# Patient Record
Sex: FEMALE | Race: WHITE | NOT HISPANIC OR LATINO | Employment: OTHER | URBAN - METROPOLITAN AREA
[De-identification: names, ages, dates, MRNs, and addresses within clinical notes are randomized per-mention and may not be internally consistent; named-entity substitution may affect disease eponyms.]

---

## 2019-04-01 ENCOUNTER — HOSPITAL ENCOUNTER (OUTPATIENT)
Dept: OTHER | Facility: HOSPITAL | Age: 59
Discharge: HOME | End: 2019-04-01
Attending: SPECIALIST

## 2019-04-01 LAB
ALBUMIN SERPL-MCNC: 3.7 G/DL (ref 3.5–5)
ALBUMIN/GLOB SERPL: 0.9 {RATIO} (ref 1.4–2.6)
ALP SERPL-CCNC: 99 U/L (ref 53–141)
ALT SERPL-CCNC: 17 U/L (ref 10–40)
ANION GAP SERPL CALC-SCNC: 17 MMOL/L (ref 8–19)
AST SERPL-CCNC: 16 U/L (ref 15–50)
BASOPHILS # BLD AUTO: 0.02 10*3/UL (ref 0–0.2)
BASOPHILS NFR BLD AUTO: 0.3 % (ref 0–3)
BILIRUB SERPL-MCNC: 0.48 MG/DL (ref 0.2–1.3)
BNP SERPL-MCNC: 1229 PG/ML (ref 0–900)
BUN SERPL-MCNC: 37 MG/DL (ref 5–25)
BUN/CREAT SERPL: 25 {RATIO} (ref 6–20)
CALCIUM SERPL-MCNC: 9.3 MG/DL (ref 8.7–10.4)
CHLORIDE SERPL-SCNC: 91 MMOL/L (ref 99–111)
CHOLEST SERPL-MCNC: 136 MG/DL (ref 107–200)
CHOLEST/HDLC SERPL: 1.8 {RATIO} (ref 3–6)
CONV ABS IMM GRAN: 0.02 10*3/UL (ref 0–0.2)
CONV CO2: 36 MMOL/L (ref 22–32)
CONV IMMATURE GRAN: 0.3 % (ref 0–1.8)
CONV TOTAL PROTEIN: 7.8 G/DL (ref 6.3–8.2)
CREAT UR-MCNC: 1.49 MG/DL (ref 0.5–0.9)
DEPRECATED RDW RBC AUTO: 53.5 FL (ref 36.4–46.3)
EOSINOPHIL # BLD AUTO: 0.23 10*3/UL (ref 0–0.7)
EOSINOPHIL # BLD AUTO: 3.2 % (ref 0–7)
ERYTHROCYTE [DISTWIDTH] IN BLOOD BY AUTOMATED COUNT: 16.7 % (ref 11.7–14.4)
GFR SERPLBLD BASED ON 1.73 SQ M-ARVRAT: 38 ML/MIN/{1.73_M2}
GLOBULIN UR ELPH-MCNC: 4.1 G/DL (ref 2–3.5)
GLUCOSE SERPL-MCNC: 147 MG/DL (ref 65–99)
HBA1C MFR BLD: 11.4 G/DL (ref 12–16)
HCT VFR BLD AUTO: 38.4 % (ref 37–47)
HDLC SERPL-MCNC: 76 MG/DL (ref 40–60)
LDLC SERPL CALC-MCNC: 50 MG/DL (ref 70–100)
LYMPHOCYTES # BLD AUTO: 1.55 10*3/UL (ref 1–5)
MCH RBC QN AUTO: 26.2 PG (ref 27–31)
MCHC RBC AUTO-ENTMCNC: 29.7 G/DL (ref 33–37)
MCV RBC AUTO: 88.3 FL (ref 81–99)
MONOCYTES # BLD AUTO: 0.61 10*3/UL (ref 0.2–1.2)
MONOCYTES NFR BLD AUTO: 8.4 % (ref 3–10)
NEUTROPHILS # BLD AUTO: 4.85 10*3/UL (ref 2–8)
NEUTROPHILS NFR BLD AUTO: 66.5 % (ref 30–85)
NRBC CBCN: 0 % (ref 0–0.7)
OSMOLALITY SERPL CALC.SUM OF ELEC: 299 MOSM/KG (ref 273–304)
PLATELET # BLD AUTO: 155 10*3/UL (ref 130–400)
PMV BLD AUTO: ABNORMAL FL (ref 9.4–12.3)
POTASSIUM SERPL-SCNC: 4.7 MMOL/L (ref 3.5–5.3)
RBC # BLD AUTO: 4.35 10*6/UL (ref 4.2–5.4)
SODIUM SERPL-SCNC: 139 MMOL/L (ref 135–147)
TRIGL SERPL-MCNC: 49 MG/DL (ref 40–150)
TSH SERPL-ACNC: 1.79 M[IU]/L (ref 0.27–4.2)
VARIANT LYMPHS NFR BLD MANUAL: 21.3 % (ref 20–45)
VLDLC SERPL-MCNC: 10 MG/DL (ref 5–37)
WBC # BLD AUTO: 7.28 10*3/UL (ref 4.8–10.8)

## 2019-04-02 ENCOUNTER — HOSPITAL ENCOUNTER (OUTPATIENT)
Dept: CARDIOLOGY | Facility: HOSPITAL | Age: 59
Discharge: HOME | End: 2019-04-02
Attending: SPECIALIST

## 2019-04-05 ENCOUNTER — HOSPITAL ENCOUNTER (OUTPATIENT)
Dept: OTHER | Facility: HOSPITAL | Age: 59
Discharge: HOME | End: 2019-04-05
Attending: SPECIALIST

## 2019-04-05 LAB
ANION GAP SERPL CALC-SCNC: 14 MMOL/L (ref 8–19)
BUN SERPL-MCNC: 33 MG/DL (ref 5–25)
BUN/CREAT SERPL: 27 {RATIO} (ref 6–20)
CALCIUM SERPL-MCNC: 8.8 MG/DL (ref 8.7–10.4)
CHLORIDE SERPL-SCNC: 94 MMOL/L (ref 99–111)
CONV CO2: 35 MMOL/L (ref 22–32)
CREAT UR-MCNC: 1.24 MG/DL (ref 0.5–0.9)
GFR SERPLBLD BASED ON 1.73 SQ M-ARVRAT: 48 ML/MIN/{1.73_M2}
GLUCOSE SERPL-MCNC: 158 MG/DL (ref 65–99)
OSMOLALITY SERPL CALC.SUM OF ELEC: 297 MOSM/KG (ref 273–304)
POTASSIUM SERPL-SCNC: 4.5 MMOL/L (ref 3.5–5.3)
SODIUM SERPL-SCNC: 138 MMOL/L (ref 135–147)

## 2019-06-02 ENCOUNTER — HOSPITAL ENCOUNTER (OUTPATIENT)
Dept: URGENT CARE | Facility: CLINIC | Age: 59
Discharge: HOME | End: 2019-06-02

## 2019-06-11 ENCOUNTER — OFFICE VISIT CONVERTED (OUTPATIENT)
Dept: GASTROENTEROLOGY | Facility: CLINIC | Age: 59
End: 2019-06-11
Attending: PHYSICIAN ASSISTANT

## 2019-06-17 ENCOUNTER — OFFICE VISIT (OUTPATIENT)
Dept: CARDIOLOGY | Facility: CLINIC | Age: 59
End: 2019-06-17

## 2019-06-17 VITALS
WEIGHT: 293 LBS | DIASTOLIC BLOOD PRESSURE: 82 MMHG | BODY MASS INDEX: 41.95 KG/M2 | SYSTOLIC BLOOD PRESSURE: 128 MMHG | HEIGHT: 70 IN | HEART RATE: 72 BPM

## 2019-06-17 DIAGNOSIS — I50.32 CHRONIC DIASTOLIC CONGESTIVE HEART FAILURE (HCC): Primary | ICD-10-CM

## 2019-06-17 DIAGNOSIS — I48.19 PERSISTENT ATRIAL FIBRILLATION (HCC): ICD-10-CM

## 2019-06-17 PROCEDURE — 99205 OFFICE O/P NEW HI 60 MIN: CPT | Performed by: INTERNAL MEDICINE

## 2019-06-17 RX ORDER — CARVEDILOL 12.5 MG/1
12.5 TABLET ORAL 2 TIMES DAILY
Refills: 0 | COMMUNITY
Start: 2019-05-24 | End: 2021-10-12 | Stop reason: SDUPTHER

## 2019-06-17 RX ORDER — LEVOTHYROXINE, LIOTHYRONINE 38; 9 UG/1; UG/1
60 TABLET ORAL DAILY
Refills: 1 | COMMUNITY
Start: 2019-06-01 | End: 2022-08-30

## 2019-06-17 RX ORDER — METOPROLOL SUCCINATE 50 MG/1
50 TABLET, EXTENDED RELEASE ORAL DAILY
Refills: 0 | COMMUNITY
Start: 2019-05-27 | End: 2019-07-03 | Stop reason: HOSPADM

## 2019-06-17 RX ORDER — LISINOPRIL 40 MG/1
40 TABLET ORAL DAILY
Refills: 1 | COMMUNITY
Start: 2019-06-02 | End: 2022-06-06

## 2019-06-17 RX ORDER — FUROSEMIDE 40 MG/1
TABLET ORAL EVERY EVENING
Refills: 0 | COMMUNITY
Start: 2019-05-22 | End: 2019-07-03 | Stop reason: HOSPADM

## 2019-06-18 NOTE — PROGRESS NOTES
Date of Office Visit: 2019  Encounter Provider: Norman Hebert MD  Place of Service: [facility] CARDIOLOGY  Patient Name: Milagros Ramirez  :1960    Chief complaint: Diastolic CHF, atrial fibrillation.  Shortness of breath and edema.    History of Present Illness:    I had the pleasure of seeing the patient in cardiology office on 2019.  She is a very pleasant 58 year-old white female with a history of morbid obesity, hypertension, diabetes, persistent atrial fibrillation, and diastolic CHF who presents for evaluation.  The patient was admitted to [facility] in [place] in May 2019 after having months of worsening dyspnea and significant abdominal and lower extremity edema.  In fact, she states that she had gained 50 pounds which was likely secondary to volume retention.  She was diuresed extensively.  She was found to be in atrial fibrillation, which was a new diagnosis.  She was unaware of the atrial fibrillation, and only occasionally felt palpitations which were mild.  She did have some expressive aphasia prior to admission in May 2019.  She was placed on Eliquis at the time of her diagnosis.  An echocardiogram had been performed on 2019 which showed an ejection fraction of 55%, normal right ventricular function, moderate left atrial enlargement, and mild mitral regurgitation.  However, the study was technically difficult.  She also had a Lexiscan nuclear stress test on 2019 which showed no ischemia or infarct, although the ejection fraction was calculated at 44% with mild global hypokinesis.  Of note, she has rheumatoid arthritis and lupus, and she is very limited in her mobility.  She also was recently diagnosed with diabetes while hospitalized.  She also has stage III chronic kidney disease.    The patient is still having significant shortness of breath with exertion.  She also has approximately 1-2+ edema of her lower  extremities, as well as abdominal distention on exam.  She has also been experiencing orthopnea.  She does state that the edema is better than when she was in the hospital previously.  Her atrial fibrillation rate is 72 today.  She is taking both carvedilol and Toprol-XL at this point.    Past Medical History:   Diagnosis Date   • Atrial fibrillation (CMS/HCC)     Diagnosed 5/2019   • CKD (chronic kidney disease)    • Diabetes (CMS/HCC)     Diagnosed 5/2019   • Diastolic CHF (CMS/HCC)    • Hyperlipidemia    • Hypertension    • Hypothyroidism    • Morbid obesity (CMS/HCC)    • Rheumatoid arthritis (CMS/HCC)    • SLE (systemic lupus erythematosus) (CMS/Formerly [facility])        Past Surgical History:   Procedure Laterality Date   • APPENDECTOMY     • HEEL SPUR EXCISION     • HERNIA REPAIR     • THYROIDECTOMY, PARTIAL         Current Outpatient Medications on File Prior to Visit   Medication Sig Dispense Refill   • carvedilol (COREG) 12.5 MG tablet Take 12.5 mg by mouth 2 (Two) Times a Day.  0   • furosemide (LASIX) 40 MG tablet Take  by mouth Every Evening.  0   • lisinopril (PRINIVIL,ZESTRIL) 40 MG tablet Take 40 mg by mouth Daily.  1   • metoprolol succinate XL (TOPROL-XL) 50 MG 24 hr tablet Take 50 mg by mouth Daily.  0   • NP THYROID 60 MG tablet Take 60 mg by mouth Daily.  1   • PROAIR  (90 Base) MCG/ACT inhaler   3     No current facility-administered medications on file prior to visit.      Allergies as of 06/17/2019   • (No Known Allergies)     Social History     Socioeconomic History   • Marital status:      Spouse name: Not on file   • Number of children: Not on file   • Years of education: Not on file   • Highest education level: Not on file   Tobacco Use   • Smoking status: Former Smoker   • Smokeless tobacco: Never Used   • Tobacco comment: Quit around age 36   Substance and Sexual Activity   • Alcohol use: Yes     Comment: Rarely   • Drug use: No     Family History   Problem Relation Age of Onset   •  "Stroke Mother    • Coronary artery disease Brother         3 brothers with stents at young ages    • Diabetes Paternal Grandfather        Review of Systems   Constitution: Positive for weight gain.   Cardiovascular: Positive for dyspnea on exertion, leg swelling and orthopnea.   Respiratory: Positive for shortness of breath and snoring.    Musculoskeletal: Positive for joint pain.   All other systems reviewed and are negative.     Objective:     Vitals:    06/17/19 0948   BP: 128/82   Pulse: 72   Weight: (!) 190 kg (419 lb)   Height: 177.8 cm (70\")     Body mass index is 60.12 kg/m².    Physical Exam   Constitutional: She is oriented to person, place, and time. She appears well-developed.   Morbidly obese   HENT:   Head: Normocephalic and atraumatic.   Eyes: Conjunctivae are normal.   Neck: Neck supple.   Cardiovascular: Normal rate. An irregularly irregular rhythm present. Exam reveals no friction rub.   No murmur heard.  Pulmonary/Chest: Effort normal. She has decreased breath sounds. She has no rales.   Abdominal: Soft. There is no tenderness.   Mildly distended, obese   Musculoskeletal:   1-2+ edema of the lower extremities bilaterally.   Neurological: She is alert and oriented to person, place, and time.   Skin: Skin is warm.   Psychiatric: She has a normal mood and affect. Her behavior is normal.     Lab Review:   Procedures    Cardiac Procedures:  1.  Echocardiogram at [facility] on 4/2/2019: Study was TDS.  The ejection fraction was 55%.  The right ventricle had normal function.  There was moderate left atrial enlargement.  There was mild mitral regurgitation.  2.  Lexiscan Cardiolite stress test at [facility] on 6/5/2019: There was no ischemia or infarct.  The ejection fraction was 44% with mild global hypokinesis.    Assessment:       Diagnosis Plan   1. Chronic diastolic congestive heart failure (CMS/HCC)     2. Persistent atrial fibrillation (CMS/HCC)       Plan:       After " reviewing the records, I feel that the patient likely has had atrial fibrillation for quite some time, and was simply unaware.  Just prior to her admission in May 2019, she did have some expressive aphasia.  I feel that the Eliquis is very important, and I would continue on this.  She does not have any issues with bleeding.  Her rate is controlled, although she is on both carvedilol and metoprolol succinate.  I am going to leave this for now, although I will likely change this in the future.  She is also taking lisinopril at 40 mg/day.  She is on Lasix at 40 mg/day, although I feel that she is going to need more diuresis.  I am going to obtain more records from [place] and review them.  However, at this point, I feel she is going to have to be readmitted at some point for more diuresis intravenously.  She still appears to have a significant amount of fluid weight on board.  Again, she was just diagnosed with diabetes in May 2019 as well.  She also has rheumatoid arthritis and lupus, and is very limited in her mobility.  I also feel that her weight plays a role in her symptoms, although this is going to be difficult for her given her mobility issues.  I also feel that she needs a sleep study in the future, as she almost certainly has sleep apnea.  If treated, this could make all of her symptoms better as well.    I spent greater than 1 hour in the care of the patient.  This included reviewing extensive outside records, as well as in direct patient care.  Greater than 50% of this time was spent in face-to-face counseling with the patient regarding her congestive heart failure and atrial fibrillation treatment and diagnosis.    Atrial Fibrillation and Atrial Flutter  Assessment  • The patient has persistent atrial fibrillation  • This is non-valvular in etiology  • The patient's CHADS2-VASc score is 3  • A [id number] score of 2 or more is considered a high risk for a thromboembolic event  • Apixaban  prescribed    Plan  • Continue in atrial fibrillation with rate control  • Continue apixaban for antithrombotic therapy, bleeding issues discussed  • Continue beta blocker for rate control    Subjective - Objective  • The average duration of atrial fibrillation episodes is >7 days to 3 months      Heart Failure  Assessment  • NYHA class II - There is slight limitation of physical activity. The patient is comfortable at rest, but physical activity results in fatigue, palpitations or shortness of breath.  • The patient was newly diagnosed with heart failure within the past 12 months  • ACE inhibitor prescribed  • Beta blocker prescribed  • Diuretics prescribed  • The most recent ejection fraction is 55%  • Left ventricular function is normal by qualitative assessment  • The left ventricle was last assessed on 4/2/2019    Plan  • The patient has received heart failure education on the following topics: dietary sodium restriction, medication instructions, minimizing or avoiding NSAID use, symptom management, physical activity, weight monitoring and minimizing alcohol intake  • The heart failure care plan was discussed with the patient today including: continuing the current program, up-titrating HF medications and lifestyle modifications  •  The patient was not counseled about ICD or CRT-D implantation    Subjective/Objective  • The patient reports dyspnea and orthopnea  • Physical exam findings positive for peripheral edema.   • Physical exam findings negative for rales.

## 2019-06-20 ENCOUNTER — HOSPITAL ENCOUNTER (OUTPATIENT)
Dept: OTHER | Facility: HOSPITAL | Age: 59
Discharge: HOME | End: 2019-06-20

## 2019-06-20 LAB
25(OH)D3 SERPL-MCNC: 30 NG/ML (ref 30–100)
ALBUMIN SERPL-MCNC: 3.8 G/DL (ref 3.5–5)
ALBUMIN/GLOB SERPL: 1 {RATIO} (ref 1.4–2.6)
ALP SERPL-CCNC: 110 U/L (ref 53–141)
ALT SERPL-CCNC: 16 U/L (ref 10–40)
ANION GAP SERPL CALC-SCNC: 16 MMOL/L (ref 8–19)
APPEARANCE UR: CLEAR
AST SERPL-CCNC: 18 U/L (ref 15–50)
BILIRUB SERPL-MCNC: 0.64 MG/DL (ref 0.2–1.3)
BILIRUB UR QL: NEGATIVE
BUN SERPL-MCNC: 20 MG/DL (ref 5–25)
BUN/CREAT SERPL: 17 {RATIO} (ref 6–20)
CALCIUM SERPL-MCNC: 9.1 MG/DL (ref 8.7–10.4)
CHLORIDE SERPL-SCNC: 93 MMOL/L (ref 99–111)
CHOLEST SERPL-MCNC: 102 MG/DL (ref 107–200)
CHOLEST/HDLC SERPL: 2 {RATIO} (ref 3–6)
CK SERPL-CCNC: 31 U/L (ref 35–230)
COLOR UR: YELLOW
CONV BACTERIA: NEGATIVE
CONV CO2: 35 MMOL/L (ref 22–32)
CONV COLLECTION SOURCE (UA): ABNORMAL
CONV CREATININE URINE, RANDOM: 74.9 MG/DL (ref 10–300)
CONV MICROALBUM.,U,RANDOM: 73.8 MG/L (ref 0–20)
CONV TOTAL PROTEIN: 7.8 G/DL (ref 6.3–8.2)
CONV UROBILINOGEN IN URINE BY AUTOMATED TEST STRIP: 1 {EHRLICHU}/DL (ref 0.1–1)
CREAT UR-MCNC: 1.17 MG/DL (ref 0.5–0.9)
CRP SERPL HS-MCNC: 0.77 MG/DL (ref 0–0.5)
EST. AVERAGE GLUCOSE BLD GHB EST-MCNC: 143 MG/DL
FOLATE SERPL-MCNC: 13.4 NG/ML (ref 4.8–20)
GFR SERPLBLD BASED ON 1.73 SQ M-ARVRAT: 51 ML/MIN/{1.73_M2}
GLOBULIN UR ELPH-MCNC: 4 G/DL (ref 2–3.5)
GLUCOSE SERPL-MCNC: 132 MG/DL (ref 65–99)
GLUCOSE UR QL: NEGATIVE MG/DL
HBA1C MFR BLD: 6.6 % (ref 3.5–5.7)
HDLC SERPL-MCNC: 50 MG/DL (ref 40–60)
HGB UR QL STRIP: ABNORMAL
IRON SATN MFR SERPL: 11 % (ref 20–55)
IRON SERPL-MCNC: 48 UG/DL (ref 60–170)
KETONES UR QL STRIP: NEGATIVE MG/DL
LDLC SERPL CALC-MCNC: 42 MG/DL (ref 70–100)
LEUKOCYTE ESTERASE UR QL STRIP: ABNORMAL
MICROALBUMIN/CREAT UR: 98.5 MG/G{CRE} (ref 0–35)
NITRITE UR QL STRIP: NEGATIVE
OSMOLALITY SERPL CALC.SUM OF ELEC: 292 MOSM/KG (ref 273–304)
PH UR STRIP.AUTO: 6.5 [PH] (ref 5–8)
POTASSIUM SERPL-SCNC: 4.8 MMOL/L (ref 3.5–5.3)
PROT UR QL: NEGATIVE MG/DL
RBC #/AREA URNS HPF: ABNORMAL /[HPF]
SODIUM SERPL-SCNC: 139 MMOL/L (ref 135–147)
SP GR UR: 1.01 (ref 1–1.03)
SQUAMOUS SPT QL MICRO: ABNORMAL /[HPF]
T4 FREE SERPL-MCNC: 1.3 NG/DL (ref 0.9–1.8)
TIBC SERPL-MCNC: 432 UG/DL (ref 245–450)
TRANSFERRIN SERPL-MCNC: 302 MG/DL (ref 250–380)
TRIGL SERPL-MCNC: 48 MG/DL (ref 40–150)
TSH SERPL-ACNC: 2.08 M[IU]/L (ref 0.27–4.2)
VIT B12 SERPL-MCNC: 1039 PG/ML (ref 211–911)
VLDLC SERPL-MCNC: 10 MG/DL (ref 5–37)
WBC #/AREA URNS HPF: ABNORMAL /[HPF]

## 2019-06-21 ENCOUNTER — HOSPITAL ENCOUNTER (OUTPATIENT)
Dept: OTHER | Facility: HOSPITAL | Age: 59
Discharge: HOME | End: 2019-06-21
Attending: PHYSICIAN ASSISTANT

## 2019-06-21 LAB
AMMONIA PLAS-MCNC: 22 UMOL/L (ref 11–51)
BASOPHILS # BLD AUTO: 0.02 10*3/UL (ref 0–0.2)
BASOPHILS NFR BLD AUTO: 0.4 % (ref 0–3)
BNP SERPL-MCNC: 2543 PG/ML (ref 0–900)
CONV ABS IMM GRAN: 0.02 10*3/UL (ref 0–0.2)
CONV IMMATURE GRAN: 0.4 % (ref 0–1.8)
DEPRECATED RDW RBC AUTO: 63.6 FL (ref 36.4–46.3)
EOSINOPHIL # BLD AUTO: 0.13 10*3/UL (ref 0–0.7)
EOSINOPHIL # BLD AUTO: 2.4 % (ref 0–7)
ERYTHROCYTE [DISTWIDTH] IN BLOOD BY AUTOMATED COUNT: 20.2 % (ref 11.7–14.4)
HBA1C MFR BLD: 11.1 G/DL (ref 12–16)
HCT VFR BLD AUTO: 38.2 % (ref 37–47)
INR PPP: 1.26 (ref 2–3)
LYMPHOCYTES # BLD AUTO: 1.03 10*3/UL (ref 1–5)
MCH RBC QN AUTO: 25.9 PG (ref 27–31)
MCHC RBC AUTO-ENTMCNC: 29.1 G/DL (ref 33–37)
MCV RBC AUTO: 89 FL (ref 81–99)
MONOCYTES # BLD AUTO: 0.59 10*3/UL (ref 0.2–1.2)
MONOCYTES NFR BLD AUTO: 10.7 % (ref 3–10)
NEUTROPHILS # BLD AUTO: 3.72 10*3/UL (ref 2–8)
NEUTROPHILS NFR BLD AUTO: 67.4 % (ref 30–85)
NRBC CBCN: 0 % (ref 0–0.7)
PLATELET # BLD AUTO: 133 10*3/UL (ref 130–400)
PMV BLD AUTO: 13.3 FL (ref 9.4–12.3)
PROTHROMBIN TIME: 12.6 S (ref 9.4–12)
RBC # BLD AUTO: 4.29 10*6/UL (ref 4.2–5.4)
VARIANT LYMPHS NFR BLD MANUAL: 18.7 % (ref 20–45)
WBC # BLD AUTO: 5.51 10*3/UL (ref 4.8–10.8)

## 2019-06-22 LAB — T3 SERPL-MCNC: 92 NG/DL (ref 71–180)

## 2019-06-26 ENCOUNTER — TELEPHONE (OUTPATIENT)
Dept: CARDIOLOGY | Facility: CLINIC | Age: 59
End: 2019-06-26

## 2019-06-26 NOTE — TELEPHONE ENCOUNTER
06/26/19  4:13 PM      Patient was notified of need for admission tomorrow.  She is agreeable.  I informed her that if her shortness of breath worsened overnight to please come to the [facility] emergency department and we would admit her from there.    MARILYNN Torres  [facility]

## 2019-06-26 NOTE — TELEPHONE ENCOUNTER
06/26/19  2:50 PM    Called patient she states she was seen by pulmonologist and Randell yesterday.  States that she had PFTs done which revealed pulse ox in the 80s.  She reports that pulmonologist wanted her to go to [facility] for admission yesterday.  She states that he told her her carotid arteries were dilated (questionable JVD) and that she needed IV diuretics.  Patient stated that she would prefer to be admitted at [facility] since Dr. Hebert is here.  She would like to know whether she should come up here for admission or if she should wait until 7/1 as recommended by Dr. Hebert earlier.    Dr. Hebert, do you want her to come up to be admitted now or can she wait until Monday?  She sounded very winded on the phone.  Sounds like she could be having worsening heart failure.      MARILYNN Torres  [facility]

## 2019-06-26 NOTE — TELEPHONE ENCOUNTER
Went to pulmonologist  Yesterday and he wanted to send the patient to the hospital she did not go. She wanted to speak to you first before going to the hospital with you having already seeing the patient.    Pt # [phone]    Thank you  Christal BUTLER

## 2019-06-27 ENCOUNTER — HOSPITAL ENCOUNTER (INPATIENT)
Facility: HOSPITAL | Age: 59
LOS: 6 days | Discharge: HOME | End: 2019-07-03
Attending: INTERNAL MEDICINE | Admitting: INTERNAL MEDICINE

## 2019-06-27 DIAGNOSIS — J96.01 ACUTE RESPIRATORY FAILURE WITH HYPOXIA AND HYPERCAPNIA (HCC): Primary | ICD-10-CM

## 2019-06-27 DIAGNOSIS — I50.33 ACUTE ON CHRONIC DIASTOLIC CONGESTIVE HEART FAILURE (HCC): ICD-10-CM

## 2019-06-27 DIAGNOSIS — J96.02 ACUTE RESPIRATORY FAILURE WITH HYPOXIA AND HYPERCAPNIA (HCC): Primary | ICD-10-CM

## 2019-06-27 DIAGNOSIS — J98.01 BRONCHOSPASM: ICD-10-CM

## 2019-06-27 DIAGNOSIS — J96.01 ACUTE RESPIRATORY FAILURE WITH HYPOXIA (HCC): ICD-10-CM

## 2019-06-27 LAB
ALBUMIN SERPL-MCNC: 3.6 G/DL (ref 3.5–5.2)
ALBUMIN/GLOB SERPL: 0.9 G/DL
ALP SERPL-CCNC: 114 U/L (ref 39–117)
ALT SERPL W P-5'-P-CCNC: 14 U/L (ref 1–33)
ANION GAP SERPL CALCULATED.3IONS-SCNC: 9 MMOL/L (ref 5–15)
AST SERPL-CCNC: 17 U/L (ref 1–32)
BILIRUB SERPL-MCNC: 0.6 MG/DL (ref 0.2–1.2)
BUN BLD-MCNC: 28 MG/DL (ref 6–20)
BUN/CREAT SERPL: 22.6 (ref 7–25)
CALCIUM SPEC-SCNC: 8.7 MG/DL (ref 8.6–10.5)
CHLORIDE SERPL-SCNC: 96 MMOL/L (ref 98–107)
CO2 SERPL-SCNC: 35 MMOL/L (ref 22–29)
CREAT BLD-MCNC: 1.24 MG/DL (ref 0.57–1)
DEPRECATED RDW RBC AUTO: 66.2 FL (ref 37–54)
ERYTHROCYTE [DISTWIDTH] IN BLOOD BY AUTOMATED COUNT: 20.2 % (ref 12.3–15.4)
GFR SERPL CREATININE-BSD FRML MDRD: 45 ML/MIN/1.73
GLOBULIN UR ELPH-MCNC: 3.8 GM/DL
GLUCOSE BLD-MCNC: 109 MG/DL (ref 65–99)
HCT VFR BLD AUTO: 39.2 % (ref 34–46.6)
HGB BLD-MCNC: 10.9 G/DL (ref 12–15.9)
MCH RBC QN AUTO: 25.1 PG (ref 26.6–33)
MCHC RBC AUTO-ENTMCNC: 27.8 G/DL (ref 31.5–35.7)
MCV RBC AUTO: 90.3 FL (ref 79–97)
NT-PROBNP SERPL-MCNC: 2246 PG/ML (ref 5–900)
PLATELET # BLD AUTO: 160 10*3/MM3 (ref 140–450)
PMV BLD AUTO: 12.3 FL (ref 6–12)
POTASSIUM BLD-SCNC: 4.6 MMOL/L (ref 3.5–5.2)
PROT SERPL-MCNC: 7.4 G/DL (ref 6–8.5)
RBC # BLD AUTO: 4.34 10*6/MM3 (ref 3.77–5.28)
SODIUM BLD-SCNC: 140 MMOL/L (ref 136–145)
WBC NRBC COR # BLD: 5.2 10*3/MM3 (ref 3.4–10.8)

## 2019-06-27 PROCEDURE — 99219 PR INITIAL OBSERVATION CARE/DAY 50 MINUTES: CPT | Performed by: NURSE PRACTITIONER

## 2019-06-27 PROCEDURE — 85027 COMPLETE CBC AUTOMATED: CPT | Performed by: NURSE PRACTITIONER

## 2019-06-27 PROCEDURE — 80053 COMPREHEN METABOLIC PANEL: CPT | Performed by: NURSE PRACTITIONER

## 2019-06-27 PROCEDURE — 83880 ASSAY OF NATRIURETIC PEPTIDE: CPT | Performed by: NURSE PRACTITIONER

## 2019-06-27 PROCEDURE — G0378 HOSPITAL OBSERVATION PER HR: HCPCS

## 2019-06-27 RX ORDER — METOPROLOL SUCCINATE 50 MG/1
50 TABLET, EXTENDED RELEASE ORAL DAILY
Status: DISCONTINUED | OUTPATIENT
Start: 2019-06-27 | End: 2019-06-27

## 2019-06-27 RX ORDER — POTASSIUM CHLORIDE 750 MG/1
40 CAPSULE, EXTENDED RELEASE ORAL AS NEEDED
Status: DISCONTINUED | OUTPATIENT
Start: 2019-06-27 | End: 2019-07-03 | Stop reason: HOSPADM

## 2019-06-27 RX ORDER — ONDANSETRON 2 MG/ML
4 INJECTION INTRAMUSCULAR; INTRAVENOUS EVERY 6 HOURS PRN
Status: DISCONTINUED | OUTPATIENT
Start: 2019-06-27 | End: 2019-07-03 | Stop reason: HOSPADM

## 2019-06-27 RX ORDER — LEVOTHYROXINE AND LIOTHYRONINE 38; 9 UG/1; UG/1
60 TABLET ORAL DAILY
Status: DISCONTINUED | OUTPATIENT
Start: 2019-06-27 | End: 2019-07-03 | Stop reason: HOSPADM

## 2019-06-27 RX ORDER — ACETAMINOPHEN 325 MG/1
650 TABLET ORAL EVERY 4 HOURS PRN
Status: DISCONTINUED | OUTPATIENT
Start: 2019-06-27 | End: 2019-07-03 | Stop reason: HOSPADM

## 2019-06-27 RX ORDER — SODIUM CHLORIDE 0.9 % (FLUSH) 0.9 %
3-10 SYRINGE (ML) INJECTION AS NEEDED
Status: DISCONTINUED | OUTPATIENT
Start: 2019-06-27 | End: 2019-07-03 | Stop reason: HOSPADM

## 2019-06-27 RX ORDER — ALBUTEROL SULFATE 2.5 MG/3ML
2.5 SOLUTION RESPIRATORY (INHALATION) EVERY 6 HOURS PRN
Status: DISCONTINUED | OUTPATIENT
Start: 2019-06-27 | End: 2019-06-28

## 2019-06-27 RX ORDER — SODIUM CHLORIDE 0.9 % (FLUSH) 0.9 %
3 SYRINGE (ML) INJECTION EVERY 12 HOURS SCHEDULED
Status: DISCONTINUED | OUTPATIENT
Start: 2019-06-27 | End: 2019-07-03 | Stop reason: HOSPADM

## 2019-06-27 RX ORDER — LISINOPRIL 40 MG/1
40 TABLET ORAL DAILY
Status: DISCONTINUED | OUTPATIENT
Start: 2019-06-27 | End: 2019-07-03 | Stop reason: HOSPADM

## 2019-06-27 RX ORDER — ROSUVASTATIN CALCIUM 10 MG/1
10 TABLET, COATED ORAL DAILY
COMMUNITY
End: 2022-12-16 | Stop reason: SDUPTHER

## 2019-06-27 RX ORDER — CARVEDILOL 12.5 MG/1
12.5 TABLET ORAL 2 TIMES DAILY
Status: DISCONTINUED | OUTPATIENT
Start: 2019-06-27 | End: 2019-07-03 | Stop reason: HOSPADM

## 2019-06-27 RX ORDER — BUMETANIDE 0.25 MG/ML
2 INJECTION INTRAMUSCULAR; INTRAVENOUS 3 TIMES DAILY
Status: DISCONTINUED | OUTPATIENT
Start: 2019-06-27 | End: 2019-07-01

## 2019-06-27 RX ORDER — ROSUVASTATIN CALCIUM 10 MG/1
10 TABLET, COATED ORAL DAILY
Status: DISCONTINUED | OUTPATIENT
Start: 2019-06-27 | End: 2019-07-03 | Stop reason: HOSPADM

## 2019-06-27 RX ORDER — NITROGLYCERIN 0.4 MG/1
0.4 TABLET SUBLINGUAL
Status: DISCONTINUED | OUTPATIENT
Start: 2019-06-27 | End: 2019-07-03 | Stop reason: HOSPADM

## 2019-06-27 RX ORDER — POTASSIUM CHLORIDE 1.5 G/1.77G
40 POWDER, FOR SOLUTION ORAL AS NEEDED
Status: DISCONTINUED | OUTPATIENT
Start: 2019-06-27 | End: 2019-07-03 | Stop reason: HOSPADM

## 2019-06-27 RX ADMIN — SODIUM CHLORIDE, PRESERVATIVE FREE 3 ML: 5 INJECTION INTRAVENOUS at 20:23

## 2019-06-27 RX ADMIN — APIXABAN 5 MG: 5 TABLET, FILM COATED ORAL at 20:23

## 2019-06-27 RX ADMIN — BUMETANIDE 2 MG: 0.25 INJECTION INTRAMUSCULAR; INTRAVENOUS at 20:22

## 2019-06-27 RX ADMIN — LISINOPRIL 40 MG: 40 TABLET ORAL at 20:23

## 2019-06-27 RX ADMIN — ROSUVASTATIN CALCIUM 10 MG: 10 TABLET, FILM COATED ORAL at 20:22

## 2019-06-27 RX ADMIN — CARVEDILOL 12.5 MG: 12.5 TABLET, FILM COATED ORAL at 20:23

## 2019-06-28 ENCOUNTER — APPOINTMENT (OUTPATIENT)
Dept: GENERAL RADIOLOGY | Facility: HOSPITAL | Age: 59
End: 2019-06-28

## 2019-06-28 ENCOUNTER — APPOINTMENT (OUTPATIENT)
Dept: CT IMAGING | Facility: HOSPITAL | Age: 59
End: 2019-06-28

## 2019-06-28 PROBLEM — I50.9 CHF (CONGESTIVE HEART FAILURE): Status: ACTIVE | Noted: 2019-06-28

## 2019-06-28 LAB
ANION GAP SERPL CALCULATED.3IONS-SCNC: 10.5 MMOL/L (ref 5–15)
ARTERIAL PATENCY WRIST A: POSITIVE
ATMOSPHERIC PRESS: 754 MMHG
ATMOSPHERIC PRESS: 754.8 MMHG
ATMOSPHERIC PRESS: 755.1 MMHG
BASE EXCESS BLDA CALC-SCNC: 5.6 MMOL/L (ref 0–2)
BASE EXCESS BLDA CALC-SCNC: 9 MMOL/L (ref 0–2)
BASE EXCESS BLDA CALC-SCNC: 9.7 MMOL/L (ref 0–2)
BDY SITE: ABNORMAL
BUN BLD-MCNC: 29 MG/DL (ref 6–20)
BUN/CREAT SERPL: 22.3 (ref 7–25)
CALCIUM SPEC-SCNC: 8.7 MG/DL (ref 8.6–10.5)
CHLORIDE SERPL-SCNC: 96 MMOL/L (ref 98–107)
CHOLEST SERPL-MCNC: 95 MG/DL (ref 0–200)
CO2 SERPL-SCNC: 32.5 MMOL/L (ref 22–29)
CREAT BLD-MCNC: 1.3 MG/DL (ref 0.57–1)
GAS FLOW AIRWAY: 4 LPM
GFR SERPL CREATININE-BSD FRML MDRD: 42 ML/MIN/1.73
GLUCOSE BLD-MCNC: 109 MG/DL (ref 65–99)
HBA1C MFR BLD: 6.5 % (ref 4.8–5.6)
HCO3 BLDA-SCNC: 35 MMOL/L (ref 22–28)
HCO3 BLDA-SCNC: 40.4 MMOL/L (ref 22–28)
HCO3 BLDA-SCNC: 40.8 MMOL/L (ref 22–28)
HDLC SERPL-MCNC: 52 MG/DL (ref 40–60)
HOROWITZ INDEX BLD+IHG-RTO: 40 %
HOROWITZ INDEX BLD+IHG-RTO: 40 %
LDLC SERPL CALC-MCNC: 32 MG/DL (ref 0–100)
LDLC/HDLC SERPL: 0.62 {RATIO}
MODALITY: ABNORMAL
O2 A-A PPRESDIFF RESPIRATORY: 0.4 MMHG
O2 A-A PPRESDIFF RESPIRATORY: 0.4 MMHG
PCO2 BLDA: 75.8 MM HG (ref 35–45)
PCO2 BLDA: 93.9 MM HG (ref 35–45)
PCO2 BLDA: 96.6 MM HG (ref 35–45)
PH BLDA: 7.23 PH UNITS (ref 7.35–7.45)
PH BLDA: 7.25 PH UNITS (ref 7.35–7.45)
PH BLDA: 7.27 PH UNITS (ref 7.35–7.45)
PO2 BLDA: 64 MM HG (ref 80–100)
PO2 BLDA: 64.1 MM HG (ref 80–100)
PO2 BLDA: 66.5 MM HG (ref 80–100)
POTASSIUM BLD-SCNC: 4.9 MMOL/L (ref 3.5–5.2)
SAO2 % BLDCOA: 85.4 % (ref 92–99)
SAO2 % BLDCOA: 87.3 % (ref 92–99)
SAO2 % BLDCOA: 87.6 % (ref 92–99)
SET MECH RESP RATE: 16
SET MECH RESP RATE: 25
SODIUM BLD-SCNC: 139 MMOL/L (ref 136–145)
TOTAL RATE: 19 BREATHS/MINUTE
TOTAL RATE: 20 BREATHS/MINUTE
TOTAL RATE: 26 BREATHS/MINUTE
TRIGL SERPL-MCNC: 53 MG/DL (ref 0–150)
TSH SERPL DL<=0.05 MIU/L-ACNC: 1.9 MIU/ML (ref 0.27–4.2)
VENTILATOR MODE: ABNORMAL
VLDLC SERPL-MCNC: 10.6 MG/DL (ref 5–40)
VT ON VENT VENT: 451 ML

## 2019-06-28 PROCEDURE — 83036 HEMOGLOBIN GLYCOSYLATED A1C: CPT | Performed by: NURSE PRACTITIONER

## 2019-06-28 PROCEDURE — 94799 UNLISTED PULMONARY SVC/PX: CPT

## 2019-06-28 PROCEDURE — 25010000002 METHYLPREDNISOLONE PER 125 MG: Performed by: INTERNAL MEDICINE

## 2019-06-28 PROCEDURE — 99233 SBSQ HOSP IP/OBS HIGH 50: CPT | Performed by: INTERNAL MEDICINE

## 2019-06-28 PROCEDURE — 94640 AIRWAY INHALATION TREATMENT: CPT

## 2019-06-28 PROCEDURE — 36600 WITHDRAWAL OF ARTERIAL BLOOD: CPT

## 2019-06-28 PROCEDURE — 82803 BLOOD GASES ANY COMBINATION: CPT

## 2019-06-28 PROCEDURE — 94660 CPAP INITIATION&MGMT: CPT

## 2019-06-28 PROCEDURE — 71046 X-RAY EXAM CHEST 2 VIEWS: CPT

## 2019-06-28 PROCEDURE — 80048 BASIC METABOLIC PNL TOTAL CA: CPT | Performed by: NURSE PRACTITIONER

## 2019-06-28 PROCEDURE — 84443 ASSAY THYROID STIM HORMONE: CPT | Performed by: NURSE PRACTITIONER

## 2019-06-28 PROCEDURE — 80061 LIPID PANEL: CPT | Performed by: NURSE PRACTITIONER

## 2019-06-28 PROCEDURE — 71250 CT THORAX DX C-: CPT

## 2019-06-28 PROCEDURE — 5A09357 ASSISTANCE WITH RESPIRATORY VENTILATION, LESS THAN 24 CONSECUTIVE HOURS, CONTINUOUS POSITIVE AIRWAY PRESSURE: ICD-10-PCS | Performed by: INTERNAL MEDICINE

## 2019-06-28 RX ORDER — METHYLPREDNISOLONE SODIUM SUCCINATE 125 MG/2ML
60 INJECTION, POWDER, LYOPHILIZED, FOR SOLUTION INTRAMUSCULAR; INTRAVENOUS ONCE
Status: COMPLETED | OUTPATIENT
Start: 2019-06-28 | End: 2019-06-28

## 2019-06-28 RX ORDER — IPRATROPIUM BROMIDE AND ALBUTEROL SULFATE 2.5; .5 MG/3ML; MG/3ML
3 SOLUTION RESPIRATORY (INHALATION)
Status: DISCONTINUED | OUTPATIENT
Start: 2019-06-28 | End: 2019-06-29

## 2019-06-28 RX ORDER — IPRATROPIUM BROMIDE AND ALBUTEROL SULFATE 2.5; .5 MG/3ML; MG/3ML
3 SOLUTION RESPIRATORY (INHALATION)
Status: DISCONTINUED | OUTPATIENT
Start: 2019-06-28 | End: 2019-07-03 | Stop reason: HOSPADM

## 2019-06-28 RX ORDER — IPRATROPIUM BROMIDE AND ALBUTEROL SULFATE 2.5; .5 MG/3ML; MG/3ML
SOLUTION RESPIRATORY (INHALATION)
Status: COMPLETED
Start: 2019-06-28 | End: 2019-06-28

## 2019-06-28 RX ADMIN — ROSUVASTATIN CALCIUM 10 MG: 10 TABLET, FILM COATED ORAL at 08:18

## 2019-06-28 RX ADMIN — APIXABAN 5 MG: 5 TABLET, FILM COATED ORAL at 08:18

## 2019-06-28 RX ADMIN — CARVEDILOL 12.5 MG: 12.5 TABLET, FILM COATED ORAL at 08:18

## 2019-06-28 RX ADMIN — IPRATROPIUM BROMIDE AND ALBUTEROL SULFATE 3 ML: 2.5; .5 SOLUTION RESPIRATORY (INHALATION) at 23:57

## 2019-06-28 RX ADMIN — BUMETANIDE 2 MG: 0.25 INJECTION INTRAMUSCULAR; INTRAVENOUS at 21:22

## 2019-06-28 RX ADMIN — BUMETANIDE 2 MG: 0.25 INJECTION INTRAMUSCULAR; INTRAVENOUS at 16:27

## 2019-06-28 RX ADMIN — SODIUM CHLORIDE, PRESERVATIVE FREE 3 ML: 5 INJECTION INTRAVENOUS at 22:02

## 2019-06-28 RX ADMIN — APIXABAN 5 MG: 5 TABLET, FILM COATED ORAL at 21:22

## 2019-06-28 RX ADMIN — SODIUM CHLORIDE, PRESERVATIVE FREE 3 ML: 5 INJECTION INTRAVENOUS at 08:18

## 2019-06-28 RX ADMIN — CARVEDILOL 12.5 MG: 12.5 TABLET, FILM COATED ORAL at 21:21

## 2019-06-28 RX ADMIN — IPRATROPIUM BROMIDE AND ALBUTEROL SULFATE 3 ML: 2.5; .5 SOLUTION RESPIRATORY (INHALATION) at 19:42

## 2019-06-28 RX ADMIN — THYROID, PORCINE 60 MG: 60 TABLET ORAL at 08:18

## 2019-06-28 RX ADMIN — BUMETANIDE 2 MG: 0.25 INJECTION INTRAMUSCULAR; INTRAVENOUS at 08:18

## 2019-06-28 RX ADMIN — METHYLPREDNISOLONE SODIUM SUCCINATE 60 MG: 125 INJECTION, POWDER, FOR SOLUTION INTRAMUSCULAR; INTRAVENOUS at 23:48

## 2019-06-28 RX ADMIN — IPRATROPIUM BROMIDE AND ALBUTEROL SULFATE 3 ML: 2.5; .5 SOLUTION RESPIRATORY (INHALATION) at 23:56

## 2019-06-28 RX ADMIN — LISINOPRIL 40 MG: 40 TABLET ORAL at 08:18

## 2019-06-29 ENCOUNTER — APPOINTMENT (OUTPATIENT)
Dept: CARDIOLOGY | Facility: HOSPITAL | Age: 59
End: 2019-06-29

## 2019-06-29 PROBLEM — E66.2 EXTREME OBESITY WITH ALVEOLAR HYPOVENTILATION: Status: ACTIVE | Noted: 2019-06-29

## 2019-06-29 LAB
ANION GAP SERPL CALCULATED.3IONS-SCNC: 9.2 MMOL/L (ref 5–15)
AORTIC DIMENSIONLESS INDEX: 0.7 (DI)
ARTERIAL PATENCY WRIST A: POSITIVE
ATMOSPHERIC PRESS: 759.9 MMHG
B PARAPERT DNA SPEC QL NAA+PROBE: NOT DETECTED
B PERT DNA SPEC QL NAA+PROBE: NOT DETECTED
BASE EXCESS BLDA CALC-SCNC: 7.4 MMOL/L (ref 0–2)
BDY SITE: ABNORMAL
BH CV ECHO MEAS - ACS: 2.2 CM
BH CV ECHO MEAS - AO MEAN PG (FULL): 3 MMHG
BH CV ECHO MEAS - AO MEAN PG: 5 MMHG
BH CV ECHO MEAS - AO ROOT AREA: 7.1 CM^2
BH CV ECHO MEAS - AO ROOT DIAM: 3 CM
BH CV ECHO MEAS - AO V2 MAX: 151 CM/SEC
BH CV ECHO MEAS - AO V2 MEAN: 108 CM/SEC
BH CV ECHO MEAS - AO V2 VTI: 26.3 CM
BH CV ECHO MEAS - AVA(I,A): 2.2 CM^2
BH CV ECHO MEAS - AVA(I,D): 2.2 CM^2
BH CV ECHO MEAS - EDV(CUBED): 148.9 ML
BH CV ECHO MEAS - EDV(MOD-SP2): 197 ML
BH CV ECHO MEAS - EDV(MOD-SP4): 196 ML
BH CV ECHO MEAS - EDV(TEICH): 135.3 ML
BH CV ECHO MEAS - EF(CUBED): 83.6 %
BH CV ECHO MEAS - EF(MOD-BP): 62 %
BH CV ECHO MEAS - EF(MOD-SP2): 67.5 %
BH CV ECHO MEAS - EF(MOD-SP4): 62.2 %
BH CV ECHO MEAS - EF(TEICH): 76.2 %
BH CV ECHO MEAS - ESV(CUBED): 24.4 ML
BH CV ECHO MEAS - ESV(MOD-SP2): 64 ML
BH CV ECHO MEAS - ESV(MOD-SP4): 74 ML
BH CV ECHO MEAS - ESV(TEICH): 32.2 ML
BH CV ECHO MEAS - FS: 45.3 %
BH CV ECHO MEAS - IVS/LVPW: 1
BH CV ECHO MEAS - IVSD: 1.2 CM
BH CV ECHO MEAS - LA A2CS (ATRIAL LENGTH): 5 CM
BH CV ECHO MEAS - LA DIMENSION(2D): 24 CM
BH CV ECHO MEAS - LA DIMENSION: 6 CM
BH CV ECHO MEAS - LAT PEAK E' VEL: 13 CM/SEC
BH CV ECHO MEAS - LV MASS(C)D: 256.6 GRAMS
BH CV ECHO MEAS - LV MEAN PG: 2 MMHG
BH CV ECHO MEAS - LV V1 MAX: 113 CM/SEC
BH CV ECHO MEAS - LV V1 MEAN: 70.1 CM/SEC
BH CV ECHO MEAS - LV V1 VTI: 18.4 CM
BH CV ECHO MEAS - LVIDD: 5.3 CM
BH CV ECHO MEAS - LVIDS: 2.9 CM
BH CV ECHO MEAS - LVLD AP2: 9 CM
BH CV ECHO MEAS - LVLD AP4: 8.8 CM
BH CV ECHO MEAS - LVLS AP2: 6.9 CM
BH CV ECHO MEAS - LVLS AP4: 7.4 CM
BH CV ECHO MEAS - LVOT AREA (M): 3.1 CM^2
BH CV ECHO MEAS - LVOT AREA: 3.1 CM^2
BH CV ECHO MEAS - LVOT DIAM: 2 CM
BH CV ECHO MEAS - LVPWD: 1.2 CM
BH CV ECHO MEAS - MED PEAK E' VEL: 9 CM/SEC
BH CV ECHO MEAS - MV DEC SLOPE: 484 CM/SEC^2
BH CV ECHO MEAS - MV DEC TIME: 211 MSEC
BH CV ECHO MEAS - MV E MAX VEL: 114 CM/SEC
BH CV ECHO MEAS - MV MEAN PG: 3 MMHG
BH CV ECHO MEAS - MV P1/2T MAX VEL: 129 CM/SEC
BH CV ECHO MEAS - MV P1/2T: 78.1 MSEC
BH CV ECHO MEAS - MV V2 MEAN: 70.7 CM/SEC
BH CV ECHO MEAS - MV V2 VTI: 22.6 CM
BH CV ECHO MEAS - MVA P1/2T LCG: 1.7 CM^2
BH CV ECHO MEAS - MVA(P1/2T): 2.8 CM^2
BH CV ECHO MEAS - MVA(VTI): 2.6 CM^2
BH CV ECHO MEAS - PA ACC SLOPE: 1377 CM/SEC^2
BH CV ECHO MEAS - PA ACC TIME: 0.1 SEC
BH CV ECHO MEAS - PA MAX PG (FULL): 3.8 MMHG
BH CV ECHO MEAS - PA MAX PG: 7.4 MMHG
BH CV ECHO MEAS - PA PR(ACCEL): 34.5 MMHG
BH CV ECHO MEAS - PA V2 MAX: 136 CM/SEC
BH CV ECHO MEAS - PVA(V,A): 2.9 CM^2
BH CV ECHO MEAS - PVA(V,D): 2.9 CM^2
BH CV ECHO MEAS - QP/QS: 1.2
BH CV ECHO MEAS - RAP SYSTOLE: 8 MMHG
BH CV ECHO MEAS - RV MAX PG: 3.6 MMHG
BH CV ECHO MEAS - RV MEAN PG: 2 MMHG
BH CV ECHO MEAS - RV V1 MAX: 95.2 CM/SEC
BH CV ECHO MEAS - RV V1 MEAN: 60.6 CM/SEC
BH CV ECHO MEAS - RV V1 VTI: 16 CM
BH CV ECHO MEAS - RVOT AREA: 4.2 CM^2
BH CV ECHO MEAS - RVOT DIAM: 2.3 CM
BH CV ECHO MEAS - RVSP: 66.7 MMHG
BH CV ECHO MEAS - SV(AO): 185.9 ML
BH CV ECHO MEAS - SV(CUBED): 124.5 ML
BH CV ECHO MEAS - SV(LVOT): 57.8 ML
BH CV ECHO MEAS - SV(MOD-SP2): 133 ML
BH CV ECHO MEAS - SV(MOD-SP4): 122 ML
BH CV ECHO MEAS - SV(RVOT): 66.5 ML
BH CV ECHO MEAS - SV(TEICH): 103.1 ML
BH CV ECHO MEAS - TAPSE (>1.6): 2 CM2
BH CV ECHO MEAS - TR MAX VEL: 383 CM/SEC
BH CV ECHO MEASUREMENTS AVERAGE E/E' RATIO: 10.36
BH CV XLRA - RV BASE: 5.3 CM
BH CV XLRA - TDI S': 12 CM/SEC
BUN BLD-MCNC: 34 MG/DL (ref 6–20)
BUN/CREAT SERPL: 29.3 (ref 7–25)
C PNEUM DNA NPH QL NAA+NON-PROBE: NOT DETECTED
CALCIUM SPEC-SCNC: 8.9 MG/DL (ref 8.6–10.5)
CHLORIDE SERPL-SCNC: 89 MMOL/L (ref 98–107)
CO2 SERPL-SCNC: 34.8 MMOL/L (ref 22–29)
CREAT BLD-MCNC: 1.16 MG/DL (ref 0.57–1)
DEPRECATED RDW RBC AUTO: 64.1 FL (ref 37–54)
ERYTHROCYTE [DISTWIDTH] IN BLOOD BY AUTOMATED COUNT: 19.4 % (ref 12.3–15.4)
FLUAV H1 2009 PAND RNA NPH QL NAA+PROBE: NOT DETECTED
FLUAV H1 HA GENE NPH QL NAA+PROBE: NOT DETECTED
FLUAV H3 RNA NPH QL NAA+PROBE: NOT DETECTED
FLUAV SUBTYP SPEC NAA+PROBE: NOT DETECTED
FLUBV RNA ISLT QL NAA+PROBE: NOT DETECTED
GFR SERPL CREATININE-BSD FRML MDRD: 48 ML/MIN/1.73
GLUCOSE BLD-MCNC: 116 MG/DL (ref 65–99)
HADV DNA SPEC NAA+PROBE: NOT DETECTED
HCO3 BLDA-SCNC: 38.2 MMOL/L (ref 22–28)
HCOV 229E RNA SPEC QL NAA+PROBE: NOT DETECTED
HCOV HKU1 RNA SPEC QL NAA+PROBE: NOT DETECTED
HCOV NL63 RNA SPEC QL NAA+PROBE: NOT DETECTED
HCOV OC43 RNA SPEC QL NAA+PROBE: NOT DETECTED
HCT VFR BLD AUTO: 38.5 % (ref 34–46.6)
HGB BLD-MCNC: 10.7 G/DL (ref 12–15.9)
HMPV RNA NPH QL NAA+NON-PROBE: NOT DETECTED
HOROWITZ INDEX BLD+IHG-RTO: 50 %
HPIV1 RNA SPEC QL NAA+PROBE: NOT DETECTED
HPIV2 RNA SPEC QL NAA+PROBE: NOT DETECTED
HPIV3 RNA NPH QL NAA+PROBE: NOT DETECTED
HPIV4 P GENE NPH QL NAA+PROBE: NOT DETECTED
LEFT ATRIUM VOLUME INDEX: 71 ML/M2
LEFT ATRIUM VOLUME: 74 CM3
M PNEUMO IGG SER IA-ACNC: NOT DETECTED
MAXIMAL PREDICTED HEART RATE: 163 BPM
MCH RBC QN AUTO: 25.5 PG (ref 26.6–33)
MCHC RBC AUTO-ENTMCNC: 27.8 G/DL (ref 31.5–35.7)
MCV RBC AUTO: 91.9 FL (ref 79–97)
MODALITY: ABNORMAL
O2 A-A PPRESDIFF RESPIRATORY: 0.3 MMHG
PCO2 BLDA: 89.3 MM HG (ref 35–45)
PH BLDA: 7.24 PH UNITS (ref 7.35–7.45)
PLATELET # BLD AUTO: 181 10*3/MM3 (ref 140–450)
PMV BLD AUTO: 11.2 FL (ref 6–12)
PO2 BLDA: 74.6 MM HG (ref 80–100)
POTASSIUM BLD-SCNC: 4.9 MMOL/L (ref 3.5–5.2)
RBC # BLD AUTO: 4.19 10*6/MM3 (ref 3.77–5.28)
RHINOVIRUS RNA SPEC NAA+PROBE: DETECTED
RSV RNA NPH QL NAA+NON-PROBE: NOT DETECTED
SAO2 % BLDCOA: 90.6 % (ref 92–99)
SET MECH RESP RATE: 25
SODIUM BLD-SCNC: 133 MMOL/L (ref 136–145)
STRESS TARGET HR: 139 BPM
TOTAL RATE: 25 BREATHS/MINUTE
VT ON VENT VENT: 380 ML
WBC NRBC COR # BLD: 5.38 10*3/MM3 (ref 3.4–10.8)

## 2019-06-29 PROCEDURE — 93306 TTE W/DOPPLER COMPLETE: CPT

## 2019-06-29 PROCEDURE — 93306 TTE W/DOPPLER COMPLETE: CPT | Performed by: INTERNAL MEDICINE

## 2019-06-29 PROCEDURE — 94799 UNLISTED PULMONARY SVC/PX: CPT

## 2019-06-29 PROCEDURE — 87581 M.PNEUMON DNA AMP PROBE: CPT | Performed by: INTERNAL MEDICINE

## 2019-06-29 PROCEDURE — 87486 CHLMYD PNEUM DNA AMP PROBE: CPT | Performed by: INTERNAL MEDICINE

## 2019-06-29 PROCEDURE — 87633 RESP VIRUS 12-25 TARGETS: CPT | Performed by: INTERNAL MEDICINE

## 2019-06-29 PROCEDURE — 99232 SBSQ HOSP IP/OBS MODERATE 35: CPT | Performed by: INTERNAL MEDICINE

## 2019-06-29 PROCEDURE — 87798 DETECT AGENT NOS DNA AMP: CPT | Performed by: INTERNAL MEDICINE

## 2019-06-29 PROCEDURE — 85027 COMPLETE CBC AUTOMATED: CPT | Performed by: INTERNAL MEDICINE

## 2019-06-29 PROCEDURE — 36600 WITHDRAWAL OF ARTERIAL BLOOD: CPT

## 2019-06-29 PROCEDURE — 80048 BASIC METABOLIC PNL TOTAL CA: CPT | Performed by: NURSE PRACTITIONER

## 2019-06-29 PROCEDURE — 82803 BLOOD GASES ANY COMBINATION: CPT

## 2019-06-29 RX ORDER — BUDESONIDE 0.5 MG/2ML
0.5 INHALANT ORAL
Status: DISCONTINUED | OUTPATIENT
Start: 2019-06-29 | End: 2019-07-02

## 2019-06-29 RX ORDER — ALBUTEROL SULFATE 2.5 MG/3ML
2.5 SOLUTION RESPIRATORY (INHALATION) ONCE AS NEEDED
Status: DISCONTINUED | OUTPATIENT
Start: 2019-06-29 | End: 2019-07-02

## 2019-06-29 RX ORDER — IPRATROPIUM BROMIDE AND ALBUTEROL SULFATE 2.5; .5 MG/3ML; MG/3ML
3 SOLUTION RESPIRATORY (INHALATION)
Status: DISCONTINUED | OUTPATIENT
Start: 2019-06-30 | End: 2019-07-03 | Stop reason: HOSPADM

## 2019-06-29 RX ORDER — SODIUM CHLORIDE FOR INHALATION 7 %
4 VIAL, NEBULIZER (ML) INHALATION ONCE AS NEEDED
Status: DISCONTINUED | OUTPATIENT
Start: 2019-06-29 | End: 2019-07-02

## 2019-06-29 RX ADMIN — BUMETANIDE 2 MG: 0.25 INJECTION INTRAMUSCULAR; INTRAVENOUS at 18:12

## 2019-06-29 RX ADMIN — IPRATROPIUM BROMIDE AND ALBUTEROL SULFATE 3 ML: 2.5; .5 SOLUTION RESPIRATORY (INHALATION) at 16:39

## 2019-06-29 RX ADMIN — IPRATROPIUM BROMIDE AND ALBUTEROL SULFATE 3 ML: 2.5; .5 SOLUTION RESPIRATORY (INHALATION) at 03:12

## 2019-06-29 RX ADMIN — CARVEDILOL 12.5 MG: 12.5 TABLET, FILM COATED ORAL at 21:00

## 2019-06-29 RX ADMIN — APIXABAN 5 MG: 5 TABLET, FILM COATED ORAL at 21:00

## 2019-06-29 RX ADMIN — BUDESONIDE 0.5 MG: 0.5 INHALANT RESPIRATORY (INHALATION) at 19:40

## 2019-06-29 RX ADMIN — CARVEDILOL 12.5 MG: 12.5 TABLET, FILM COATED ORAL at 09:39

## 2019-06-29 RX ADMIN — IPRATROPIUM BROMIDE AND ALBUTEROL SULFATE 3 ML: 2.5; .5 SOLUTION RESPIRATORY (INHALATION) at 19:39

## 2019-06-29 RX ADMIN — APIXABAN 5 MG: 5 TABLET, FILM COATED ORAL at 09:39

## 2019-06-29 RX ADMIN — IPRATROPIUM BROMIDE AND ALBUTEROL SULFATE 3 ML: 2.5; .5 SOLUTION RESPIRATORY (INHALATION) at 11:20

## 2019-06-29 RX ADMIN — ROSUVASTATIN CALCIUM 10 MG: 10 TABLET, FILM COATED ORAL at 09:39

## 2019-06-29 RX ADMIN — SODIUM CHLORIDE, PRESERVATIVE FREE 3 ML: 5 INJECTION INTRAVENOUS at 21:01

## 2019-06-29 RX ADMIN — BUMETANIDE 2 MG: 0.25 INJECTION INTRAMUSCULAR; INTRAVENOUS at 09:39

## 2019-06-29 RX ADMIN — BUMETANIDE 2 MG: 0.25 INJECTION INTRAMUSCULAR; INTRAVENOUS at 21:01

## 2019-06-29 RX ADMIN — IPRATROPIUM BROMIDE AND ALBUTEROL SULFATE 3 ML: 2.5; .5 SOLUTION RESPIRATORY (INHALATION) at 07:53

## 2019-06-29 RX ADMIN — THYROID, PORCINE 60 MG: 60 TABLET ORAL at 09:39

## 2019-06-29 RX ADMIN — SODIUM CHLORIDE, PRESERVATIVE FREE 3 ML: 5 INJECTION INTRAVENOUS at 08:13

## 2019-06-29 RX ADMIN — LISINOPRIL 40 MG: 40 TABLET ORAL at 09:39

## 2019-06-30 LAB
ANION GAP SERPL CALCULATED.3IONS-SCNC: 7.5 MMOL/L (ref 5–15)
BUN BLD-MCNC: 44 MG/DL (ref 6–20)
BUN/CREAT SERPL: 36.1 (ref 7–25)
CALCIUM SPEC-SCNC: 8.8 MG/DL (ref 8.6–10.5)
CHLORIDE SERPL-SCNC: 96 MMOL/L (ref 98–107)
CO2 SERPL-SCNC: 39.5 MMOL/L (ref 22–29)
CREAT BLD-MCNC: 1.22 MG/DL (ref 0.57–1)
GFR SERPL CREATININE-BSD FRML MDRD: 45 ML/MIN/1.73
GLUCOSE BLD-MCNC: 116 MG/DL (ref 65–99)
POTASSIUM BLD-SCNC: 4.4 MMOL/L (ref 3.5–5.2)
SODIUM BLD-SCNC: 143 MMOL/L (ref 136–145)

## 2019-06-30 PROCEDURE — 94660 CPAP INITIATION&MGMT: CPT

## 2019-06-30 PROCEDURE — 94799 UNLISTED PULMONARY SVC/PX: CPT

## 2019-06-30 PROCEDURE — 99233 SBSQ HOSP IP/OBS HIGH 50: CPT | Performed by: NURSE PRACTITIONER

## 2019-06-30 PROCEDURE — 80048 BASIC METABOLIC PNL TOTAL CA: CPT | Performed by: NURSE PRACTITIONER

## 2019-06-30 RX ORDER — ECHINACEA PURPUREA EXTRACT 125 MG
2 TABLET ORAL AS NEEDED
Status: DISCONTINUED | OUTPATIENT
Start: 2019-06-30 | End: 2019-07-02

## 2019-06-30 RX ADMIN — IPRATROPIUM BROMIDE AND ALBUTEROL SULFATE 3 ML: 2.5; .5 SOLUTION RESPIRATORY (INHALATION) at 08:42

## 2019-06-30 RX ADMIN — LISINOPRIL 40 MG: 40 TABLET ORAL at 08:31

## 2019-06-30 RX ADMIN — ROSUVASTATIN CALCIUM 10 MG: 10 TABLET, FILM COATED ORAL at 08:31

## 2019-06-30 RX ADMIN — SODIUM CHLORIDE, PRESERVATIVE FREE 3 ML: 5 INJECTION INTRAVENOUS at 10:00

## 2019-06-30 RX ADMIN — BUDESONIDE 0.5 MG: 0.5 INHALANT RESPIRATORY (INHALATION) at 08:42

## 2019-06-30 RX ADMIN — BUDESONIDE 0.5 MG: 0.5 INHALANT RESPIRATORY (INHALATION) at 19:41

## 2019-06-30 RX ADMIN — BUMETANIDE 2 MG: 0.25 INJECTION INTRAMUSCULAR; INTRAVENOUS at 08:31

## 2019-06-30 RX ADMIN — CARVEDILOL 12.5 MG: 12.5 TABLET, FILM COATED ORAL at 20:01

## 2019-06-30 RX ADMIN — CARVEDILOL 12.5 MG: 12.5 TABLET, FILM COATED ORAL at 08:31

## 2019-06-30 RX ADMIN — IPRATROPIUM BROMIDE AND ALBUTEROL SULFATE 3 ML: 2.5; .5 SOLUTION RESPIRATORY (INHALATION) at 19:41

## 2019-06-30 RX ADMIN — BUMETANIDE 2 MG: 0.25 INJECTION INTRAMUSCULAR; INTRAVENOUS at 18:19

## 2019-06-30 RX ADMIN — BUMETANIDE 2 MG: 0.25 INJECTION INTRAMUSCULAR; INTRAVENOUS at 20:01

## 2019-06-30 RX ADMIN — APIXABAN 5 MG: 5 TABLET, FILM COATED ORAL at 08:31

## 2019-06-30 RX ADMIN — IPRATROPIUM BROMIDE AND ALBUTEROL SULFATE 3 ML: 2.5; .5 SOLUTION RESPIRATORY (INHALATION) at 13:47

## 2019-06-30 RX ADMIN — APIXABAN 5 MG: 5 TABLET, FILM COATED ORAL at 20:01

## 2019-06-30 RX ADMIN — THYROID, PORCINE 60 MG: 60 TABLET ORAL at 08:31

## 2019-06-30 RX ADMIN — SODIUM CHLORIDE, PRESERVATIVE FREE 3 ML: 5 INJECTION INTRAVENOUS at 21:58

## 2019-07-01 LAB
ANION GAP SERPL CALCULATED.3IONS-SCNC: 6.3 MMOL/L (ref 5–15)
BUN BLD-MCNC: 36 MG/DL (ref 6–20)
BUN/CREAT SERPL: 35.6 (ref 7–25)
CALCIUM SPEC-SCNC: 9.3 MG/DL (ref 8.6–10.5)
CHLORIDE SERPL-SCNC: 91 MMOL/L (ref 98–107)
CO2 SERPL-SCNC: 43.7 MMOL/L (ref 22–29)
CREAT BLD-MCNC: 1.01 MG/DL (ref 0.57–1)
GFR SERPL CREATININE-BSD FRML MDRD: 56 ML/MIN/1.73
GLUCOSE BLD-MCNC: 92 MG/DL (ref 65–99)
POTASSIUM BLD-SCNC: 4.4 MMOL/L (ref 3.5–5.2)
SODIUM BLD-SCNC: 141 MMOL/L (ref 136–145)

## 2019-07-01 PROCEDURE — 99232 SBSQ HOSP IP/OBS MODERATE 35: CPT | Performed by: INTERNAL MEDICINE

## 2019-07-01 PROCEDURE — 87070 CULTURE OTHR SPECIMN AEROBIC: CPT | Performed by: INTERNAL MEDICINE

## 2019-07-01 PROCEDURE — 94799 UNLISTED PULMONARY SVC/PX: CPT

## 2019-07-01 PROCEDURE — 87205 SMEAR GRAM STAIN: CPT | Performed by: INTERNAL MEDICINE

## 2019-07-01 PROCEDURE — 80048 BASIC METABOLIC PNL TOTAL CA: CPT | Performed by: NURSE PRACTITIONER

## 2019-07-01 RX ORDER — TORSEMIDE 20 MG/1
20 TABLET ORAL DAILY
Status: DISCONTINUED | OUTPATIENT
Start: 2019-07-01 | End: 2019-07-03 | Stop reason: HOSPADM

## 2019-07-01 RX ADMIN — LISINOPRIL 40 MG: 40 TABLET ORAL at 09:20

## 2019-07-01 RX ADMIN — ROSUVASTATIN CALCIUM 10 MG: 10 TABLET, FILM COATED ORAL at 09:20

## 2019-07-01 RX ADMIN — APIXABAN 5 MG: 5 TABLET, FILM COATED ORAL at 20:15

## 2019-07-01 RX ADMIN — IPRATROPIUM BROMIDE AND ALBUTEROL SULFATE 3 ML: 2.5; .5 SOLUTION RESPIRATORY (INHALATION) at 12:06

## 2019-07-01 RX ADMIN — ACETAMINOPHEN 650 MG: 325 TABLET, FILM COATED ORAL at 23:39

## 2019-07-01 RX ADMIN — IPRATROPIUM BROMIDE AND ALBUTEROL SULFATE 3 ML: 2.5; .5 SOLUTION RESPIRATORY (INHALATION) at 07:03

## 2019-07-01 RX ADMIN — ACETAMINOPHEN 650 MG: 325 TABLET, FILM COATED ORAL at 03:21

## 2019-07-01 RX ADMIN — SODIUM CHLORIDE, PRESERVATIVE FREE 3 ML: 5 INJECTION INTRAVENOUS at 09:21

## 2019-07-01 RX ADMIN — CARVEDILOL 12.5 MG: 12.5 TABLET, FILM COATED ORAL at 09:20

## 2019-07-01 RX ADMIN — IPRATROPIUM BROMIDE AND ALBUTEROL SULFATE 3 ML: 2.5; .5 SOLUTION RESPIRATORY (INHALATION) at 19:48

## 2019-07-01 RX ADMIN — CARVEDILOL 12.5 MG: 12.5 TABLET, FILM COATED ORAL at 20:15

## 2019-07-01 RX ADMIN — APIXABAN 5 MG: 5 TABLET, FILM COATED ORAL at 09:21

## 2019-07-01 RX ADMIN — BUDESONIDE 0.5 MG: 0.5 INHALANT RESPIRATORY (INHALATION) at 07:03

## 2019-07-01 RX ADMIN — TORSEMIDE 20 MG: 20 TABLET ORAL at 11:22

## 2019-07-01 RX ADMIN — BUDESONIDE 0.5 MG: 0.5 INHALANT RESPIRATORY (INHALATION) at 19:48

## 2019-07-01 RX ADMIN — THYROID, PORCINE 60 MG: 60 TABLET ORAL at 09:21

## 2019-07-01 NOTE — PROGRESS NOTES
Dr. JASWANT Bauman    [address]    7/1/2019    Patient ID:  Name:  Milagros Ramirez  MRN:  [id number]  1960  58 y.o.  female            CC/Reason for visit: Cough, shortness of breath and hypoxia    Interval hx: Cough is somewhat better on albuterol.  She still has cough and now producing some yellow sputum.  She has not ambulated much    ROS: Denies fever or hemoptysis    Vitals:  Vitals:    07/01/19 0739 07/01/19 1206 07/01/19 1214 07/01/19 1300   BP: 126/84   98/64   BP Location: Left arm   Left arm   Patient Position: Lying   Lying   Pulse: 114 77 75 96   Resp: 18 16 16 18   Temp: 97.8 °F (36.6 °C)   97.3 °F (36.3 °C)   TempSrc: Oral   Oral   SpO2: 90% 91% 97%    Weight:       Height:         FiO2 (%): 40 %     Body mass index is 58.97 kg/m².    Intake/Output Summary (Last 24 hours) at 7/1/2019 1639  Last data filed at 7/1/2019 1300  Gross per 24 hour   Intake 360 ml   Output 4550 ml   Net -4190 ml       Exam:  GEN:  No distress  Alert, oriented x 3.   LUNGS: Scattered rhonchi and wheezing, no use of accessory muscles  CV:  Normal S1S2, without murmur, 1+ leg edema  ABD:  Morbidly obese, nontender      Scheduled meds:    apixaban 5 mg Oral Q12H   budesonide 0.5 mg Nebulization BID - RT   carvedilol 12.5 mg Oral BID   ipratropium-albuterol 3 mL Nebulization Q6H While Awake - RT   lisinopril 40 mg Oral Daily   rosuvastatin 10 mg Oral Daily   sodium chloride 3 mL Intravenous Q12H   Thyroid 60 mg Oral Daily   torsemide 20 mg Oral Daily     IV meds:                           Data Review:   I reviewed the patient's medications and new clinical results.  Lab Results   Component Value Date    CALCIUM 9.3 07/01/2019     Results from last 7 days   Lab Units 07/01/19  0744 06/30/19  0721 06/29/19  0604  06/27/19  1522   SODIUM mmol/L 141 143 133*   < > 140   POTASSIUM mmol/L 4.4 4.4 4.9   < > 4.6   CHLORIDE mmol/L 91* 96* 89*   < > 96*   CO2 mmol/L 43.7* 39.5* 34.8*   < > 35.0*   BUN mg/dL 36* 44* 34*    < > 28*   CREATININE mg/dL 1.01* 1.22* 1.16*   < > 1.24*   CALCIUM mg/dL 9.3 8.8 8.9   < > 8.7   BILIRUBIN mg/dL  --   --   --   --  0.6   ALK PHOS U/L  --   --   --   --  114   ALT (SGPT) U/L  --   --   --   --  14   AST (SGOT) U/L  --   --   --   --  17   GLUCOSE mg/dL 92 116* 116*   < > 109*   WBC 10*3/mm3  --   --  5.38  --  5.20   HEMOGLOBIN g/dL  --   --  10.7*  --  10.9*   PLATELETS 10*3/mm3  --   --  181  --  160   PROBNP pg/mL  --   --   --   --  2,246.0*    < > = values in this interval not displayed.         Results from last 7 days   Lab Units 06/29/19  1852   ADENOVIRUS DETECTION BY PCR  Not Detected   CORONAVIRUS 229E  Not Detected   CORONAVIRUS HKU1  Not Detected   CORONAVIRUS NL63  Not Detected   CORONAVIRUS OC43  Not Detected   HUMAN METAPNEUMOVIRUS  Not Detected   HUMAN RHINOVIRUS/ENTEROVIRUS  Detected*   INFLUENZA B PCR  Not Detected   PARAINFLUENZA 1  Not Detected   PARAINFLUENZA VIRUS 2  Not Detected   PARAINFLUENZA VIRUS 3  Not Detected   PARAINFLUENZA VIRUS 4  Not Detected   BORDETELLA PERTUSSIS PCR  Not Detected   CHLAMYDOPHILA PNEUMONIAE PCR  Not Detected   MYCOPLAMA PNEUMO PCR  Not Detected   INFLUENZA A PCR  Not Detected   INFLUENZA A H3  Not Detected   INFLUENZA A H1  Not Detected   RSV, PCR  Not Detected           Results from last 7 days   Lab Units 06/29/19  0823 06/28/19  2046 06/28/19  1917 06/28/19  1655   PH, ARTERIAL pH units 7.240* 7.229* 7.246* 7.273*   PCO2, ARTERIAL mm Hg 89.3* 96.6* 93.9* 75.8*   PO2 ART mm Hg 74.6* 64.0* 66.5* 64.1*   FLOW RATE lpm  --   --   --  4   MODALITY  BiPap BiPap BiPap Cannula   O2 SATURATION CALC % 90.6* 85.4* 87.3* 87.6*       Estimated Creatinine Clearance: 112.5 mL/min (A) (by C-G formula based on SCr of 1.01 mg/dL (H)).      ASSESSMENT:   Acute rhinovirus bronchitis causing bronchospasm  Acute on chronic diastolic heart failure  Most likely chronic respiratory failure from hypoventilation due to obesity, morbid obesity  Cough  Bronchospasm and  wheezing  Patient says she has never had formal sleep study  Lupus  Rheumatoid arthritis  Diabetes type 2        PLAN:  Will need ambulatory oximetry to be done on the day of discharge to determine oxygen requirements.  Continue supportive care with bronchodilators via nebulizer.  No need for antibiotics since his viral infection.  Repeat ABG tomorrow at rest, on room air to see if she still has significant respiratory failure.        Augustine Bauman MD  7/1/2019

## 2019-07-01 NOTE — PLAN OF CARE
"Problem: Patient Care Overview  Goal: Plan of Care Review  Outcome: Ongoing (interventions implemented as appropriate)   07/01/19 1471   OTHER   Outcome Summary Patient alert and oriented. Continued IV Bumex. O2 2L NC. Attempted to wear BiPap at HS, but states\" it gave her a headache\". Up to bedside commode. Vital signs stable. No acute distress noted. Will continue to monitor.    Coping/Psychosocial   Plan of Care Reviewed With patient   Plan of Care Review   Progress no change       Problem: Cardiac: Heart Failure (Adult)  Goal: Signs and Symptoms of Listed Potential Problems Will be Absent, Minimized or Managed (Cardiac: Heart Failure)  Outcome: Ongoing (interventions implemented as appropriate)      Problem: Fall Risk (Adult)  Goal: Absence of Fall  Outcome: Ongoing (interventions implemented as appropriate)        "

## 2019-07-01 NOTE — PLAN OF CARE
Problem: Patient Care Overview  Goal: Plan of Care Review  Outcome: Ongoing (interventions implemented as appropriate)   07/01/19 9775   OTHER   Outcome Summary A&O, neuro status intact, OOB to chair and commode with steady gait, VSS, afebrile, monitor tracing afib, lungs with expiratory wheezes throughout, no SOB, + RANGEL,Started O2 at 2l in AM, currently O2 at 1.5l, voiding qs, sputum C&S sent, +BM,monitored closely.   Coping/Psychosocial   Plan of Care Reviewed With patient   Plan of Care Review   Progress improving     Goal: Individualization and Mutuality  Outcome: Ongoing (interventions implemented as appropriate)    Goal: Discharge Needs Assessment  Outcome: Ongoing (interventions implemented as appropriate)    Goal: Interprofessional Rounds/Family Conf  Outcome: Ongoing (interventions implemented as appropriate)      Problem: Cardiac: Heart Failure (Adult)  Goal: Signs and Symptoms of Listed Potential Problems Will be Absent, Minimized or Managed (Cardiac: Heart Failure)  Outcome: Ongoing (interventions implemented as appropriate)      Problem: Fall Risk (Adult)  Goal: Absence of Fall  Outcome: Ongoing (interventions implemented as appropriate)

## 2019-07-01 NOTE — PROGRESS NOTES
Continued Stay Note  [facility]     Patient Name: Milagros Ramirez  MRN: [id number]  Today's Date: 2019    Admit Date: 2019    Discharge Plan     Row Name 19 1222       Plan    Plan Comments  CCP met with pt per request to provide disability application information. Pt does not anticipate being able to return to work due to multiple co-morbidities and is currently accessing short-term disability though her employer. CCP provided informational packet outlining process to apply for long-term/ongoing disability. Pt plans to initiate this application online while admitted as the process can take a fair amount of time and pt reports employer disability will  at the end of July. CCP remains available to assist should additional d/c needs arise. Sandy Duarte LCSW        Discharge Codes    No documentation.             Angeline Duarte LCSW

## 2019-07-02 LAB
ANION GAP SERPL CALCULATED.3IONS-SCNC: 7.8 MMOL/L (ref 5–15)
BUN BLD-MCNC: 25 MG/DL (ref 6–20)
BUN/CREAT SERPL: 26.6 (ref 7–25)
CALCIUM SPEC-SCNC: 9.2 MG/DL (ref 8.6–10.5)
CHLORIDE SERPL-SCNC: 90 MMOL/L (ref 98–107)
CO2 SERPL-SCNC: 43.2 MMOL/L (ref 22–29)
CREAT BLD-MCNC: 0.94 MG/DL (ref 0.57–1)
DEPRECATED RDW RBC AUTO: 62.9 FL (ref 37–54)
ERYTHROCYTE [DISTWIDTH] IN BLOOD BY AUTOMATED COUNT: 19.3 % (ref 12.3–15.4)
GFR SERPL CREATININE-BSD FRML MDRD: 61 ML/MIN/1.73
GLUCOSE BLD-MCNC: 95 MG/DL (ref 65–99)
HCT VFR BLD AUTO: 36.7 % (ref 34–46.6)
HGB BLD-MCNC: 10.4 G/DL (ref 12–15.9)
MCH RBC QN AUTO: 25.5 PG (ref 26.6–33)
MCHC RBC AUTO-ENTMCNC: 28.3 G/DL (ref 31.5–35.7)
MCV RBC AUTO: 90 FL (ref 79–97)
PLATELET # BLD AUTO: 154 10*3/MM3 (ref 140–450)
PMV BLD AUTO: 11.4 FL (ref 6–12)
POTASSIUM BLD-SCNC: 3.8 MMOL/L (ref 3.5–5.2)
RBC # BLD AUTO: 4.08 10*6/MM3 (ref 3.77–5.28)
SODIUM BLD-SCNC: 141 MMOL/L (ref 136–145)
WBC NRBC COR # BLD: 4.09 10*3/MM3 (ref 3.4–10.8)

## 2019-07-02 PROCEDURE — 94799 UNLISTED PULMONARY SVC/PX: CPT

## 2019-07-02 PROCEDURE — 85027 COMPLETE CBC AUTOMATED: CPT | Performed by: INTERNAL MEDICINE

## 2019-07-02 PROCEDURE — 80048 BASIC METABOLIC PNL TOTAL CA: CPT | Performed by: NURSE PRACTITIONER

## 2019-07-02 PROCEDURE — 94660 CPAP INITIATION&MGMT: CPT

## 2019-07-02 PROCEDURE — 99232 SBSQ HOSP IP/OBS MODERATE 35: CPT | Performed by: NURSE PRACTITIONER

## 2019-07-02 RX ADMIN — APIXABAN 5 MG: 5 TABLET, FILM COATED ORAL at 09:41

## 2019-07-02 RX ADMIN — IPRATROPIUM BROMIDE AND ALBUTEROL SULFATE 3 ML: 2.5; .5 SOLUTION RESPIRATORY (INHALATION) at 08:14

## 2019-07-02 RX ADMIN — SODIUM CHLORIDE, PRESERVATIVE FREE 3 ML: 5 INJECTION INTRAVENOUS at 20:18

## 2019-07-02 RX ADMIN — SODIUM CHLORIDE, PRESERVATIVE FREE 3 ML: 5 INJECTION INTRAVENOUS at 09:41

## 2019-07-02 RX ADMIN — ACETAMINOPHEN 650 MG: 325 TABLET, FILM COATED ORAL at 04:03

## 2019-07-02 RX ADMIN — APIXABAN 5 MG: 5 TABLET, FILM COATED ORAL at 20:08

## 2019-07-02 RX ADMIN — ROSUVASTATIN CALCIUM 10 MG: 10 TABLET, FILM COATED ORAL at 09:41

## 2019-07-02 RX ADMIN — LISINOPRIL 40 MG: 40 TABLET ORAL at 09:41

## 2019-07-02 RX ADMIN — IPRATROPIUM BROMIDE AND ALBUTEROL SULFATE 3 ML: 2.5; .5 SOLUTION RESPIRATORY (INHALATION) at 19:40

## 2019-07-02 RX ADMIN — BUDESONIDE 0.5 MG: 0.5 INHALANT RESPIRATORY (INHALATION) at 08:14

## 2019-07-02 RX ADMIN — TORSEMIDE 20 MG: 20 TABLET ORAL at 09:41

## 2019-07-02 RX ADMIN — THYROID, PORCINE 60 MG: 60 TABLET ORAL at 09:41

## 2019-07-02 RX ADMIN — CARVEDILOL 12.5 MG: 12.5 TABLET, FILM COATED ORAL at 20:08

## 2019-07-02 RX ADMIN — IPRATROPIUM BROMIDE AND ALBUTEROL SULFATE 3 ML: 2.5; .5 SOLUTION RESPIRATORY (INHALATION) at 12:05

## 2019-07-02 RX ADMIN — CARVEDILOL 12.5 MG: 12.5 TABLET, FILM COATED ORAL at 09:41

## 2019-07-02 NOTE — PLAN OF CARE
Problem: Patient Care Overview  Goal: Plan of Care Review  Outcome: Ongoing (interventions implemented as appropriate)   07/02/19 0567   OTHER   Outcome Summary Pt alert and oriented. Vital signs stable. C/O headache, PRN Tylenol given. A-fib on monitor. RANGEL. Pt was able to tolerate wearing BiPap for longer time period than prior night. No acute distress noted. Will continue to monitor.    Coping/Psychosocial   Plan of Care Reviewed With patient   Plan of Care Review   Progress improving       Problem: Cardiac: Heart Failure (Adult)  Goal: Signs and Symptoms of Listed Potential Problems Will be Absent, Minimized or Managed (Cardiac: Heart Failure)  Outcome: Ongoing (interventions implemented as appropriate)      Problem: Fall Risk (Adult)  Goal: Absence of Fall  Outcome: Ongoing (interventions implemented as appropriate)

## 2019-07-02 NOTE — PROGRESS NOTES
"[facility]    Patient Name: Milagros Ramirez  :1960  58 y.o.  LOS: 4  Encounter Provider: MARILYNN Alonzo      Patient Care Team:  Nelida Corea APRN as PCP - General (Family Medicine)    Chief Complaint: Persistent atrial fibrillation, acute on chronic diastolic heart failure, right-sided heart failure, undiagnosed sleep apnea    Interval History: Feeling much better.  Much more alert.  Shortness of breath at baseline.  She does continue to complain of productive sputum cough.  She states when she has a cough she feels that she is having bronchospasm and does become short of breath.  Denies any episodes of chest pain.  Heart rate is controlled.  Blood pressure elevated.       Objective   Vital Signs  Temp:  [97.3 °F (36.3 °C)-98 °F (36.7 °C)] 97.8 °F (36.6 °C)  Heart Rate:  [72-99] 83  Resp:  [16-20] 18  BP: ()/(64-93) 154/93  FiO2 (%):  [35 %] 35 %    Intake/Output Summary (Last 24 hours) at 2019 0953  Last data filed at 2019 1300  Gross per 24 hour   Intake 240 ml   Output --   Net 240 ml     Flowsheet Rows      First Filed Value   Admission Height  177.8 cm (70\") Documented at 2019 1436   Admission Weight  196 kg (433 lb 3.3 oz)  (Abnormal)  Documented at 2019 1436            Physical Exam   Constitutional: She is oriented to person, place, and time. Vital signs are normal. She appears well-developed and well-nourished. She is active.   Obesity   Eyes: Conjunctivae are normal.   Neck: Carotid bruit is not present.   Cardiovascular: Normal rate, regular rhythm and normal heart sounds.   Pulmonary/Chest: Breath sounds normal.   Abdominal: Normal appearance.   Musculoskeletal:   No cyanosis, clubbing, edema  Normal ROM   Neurological: She is alert and oriented to person, place, and time. GCS eye subscore is 4. GCS verbal subscore is 5. GCS motor subscore is 6.   Skin: Skin is warm, dry and intact.   Psychiatric: She has a normal mood and affect. " Her speech is normal and behavior is normal. Judgment and thought content normal. Cognition and memory are normal.       Results Review:    Results from last 7 days   Lab Units 07/02/19  0455   SODIUM mmol/L 141   POTASSIUM mmol/L 3.8   CHLORIDE mmol/L 90*   CO2 mmol/L 43.2*   BUN mg/dL 25*   CREATININE mg/dL 0.94   GLUCOSE mg/dL 95   CALCIUM mg/dL 9.2         Results from last 7 days   Lab Units 07/02/19  0455   WBC 10*3/mm3 4.09   HEMOGLOBIN g/dL 10.4*   HEMATOCRIT % 36.7   PLATELETS 10*3/mm3 154             Results from last 7 days   Lab Units 06/28/19  0601   CHOLESTEROL mg/dL 95   TRIGLYCERIDES mg/dL 53   HDL CHOL mg/dL 52   LDL CHOL mg/dL 32               Medication Review:     apixaban 5 mg Oral Q12H   budesonide 0.5 mg Nebulization BID - RT   carvedilol 12.5 mg Oral BID   ipratropium-albuterol 3 mL Nebulization Q6H While Awake - RT   lisinopril 40 mg Oral Daily   rosuvastatin 10 mg Oral Daily   sodium chloride 3 mL Intravenous Q12H   Thyroid 60 mg Oral Daily   torsemide 20 mg Oral Daily             Assessment/Plan   1. Acute on chronic hypoxic and hypercapnic respiratory failure: Appreciate the assistance of pulmonology.  Continues to have a productive sputum cough and bronchospasm.  States that this is improved with nebulized treatments.  Patient using BiPAP for sleep.  Again stressed the importance of BiPAP and the role of right-sided heart failure.  2. Obesity hypoventilation syndrome with CO2 retention  3. Acute on chronic diastolic heart failure, right ventricular dilation with right-sided heart failure: Currently on oral diuretics.  Does not appear volume overloaded.  4. Hypertension.  Blood pressure elevated today.  We will add spironolactone 12.5 mg daily.  Will monitor renal function closely given that she is also on torsemide and lisinopril.  5. Rhinovirus with bronchospasm and cough  6. Persistent atrial fibrillation.  Heart rate is controlled with carvedilol.  Patient anticoagulated with  Eliquis.  7. Chronic kidney disease.  Creatinine stable at 0.9.  8. Morbid obesity  9. Diabetes  10. Rheumatoid arthritis  11. Lupus    MARILYNN Alonzo  [facility]  07/02/19  9:53 AM

## 2019-07-02 NOTE — PLAN OF CARE
Problem: Patient Care Overview  Goal: Plan of Care Review  Outcome: Ongoing (interventions implemented as appropriate)   07/02/19 0375   OTHER   Outcome Summary A&O, VSS, afebrile, lungs with scattered expiratory wheezes, +RANGEL, O2 at 1l in place, loose mostly non productive cough, receiving respi treatments, voiding qs, monitored closely.   Coping/Psychosocial   Plan of Care Reviewed With patient   Plan of Care Review   Progress improving     Goal: Individualization and Mutuality  Outcome: Ongoing (interventions implemented as appropriate)    Goal: Discharge Needs Assessment  Outcome: Ongoing (interventions implemented as appropriate)    Goal: Interprofessional Rounds/Family Conf  Outcome: Ongoing (interventions implemented as appropriate)      Problem: Cardiac: Heart Failure (Adult)  Goal: Signs and Symptoms of Listed Potential Problems Will be Absent, Minimized or Managed (Cardiac: Heart Failure)  Outcome: Ongoing (interventions implemented as appropriate)      Problem: Fall Risk (Adult)  Goal: Absence of Fall  Outcome: Ongoing (interventions implemented as appropriate)

## 2019-07-02 NOTE — PROGRESS NOTES
Dr. JASWANT Bauman    [address]    7/2/2019    Patient ID:  Name:  Milagros Ramirez  MRN:  [id number]  1960  58 y.o.  female            CC/Reason for visit: Cough, shortness of breath and hypoxia    Interval hx: Cough continues to improve and albuterol.  She is still requiring some oxygen but denies shortness of breath at rest.  She is now producing some loose sputum    ROS: Negative for fever or hemoptysis    Vitals:  Vitals:    07/02/19 0821 07/02/19 1204 07/02/19 1209 07/02/19 1430   BP:    158/85   BP Location:    Right arm   Patient Position:    Lying   Pulse: 83 108 76 77   Resp: 18 18 18 18   Temp:    97.2 °F (36.2 °C)   TempSrc:    Oral   SpO2: 97% 94% 99% 93%   Weight:       Height:         FiO2 (%): 35 %     Body mass index is 58.25 kg/m².  No intake or output data in the 24 hours ending 07/02/19 1610    Exam:  GEN:  No distress  Alert, oriented x 3.   LUNGS: Bilateral rhonchi and some mild wheezing today, sounds better, no use of accessory muscles  CV:  Distant heart tones, irregularly irregular pulse,, 1+ edema legs  ABD:  Non tender, morbidly obese, hampered by large body habitus      Scheduled meds:    apixaban 5 mg Oral Q12H   budesonide 0.5 mg Nebulization BID - RT   carvedilol 12.5 mg Oral BID   ipratropium-albuterol 3 mL Nebulization Q6H While Awake - RT   lisinopril 40 mg Oral Daily   rosuvastatin 10 mg Oral Daily   sodium chloride 3 mL Intravenous Q12H   Thyroid 60 mg Oral Daily   torsemide 20 mg Oral Daily     IV meds:                           Data Review:   I reviewed the patient's medications and new clinical results.  Lab Results   Component Value Date    CALCIUM 9.2 07/02/2019     Results from last 7 days   Lab Units 07/02/19  0455 07/01/19  0744 06/30/19  0721 06/29/19  0604  06/27/19  1522   SODIUM mmol/L 141 141 143 133*   < > 140   POTASSIUM mmol/L 3.8 4.4 4.4 4.9   < > 4.6   CHLORIDE mmol/L 90* 91* 96* 89*   < > 96*   CO2 mmol/L 43.2* 43.7* 39.5* 34.8*   < > 35.0*    BUN mg/dL 25* 36* 44* 34*   < > 28*   CREATININE mg/dL 0.94 1.01* 1.22* 1.16*   < > 1.24*   CALCIUM mg/dL 9.2 9.3 8.8 8.9   < > 8.7   BILIRUBIN mg/dL  --   --   --   --   --  0.6   ALK PHOS U/L  --   --   --   --   --  114   ALT (SGPT) U/L  --   --   --   --   --  14   AST (SGOT) U/L  --   --   --   --   --  17   GLUCOSE mg/dL 95 92 116* 116*   < > 109*   WBC 10*3/mm3 4.09  --   --  5.38  --  5.20   HEMOGLOBIN g/dL 10.4*  --   --  10.7*  --  10.9*   PLATELETS 10*3/mm3 154  --   --  181  --  160   PROBNP pg/mL  --   --   --   --   --  2,246.0*    < > = values in this interval not displayed.     Results from last 7 days   Lab Units 07/01/19  0934   RESPCX  Moderate growth (3+) Normal Respiratory Radha     Results from last 7 days   Lab Units 06/29/19  1852   ADENOVIRUS DETECTION BY PCR  Not Detected   CORONAVIRUS 229E  Not Detected   CORONAVIRUS HKU1  Not Detected   CORONAVIRUS NL63  Not Detected   CORONAVIRUS OC43  Not Detected   HUMAN METAPNEUMOVIRUS  Not Detected   HUMAN RHINOVIRUS/ENTEROVIRUS  Detected*   INFLUENZA B PCR  Not Detected   PARAINFLUENZA 1  Not Detected   PARAINFLUENZA VIRUS 2  Not Detected   PARAINFLUENZA VIRUS 3  Not Detected   PARAINFLUENZA VIRUS 4  Not Detected   BORDETELLA PERTUSSIS PCR  Not Detected   CHLAMYDOPHILA PNEUMONIAE PCR  Not Detected   MYCOPLAMA PNEUMO PCR  Not Detected   INFLUENZA A PCR  Not Detected   INFLUENZA A H3  Not Detected   INFLUENZA A H1  Not Detected   RSV, PCR  Not Detected           Results from last 7 days   Lab Units 06/29/19  0823 06/28/19 2046 06/28/19  1917 06/28/19  1655   PH, ARTERIAL pH units 7.240* 7.229* 7.246* 7.273*   PCO2, ARTERIAL mm Hg 89.3* 96.6* 93.9* 75.8*   PO2 ART mm Hg 74.6* 64.0* 66.5* 64.1*   FLOW RATE lpm  --   --   --  4   MODALITY  BiPap BiPap BiPap Cannula   O2 SATURATION CALC % 90.6* 85.4* 87.3* 87.6*       Estimated Creatinine Clearance: 119.9 mL/min (by C-G formula based on SCr of 0.94 mg/dL).      ASSESSMENT:   Acute rhinovirus bronchitis  causing bronchospasm  Acute on chronic diastolic heart failure  Most likely chronic respiratory failure from hypoventilation due to obesity, morbid obesity  Atrial fibrillation  Bronchospasm and wheezing  Patient says she has never had formal sleep study  Lupus  Rheumatoid arthritis  Diabetes type 2        PLAN:  From the respiratory standpoint, the patient may be discharged home tomorrow.  She will need ambulatory oximetry to determine her requirement of oxygen during daytime.  She has a pulmonologist in [place].  She needs to follow-up with him and have a formal sleep study ordered soon.  Tonight she will sleep only with oxygen, no BiPAP or noninvasive ventilation.  I will see how she does.  Continue bronchodilators on discharge in the form of nebulized DuoNeb.  I will enter order for home nebulizer.  I will enter order for Symbicort.      Augustine Bauman MD  7/2/2019

## 2019-07-03 VITALS
HEIGHT: 70 IN | TEMPERATURE: 97 F | OXYGEN SATURATION: 93 % | SYSTOLIC BLOOD PRESSURE: 147 MMHG | HEART RATE: 89 BPM | RESPIRATION RATE: 18 BRPM | WEIGHT: 293 LBS | DIASTOLIC BLOOD PRESSURE: 83 MMHG | BODY MASS INDEX: 41.95 KG/M2

## 2019-07-03 LAB
ANION GAP SERPL CALCULATED.3IONS-SCNC: 10.2 MMOL/L (ref 5–15)
BACTERIA SPEC RESP CULT: NORMAL
BUN BLD-MCNC: 19 MG/DL (ref 6–20)
BUN/CREAT SERPL: 20.9 (ref 7–25)
CALCIUM SPEC-SCNC: 9.3 MG/DL (ref 8.6–10.5)
CHLORIDE SERPL-SCNC: 94 MMOL/L (ref 98–107)
CO2 SERPL-SCNC: 41.8 MMOL/L (ref 22–29)
CREAT BLD-MCNC: 0.91 MG/DL (ref 0.57–1)
GFR SERPL CREATININE-BSD FRML MDRD: 64 ML/MIN/1.73
GLUCOSE BLD-MCNC: 109 MG/DL (ref 65–99)
GRAM STN SPEC: NORMAL
GRAM STN SPEC: NORMAL
POTASSIUM BLD-SCNC: 4.3 MMOL/L (ref 3.5–5.2)
SODIUM BLD-SCNC: 146 MMOL/L (ref 136–145)

## 2019-07-03 PROCEDURE — 80048 BASIC METABOLIC PNL TOTAL CA: CPT | Performed by: NURSE PRACTITIONER

## 2019-07-03 PROCEDURE — 94799 UNLISTED PULMONARY SVC/PX: CPT

## 2019-07-03 PROCEDURE — 99238 HOSP IP/OBS DSCHRG MGMT 30/<: CPT | Performed by: NURSE PRACTITIONER

## 2019-07-03 RX ORDER — TORSEMIDE 20 MG/1
20 TABLET ORAL DAILY
Qty: 30 TABLET | Refills: 5 | Status: SHIPPED | OUTPATIENT
Start: 2019-07-04 | End: 2020-01-17 | Stop reason: SDUPTHER

## 2019-07-03 RX ORDER — IPRATROPIUM BROMIDE AND ALBUTEROL SULFATE 2.5; .5 MG/3ML; MG/3ML
3 SOLUTION RESPIRATORY (INHALATION)
Qty: 360 ML | Refills: 0 | Status: SHIPPED | OUTPATIENT
Start: 2019-07-03 | End: 2019-09-30 | Stop reason: ALTCHOICE

## 2019-07-03 RX ADMIN — CARVEDILOL 12.5 MG: 12.5 TABLET, FILM COATED ORAL at 08:13

## 2019-07-03 RX ADMIN — IPRATROPIUM BROMIDE AND ALBUTEROL SULFATE 3 ML: 2.5; .5 SOLUTION RESPIRATORY (INHALATION) at 12:29

## 2019-07-03 RX ADMIN — APIXABAN 5 MG: 5 TABLET, FILM COATED ORAL at 08:13

## 2019-07-03 RX ADMIN — IPRATROPIUM BROMIDE AND ALBUTEROL SULFATE 3 ML: 2.5; .5 SOLUTION RESPIRATORY (INHALATION) at 07:37

## 2019-07-03 RX ADMIN — THYROID, PORCINE 60 MG: 60 TABLET ORAL at 08:13

## 2019-07-03 RX ADMIN — LISINOPRIL 40 MG: 40 TABLET ORAL at 08:13

## 2019-07-03 RX ADMIN — SODIUM CHLORIDE, PRESERVATIVE FREE 3 ML: 5 INJECTION INTRAVENOUS at 08:13

## 2019-07-03 RX ADMIN — ROSUVASTATIN CALCIUM 10 MG: 10 TABLET, FILM COATED ORAL at 08:13

## 2019-07-03 RX ADMIN — TORSEMIDE 20 MG: 20 TABLET ORAL at 08:13

## 2019-07-03 NOTE — PROGRESS NOTES
Continued Stay Note  [facility]     Patient Name: Milagros Ramirez  MRN: [id number]  Today's Date: 7/3/2019    Admit Date: 6/27/2019    Discharge Plan     Row Name 07/03/19 1259       Plan    Plan  Plan home with [facility].  ROSY Aguilar RN    Patient/Family in Agreement with Plan  yes    Plan Comments  Spoke to pt at bedside.  Pt is interested in HH.   Pt provided a list of HH agencies.  Pt requested [facility] but they do not service [facility]. Pt then requested [facility].  Ashly  ( [phone]) called to follow. Plan home with [facility].  ROSY Aguilar RN    Row Name 07/03/19 1052       Plan    Plan  Plan home.  ROSY Aguilar RN    Patient/Family in Agreement with Plan  yes    Plan Comments  Pt qualifies for home O2.   [facility]  ( [phone]) following to provide a nebulizer and will also provide home O2.  Plan home.  ROSY Aguilar RN        Discharge Codes    No documentation.             Deborah Aguilar RN

## 2019-07-03 NOTE — PLAN OF CARE
Problem: Patient Care Overview  Goal: Plan of Care Review  Outcome: Ongoing (interventions implemented as appropriate)   07/03/19 3842   OTHER   Outcome Summary Pt alert and oriented. Vital signs stable, a-fib on monitor with rate controlled. No complaints of pain. Possible D/C today. No acute distress noted. Will continue to monitor.    Coping/Psychosocial   Plan of Care Reviewed With patient   Plan of Care Review   Progress improving       Problem: Cardiac: Heart Failure (Adult)  Goal: Signs and Symptoms of Listed Potential Problems Will be Absent, Minimized or Managed (Cardiac: Heart Failure)  Outcome: Ongoing (interventions implemented as appropriate)      Problem: Fall Risk (Adult)  Goal: Absence of Fall  Outcome: Ongoing (interventions implemented as appropriate)

## 2019-07-03 NOTE — PROGRESS NOTES
Continued Stay Note  [facility]     Patient Name: Milagros Ramirez  MRN: [id number]  Today's Date: 7/3/2019    Admit Date: 6/27/2019    Discharge Plan     Row Name 07/03/19 1050       Plan    Plan  Plan home.  ROSY Aguilar RN    Patient/Family in Agreement with Plan  yes    Plan Comments  Pt qualifies for home O2.   [facility]  ( [phone]) following to provide a nebulizer and will also provide home O2.  Plan home.  ROSY Aguilar RN    Row Name 07/03/19 1186       Plan    Plan  Plan home.  ROSY Aguilar RN    Patient/Family in Agreement with Plan  yes    Plan Comments  Pt has an order for nebulizer.  Spoke to pt at bedside and she is requesting [facility] to supply DME's.   [facility]   ( [phone]) with [facility] called to provide nebulizer.  Plan home.  ROSY Aguilar RN        Discharge Codes    No documentation.             Deborah Aguilar RN

## 2019-07-03 NOTE — DISCHARGE SUMMARY
Hospital Discharge    Patient Name: Milagros Ramirez  Age/Sex: 58 y.o. female  : 1960  MRN: [id number]    Encounter Provider: MARILYNN Subramanian  Referring Provider: Norman Hebert MD  Place of Service: [facility] CARDIOLOGY  Patient Care Team:  Nelida Corea APRN as PCP - General (Family Medicine)         Date of Discharge:  7/3/2019   Date of Admit: 2019    Discharge Condition: Stable  Discharge Diagnosis:    CHF (congestive heart failure) (CMS/HCC)    Extreme obesity with alveolar hypoventilation (CMS/HCC)     1. Dyspnea - multifactorial (dCHF, obesity hypoventilation syndrome, acute rhinovirus causing acute bronchospasm). Appreciate pulm assistance in patient's care. She qualifies for home 02. pulm is arranging.   2. Acute on chronic diastolic heart failure - he is now on oral torsemide volume status   3. Persistent atrial fibrillation - she is on apixaban. Rate is controlled.   4. Morbid obesity - effecting all aspects of care  5. Diabetes mellitus Type II - Hgb A1c 6.5 . She can follow up with her PCP  6. Chronic kidney disease - stable  7. Limited mobility due to rheumatoid arthritis   8. Likely sleep apnea - she has never had a formal sleep study. She will follow up with pulmonologist in [place] for arrangement of sleep study.         Hospital Course:   Milagros Ramirez is a 58 y.o. female who presented with shortness of breath. She was admitted in May to [facility] and was diagnosed with atrial fibrillation and diastolic heart failure. She was referred to Dr. Hebert to establish care. She was volume overloaded at that visit. She was directly admitted on  and was placed on IV diuresis. Pulmonary was consulted. She was also noted to have acute rhino virus and bronchospasm as well as obesity related hypoventilation. She was changed to oral torsemide on . She qualifies for oxygen at home and pulmonary has arranged. Metoprolol  succinate was stopped. She maintained adequate rate control on carvedilol alone. She had an echo that is detailed alone. She will be discharged with home health. She will follow up with her pulmonologist in [facility] who will schedule a formal sleep study. She will see MARILYNN Torres in 1-2 weeks.     Objective:  Temp:  [97 °F (36.1 °C)-98 °F (36.7 °C)] 97 °F (36.1 °C)  Heart Rate:  [84-98] 89  Resp:  [16-18] 18  BP: (140-160)/() 147/83    Intake/Output Summary (Last 24 hours) at 7/3/2019 1456  Last data filed at 7/3/2019 0600  Gross per 24 hour   Intake 320 ml   Output --   Net 320 ml     Body mass index is 58.13 kg/m².      07/01/19  0500 07/02/19  0100 07/03/19  0020   Weight: (!) 186 kg (411 lb) (!) 184 kg (405 lb 15.3 oz) (!) 184 kg (405 lb 1.6 oz)     Weight change: -0.388 kg (-13.7 oz)    Physical Exam:  Constitutional: She is oriented to person, place, and time. She appears well-developed. She does not appear ill.   Neck: unable to assess JVD due to body habitus  Cardiovascular: Normal rate, irregular rhythm and normal heart sounds.    Pulses:       Posterior tibial pulses are 2+ on the right side, and 2+ on the left side.   Pulmonary/Chest: Effort normal and breath sounds normal.   Abdominal: Soft. Normal appearance and bowel sounds are normal. There is no tenderness.   Musculoskeletal: Normal range of motion.        Right shoulder: She exhibits no deformity.        Left shoulder: She exhibits no deformity.   Neurological: She is alert and oriented to person, place, and time. She has normal strength.   Skin: Skin is warm, dry and intact. No rash noted.   Psychiatric: She has a normal mood and affect. Her behavior is normal. Thought content normal.   Vitals reviewed      Procedures Performed  Echo 6/29/19  · Left ventricular systolic function is normal. Calculated EF = 62%. Estimated EF was in agreement with the calculated EF. Normal left ventricular cavity size noted. Left ventricular wall thickness  is consistent with mild concentric hypertrophy. Left ventricular diastolic function was indeterminate.  · Right ventricular cavity is severely dilated. Moderately reduced right ventricular systolic function noted.  · Left atrial cavity size is severely dilated. Saline test results are negative.  · Right atrial cavity size is severely dilated.  · Mild to moderate tricuspid valve regurgitation is present. Estimated right ventricular systolic pressure from tricuspid regurgitation is markedly elevated (>55 mmHg). Calculated right ventricular systolic pressure from tricuspid regurgitation is 66.7 mmHg.     Consults:  Consults     Date and Time Order Name Status Description    6/28/2019 1042 Inpatient Pulmonology Consult Completed           Pertinent Test Results:  Results from last 7 days   Lab Units 07/03/19  0648 07/02/19  0455 07/01/19  0744 06/30/19  0721 06/29/19  0604 06/28/19  0601 06/27/19  1522   SODIUM mmol/L 146* 141 141 143 133* 139 140   POTASSIUM mmol/L 4.3 3.8 4.4 4.4 4.9 4.9 4.6   CHLORIDE mmol/L 94* 90* 91* 96* 89* 96* 96*   CO2 mmol/L 41.8* 43.2* 43.7* 39.5* 34.8* 32.5* 35.0*   BUN mg/dL 19 25* 36* 44* 34* 29* 28*   CREATININE mg/dL 0.91 0.94 1.01* 1.22* 1.16* 1.30* 1.24*   GLUCOSE mg/dL 109* 95 92 116* 116* 109* 109*   CALCIUM mg/dL 9.3 9.2 9.3 8.8 8.9 8.7 8.7   AST (SGOT) U/L  --   --   --   --   --   --  17   ALT (SGPT) U/L  --   --   --   --   --   --  14         Results from last 7 days   Lab Units 07/02/19  0455 06/29/19  0604 06/27/19  1522   WBC 10*3/mm3 4.09 5.38 5.20   HEMOGLOBIN g/dL 10.4* 10.7* 10.9*   HEMATOCRIT % 36.7 38.5 39.2   PLATELETS 10*3/mm3 154 181 160             Results from last 7 days   Lab Units 06/28/19  0601   CHOLESTEROL mg/dL 95   TRIGLYCERIDES mg/dL 53   HDL CHOL mg/dL 52     Results from last 7 days   Lab Units 06/27/19  1522   PROBNP pg/mL 2,246.0*     Results from last 7 days   Lab Units 06/28/19  0601   TSH mIU/mL 1.900       Discharge Medications     Discharge  Medications      New Medications      Instructions Start Date   ipratropium-albuterol 0.5-2.5 mg/3 ml nebulizer  Commonly known as:  DUO-NEB   3 mL, Nebulization, Every 6 Hours While Awake - RT      torsemide 20 MG tablet  Commonly known as:  DEMADEX   20 mg, Oral, Daily   Start Date:  7/4/2019        Continue These Medications      Instructions Start Date   apixaban 5 MG tablet tablet  Commonly known as:  ELIQUIS   5 mg, Oral, Every 12 Hours Scheduled      carvedilol 12.5 MG tablet  Commonly known as:  COREG   12.5 mg, Oral, 2 Times Daily      lisinopril 40 MG tablet  Commonly known as:  PRINIVIL,ZESTRIL   40 mg, Oral, Daily      NP THYROID 60 MG tablet  Generic drug:  Thyroid   60 mg, Oral, Daily      rosuvastatin 10 MG tablet  Commonly known as:  CRESTOR   10 mg, Oral, Daily         Stop These Medications    furosemide 40 MG tablet  Commonly known as:  LASIX     metoprolol succinate XL 50 MG 24 hr tablet  Commonly known as:  TOPROL-XL     PROAIR  (90 Base) MCG/ACT inhaler  Generic drug:  albuterol sulfate HFA            Discharge Diet:    Dietary Orders (From admission, onward)    Start     Ordered    06/27/19 1503  Diet Regular; Cardiac  Diet Effective Now     Question Answer Comment   Diet Texture / Consistency Regular    Common Modifiers Cardiac        06/27/19 1502          Activity at Discharge:    Activity Instructions     Gradually Increase Activity Until at Pre-Hospitalization Level             Discharge disposition: home     Discharge Instructions and Follow ups:  No future appointments.  Additional Instructions for the Follow-ups that You Need to Schedule     Ambulatory Referral to Home Health   As directed      Face to Face Visit Date:  7/3/2019    Follow-up Provider for Plan of Care?:  I treated the patient in an acute care facility and will not continue treatment after discharge.    Follow-up Provider:  AIDEN BELTRAN [[id number]]    Reason/Clinical Findings:  heart failure    Describe mobility  limitations that make leaving home difficult:  obesity, weakness, rheumatoid and lupus making mobility very difficult    Nursing/Therapeutic Services Requested:  Skilled Nursing Physical Therapy    Skilled nursing orders:  O2 instruction CHF management Medication education Cardiopulmonary assessments    PT orders:  Strengthening    Frequency:  1 Week 1         Discharge Follow-up with Specified Provider: MARILYNN Torres in 1-2 weeks.; 1 Week   As directed      To:  MARILYNN Torres in 1-2 weeks.    Follow Up:  1 Week         Discharge Follow-up with Specified Provider: Pulmonology in [place]; 2 Weeks   As directed      To:  Pulmonology in [place]    Follow Up:  2 Weeks            Contact information for follow-up providers     Nelida Corea APRN .    Specialty:  Family Medicine  Contact information:  [address] [facility] [address] [phone]                   Contact information for after-discharge care     Durable Medical Equipment     [facility] .    Service:  Durable Medical Equipment  Contact information:  [address] [place] [address] [id number]  [facility] [id number] [phone]                             Test Results Pending at Discharge: outpatient sleep study     MARILYNN Subramanian  07/03/19  2:56 PM

## 2019-07-03 NOTE — PROGRESS NOTES
Case Management Discharge Note    Final Note: Pt discharged home with VASHTI Aguilar RN    Destination      No service has been selected for the patient.      Durable Medical Equipment      Service Provider Request Status Selected Services Address Phone Number Fax Number    [facility] Selected Durable Medical [facility] [address] [address] [phone]      Dialysis/Infusion      No service has been selected for the patient.      Home Medical Care - Selection Complete      Service Provider Request Status Selected Services Address Phone Number Fax Number    [facility] Selected Home Health Services [address] [facility] [[email or web address] [address], [facility] [id number] [phone]      Therapy      No service has been selected for the patient.      Community Resources      No service has been selected for the patient.        Transportation Services  Private: Car    Final Discharge Disposition Code: 06 - home with home health care

## 2019-07-03 NOTE — DISCHARGE PLACEMENT REQUEST
"Lizett Ramirez (58 y.o. Female)     Date of Birth Social Security Number Address Home Phone MRN    1960  [address] [facility] [place] [place] [address] [phone] [id number]    [employer] Marital Status          Shinto        Admission Date Admission Type Admitting Provider Attending Provider Department, Room/Bed    6/27/19 Urgent Norman Hebert MD Meriwether, Louis G, MD [address], [address]    Discharge Date Discharge Disposition Discharge Destination                       Attending Provider:  Norman Hebert MD    Allergies:  No Known Allergies    Isolation:  Droplet   Infection:  Rhinovirus  (06/29/19)   Code Status:  CPR    Ht:  177.8 cm (70\")   Wt:  184 kg (405 lb 1.6 oz)    Admission Cmt:  None   Principal Problem:  None                Active Insurance as of 6/27/2019     Primary Coverage     Payor Plan Insurance Group Employer/Plan Group    [employer]HE[employer] BLUE CROSS AN[employer]M BLUE CROSS BLUE SHIELD PPO [id number]     Payor Plan Address Payor Plan Phone Number Payor Plan Fax Number Effective Dates    [address] [phone]  8/1/2018 - None Entered    [facility] [id number]       Subscriber Name Subscriber Birth Date Member ID       LIZETT RAMIREZ 1960 [id number]                 Emergency Contacts      (Rel.) Home Phone Work Phone Mobile Phone    MARYAMTAMMIERY (Spouse) [phone] -- --    Terry Ramirez (Son) [phone] -- --    Skyler Moreno (Son) -- -- [phone]    Maisha Rangel -- -- [phone]              "

## 2019-07-03 NOTE — PROGRESS NOTES
"    Patient Name: Milagros Ramirez  :1960  58 y.o.      Patient Care Team:  Nelida Corea APRN as PCP - General (Family Medicine)    Chief Complaint: follow up dyspnea    Interval History: she is sitting up in the chair. She was up late and feels tired today but otherwise does not have any complaints. She hopes to go home soon.        Objective   Vital Signs  Temp:  [97.2 °F (36.2 °C)-98 °F (36.7 °C)] 97.2 °F (36.2 °C)  Heart Rate:  [77-98] 94  Resp:  [16-18] 18  BP: (140-160)/() 160/95    Intake/Output Summary (Last 24 hours) at 7/3/2019 1421  Last data filed at 7/3/2019 0600  Gross per 24 hour   Intake 320 ml   Output --   Net 320 ml     Flowsheet Rows      First Filed Value   Admission Height  177.8 cm (70\") Documented at 2019 1436   Admission Weight  196 kg (433 lb 3.3 oz)  (Abnormal)  Documented at 2019 1436          Physical Exam:   General Appearance:    Alert, cooperative, in no acute distress   Lungs:     Clear to auscultation.  Normal respiratory effort and rate.      Heart:    Regular rhythm and normal rate, normal S1 and S2, no murmurs, gallops or rubs.     Chest Wall:    No abnormalities observed   Abdomen:     Soft, nontender, positive bowel sounds.     Extremities:   no cyanosis, clubbing or edema.  No marked joint deformities.  Adequate musculoskeletal strength.       Results Review:    Results from last 7 days   Lab Units 19  0648   SODIUM mmol/L 146*   POTASSIUM mmol/L 4.3   CHLORIDE mmol/L 94*   CO2 mmol/L 41.8*   BUN mg/dL 19   CREATININE mg/dL 0.91   GLUCOSE mg/dL 109*   CALCIUM mg/dL 9.3         Results from last 7 days   Lab Units 19  0455   WBC 10*3/mm3 4.09   HEMOGLOBIN g/dL 10.4*   HEMATOCRIT % 36.7   PLATELETS 10*3/mm3 154             Results from last 7 days   Lab Units 19  0601   CHOLESTEROL mg/dL 95   TRIGLYCERIDES mg/dL 53   HDL CHOL mg/dL 52   LDL CHOL mg/dL 32               Medication Review:     apixaban 5 mg Oral Q12H   carvedilol " 12.5 mg Oral BID   ipratropium-albuterol 3 mL Nebulization Q6H While Awake - RT   lisinopril 40 mg Oral Daily   rosuvastatin 10 mg Oral Daily   sodium chloride 3 mL Intravenous Q12H   Thyroid 60 mg Oral Daily   torsemide 20 mg Oral Daily             Assessment/Plan   1. Dyspnea - multifactorial (dCHF, obesity hypoventilation syndrome, acute rhinovirus causing acute bronchospasm). Appreciate pulm assistance in patient's care. She qualifies for home 02. pulm is arranging.   2. Acute on chronic diastolic heart failure - he is now on oral torsemide volume status   3. Persistent atrial fibrillation - she is on apixaban. Rate is controlled.   4. Morbid obesity - effecting all aspects of care  5. Diabetes mellitus Type II - Hgb A1c 6.5 . She can follow up with her PCP  6. Chronic kidney disease - stable  7. Limited mobility due to rheumatoid arthritis   8. Likely sleep apnea - she has never had a formal sleep study. She will follow up with pulmonologist in [place] for arrangement of sleep study.     Plan for possible dc home today with HH. Will check with pulm to see if they have any objections but it looks like they have arranged all her medications and oxygen already.     MARILYNN Subramanian  [facility]  07/03/19  2:21 PM

## 2019-07-03 NOTE — DISCHARGE PLACEMENT REQUEST
"Lizett Ramirez (58 y.o. Female)     Date of Birth Social Security Number Address Home Phone MRN    1960  [address] [facility] [address] [phone] [id number]    Lutheran Marital Status          Roman Catholic        Admission Date Admission Type Admitting Provider Attending Provider Department, Room/Bed    6/27/19 Urgent Norman Hebert MD Meriwether, Louis G, MD [address], [address]/1    Discharge Date Discharge Disposition Discharge Destination                       Attending Provider:  Norman Hebert MD    Allergies:  No Known Allergies    Isolation:  Droplet   Infection:  Rhinovirus  (06/29/19)   Code Status:  CPR    Ht:  177.8 cm (70\")   Wt:  184 kg (405 lb 1.6 oz)    Admission Cmt:  None   Principal Problem:  None                Active Insurance as of 6/27/2019     Primary Coverage     Payor Plan Insurance Group Employer/Plan Group    ANTHEM BLUE CROSS ANTHEM BLUE CROSS BLUE SHIELD PPO [id number]     Payor Plan Address Payor Plan Phone Number Payor Plan Fax Number Effective Dates    [address] [phone]  8/1/2018 - None Entered    [facility] [id number]       Subscriber Name Subscriber Birth Date Member ID       LIZETT RAMIREZ 1960 [id number]                 Emergency Contacts      (Rel.) Home Phone Work Phone Mobile Phone    MARYAMTAMMIERY (Spouse) [phone] -- --    Terry Ramirez (Son) [phone] -- --    Skyler Moreno (Son) -- -- [phone]    Maihsa Rangel -- -- [phone]              "

## 2019-07-03 NOTE — PROGRESS NOTES
Continued Stay Note  [facility]     Patient Name: Milagros Ramirez  MRN: [id number]  Today's Date: 7/3/2019    Admit Date: 6/27/2019    Discharge Plan     Row Name 07/03/19 0930       Plan    Plan  Plan home.  ROSY Aguilar RN    Patient/Family in Agreement with Plan  yes    Plan Comments  Pt has an order for nebulizer.  Spoke to pt at bedside and she is requesting [facility] to supply DME's.   Lyric   ( [phone]) with [facility] called to provide nebulizer.  Plan home.  ROSY Aguilar RN        Discharge Codes    No documentation.             Deborah Aguilar, RN

## 2019-07-04 ENCOUNTER — READMISSION MANAGEMENT (OUTPATIENT)
Dept: CALL CENTER | Facility: HOSPITAL | Age: 59
End: 2019-07-04

## 2019-07-04 NOTE — OUTREACH NOTE
Prep Survey      Responses   Facility patient discharged from?  [facility]   Is patient eligible?  Yes   Discharge diagnosis  CHF (congestive heart failure)   Does the patient have one of the following disease processes/diagnoses(primary or secondary)?  CHF   Does the patient have Home health ordered?  Yes   What is the [facility] health [facility]    Is there a DME ordered?  Yes   What DME was ordered?  Home Nebulizer , by [facility] , oxygen therapy   Comments regarding appointments  See AVS   Medication alerts for this patient  torsemide (DEMADEX)    General alerts for this patient  Chronic kidney disease,CHF,Deabetic,RA,,Sleep apnea   Prep survey completed?  Yes          Palma Boss RN

## 2019-07-07 ENCOUNTER — READMISSION MANAGEMENT (OUTPATIENT)
Dept: CALL CENTER | Facility: HOSPITAL | Age: 59
End: 2019-07-07

## 2019-07-07 NOTE — OUTREACH NOTE
CHF Week 1 Survey      Responses   Facility patient discharged from?  [place]   Does the patient have one of the following disease processes/diagnoses(primary or secondary)?  CHF   Is there a successful TCM telephone encounter documented?  No   CHF Week 1 attempt successful?  Yes   Call start time  1327   Call end time  1330   Discharge diagnosis  CHF (congestive heart failure)   Is patient permission given to speak with other caregiver?  Yes   List who call center can speak with  Christ-     Meds reviewed with patient/caregiver?  Yes   Is the patient having any side effects they believe may be caused by any medication additions or changes?  No   Does the patient have all medications ordered at discharge?  Yes   Is the patient taking all medications as directed (includes completed medication regime)?  Yes   Does the patient have a primary care provider?   Yes   Does the patient have an appointment with their PCP within 7 days of discharge?  Yes   Has the patient kept scheduled appointments due by today?  N/A   [facility]    Has home health visited the patient within 72 hours of discharge?  Yes   What DME was ordered?  Home Nebulizer , by [facility] , oxygen therapy   Has all DME been delivered?  No   DME interventions  Other   DME comments  Will email Case Management    Psychosocial issues?  No   Did the patient receive a copy of their discharge instructions?  Yes   Nursing interventions  Reviewed instructions with patient   What is the patient's perception of their health status since discharge?  Improving   Nursing interventions  Nurse provided patient education   Is the patient weighing daily?  No   Does the patient have scales?  No   Daily weight interventions  Education provided on importance of daily weight   Is the patient able to teach back Heart Failure diet management?  Yes   Is the patient able to teach back Heart Failure Zones?  Yes   Is the  patient able to teach back signs and symptoms of worsening condition? (i.e. weight gain, shortness of air, etc.)  Yes   Is the patient/caregiver able to teach back the hierarchy of who to call/visit for symptoms/problems? PCP, Specialist, Home health nurse, Urgent Care, ED, 911  Yes    CHF Week 1 call completed?  Yes          Christal Barahona RN

## 2019-07-08 NOTE — PROGRESS NOTES
Case Management Discharge Note    Final Note: [facility] did not visit within 72 hours but will see patient today.    Destination      No service has been selected for the patient.      Durable Medical Equipment      Service Provider Request Status Selected Services Address Phone Number Fax Number    [facility] Selected Durable Medical Equipment [address] [id number] [phone]      Dialysis/Infusion      No service has been selected for the patient.      Home Medical Care - Selection Complete      Service Provider Request Status Selected Services Address Phone Number Fax Number    [facility] Selected Home Health Services [address] [facility] [address] [id number] [phone]      Therapy      No service has been selected for the patient.      Community Resources      No service has been selected for the patient.        Transportation Services  Private: Car    Final Discharge Disposition Code: 01 - home or self-care

## 2019-07-08 NOTE — OUTREACH NOTE
Case Management Call Center Follow-up      Responses   [facility] Call Center Tracking Reason?  No DME   Has the Call Center Case Management Follow-up issue been resolved?  Yes   Follow-up Comments  Patient did not receive nebulizer as ordered.  Discussed with Lyric Lara who confirmed that it will be delivered today.  Patient also inquired about scales and pulse oximetry.  Informed patient that if someone can come to [place] for her, we can provide scales.  Informed patient she would need to obtain pulse oximeter on own if she desires it. Patient states that [facility] has called but not visited as of yet.  HADLEY Limon with [facility] to expedite visit.          Snow Owens RN    7/8/2019, 11:03 AM

## 2019-07-09 ENCOUNTER — TELEPHONE (OUTPATIENT)
Dept: CARDIOLOGY | Facility: CLINIC | Age: 59
End: 2019-07-09

## 2019-07-09 NOTE — TELEPHONE ENCOUNTER
07/09/19  12:54 PM  Milagros Ramirez  1960  Home Phone [phone]   Mobile [phone]     [facility] with [facility] home [facility] call during Ms. Ramirez's first home visit. Ms. Ramirez feels like she is retaining fluid. She has not been weighing herself.    [facility] states Ms. Ramirez has 2+ non-pitting edema and clear lungs. Ms. Ramirez reports a cough when she lays flat.    She is taking 20mg torsemide daily. Does she need adjustment? She is scheduled to see Yesenia Bryan on 7/26/19.    Bell MOTLEY RN

## 2019-07-12 NOTE — TELEPHONE ENCOUNTER
I called and spoke with her on the phone on 7/10/2019.  She is going to double the torsemide for the next 2 days.  Home Health is bringing a scale, and she will start recording her weight daily as well.  Thanks     ALEXIS

## 2019-07-15 ENCOUNTER — READMISSION MANAGEMENT (OUTPATIENT)
Dept: CALL CENTER | Facility: HOSPITAL | Age: 59
End: 2019-07-15

## 2019-07-15 NOTE — OUTREACH NOTE
CHF Week 2 Survey      Responses   Facility patient discharged from?  [place]   Does the patient have one of the following disease processes/diagnoses(primary or secondary)?  CHF   Week 2 attempt successful?  Yes   Call start time  1803   Unsuccessful attempts  Attempt 1   Call end time  1808   General alerts for this patient  Chronic kidney disease,CHF,Deabetic,RA,,Sleep apnea   Discharge diagnosis  CHF (congestive heart failure)   Is patient permission given to speak with other caregiver?  Yes   List who call center can speak with  Christ-     Medication alerts for this patient  torsemide (DEMADEX)    Meds reviewed with patient/caregiver?  Yes   Is the patient having any side effects they believe may be caused by any medication additions or changes?  No   Does the patient have all medications ordered at discharge?  Yes   Is the patient taking all medications as directed (includes completed medication regime)?  Yes   Comments regarding appointments  See AVS   Does the patient have a primary care provider?   Yes   Does the patient have an appointment with their PCP within 7 days of discharge?  Yes   Comments regarding PCP  She seesa the Pulmonary tommorow.    Has the patient kept scheduled appointments due by today?  N/A   Did the patient receive a copy of their discharge instructions?  Yes   Nursing interventions  Reviewed instructions with patient   What is the patient's perception of their health status since discharge?  Worsening [Her RA and Lupus are bothering. She has called her specialist regarding the medication. ]   Nursing interventions  Nurse provided patient education   Is the patient weighing daily?  Yes   Does the patient have scales?  Yes   Daily weight interventions  Education provided on importance of daily weight   Is the patient able to teach back Heart Failure diet management?  Yes   Is the patient able to teach back Heart Failure Zones?  Yes [yellow zone - gained 2 l.bs- ]   Is the  patient able to teach back signs and symptoms of worsening condition? (i.e. weight gain, shortness of air, etc.)  Yes   Is the patient/caregiver able to teach back the hierarchy of who to call/visit for symptoms/problems? PCP, Specialist, Home health nurse, Urgent Care, ED, 911  Yes   Additional teach back comments  Her RA and Lupus are bothering her.   CHF Week 2 call completed?  Yes          Margoth Dorman RN

## 2019-07-22 ENCOUNTER — READMISSION MANAGEMENT (OUTPATIENT)
Dept: CALL CENTER | Facility: HOSPITAL | Age: 59
End: 2019-07-22

## 2019-07-22 NOTE — OUTREACH NOTE
CHF Week 3 Survey      Responses   Facility patient discharged from?  [place]   Does the patient have one of the following disease processes/diagnoses(primary or secondary)?  CHF   Week 3 attempt successful?  Yes   Call start time  1423   Call end time  1424   General alerts for this patient  Chronic kidney disease,CHF,Deabetic,RA,,Sleep apnea   Discharge diagnosis  CHF (congestive heart failure)   Is patient permission given to speak with other caregiver?  Yes   List who call center can speak with  Christ-     Meds reviewed with patient/caregiver?  Yes   Is the patient having any side effects they believe may be caused by any medication additions or changes?  No   Does the patient have all medications ordered at discharge?  Yes   Is the patient taking all medications as directed (includes completed medication regime)?  Yes   Does the patient have a primary care provider?   Yes   Does the patient have an appointment with their PCP within 7 days of discharge?  Yes   Has the patient kept scheduled appointments due by today?  Yes   Psychosocial issues?  No   Did the patient receive a copy of their discharge instructions?  Yes   Nursing interventions  Reviewed instructions with patient   What is the patient's perception of their health status since discharge?  Improving   Nursing interventions  Nurse provided patient education   Is the patient weighing daily?  Yes   Does the patient have scales?  Yes   Is the patient able to teach back Heart Failure diet management?  Yes   Is the patient able to teach back Heart Failure Zones?  Yes   Is the patient able to teach back signs and symptoms of worsening condition? (i.e. weight gain, shortness of air, etc.)  Yes   Is the patient/caregiver able to teach back the hierarchy of who to call/visit for symptoms/problems? PCP, Specialist, Home health nurse, Urgent Care, ED, 911  Yes   CHF Week 3 call completed?  Yes          Christal Barahona RN

## 2019-07-26 ENCOUNTER — LAB (OUTPATIENT)
Dept: LAB | Facility: HOSPITAL | Age: 59
End: 2019-07-26

## 2019-07-26 ENCOUNTER — OFFICE VISIT (OUTPATIENT)
Dept: CARDIOLOGY | Facility: CLINIC | Age: 59
End: 2019-07-26

## 2019-07-26 VITALS
SYSTOLIC BLOOD PRESSURE: 142 MMHG | DIASTOLIC BLOOD PRESSURE: 86 MMHG | WEIGHT: 293 LBS | HEART RATE: 82 BPM | HEIGHT: 70 IN | BODY MASS INDEX: 41.95 KG/M2 | OXYGEN SATURATION: 94 %

## 2019-07-26 DIAGNOSIS — I50.33 ACUTE ON CHRONIC DIASTOLIC CONGESTIVE HEART FAILURE (HCC): ICD-10-CM

## 2019-07-26 DIAGNOSIS — I50.33 ACUTE ON CHRONIC DIASTOLIC CONGESTIVE HEART FAILURE (HCC): Primary | ICD-10-CM

## 2019-07-26 DIAGNOSIS — I48.0 PAROXYSMAL ATRIAL FIBRILLATION (HCC): ICD-10-CM

## 2019-07-26 LAB
ANION GAP SERPL CALCULATED.3IONS-SCNC: 11.1 MMOL/L (ref 5–15)
BUN BLD-MCNC: 26 MG/DL (ref 6–20)
BUN/CREAT SERPL: 21.3 (ref 7–25)
CALCIUM SPEC-SCNC: 9.5 MG/DL (ref 8.6–10.5)
CHLORIDE SERPL-SCNC: 92 MMOL/L (ref 98–107)
CO2 SERPL-SCNC: 34.9 MMOL/L (ref 22–29)
CREAT BLD-MCNC: 1.22 MG/DL (ref 0.57–1)
GFR SERPL CREATININE-BSD FRML MDRD: 45 ML/MIN/1.73
GLUCOSE BLD-MCNC: 107 MG/DL (ref 65–99)
POTASSIUM BLD-SCNC: 4.7 MMOL/L (ref 3.5–5.2)
SODIUM BLD-SCNC: 138 MMOL/L (ref 136–145)

## 2019-07-26 PROCEDURE — 99214 OFFICE O/P EST MOD 30 MIN: CPT | Performed by: NURSE PRACTITIONER

## 2019-07-26 PROCEDURE — 36415 COLL VENOUS BLD VENIPUNCTURE: CPT

## 2019-07-26 PROCEDURE — 80048 BASIC METABOLIC PNL TOTAL CA: CPT

## 2019-07-26 RX ORDER — TRAMADOL HYDROCHLORIDE 50 MG/1
TABLET ORAL
Refills: 0 | COMMUNITY
Start: 2019-07-12 | End: 2021-04-27

## 2019-07-26 NOTE — PROGRESS NOTES
[facility]      Patient Name: Milagros Ramirez  :1960  Age: 58 y.o.  Primary Cardiologist: Duncan Hebert MD  Encounter Provider:  MARILYNN Alonzo      Chief Complaint:   Chief Complaint   Patient presents with   • Follow-up     1 month Hospital         HPI  Milagros Ramirez is a 58 y.o. female who presents today for hospital reevaluation. Pt has a  history significant for atrial fibrillation, hypertension, diastolic heart failure, obesity, diabetes, systemic lupus, chronic kidney disease.  Patient was hospitalized -7/3 for dyspnea.  Heart failure exacerbation.  Patient was diuresed during hospitalization and eventually discharged on torsemide.  She did qualify for home oxygen therapy and pulmonology made arrangements for this.  She was transitioned from metoprolol to carvedilol.    Patient reports that she is currently taking torsemide 40 mg daily.  She has not had a blood rechecked since the same dose increase.  Patient's biggest complaint today is joint pain and generalized pain consistent with her lupus and rheumatoid arthritis.  She reports that since being diagnosed with atrial fibrillation and heart failure her rheumatologist has stopped her Humira and Plaquenil that her symptoms have worsened since then.  She states that she has been in touch with her rheumatologist and he has asked that the possibility of adding Rituxan be discussed with our office.  Patient is being followed by home health and is weighing herself daily.  She reports that her weight is currently going up and down.  She states that this morning her weight was 383 pounds.  She denies any worsening edema.  She does complain of generalized fatigue and states that it is a challenge sometimes for her to complete her activities of daily living.  She does remain on home oxygen therapy.  She has not yet seen pulmonology in the outpatient setting.  She has not scheduled her sleep study as of yet.    The  "following portions of the patient's history were reviewed and updated as appropriate: allergies, current medications, past family history, past medical history, past social history, past surgical history and problem list.      Review of Systems   Constitution: Positive for weakness and malaise/fatigue.   Cardiovascular: Positive for dyspnea on exertion. Negative for chest pain and leg swelling.   Respiratory: Positive for cough and shortness of breath.    Musculoskeletal: Positive for joint pain and myalgias.   Neurological: Negative for light-headedness.   All other systems reviewed and are negative.      OBJECTIVE:   Vital Signs  Vitals:    07/26/19 1103   BP: 142/86   Pulse: 82   SpO2: 94%     Estimated body mass index is 58.11 kg/m² as calculated from the following:    Height as of this encounter: 177.8 cm (70\").    Weight as of this encounter: 184 kg (405 lb).    Physical Exam   Constitutional: She is oriented to person, place, and time. Vital signs are normal. She appears well-developed and well-nourished. She is active.   Morbid obesity   Eyes: Conjunctivae are normal.   Neck: Carotid bruit is not present.   Cardiovascular: Normal rate, regular rhythm and normal heart sounds.   Pulmonary/Chest: Breath sounds normal.   Abdominal: Normal appearance.   Musculoskeletal:   No cyanosis, clubbing, edema  Normal ROM   Neurological: She is alert and oriented to person, place, and time. GCS eye subscore is 4. GCS verbal subscore is 5. GCS motor subscore is 6.   Skin: Skin is warm, dry and intact.   Psychiatric: She has a normal mood and affect. Her speech is normal and behavior is normal. Judgment and thought content normal. Cognition and memory are normal.       Procedures    Cardiac Procedures:  1. Echocardiogram 6/29/2019.  EF 62%.  Diastolic function was indeterminate.  RV cavity severely dilated.  LA cavity severely dilated.  Saline test results are negative.  Mild to moderate tricuspid valve regurgitation.      "   ASSESSMENT:      Diagnosis Plan   1. Acute on chronic diastolic congestive heart failure (CMS/HCC)  Basic Metabolic Panel   2. Paroxysmal atrial fibrillation (CMS/[facility])           PLAN OF CARE:     1. Acute on chronic diastolic heart failure.  Patient currently appears euvolemic.  She states that she keeps close eye on her weight.  She is very fatigued and has baseline shortness of breath.  She is on home oxygen therapy.  She states that some days are challenging for her to complete activities of daily living.  She does admit that most of her fatigue is secondary to joint pain and myalgias from her rheumatoid arthritis and lupus.  She has had to stop Humira and Plaquenil since her diagnosis of heart failure and atrial fibrillation.  Her rheumatologist is questioning if Rituxan can be added and is asking for our opinion.  I will defer this to Dr. Hebert.  Patient will continue torsemide 40 mg daily as well as home oxygen.  She is in need of appointment with pulmonologist.  She will have a BMP drawn today to assess renal function with increased dose of torsemide.  2. Paroxysmal atrial fibrillation.  Rate is currently controlled with carvedilol.  She is anticoagulated with apixaban.  3. Follow-up with Dr. Hebert in 2 months.  Sooner for problems or complications.      ADDENDUM   1:36 PM    Patient's lab results have resulted and are listed below.  Patient's creatinine is elevated slightly.  Discussed this with Dr. Hebert.  He is concerned if we go back down to 20 mg of torsemide that she will become volume overloaded.  Will continue with current dosing of 40 mg daily and patient will have repeat BMP in 2 weeks.  [facility]  I also informed her that Dr. Hebert is okay with her starting Rituxan.  There is a link between atrial fibrillation and heart failure in some patients.  She was advised that if she started having worsening episodes of shortness  of breath or volume overload or tachycardias that we would need to stop this medication.  She states that she will notify her rheumatologist.    Atrial Fibrillation and Atrial Flutter  Assessment  • The patient has paroxysmal atrial fibrillation  • The patient's CHADS2-VASc score is 3  • A [id number] score of 2 or more is considered a high risk for a thromboembolic event  • Apixaban prescribed    Plan  • Attempt to maintain sinus rhythm  • Continue apixaban for antithrombotic therapy, bleeding issues discussed  • Continue beta blocker for rhythm control  • Continue beta blocker for rate control         Discharge Medications           Accurate as of 7/26/19 11:44 AM. If you have any questions, ask your nurse or doctor.               Continue These Medications      Instructions Start Date   apixaban 5 MG tablet tablet  Commonly known as:  ELIQUIS   5 mg, Oral, Every 12 Hours Scheduled      carvedilol 12.5 MG tablet  Commonly known as:  COREG   12.5 mg, Oral, 2 Times Daily      ipratropium-albuterol 0.5-2.5 mg/3 ml nebulizer  Commonly known as:  DUO-NEB   3 mL, Nebulization, Every 6 Hours While Awake - RT      lisinopril 40 MG tablet  Commonly known as:  PRINIVIL,ZESTRIL   40 mg, Oral, Daily      NP THYROID 60 MG tablet  Generic drug:  Thyroid   60 mg, Oral, Daily      rosuvastatin 10 MG tablet  Commonly known as:  CRESTOR   10 mg, Oral, Daily      torsemide 20 MG tablet  Commonly known as:  DEMADEX   20 mg, Oral, Daily      traMADol 50 MG tablet  Commonly known as:  ULTRAM   TK 1 T PO QID PRN             Thank you for allowing me to participate in the care of your patient,      Sincerely,   MARILYNN Alonzo  [facility]  07/26/19  11:44 AM    **Kinga Disclaimer:**  Much of this encounter note is an electronic transcription/translation of spoken language to printed text. The electronic translation of spoken language may permit erroneous, or at times, nonsensical words or phrases to be  inadvertently transcribed. Although I have reviewed the note for such errors, some may still exist.

## 2019-07-26 NOTE — PROGRESS NOTES
You would increase her torsemide to 40 mg daily.  Do you want me to have her back down to 20 mg?  Weight has been up and down.  She still has some chronic shortness of breath.

## 2019-07-29 ENCOUNTER — READMISSION MANAGEMENT (OUTPATIENT)
Dept: CALL CENTER | Facility: HOSPITAL | Age: 59
End: 2019-07-29

## 2019-07-29 NOTE — OUTREACH NOTE
CHF Week 4 Survey      Responses   Facility patient discharged from?  [place]   Does the patient have one of the following disease processes/diagnoses(primary or secondary)?  CHF   Week 4 attempt successful?  Yes   Call start time  1644   Call end time  1650   General alerts for this patient  Chronic kidney disease,CHF,Deabetic,RA,,Sleep apnea   Discharge diagnosis  CHF (congestive heart failure)   Meds reviewed with patient/caregiver?  Yes   Is the patient taking all medications as directed (includes completed medication regime)?  Yes   Has the patient kept scheduled appointments due by today?  Yes   Psychosocial issues?  No   Comments  LE edema is resolving, but still present. Stil having muscular pain and SOA. Her knees are swollen r/t her RA.    What is the patient's perception of their health status since discharge?  Improving   Is the patient weighing daily?  Yes   Is the patient able to teach back Heart Failure diet management?  Yes   Is the patient able to teach back Heart Failure Zones?  Yes   Is the patient/caregiver able to teach back the hierarchy of who to call/visit for symptoms/problems? PCP, Specialist, Home health nurse, Urgent Care, ED, 911  Yes   Additional teach back comments  Her RA and Lupus are bothering her.   Week 4 Call Completed?  Yes   Would the patient like one additional call?  No   Graduated  Yes   Did the patient feel the follow up calls were helpful during their recovery period?  Yes   Was the number of calls appropriate?  Yes          Shannon Herrera RN

## 2019-08-09 ENCOUNTER — HOSPITAL ENCOUNTER (OUTPATIENT)
Dept: OTHER | Facility: HOSPITAL | Age: 59
Discharge: HOME | End: 2019-08-09
Attending: INTERNAL MEDICINE

## 2019-08-09 ENCOUNTER — RESULTS ENCOUNTER (OUTPATIENT)
Dept: CARDIOLOGY | Facility: CLINIC | Age: 59
End: 2019-08-09

## 2019-08-09 DIAGNOSIS — I50.33 ACUTE ON CHRONIC DIASTOLIC CONGESTIVE HEART FAILURE (HCC): ICD-10-CM

## 2019-08-09 LAB
ALBUMIN SERPL-MCNC: 3.7 G/DL (ref 3.5–5)
ALBUMIN/GLOB SERPL: 0.8 {RATIO} (ref 1.4–2.6)
ALP SERPL-CCNC: 80 U/L (ref 53–141)
ALT SERPL-CCNC: 8 U/L (ref 10–40)
ANION GAP SERPL CALC-SCNC: 16 MMOL/L (ref 8–19)
AST SERPL-CCNC: 14 U/L (ref 15–50)
BASOPHILS # BLD AUTO: 0.02 10*3/UL (ref 0–0.2)
BASOPHILS NFR BLD AUTO: 0.4 % (ref 0–3)
BILIRUB SERPL-MCNC: 0.5 MG/DL (ref 0.2–1.3)
BUN SERPL-MCNC: 28 MG/DL (ref 5–25)
BUN/CREAT SERPL: 28 {RATIO} (ref 6–20)
CALCIUM SERPL-MCNC: 9.8 MG/DL (ref 8.7–10.4)
CHLORIDE SERPL-SCNC: 87 MMOL/L (ref 99–111)
CONV ABS IMM GRAN: 0.01 10*3/UL (ref 0–0.2)
CONV CO2: 35 MMOL/L (ref 22–32)
CONV IMMATURE GRAN: 0.2 % (ref 0–1.8)
CONV TOTAL PROTEIN: 8.1 G/DL (ref 6.3–8.2)
CREAT UR-MCNC: 0.99 MG/DL (ref 0.5–0.9)
DEPRECATED RDW RBC AUTO: 60.7 FL (ref 36.4–46.3)
EOSINOPHIL # BLD AUTO: 0.09 10*3/UL (ref 0–0.7)
EOSINOPHIL # BLD AUTO: 1.8 % (ref 0–7)
ERYTHROCYTE [DISTWIDTH] IN BLOOD BY AUTOMATED COUNT: 18.3 % (ref 11.7–14.4)
ERYTHROCYTE [SEDIMENTATION RATE] IN BLOOD: 69 MM/H (ref 0–30)
GFR SERPLBLD BASED ON 1.73 SQ M-ARVRAT: >60 ML/MIN/{1.73_M2}
GLOBULIN UR ELPH-MCNC: 4.4 G/DL (ref 2–3.5)
GLUCOSE SERPL-MCNC: 132 MG/DL (ref 65–99)
HCT VFR BLD AUTO: 36.8 % (ref 37–47)
HGB BLD-MCNC: 11.2 G/DL (ref 12–16)
LYMPHOCYTES # BLD AUTO: 0.97 10*3/UL (ref 1–5)
LYMPHOCYTES NFR BLD AUTO: 19.4 % (ref 20–45)
MCH RBC QN AUTO: 27.5 PG (ref 27–31)
MCHC RBC AUTO-ENTMCNC: 30.4 G/DL (ref 33–37)
MCV RBC AUTO: 90.4 FL (ref 81–99)
MONOCYTES # BLD AUTO: 0.47 10*3/UL (ref 0.2–1.2)
MONOCYTES NFR BLD AUTO: 9.4 % (ref 3–10)
NEUTROPHILS # BLD AUTO: 3.45 10*3/UL (ref 2–8)
NEUTROPHILS NFR BLD AUTO: 68.8 % (ref 30–85)
NRBC CBCN: 0 % (ref 0–0.7)
OSMOLALITY SERPL CALC.SUM OF ELEC: 283 MOSM/KG (ref 273–304)
PLATELET # BLD AUTO: 236 10*3/UL (ref 130–400)
PMV BLD AUTO: 9.8 FL (ref 9.4–12.3)
POTASSIUM SERPL-SCNC: 4.7 MMOL/L (ref 3.5–5.3)
RBC # BLD AUTO: 4.07 10*6/UL (ref 4.2–5.4)
SODIUM SERPL-SCNC: 133 MMOL/L (ref 135–147)
WBC # BLD AUTO: 5.01 10*3/UL (ref 4.8–10.8)

## 2019-08-10 LAB
CONV HEPATITIS B SURFACE AG W CONFIRMATION RE: NEGATIVE
HAV IGM SERPL QL IA: NEGATIVE
HBV CORE IGM SERPL QL IA: NEGATIVE
HCV AB SER DONR QL: 0.2 S/CO RATIO (ref 0–0.9)

## 2019-08-14 ENCOUNTER — TELEPHONE (OUTPATIENT)
Dept: CARDIOLOGY | Facility: CLINIC | Age: 59
End: 2019-08-14

## 2019-08-14 LAB
CONV QUANTIFERON TB GOLD: NEGATIVE
QUANTIFERON CRITERIA: NORMAL
QUANTIFERON MITOGEN VALUE: >10 IU/ML
QUANTIFERON NIL VALUE: 0.04 IU/ML
QUANTIFERON TB1 AG VALUE: 0.04 IU/ML
QUANTIFERON TB2 AG VALUE: 0.03 IU/ML

## 2019-08-14 NOTE — TELEPHONE ENCOUNTER
If her edema is better, I suspect a lot of this is fluid weight.  I am ok with watching this for now.  She was grossly volume overloaded, and she needed to lose it.  Thanks     ALEXIS

## 2019-08-14 NOTE — TELEPHONE ENCOUNTER
08/14/19  1:17 PM  Milagros Ramirez  1960  Home Phone [phone]   Mobile [phone]       Monserrat Keyh, the Home Health nurse for Milagros Ramirez called the office today. She was concerned about the patient's rapid weight loss. On 7-9-19 was 401 lbs. Her weight on 7- was 405 lbs (here in the office). Her weight today was recorded at 366 lbs.    Note:  bought the patient a scale that has a wt limit of 300 lbs. So, we are unsure of the accuracy of her home scale. Also, the patient told the  nurse that she is actively trying to lose weight. The  nurse also said that the swelling in the patient's legs has gotten much better in the last few weeks.    The patient is also being discharged from the Telehealth program because she has been non-complaint with calling in her information.    Home Health would like to know if you have any recommendations or new orders for them.    Thank you!    Anisa Solomon, RN  Triage RN JESSYMG

## 2019-08-14 NOTE — TELEPHONE ENCOUNTER
Called  ITZEL Campos. Let her know Dr. Hebert's recommendations. She verbalized understanding.    Anisa Solomon, RN  Triage RN PURVI

## 2019-08-23 ENCOUNTER — TELEPHONE (OUTPATIENT)
Dept: CARDIOLOGY | Facility: CLINIC | Age: 59
End: 2019-08-23

## 2019-08-26 ENCOUNTER — HOSPITAL ENCOUNTER (OUTPATIENT)
Dept: OTHER | Facility: HOSPITAL | Age: 59
Setting detail: RECURRING SERIES
Discharge: HOME | End: 2019-08-31
Attending: INTERNAL MEDICINE

## 2019-08-26 LAB
ALBUMIN SERPL-MCNC: 3.7 G/DL (ref 3.5–5)
ALBUMIN/GLOB SERPL: 0.8 {RATIO} (ref 1.4–2.6)
ALP SERPL-CCNC: 87 U/L (ref 53–141)
ALT SERPL-CCNC: 9 U/L (ref 10–40)
ANION GAP SERPL CALC-SCNC: 15 MMOL/L (ref 8–19)
AST SERPL-CCNC: 15 U/L (ref 15–50)
BASOPHILS # BLD AUTO: 0.01 10*3/UL (ref 0–0.2)
BASOPHILS NFR BLD AUTO: 0.2 % (ref 0–3)
BILIRUB SERPL-MCNC: 0.36 MG/DL (ref 0.2–1.3)
BUN SERPL-MCNC: 33 MG/DL (ref 5–25)
BUN/CREAT SERPL: 25 {RATIO} (ref 6–20)
CALCIUM SERPL-MCNC: 9.7 MG/DL (ref 8.7–10.4)
CHLORIDE SERPL-SCNC: 91 MMOL/L (ref 99–111)
CONV ABS IMM GRAN: 0.01 10*3/UL (ref 0–0.54)
CONV CO2: 34 MMOL/L (ref 22–32)
CONV EOSINOPHILS PERCENT BY MANUAL COUNT: 3.1 % (ref 0–7)
CONV IMMATURE GRAN: 0.2 % (ref 0–0.4)
CONV TOTAL PROTEIN: 8.2 G/DL (ref 6.3–8.2)
CREAT UR-MCNC: 1.33 MG/DL (ref 0.5–0.9)
EOSINOPHIL # BLD MANUAL: 0.18 10*3/UL (ref 0–0.7)
ERYTHROCYTE [DISTWIDTH] IN BLOOD BY AUTOMATED COUNT: 16.5 % (ref 11.5–14.5)
ERYTHROCYTE [DISTWIDTH] IN BLOOD BY AUTOMATED COUNT: 56.2 FL
GFR SERPLBLD BASED ON 1.73 SQ M-ARVRAT: 44 ML/MIN/{1.73_M2}
GLOBULIN UR ELPH-MCNC: 4.5 G/DL (ref 2–3.5)
GLUCOSE SERPL-MCNC: 176 MG/DL (ref 65–99)
HBA1C MFR BLD: 11 G/DL (ref 12–16)
HCT VFR BLD AUTO: 35.9 % (ref 37–47)
LDH SERPL-CCNC: 199 U/L (ref 120–240)
LYMPHOCYTES # BLD AUTO: 0.92 10*3/UL (ref 1–5)
LYMPHOCYTES NFR BLD AUTO: 16 % (ref 20–45)
MCH RBC QN AUTO: 28.1 PG (ref 27–31)
MCHC RBC AUTO-ENTMCNC: 30.6 G/DL (ref 33–37)
MCV RBC AUTO: 91.6 FL (ref 81–99)
MONOCYTES # BLD AUTO: 0.46 10*3/UL (ref 0.2–1.2)
MONOCYTES NFR BLD MANUAL: 8 % (ref 3–10)
NEUTROPHILS # BLD AUTO: 4.17 10*3/UL (ref 2–8)
NEUTROPHILS NFR BLD MANUAL: 72.5 % (ref 30–85)
OSMOLALITY SERPL CALC.SUM OF ELEC: 292 MOSM/KG (ref 273–304)
PLATELET # BLD AUTO: 235 10*3/UL (ref 130–400)
PMV BLD AUTO: 9.6 FL (ref 7.4–10.4)
POTASSIUM SERPL-SCNC: 4.8 MMOL/L (ref 3.5–5.3)
RBC MORPH BLD: 3.92 10*6/UL (ref 4.2–5.4)
SODIUM SERPL-SCNC: 135 MMOL/L (ref 135–147)
WBC # BLD AUTO: 5.75 10*3/UL (ref 4.8–10.8)

## 2019-08-29 ENCOUNTER — TELEPHONE (OUTPATIENT)
Dept: CARDIOLOGY | Facility: CLINIC | Age: 59
End: 2019-08-29

## 2019-09-17 ENCOUNTER — TELEPHONE (OUTPATIENT)
Dept: CARDIOLOGY | Facility: CLINIC | Age: 59
End: 2019-09-17

## 2019-09-18 NOTE — TELEPHONE ENCOUNTER
I called pt unable to reach left a voicemail stating Ok to restart Humira.   I asked her to call with any questions  Michael THAPA

## 2019-09-30 ENCOUNTER — OFFICE VISIT (OUTPATIENT)
Dept: CARDIOLOGY | Facility: CLINIC | Age: 59
End: 2019-09-30

## 2019-09-30 DIAGNOSIS — I50.810 RIGHT HEART FAILURE WITH REDUCED RIGHT VENTRICULAR FUNCTION (HCC): ICD-10-CM

## 2019-09-30 DIAGNOSIS — I50.32 CHRONIC DIASTOLIC CONGESTIVE HEART FAILURE (HCC): Primary | ICD-10-CM

## 2019-09-30 DIAGNOSIS — I48.19 OTHER PERSISTENT ATRIAL FIBRILLATION (HCC): ICD-10-CM

## 2019-09-30 DIAGNOSIS — I51.7 RIGHT VENTRICULAR DILATION: ICD-10-CM

## 2019-09-30 PROCEDURE — 99214 OFFICE O/P EST MOD 30 MIN: CPT | Performed by: INTERNAL MEDICINE

## 2019-09-30 RX ORDER — BUDESONIDE AND FORMOTEROL FUMARATE DIHYDRATE 160; 4.5 UG/1; UG/1
2 AEROSOL RESPIRATORY (INHALATION)
COMMUNITY

## 2019-09-30 RX ORDER — MONTELUKAST SODIUM 10 MG/1
10 TABLET ORAL NIGHTLY
COMMUNITY
End: 2022-12-16 | Stop reason: SDUPTHER

## 2019-09-30 RX ORDER — HYDROXYCHLOROQUINE SULFATE 200 MG/1
200 TABLET, FILM COATED ORAL EVERY OTHER DAY
COMMUNITY

## 2019-09-30 RX ORDER — ALBUTEROL SULFATE 90 UG/1
2 AEROSOL, METERED RESPIRATORY (INHALATION) EVERY 4 HOURS PRN
COMMUNITY

## 2019-10-20 VITALS
WEIGHT: 293 LBS | HEART RATE: 83 BPM | BODY MASS INDEX: 41.95 KG/M2 | SYSTOLIC BLOOD PRESSURE: 124 MMHG | HEIGHT: 70 IN | DIASTOLIC BLOOD PRESSURE: 78 MMHG

## 2019-10-20 NOTE — PROGRESS NOTES
Date of Office Visit: 2019  Encounter Provider: Norman Hebert MD  Place of Service: [facility]ARDIOLOG[facility]  Patient Name: Milagros Ramirez  :1960    Chief complaint: Follow-up for persistent atrial fibrillation, chronic diastolic CHF, right   ventricular dilatation with right-sided heart failure.    History of Present Illness:    I again had the pleasure of seeing the patient in cardiology office on 2019.  She is a very pleasant 59 year-old white female with a history of morbid obesity, chronic hypoxic and hypercapnic respiratory failure, obesity hypoventilation syndrome, chronic diastolic CHF, right-sided heart failure, and persistent atrial fibrillation who presents for follow-up.    I initially saw the patient on 2019.  She had been admitted to [facility] in [place] in May 2019 after having months of worsening dyspnea and significant abdominal and lower extremity edema.  She had gained over 50 pounds secondary to volume retention.  She was diuresed extensively at that time, and was also found to be in atrial fibrillation, which was a new diagnosis.  She was anticoagulated with Eliquis, and an echocardiogram on 2019 showed an ejection fraction of 55%, although it was a technically difficult study.  She also had a Lexiscan nuclear stress test on 2019 which showed no ischemia or infarct.  She does have lupus and rheumatoid arthritis, which also limits her mobility significantly.  She also has chronic kidney disease.    At the time of her initial appointment with me on 2019, she was grossly volume overloaded again.  She also had been very short of breath and had been experiencing orthopnea.  She was readmitted to the hospital in 2019, and underwent extensive diuresis at that point.  She was also seen by pulmonology, and was felt to have obesity hypoventilation syndrome with chronic CO2 retention.  She also was noted to  have severe undiagnosed obstructive sleep apnea, and she was experiencing acute on chronic hypoxic and hypercapnic respiratory failure.  She did test positive for rhinovirus, and this was felt to be causing some degree of bronchospasm and cough as well.  An echocardiogram performed on 6/29/2019 showed an ejection fraction of 62%, severe biatrial enlargement, a severely dilated right ventricle with moderate reduction in function, mild to moderate tricuspid regurgitation, and a calculated RVSP of 67 mmHg.  She also had a CT scan of the chest without contrast on 6/28/2019 which showed enlarged pulmonary arteries, suggestive of pulmonary hypertension.  After extensive diuresis and pulmonary care, she did improve.    The patient presents today for follow-up.  Incredibly, she has lost over 80 pounds of weight since her last visit with me.  Some of this is water weight, although some of this is from modifying her diet and watching her salt intake as well.  She has minimal edema on exam, and looks much better.  She does tell me that she swells significantly if she misses a dose of torsemide.  She is on 2 L of oxygen chronically, and her sats are 90 to 94%.  Her blood pressure has mainly been in the 120s over 70s, although sometimes higher.  Her heart rate is in the 70s to 90s on average.  She was diagnosed with severe obstructive sleep apnea, and she is scheduled to find out whether she needs a BiPAP or CPAP machine soon.  She is taking the Eliquis without any difficulty.    Past Medical History:   Diagnosis Date   • Atrial fibrillation (CMS/[facility])     Diagnosed 5/2019   • CKD (chronic kidney disease)    • Diabetes (CMS/[facility])     Diagnosed 5/2019   • Diastolic CHF (CMS/[facility])    • Hyperlipidemia    • Hypertension    • Hypothyroidism    • Morbid obesity (CMS/[facility])    • AMOR (obstructive sleep apnea)    • Rheumatoid arthritis (CMS/[facility])    • SLE (systemic lupus erythematosus) (CMS/[facility])        Past Surgical History:   Procedure  Laterality Date   • APPENDECTOMY     • CARPAL TUNNEL RELEASE     • HEEL SPUR EXCISION     • HERNIA REPAIR     • THYROIDECTOMY, PARTIAL         Current Outpatient Medications on File Prior to Visit   Medication Sig Dispense Refill   • albuterol sulfate  (90 Base) MCG/ACT inhaler Inhale 2 puffs Every 4 (Four) Hours As Needed for Wheezing.     • apixaban (ELIQUIS) 5 MG tablet tablet Take 5 mg by mouth Every 12 (Twelve) Hours.     • budesonide-formoterol (SYMBICORT) 160-4.5 MCG/ACT inhaler Inhale 2 puffs 2 (Two) Times a Day.     • carvedilol (COREG) 12.5 MG tablet Take 12.5 mg by mouth 2 (Two) Times a Day.  0   • Green Tea, Sabrina sinensis, (GREEN TEA PO) Take 500 mg by mouth Daily.     • hydroxychloroquine (PLAQUENIL) 200 MG tablet Take  by mouth Daily.     • Iron-Vitamins (GERITOL PO) Take  by mouth Daily.     • lisinopril (PRINIVIL,ZESTRIL) 40 MG tablet Take 40 mg by mouth Daily.  1   • MAGNESIUM PO Take  by mouth Daily.     • montelukast (SINGULAIR) 10 MG tablet Take 10 mg by mouth Every Night.     • NP THYROID 60 MG tablet Take 60 mg by mouth Daily.  1   • POTASSIUM PO Take  by mouth Daily.     • rosuvastatin (CRESTOR) 10 MG tablet Take 10 mg by mouth Daily.     • torsemide (DEMADEX) 20 MG tablet Take 1 tablet by mouth Daily. 30 tablet 5   • traMADol (ULTRAM) 50 MG tablet TK 1 T PO QID PRN  0   • TURMERIC CURCUMIN PO Take  by mouth Daily.       No current facility-administered medications on file prior to visit.      Allergies as of 09/30/2019   • (No Known Allergies)     Social History     Socioeconomic History   • Marital status:      Spouse name: Not on file   • Number of children: Not on file   • Years of education: Not on file   • Highest education level: Not on file   Tobacco Use   • Smoking status: Former Smoker   • Smokeless tobacco: Never Used   • Tobacco comment: Quit around age 36   Substance and Sexual Activity   • Alcohol use: Yes     Drinks per session: 1 or 2     Binge frequency:  "Weekly   • Drug use: No     Family History   Problem Relation Age of Onset   • Stroke Mother    • Coronary artery disease Brother         3 brothers with stents at young ages    • Diabetes Paternal Grandfather    • Heart disease Father    • Atrial fibrillation Father    • Colon cancer Father    • Coronary artery disease Brother        Review of Systems   Constitution: Positive for malaise/fatigue and weight loss.   HENT: Positive for sore throat.    Cardiovascular: Positive for dyspnea on exertion, leg swelling and palpitations.   Respiratory: Positive for shortness of breath and snoring.    Skin: Positive for color change.   Genitourinary: Positive for frequency.   Neurological: Positive for light-headedness and paresthesias.   All other systems reviewed and are negative.     Objective:     Vitals:    09/30/19 1340   BP: 124/78   Pulse: 83   Weight: (!) 158 kg (349 lb)   Height: 177.8 cm (70\")     Body mass index is 50.08 kg/m².    Physical Exam   Constitutional: She is oriented to person, place, and time. She appears well-developed.   Morbidly obese (lost weight)   HENT:   Head: Normocephalic and atraumatic.   Eyes: Conjunctivae are normal.   Neck: Neck supple.   Cardiovascular: Normal rate. An irregularly irregular rhythm present. Exam reveals no gallop and no friction rub.   No murmur heard.  Pulmonary/Chest: Effort normal and breath sounds normal.   Abdominal: Soft. There is no tenderness.   Musculoskeletal:   Trace edema of the lower extremities bilaterally   Neurological: She is alert and oriented to person, place, and time.   Skin: Skin is warm.   Psychiatric: She has a normal mood and affect. Her behavior is normal.     Lab Review:   Procedures    Cardiac Procedures:  1.  Echocardiogram at [facility] on 4/2/2019: Study was TDS.  The ejection fraction was 55%.  The right ventricle had normal function.  There was moderate left atrial enlargement.  There was mild mitral regurgitation.  2.  " Lexiscan Cardiolite stress test at [facility] on 6/5/2019: There was no ischemia or infarct.  The ejection fraction was 44% with mild global hypokinesis.  3.  Echocardiogram on 6/29/2019: The ejection fraction was 62%.  There was mild LVH.  The right ventricle was severely dilated and moderately reduced and function.  The left atrium was severely enlarged.  The right atrium was severely enlarged.  The saline study was negative.  There was mild to moderate tricuspid regurgitation.  The calculated RVSP was 67 mmHg.  4.  CT of the chest without contrast on 6/28/2019: There was interstitial edema and haziness within both lungs likely representing edema.  The pulmonary arteries were enlarged, suggestive of pulmonary hypertension.    Assessment:       Diagnosis Plan   1. Chronic diastolic congestive heart failure (CMS/HCC)     2. Right ventricular dilation     3. Right heart failure with reduced right ventricular function (CMS/HCC)     4. Other persistent atrial fibrillation       Plan:       Overall, she is doing much better.  She has lost 80 pounds of weight through diuresis, salt restriction, and dietary modification.  I commended her on this, and encouraged her to keep this up.  This can only help.  She has minimal edema, and she will continue on the torsemide at 20 mg/day.  She has responded well to this.  Her rate has been controlled at home, and she will continue on the carvedilol at 12.5 mg twice a day.  She has had no bleeding with taking the Eliquis thus far.  She has been diagnosed with severe obstructive sleep apnea, and she will find out whether she needs a BiPAP or CPAP.  This will also help her significantly.  She will remain on oxygen at 2 L, and she is maintaining her oxygen saturations at 90 to 94% during most of the time.  She is now on long-term disability for her multiple issues.  Overall, I feel she is doing excellent compared to previous.  I am going to have her follow-up with me in 4  months.    I spent 30 minutes in the care of the patient.  Greater than 50% of this time was spent in face-to-face counseling with the patient and her son regarding her congestive heart failure management and atrial fibrillation management.

## 2019-11-21 ENCOUNTER — HOSPITAL ENCOUNTER (OUTPATIENT)
Dept: GENERAL RADIOLOGY | Facility: HOSPITAL | Age: 59
Discharge: HOME | End: 2019-11-21
Attending: OBSTETRICS & GYNECOLOGY

## 2019-11-25 ENCOUNTER — HOSPITAL ENCOUNTER (OUTPATIENT)
Dept: PERIOP | Facility: HOSPITAL | Age: 59
Setting detail: HOSPITAL OUTPATIENT SURGERY
Discharge: HOME | End: 2019-11-25
Attending: OBSTETRICS & GYNECOLOGY

## 2019-11-25 LAB
ANION GAP SERPL CALC-SCNC: 15 MMOL/L (ref 8–19)
APTT BLD: 27.3 S (ref 22.2–34.2)
BASOPHILS # BLD AUTO: 0.03 10*3/UL (ref 0–0.2)
BASOPHILS NFR BLD AUTO: 0.5 % (ref 0–3)
BUN SERPL-MCNC: 28 MG/DL (ref 5–25)
BUN/CREAT SERPL: 17 {RATIO} (ref 6–20)
CALCIUM SERPL-MCNC: 9.5 MG/DL (ref 8.7–10.4)
CHLORIDE SERPL-SCNC: 97 MMOL/L (ref 99–111)
CONV ABS IMM GRAN: 0 10*3/UL (ref 0–0.2)
CONV CO2: 29 MMOL/L (ref 22–32)
CONV IMMATURE GRAN: 0 % (ref 0–1.8)
CREAT UR-MCNC: 1.65 MG/DL (ref 0.5–0.9)
DEPRECATED RDW RBC AUTO: 45.1 FL (ref 36.4–46.3)
EOSINOPHIL # BLD AUTO: 0.19 10*3/UL (ref 0–0.7)
EOSINOPHIL # BLD AUTO: 3.4 % (ref 0–7)
ERYTHROCYTE [DISTWIDTH] IN BLOOD BY AUTOMATED COUNT: 13.2 % (ref 11.7–14.4)
GFR SERPLBLD BASED ON 1.73 SQ M-ARVRAT: 34 ML/MIN/{1.73_M2}
GLUCOSE BLD-MCNC: 68 MG/DL (ref 65–99)
GLUCOSE BLD-MCNC: 96 MG/DL (ref 65–99)
GLUCOSE SERPL-MCNC: 89 MG/DL (ref 65–99)
HCT VFR BLD AUTO: 34.7 % (ref 37–47)
HGB BLD-MCNC: 11.2 G/DL (ref 12–16)
INR PPP: 1.15 (ref 2–3)
LYMPHOCYTES # BLD AUTO: 1.7 10*3/UL (ref 1–5)
LYMPHOCYTES NFR BLD AUTO: 30.6 % (ref 20–45)
MCH RBC QN AUTO: 30 PG (ref 27–31)
MCHC RBC AUTO-ENTMCNC: 32.3 G/DL (ref 33–37)
MCV RBC AUTO: 93 FL (ref 81–99)
MONOCYTES # BLD AUTO: 0.51 10*3/UL (ref 0.2–1.2)
MONOCYTES NFR BLD AUTO: 9.2 % (ref 3–10)
NEUTROPHILS # BLD AUTO: 3.12 10*3/UL (ref 2–8)
NEUTROPHILS NFR BLD AUTO: 56.3 % (ref 30–85)
NRBC CBCN: 0 % (ref 0–0.7)
OSMOLALITY SERPL CALC.SUM OF ELEC: 289 MOSM/KG (ref 273–304)
PLATELET # BLD AUTO: 211 10*3/UL (ref 130–400)
PMV BLD AUTO: 10.8 FL (ref 9.4–12.3)
POTASSIUM SERPL-SCNC: 4.3 MMOL/L (ref 3.5–5.3)
PROTHROMBIN TIME: 12 S (ref 9.4–12)
RBC # BLD AUTO: 3.73 10*6/UL (ref 4.2–5.4)
SODIUM SERPL-SCNC: 137 MMOL/L (ref 135–147)
WBC # BLD AUTO: 5.55 10*3/UL (ref 4.8–10.8)

## 2020-01-16 ENCOUNTER — OFFICE VISIT CONVERTED (OUTPATIENT)
Dept: ORTHOPEDIC SURGERY | Facility: CLINIC | Age: 60
End: 2020-01-16
Attending: ORTHOPAEDIC SURGERY

## 2020-01-17 ENCOUNTER — OFFICE VISIT (OUTPATIENT)
Dept: CARDIOLOGY | Facility: CLINIC | Age: 60
End: 2020-01-17

## 2020-01-17 VITALS
HEIGHT: 70 IN | WEIGHT: 293 LBS | SYSTOLIC BLOOD PRESSURE: 104 MMHG | DIASTOLIC BLOOD PRESSURE: 68 MMHG | BODY MASS INDEX: 41.95 KG/M2 | HEART RATE: 69 BPM

## 2020-01-17 DIAGNOSIS — I50.812 CHRONIC RIGHT-SIDED CONGESTIVE HEART FAILURE (HCC): ICD-10-CM

## 2020-01-17 DIAGNOSIS — I50.32 CHRONIC DIASTOLIC CONGESTIVE HEART FAILURE (HCC): ICD-10-CM

## 2020-01-17 DIAGNOSIS — I27.20 PULMONARY HYPERTENSION (HCC): ICD-10-CM

## 2020-01-17 DIAGNOSIS — I48.11 LONGSTANDING PERSISTENT ATRIAL FIBRILLATION (HCC): Primary | ICD-10-CM

## 2020-01-17 DIAGNOSIS — I51.7 RIGHT VENTRICULAR ENLARGEMENT: ICD-10-CM

## 2020-01-17 PROCEDURE — 99213 OFFICE O/P EST LOW 20 MIN: CPT | Performed by: INTERNAL MEDICINE

## 2020-01-17 RX ORDER — TORSEMIDE 20 MG/1
20 TABLET ORAL DAILY
Qty: 30 TABLET | Refills: 11 | Status: SHIPPED | OUTPATIENT
Start: 2020-01-17 | End: 2021-03-03

## 2020-01-17 RX ORDER — CYCLOBENZAPRINE HCL 10 MG
10 TABLET ORAL AS NEEDED
COMMUNITY
Start: 2019-10-10 | End: 2022-11-11 | Stop reason: SDUPTHER

## 2020-01-17 RX ORDER — LIRAGLUTIDE 6 MG/ML
1.2 INJECTION SUBCUTANEOUS DAILY
COMMUNITY
Start: 2019-11-29 | End: 2022-06-06

## 2020-01-17 RX ORDER — ADALIMUMAB 40MG/0.4ML
1 KIT SUBCUTANEOUS TAKE AS DIRECTED
COMMUNITY
Start: 2020-01-14 | End: 2022-06-06

## 2020-01-17 RX ORDER — TORSEMIDE 20 MG/1
20 TABLET ORAL DAILY
Qty: 30 TABLET | Refills: 5 | Status: SHIPPED | OUTPATIENT
Start: 2020-01-17 | End: 2020-01-17 | Stop reason: SDUPTHER

## 2020-01-26 NOTE — PROGRESS NOTES
Date of Office Visit: 2020  Encounter Provider: Norman Hebert MD  Place of Service: [facility] CARDIOLOGY  Patient Name: Milagros Ramirez  :1960    Chief complaint: Follow-up for persistent atrial fibrillation, chronic diastolic CHF, right   ventricular dilatation with right-sided heart failure.    History of Present Illness:    I again had the pleasure of seeing the patient in cardiology office on 2020.  She is a very pleasant 59 year-old white female with a history of morbid obesity, chronic hypoxic and hypercapnic respiratory failure, obesity hypoventilation syndrome, chronic diastolic CHF, right-sided heart failure, and persistent atrial fibrillation who presents for follow-up.    I initially saw the patient on 2019.  She had been admitted to [facility] in [place] in May 2019 after having months of worsening dyspnea and significant abdominal and lower extremity edema.  She had gained over 50 pounds secondary to volume retention.  She was diuresed extensively at that time, and was also found to be in atrial fibrillation, which was a new diagnosis.  She was anticoagulated with Eliquis, and an echocardiogram on 2019 showed an ejection fraction of 55%, although it was a technically difficult study.  She also had a Lexiscan nuclear stress test on 2019 which showed no ischemia or infarct.  She does have lupus and rheumatoid arthritis, which also limits her mobility significantly.  She also has chronic kidney disease.    At the time of her initial appointment with me on 2019, she was grossly volume overloaded again.  She also had been very short of breath and had been experiencing orthopnea.  She was readmitted to the hospital in 2019, and underwent extensive diuresis at that point.  She was also seen by pulmonology, and was felt to have obesity hypoventilation syndrome with chronic CO2 retention.  She also was noted to have  severe undiagnosed obstructive sleep apnea, and she was experiencing acute on chronic hypoxic and hypercapnic respiratory failure.  She did test positive for rhinovirus, and this was felt to be causing some degree of bronchospasm and cough as well.  An echocardiogram performed on 6/29/2019 showed an ejection fraction of 62%, severe biatrial enlargement, a severely dilated right ventricle with moderate reduction in function, mild to moderate tricuspid regurgitation, and a calculated RVSP of 67 mmHg.  She also had a CT scan of the chest without contrast on 6/28/2019 which showed enlarged pulmonary arteries, suggestive of pulmonary hypertension.  After extensive diuresis and pulmonary care, she did improve.  At her follow-up visit on 9/30/2019, she had lost over 80 pounds of weight from diuresis and diet modification.  She was doing much better at that time.  She was also eventually diagnosed with severe obstructive sleep apnea, and she was placed on a CPAP machine.    The patient presents today for follow-up.  She still looks good since her last visit.  She is on 2 L of oxygen at night with her CPAP, although she does not require oxygen during the day.  She may need a total knee arthroplasty, and inquired about this from a cardiac perspective.  She has had baseline shortness of breath, but is much improved in general.    Past Medical History:   Diagnosis Date   • Atrial fibrillation (CMS/HCC)     Diagnosed 5/2019   • CKD (chronic kidney disease)    • Diabetes (CMS/HCC)     Diagnosed 5/2019   • Diastolic CHF (CMS/HCC)    • Hyperlipidemia    • Hypertension    • Hypothyroidism    • Morbid obesity (CMS/HCC)    • AMOR (obstructive sleep apnea)    • Rheumatoid arthritis (CMS/HCC)    • SLE (systemic lupus erythematosus) (CMS/HCC)        Past Surgical History:   Procedure Laterality Date   • APPENDECTOMY     • CARPAL TUNNEL RELEASE     • ENDOMETRIAL ABLATION     • HEEL SPUR EXCISION     • HERNIA REPAIR     • THYROIDECTOMY,  PARTIAL         Current Outpatient Medications on File Prior to Visit   Medication Sig Dispense Refill   • albuterol sulfate  (90 Base) MCG/ACT inhaler Inhale 2 puffs Every 4 (Four) Hours As Needed for Wheezing.     • apixaban (ELIQUIS) 5 MG tablet tablet Take 5 mg by mouth Every 12 (Twelve) Hours.     • budesonide-formoterol (SYMBICORT) 160-4.5 MCG/ACT inhaler Inhale 2 puffs 2 (Two) Times a Day.     • carvedilol (COREG) 12.5 MG tablet Take 12.5 mg by mouth 2 (Two) Times a Day.  0   • cyclobenzaprine (FLEXERIL) 10 MG tablet Take 10 mg by mouth As Needed.     • Green Tea, Sabrina sinensis, (GREEN TEA PO) Take 500 mg by mouth Daily.     • HUMIRA PEN 40 MG/0.4ML Pen-injector Kit Inject 1 pen as directed Take As Directed.     • hydroxychloroquine (PLAQUENIL) 200 MG tablet Take  by mouth Daily.     • Iron-Vitamins (GERITOL PO) Take  by mouth Daily.     • lisinopril (PRINIVIL,ZESTRIL) 40 MG tablet Take 40 mg by mouth Daily.  1   • MAGNESIUM PO Take  by mouth Daily.     • montelukast (SINGULAIR) 10 MG tablet Take 10 mg by mouth Every Night.     • NP THYROID 60 MG tablet Take 60 mg by mouth Daily.  1   • POTASSIUM PO Take  by mouth Daily.     • rosuvastatin (CRESTOR) 10 MG tablet Take 10 mg by mouth Daily.     • traMADol (ULTRAM) 50 MG tablet TK 1 T PO QID PRN  0   • TURMERIC CURCUMIN PO Take  by mouth Daily.     • VICTOZA 18 MG/3ML solution pen-injector injection Inject 1.2 mg as directed Daily.       No current facility-administered medications on file prior to visit.      Allergies as of 01/17/2020 - Reviewed 01/17/2020   Allergen Reaction Noted   • Other Other (See Comments) 01/17/2020   • Other Irritability 01/17/2020     Social History     Socioeconomic History   • Marital status:      Spouse name: Not on file   • Number of children: Not on file   • Years of education: Not on file   • Highest education level: Not on file   Tobacco Use   • Smoking status: Former Smoker   • Smokeless tobacco: Never Used  "  • Tobacco comment: Quit around age 36   Substance and Sexual Activity   • Alcohol use: Yes     Drinks per session: 1 or 2     Binge frequency: Weekly   • Drug use: No   Lifestyle   • Physical activity:     Days per week: 3 days     Minutes per session: 20 min   • Stress: Not on file     Family History   Problem Relation Age of Onset   • Stroke Mother    • Coronary artery disease Brother         3 brothers with stents at young ages    • Diabetes Paternal Grandfather    • Heart disease Father    • Atrial fibrillation Father    • Colon cancer Father    • Coronary artery disease Brother        Review of Systems   Constitution: Positive for malaise/fatigue.   HENT: Positive for sore throat.    Cardiovascular: Positive for dyspnea on exertion, leg swelling and palpitations.   Respiratory: Positive for shortness of breath and snoring.    Skin: Positive for color change.   Genitourinary: Positive for frequency.   Neurological: Positive for light-headedness and paresthesias.   All other systems reviewed and are negative.     Objective:     Vitals:    01/17/20 1040   BP: 104/68   Pulse: 69   Weight: (!) 153 kg (338 lb)   Height: 177.8 cm (70\")     Body mass index is 48.5 kg/m².    Physical Exam   Constitutional: She is oriented to person, place, and time. She appears well-developed.   Morbidly obese (lost weight)   HENT:   Head: Normocephalic and atraumatic.   Eyes: Conjunctivae are normal.   Neck: Neck supple.   Cardiovascular: Normal rate. An irregularly irregular rhythm present. Exam reveals no gallop and no friction rub.   No murmur heard.  Pulmonary/Chest: Effort normal and breath sounds normal.   Abdominal: Soft. There is no tenderness.   Musculoskeletal:   Trace edema of the lower extremities bilaterally   Neurological: She is alert and oriented to person, place, and time.   Skin: Skin is warm.   Psychiatric: She has a normal mood and affect. Her behavior is normal.     Lab Review:   Procedures    Cardiac " Procedures:  1.  Echocardiogram at [facility] on 4/2/2019: Study was TDS.  The ejection fraction was 55%.  The right ventricle had normal function.  There was moderate left atrial enlargement.  There was mild mitral regurgitation.  2.  Lexiscan Cardiolite stress test at [facility] on 6/5/2019: There was no ischemia or infarct.  The ejection fraction was 44% with mild global hypokinesis.  3.  Echocardiogram on 6/29/2019: The ejection fraction was 62%.  There was mild LVH.  The right ventricle was severely dilated and moderately reduced and function.  The left atrium was severely enlarged.  The right atrium was severely enlarged.  The saline study was negative.  There was mild to moderate tricuspid regurgitation.  The calculated RVSP was 67 mmHg.  4.  CT of the chest without contrast on 6/28/2019: There was interstitial edema and haziness within both lungs likely representing edema.  The pulmonary arteries were enlarged, suggestive of pulmonary hypertension.    Assessment:       Diagnosis Plan   1. Longstanding persistent atrial fibrillation     2. Chronic diastolic congestive heart failure (CMS/HCC)     3. Right ventricular enlargement     4. Chronic right-sided congestive heart failure (CMS/HCC)     5. Pulmonary hypertension (CMS/HCC)       Plan:     Again, she has done remarkably well within the last 6 to 8 months.  She has lost over 80 pounds of weight.  She is still watching her diet, and she is limiting her salt intake.  She is now on a CPAP machine, which I think will help immensely.  She is on oxygen only at night with the CPAP, but does not require oxygen during the day.  She does have stage III chronic kidney disease, although this seems to be stable on her most recent labs.  I would continue her on the torsemide at 20 mg/day.  Her rate appears to be well controlled with the carvedilol.  She remains in atrial fibrillation.  She will continue on the Eliquis for anticoagulation, and  she has not had any bleeding issues.  I will plan on seeing her back in the office in 4 months.

## 2020-02-04 ENCOUNTER — TELEPHONE (OUTPATIENT)
Dept: CARDIOLOGY | Facility: CLINIC | Age: 60
End: 2020-02-04

## 2020-02-04 NOTE — TELEPHONE ENCOUNTER
02/04/2020@ 12:00pm  Pt called for surgery clearance for knee surgery. She stated this was discussed in last office.   Please advise   Pt's call back# [phone]   Thanks Michael THAPA

## 2020-02-04 NOTE — TELEPHONE ENCOUNTER
Michael,    She is clear at moderate risk.  She will need to hold the Eliquis 3 days prior to the surgery.  Can you please send a clearance letter?  Thanks     GM

## 2020-02-05 NOTE — TELEPHONE ENCOUNTER
I called pt unable to reach I left a voicemail with instructions for holding her Eliquis prior to procedure. Repeated instructions   I did ask her to call back with Surgeons name and number so I can fax over clearance letter.   Michael THAPA

## 2020-05-17 ENCOUNTER — TELEPHONE (OUTPATIENT)
Dept: CARDIOLOGY | Facility: CLINIC | Age: 60
End: 2020-05-17

## 2020-05-17 NOTE — TELEPHONE ENCOUNTER
5/17 Called pt to prescreen, she cancelled appt for 5/18.  Patient said she tried to call and r/s but did not hear back from the office.    Pt said she is having CP off and on.    Thank you    Starr RODRIGUEZ

## 2020-06-17 ENCOUNTER — OFFICE VISIT (OUTPATIENT)
Dept: CARDIOLOGY | Facility: CLINIC | Age: 60
End: 2020-06-17

## 2020-06-17 VITALS
WEIGHT: 293 LBS | SYSTOLIC BLOOD PRESSURE: 110 MMHG | OXYGEN SATURATION: 98 % | HEART RATE: 76 BPM | HEIGHT: 70 IN | DIASTOLIC BLOOD PRESSURE: 70 MMHG | BODY MASS INDEX: 41.95 KG/M2

## 2020-06-17 DIAGNOSIS — I48.11 LONGSTANDING PERSISTENT ATRIAL FIBRILLATION (HCC): ICD-10-CM

## 2020-06-17 DIAGNOSIS — I51.7 RIGHT VENTRICULAR ENLARGEMENT: ICD-10-CM

## 2020-06-17 DIAGNOSIS — I50.32 CHRONIC DIASTOLIC CONGESTIVE HEART FAILURE (HCC): ICD-10-CM

## 2020-06-17 DIAGNOSIS — I27.20 PULMONARY HYPERTENSION (HCC): ICD-10-CM

## 2020-06-17 DIAGNOSIS — R07.89 CHEST PAIN, ATYPICAL: Primary | ICD-10-CM

## 2020-06-17 PROCEDURE — 93000 ELECTROCARDIOGRAM COMPLETE: CPT | Performed by: NURSE PRACTITIONER

## 2020-06-17 PROCEDURE — 99214 OFFICE O/P EST MOD 30 MIN: CPT | Performed by: NURSE PRACTITIONER

## 2020-06-17 RX ORDER — NITROGLYCERIN 0.4 MG/1
0.4 TABLET SUBLINGUAL
COMMUNITY
Start: 2020-06-04

## 2020-06-17 NOTE — PROGRESS NOTES
"      [facility] CARDIOLOGY  [address]  [facility] [address]  Phone: [phone]      Patient Name: Milagros Ramirez  :1960  Age: 59 y.o.  Primary Cardiologist: Duncan Hebert MD  Encounter Provider:  MARILYNN Alonzo      Chief Complaint:   Chief Complaint   Patient presents with   • Follow-up     4 months         HPI  Milagros Ramirez is a 59 y.o. female who presents today for semiannual follow-up.     Pt has a  history significant for morbid obesity, atrial fibrillation, hypertension, diastolic heart failure, obesity, diabetes, systemic lupus, chronic kidney disease, AMOR.    Patient states that she has done well since last visit.  She states that she is attempting to loose weight, but has not been able to exercise secondary to joint pain.  She has been seen by ortho and they have recommended TKR, but they have not yet been scheduled.  She reports that for the past several months she has had pain to the left side and sometimes the left chest. The pain is intermittent and is described as a \"pinch\".  It occasionally radiates to the left arm and to the back.  She denies associated symptoms of SOA, nausea, diaphoresis.  Pain is not exertional and occurs at rest.  She reports occasional episodes of lightheadedness and dizziness that resolve after laying down.  She denies increased shortness of breath, palpitations, swelling, increased fatigue.        The following portions of the patient's history were reviewed and updated as appropriate: allergies, current medications, past family history, past medical history, past social history, past surgical history and problem list.      Review of Systems   Constitution: Positive for malaise/fatigue and weight gain.   Eyes: Positive for blurred vision.   Cardiovascular: Positive for leg swelling.   Skin: Positive for itching.   Musculoskeletal: Positive for joint pain, joint swelling and myalgias.   Neurological: " "Positive for paresthesias.       OBJECTIVE:     Vitals:    06/17/20 1033   BP: 110/70   BP Location: Right arm   Patient Position: Sitting   Pulse: 76   SpO2: 98%   Weight: (!) 154 kg (339 lb)   Height: 177.8 cm (70\")     Body mass index is 48.64 kg/m².    Physical Exam   Constitutional: She is oriented to person, place, and time. Vital signs are normal. She appears well-developed and well-nourished. She is active.   Obesity     Eyes: Conjunctivae are normal.   Neck: Carotid bruit is not present.   Cardiovascular: Normal rate, regular rhythm and normal heart sounds.   Pulmonary/Chest: Breath sounds normal.   Abdominal: Normal appearance.   Musculoskeletal:   No cyanosis, clubbing, edema  Normal ROM   Neurological: She is alert and oriented to person, place, and time. GCS eye subscore is 4. GCS verbal subscore is 5. GCS motor subscore is 6.   Skin: Skin is warm, dry and intact.   Psychiatric: She has a normal mood and affect. Her speech is normal and behavior is normal. Judgment and thought content normal. Cognition and memory are normal.         ECG 12 Lead  Date/Time: 6/17/2020 9:42 AM  Performed by: Danica Bryan APRN  Authorized by: Danica Bryan APRN   Comparison: compared with previous ECG from 6/4/2019  Rhythm: atrial fibrillation  Rate: normal  ST Segments: ST segments normal  T Waves: T waves normal  QRS axis: normal    Clinical impression: abnormal EKG              Cardiac Procedures:  1. Echocardiogram 6/29/2019.  EF 62%.  Diastolic function was indeterminate.  RV cavity severely dilated.  LA cavity severely dilated.  Saline test results are negative.  Mild to moderate tricuspid valve regurgitation.  2. Lexiscan Cardiolite stress test at [facility] on 6/5/2019: There was no ischemia or infarct.  The ejection fraction was 44% with mild global hypokinesis.        ASSESSMENT:      Diagnosis Plan   1. Chest pain, atypical  Adult Transthoracic Echo Complete W/ Cont if Necessary Per " "Protocol   2. Longstanding persistent atrial fibrillation (CMS/HCC)  Adult Transthoracic Echo Complete W/ Cont if Necessary Per Protocol   3. Chronic diastolic congestive heart failure (CMS/HCC)  Adult Transthoracic Echo Complete W/ Cont if Necessary Per Protocol   4. Right ventricular enlargement  Adult Transthoracic Echo Complete W/ Cont if Necessary Per Protocol   5. Pulmonary hypertension (CMS/HCC)  Adult Transthoracic Echo Complete W/ Cont if Necessary Per Protocol         PLAN OF CARE:     1. Atypical chest pain.  Patient having symptoms of very atypical chest pain.  Symptoms are described as a \"pinching\" that are sometimes on her left side and sometimes to the left chest.  There are no associated symptoms.  She states the pain sometimes radiates but this is only very occasional.  She reports that episodes last seconds to minutes at most.  She states that they have been going on for several months.  I do think that these episodes sound very atypical of coronary disease.  However, patient is hoping to have a knee replacement soon.  She had a myocardial perfusion stress test 1 year ago which was negative for ischemia.  I have recommended repeating echocardiogram to assess LV wall motion and function.  2. Persistent atrial fibrillation.  Remains in rate controlled atrial fibrillation on ECG.  Rate controlled with carvedilol.  Anticoagulated with apixaban and not having any bleeding complications.  She will continue current regimen.  3. Diastolic heart failure.  Denies increased shortness of breath or fatigue.  Denies any swelling today.  Patient appears euvolemic on exam today.  She will continue with carvedilol, torsemide 20 mg/day, lisinopril 40 mg/day.  4. History of RV enlargement  5. Pulmonary hypertension  6. Follow-up in 4-6 months with Dr. Hebert.  Sooner for problems or complications.             Discharge Medications           Accurate as of June 17, 2020 11:00 AM. If you have any questions, ask " your nurse or doctor.               Continue These Medications      Instructions Start Date   albuterol sulfate  (90 Base) MCG/ACT inhaler  Commonly known as:  PROVENTIL HFA;VENTOLIN HFA;PROAIR HFA   2 puffs, Inhalation, Every 4 Hours PRN      apixaban 5 MG tablet tablet  Commonly known as:  ELIQUIS   5 mg, Oral, Every 12 Hours Scheduled      budesonide-formoterol 160-4.5 MCG/ACT inhaler  Commonly known as:  SYMBICORT   2 puffs, Inhalation, 2 Times Daily - RT      carvedilol 12.5 MG tablet  Commonly known as:  COREG   12.5 mg, Oral, 2 Times Daily      cyclobenzaprine 10 MG tablet  Commonly known as:  FLEXERIL   10 mg, Oral, As Needed      GERITOL PO   Oral, Daily      GREEN TEA PO   500 mg, Oral, Daily      Humira Pen 40 MG/0.4ML Pen-injector Kit  Generic drug:  Adalimumab   1 pen, Injection, Take As Directed      hydroxychloroquine 200 MG tablet  Commonly known as:  PLAQUENIL   Oral, Daily      lisinopril 40 MG tablet  Commonly known as:  PRINIVIL,ZESTRIL   40 mg, Oral, Daily      MAGNESIUM PO   Oral, Daily      montelukast 10 MG tablet  Commonly known as:  SINGULAIR   10 mg, Oral, Nightly      nitroglycerin 0.4 MG SL tablet  Commonly known as:  NITROSTAT   TAKE 1 TABLET SUBLINGUALLY EVERY 5 MINS AS NEEDED FOR CHEST PAIN. AFTER THIRD TAB, [id number]      NP Thyroid 60 MG tablet  Generic drug:  Thyroid   60 mg, Oral, Daily      POTASSIUM PO   Oral, Daily      rosuvastatin 10 MG tablet  Commonly known as:  CRESTOR   10 mg, Oral, Daily      torsemide 20 MG tablet  Commonly known as:  DEMADEX   20 mg, Oral, Daily      traMADol 50 MG tablet  Commonly known as:  ULTRAM   TK 1 T PO QID PRN      TURMERIC CURCUMIN PO   Oral, Daily      Victoza 18 MG/3ML solution pen-injector injection  Generic drug:  Liraglutide   1.2 mg, Injection, Daily             Thank you for allowing me to participate in the care of your patient,         MARILYNN Alonzo  [facility]  06/17/20  10:01 AM      **Dragon  Disclaimer:**  Much of this encounter note is an electronic transcription/translation of spoken language to printed text. The electronic translation of spoken language may permit erroneous, or at times, nonsensical words or phrases to be inadvertently transcribed. Although I have reviewed the note for such errors, some may still exist.

## 2020-07-16 DIAGNOSIS — I48.11 LONGSTANDING PERSISTENT ATRIAL FIBRILLATION (HCC): Primary | ICD-10-CM

## 2020-07-16 DIAGNOSIS — I50.32 CHRONIC DIASTOLIC CONGESTIVE HEART FAILURE (HCC): ICD-10-CM

## 2020-08-03 ENCOUNTER — HOSPITAL ENCOUNTER (OUTPATIENT)
Dept: CARDIOLOGY | Facility: HOSPITAL | Age: 60
Discharge: HOME | End: 2020-08-03

## 2020-08-06 ENCOUNTER — HOSPITAL ENCOUNTER (OUTPATIENT)
Dept: OTHER | Facility: HOSPITAL | Age: 60
Discharge: HOME | End: 2020-08-06
Attending: INTERNAL MEDICINE

## 2020-08-06 LAB
APPEARANCE UR: CLEAR
BASOPHILS # BLD AUTO: 0.02 10*3/UL (ref 0–0.2)
BASOPHILS NFR BLD AUTO: 0.3 % (ref 0–3)
BILIRUB UR QL: NEGATIVE
COLOR UR: YELLOW
CONV ABS IMM GRAN: 0.01 10*3/UL (ref 0–0.2)
CONV BACTERIA: NEGATIVE
CONV COLLECTION SOURCE (UA): ABNORMAL
CONV CREATININE URINE, RANDOM: 93.7 MG/DL (ref 10–300)
CONV IMMATURE GRAN: 0.1 % (ref 0–1.8)
CONV MICROALBUM.,U,RANDOM: <12 MG/L (ref 0–20)
CONV PROTEIN TO CREATININE RATIO (RANDOM URINE): 0.07 {RATIO} (ref 0–0.1)
CONV UROBILINOGEN IN URINE BY AUTOMATED TEST STRIP: 0.2 {EHRLICHU}/DL (ref 0.1–1)
DEPRECATED RDW RBC AUTO: 40.5 FL (ref 36.4–46.3)
EOSINOPHIL # BLD AUTO: 0.11 10*3/UL (ref 0–0.7)
EOSINOPHIL # BLD AUTO: 1.6 % (ref 0–7)
ERYTHROCYTE [DISTWIDTH] IN BLOOD BY AUTOMATED COUNT: 12 % (ref 11.7–14.4)
ERYTHROCYTE [SEDIMENTATION RATE] IN BLOOD: 90 MM/H (ref 0–30)
GLUCOSE UR QL: NEGATIVE MG/DL
HCT VFR BLD AUTO: 36.1 % (ref 37–47)
HGB BLD-MCNC: 11.7 G/DL (ref 12–16)
HGB UR QL STRIP: NEGATIVE
KETONES UR QL STRIP: NEGATIVE MG/DL
LEUKOCYTE ESTERASE UR QL STRIP: ABNORMAL
LYMPHOCYTES # BLD AUTO: 1.49 10*3/UL (ref 1–5)
LYMPHOCYTES NFR BLD AUTO: 21.4 % (ref 20–45)
MCH RBC QN AUTO: 29.8 PG (ref 27–31)
MCHC RBC AUTO-ENTMCNC: 32.4 G/DL (ref 33–37)
MCV RBC AUTO: 92.1 FL (ref 81–99)
MICROALBUMIN/CREAT UR: 12.8 MG/G{CRE} (ref 0–35)
MONOCYTES # BLD AUTO: 0.59 10*3/UL (ref 0.2–1.2)
MONOCYTES NFR BLD AUTO: 8.5 % (ref 3–10)
NEUTROPHILS # BLD AUTO: 4.73 10*3/UL (ref 2–8)
NEUTROPHILS NFR BLD AUTO: 68.1 % (ref 30–85)
NITRITE UR QL STRIP: NEGATIVE
NRBC CBCN: 0 % (ref 0–0.7)
PH UR STRIP.AUTO: 6 [PH] (ref 5–8)
PLATELET # BLD AUTO: 170 10*3/UL (ref 130–400)
PMV BLD AUTO: 12.1 FL (ref 9.4–12.3)
PROT UR QL: NEGATIVE MG/DL
PROT UR-MCNC: 6.6 MG/DL
RBC # BLD AUTO: 3.92 10*6/UL (ref 4.2–5.4)
RBC #/AREA URNS HPF: ABNORMAL /[HPF]
SP GR UR: 1.01 (ref 1–1.03)
SQUAMOUS SPT QL MICRO: ABNORMAL /[HPF]
WBC # BLD AUTO: 6.95 10*3/UL (ref 4.8–10.8)
WBC #/AREA URNS HPF: ABNORMAL /[HPF]

## 2020-08-13 ENCOUNTER — TELEPHONE (OUTPATIENT)
Dept: CARDIOLOGY | Facility: CLINIC | Age: 60
End: 2020-08-13

## 2020-08-13 NOTE — TELEPHONE ENCOUNTER
Phone# [phone]    Patient called, informing me that she would like to know her echo results.  I called and patient to find out more information.  Patient was upset that I didn't know about her echo and finally told me she had it done on 8/3 at [facility].  I apologized to pt.  I got a copy of the Echo and put it on Dr. Hebert's desk.  Please call pt.  Hiram

## 2020-10-13 ENCOUNTER — OFFICE VISIT (OUTPATIENT)
Dept: CARDIOLOGY | Facility: CLINIC | Age: 60
End: 2020-10-13

## 2020-10-13 VITALS
OXYGEN SATURATION: 99 % | BODY MASS INDEX: 67.81 KG/M2 | HEART RATE: 83 BPM | WEIGHT: 293 LBS | DIASTOLIC BLOOD PRESSURE: 64 MMHG | SYSTOLIC BLOOD PRESSURE: 118 MMHG | HEIGHT: 55 IN

## 2020-10-13 DIAGNOSIS — I50.32 CHRONIC DIASTOLIC CONGESTIVE HEART FAILURE (HCC): ICD-10-CM

## 2020-10-13 DIAGNOSIS — I51.7 RIGHT VENTRICULAR ENLARGEMENT: ICD-10-CM

## 2020-10-13 DIAGNOSIS — R07.2 PRECORDIAL PAIN: Primary | ICD-10-CM

## 2020-10-13 DIAGNOSIS — I48.11 LONGSTANDING PERSISTENT ATRIAL FIBRILLATION (HCC): ICD-10-CM

## 2020-10-13 PROCEDURE — 99214 OFFICE O/P EST MOD 30 MIN: CPT | Performed by: INTERNAL MEDICINE

## 2020-10-27 NOTE — PROGRESS NOTES
Date of Office Visit:  10/13/2020  Encounter Provider: Norman Hebert MD  Place of Service: [facility] CARDIOLOGY  Patient Name: Milagros Ramirez  :1960    Chief complaint: Follow-up for persistent atrial fibrillation, chronic diastolic CHF, right   ventricular dilatation with right-sided heart failure.    History of Present Illness:    I again had the pleasure of seeing the patient in cardiology office on 10/13/2020.  She is a very pleasant 60 year-old white female with a history of morbid obesity, chronic hypoxic and hypercapnic respiratory failure, obesity hypoventilation syndrome, chronic diastolic CHF, right-sided heart failure, and persistent atrial fibrillation who presents for follow-up.    I initially saw the patient on 2019.  She had been admitted to [facility] in [place] in May 2019 after having months of worsening dyspnea and significant abdominal and lower extremity edema.  She had gained over 50 pounds secondary to volume retention.  She was diuresed extensively at that time, and was also found to be in atrial fibrillation, which was a new diagnosis.  She was anticoagulated with Eliquis, and an echocardiogram on 2019 showed an ejection fraction of 55%, although it was a technically difficult study.  She also had a Lexiscan nuclear stress test on 2019 which showed no ischemia or infarct.  She does have lupus and rheumatoid arthritis, which also limits her mobility significantly.  She also has chronic kidney disease.    At the time of her initial appointment with me on 2019, she was grossly volume overloaded again.  She also had been very short of breath and had been experiencing orthopnea.  She was readmitted to the hospital in 2019, and underwent extensive diuresis at that point.  She was also seen by pulmonology, and was felt to have obesity hypoventilation syndrome with chronic CO2 retention.  She also was noted to  have severe undiagnosed obstructive sleep apnea, and she was experiencing acute on chronic hypoxic and hypercapnic respiratory failure.  She did test positive for rhinovirus, and this was felt to be causing some degree of bronchospasm and cough as well.  An echocardiogram performed on 6/29/2019 showed an ejection fraction of 62%, severe biatrial enlargement, a severely dilated right ventricle with moderate reduction in function, mild to moderate tricuspid regurgitation, and a calculated RVSP of 67 mmHg.  She also had a CT scan of the chest without contrast on 6/28/2019 which showed enlarged pulmonary arteries, suggestive of pulmonary hypertension.  After extensive diuresis and pulmonary care, she did improve.  At her follow-up visit on 9/30/2019, she had lost over 80 pounds of weight from diuresis and diet modification.  She was doing much better at that time.  She was also eventually diagnosed with severe obstructive sleep apnea, and she was placed on a CPAP machine.    The patient presents today for follow-up.  She does tell me that she has had intermittent pain on the left side of her chest and under her left breast.  She states that this occurs both with and without activity, and has been occurring more frequently recently.  Her shortness of breath is stable, and she is still on oxygen at night with her CPAP.  She has not had any symptoms of the atrial fibrillation, and she continues to take Eliquis without any difficulty.    Past Medical History:   Diagnosis Date   • Atrial fibrillation (CMS/HCC)     Diagnosed 5/2019   • CKD (chronic kidney disease)    • Diabetes (CMS/HCC)     Diagnosed 5/2019   • Diastolic CHF (CMS/HCC)    • Hyperlipidemia    • Hypertension    • Hypothyroidism    • Morbid obesity (CMS/HCC)    • AMOR (obstructive sleep apnea)    • Rheumatoid arthritis (CMS/HCC)    • SLE (systemic lupus erythematosus) (CMS/HCC)        Past Surgical History:   Procedure Laterality Date   • APPENDECTOMY     •  CARPAL TUNNEL RELEASE     • ENDOMETRIAL ABLATION     • HEEL SPUR EXCISION     • HERNIA REPAIR     • THYROIDECTOMY, PARTIAL         Current Outpatient Medications on File Prior to Visit   Medication Sig Dispense Refill   • albuterol sulfate  (90 Base) MCG/ACT inhaler Inhale 2 puffs Every 4 (Four) Hours As Needed for Wheezing.     • apixaban (ELIQUIS) 5 MG tablet tablet Take 5 mg by mouth Every 12 (Twelve) Hours.     • budesonide-formoterol (SYMBICORT) 160-4.5 MCG/ACT inhaler Inhale 2 puffs 2 (Two) Times a Day.     • carvedilol (COREG) 12.5 MG tablet Take 12.5 mg by mouth 2 (Two) Times a Day.  0   • cyclobenzaprine (FLEXERIL) 10 MG tablet Take 10 mg by mouth As Needed.     • Green Tea, Sabrina sinensis, (GREEN TEA PO) Take 500 mg by mouth Daily.     • HUMIRA PEN 40 MG/0.4ML Pen-injector Kit Inject 1 pen as directed Take As Directed.     • hydroxychloroquine (PLAQUENIL) 200 MG tablet Take  by mouth Daily.     • Iron-Vitamins (GERITOL PO) Take  by mouth Daily.     • lisinopril (PRINIVIL,ZESTRIL) 40 MG tablet Take 40 mg by mouth Daily.  1   • MAGNESIUM PO Take  by mouth Daily.     • montelukast (SINGULAIR) 10 MG tablet Take 10 mg by mouth Every Night.     • nitroglycerin (NITROSTAT) 0.4 MG SL tablet TAKE 1 TABLET SUBLINGUALLY EVERY 5 MINS AS NEEDED FOR CHEST PAIN. AFTER THIRD TAB, [id number]     • NP THYROID 60 MG tablet Take 60 mg by mouth Daily.  1   • POTASSIUM PO Take  by mouth Daily.     • rosuvastatin (CRESTOR) 10 MG tablet Take 10 mg by mouth Daily.     • torsemide (DEMADEX) 20 MG tablet Take 1 tablet by mouth Daily. 30 tablet 11   • traMADol (ULTRAM) 50 MG tablet TK 1 T PO QID PRN  0   • TURMERIC CURCUMIN PO Take  by mouth Daily.     • VICTOZA 18 MG/3ML solution pen-injector injection Inject 1.2 mg as directed Daily.       No current facility-administered medications on file prior to visit.      Allergies as of 10/13/2020 - Reviewed 06/17/2020   Allergen Reaction Noted   • Other Other (See Comments)  "01/17/2020   • Other Irritability 01/17/2020     Social History     Socioeconomic History   • Marital status:      Spouse name: Not on file   • Number of children: Not on file   • Years of education: Not on file   • Highest education level: Not on file   Tobacco Use   • Smoking status: Former Smoker   • Smokeless tobacco: Never Used   • Tobacco comment: Quit around age 36   Substance and Sexual Activity   • Alcohol use: Yes     Drinks per session: 1 or 2     Binge frequency: Weekly   • Drug use: No   Lifestyle   • Physical activity     Days per week: 3 days     Minutes per session: 20 min   • Stress: Not on file     Family History   Problem Relation Age of Onset   • Stroke Mother    • Coronary artery disease Brother         3 brothers with stents at young ages    • Diabetes Paternal Grandfather    • Heart disease Father    • Atrial fibrillation Father    • Colon cancer Father    • Coronary artery disease Brother        Review of Systems   Constitution: Positive for malaise/fatigue and weight loss.   HENT: Positive for sore throat.    Cardiovascular: Positive for dyspnea on exertion, leg swelling and palpitations.   Respiratory: Positive for shortness of breath.    Skin: Positive for color change.   Genitourinary: Positive for frequency.   Neurological: Positive for light-headedness and paresthesias.   All other systems reviewed and are negative.     Objective:     Vitals:    10/13/20 1127   BP: 118/64   Pulse: 83   SpO2: 99%   Weight: (!) 150 kg (331 lb)   Height: 117.8 cm (46.38\")     Body mass index is 108.19 kg/m².    Physical Exam   Constitutional: She is oriented to person, place, and time. She appears well-developed.   Morbidly obese (lost weight)   HENT:   Head: Normocephalic and atraumatic.   Eyes: Conjunctivae are normal.   Neck: Neck supple.   Cardiovascular: Normal rate. An irregularly irregular rhythm present. Exam reveals no gallop and no friction rub.   No murmur heard.  Pulmonary/Chest: Effort " normal and breath sounds normal.   Abdominal: Soft. There is no abdominal tenderness.   Musculoskeletal:      Comments: Trace edema of the lower extremities bilaterally   Neurological: She is alert and oriented to person, place, and time.   Skin: Skin is warm.   Psychiatric: She has a normal mood and affect. Her behavior is normal.     Lab Review:   Procedures    Cardiac Procedures:  1.  Echocardiogram at [facility] on 4/2/2019: Study was TDS.  The ejection fraction was 55%.  The right ventricle had normal function.  There was moderate left atrial enlargement.  There was mild mitral regurgitation.  2.  Lexiscan Cardiolite stress test at [facility] on 6/5/2019: There was no ischemia or infarct.  The ejection fraction was 44% with mild global hypokinesis.  3.  Echocardiogram on 6/29/2019: The ejection fraction was 62%.  There was mild LVH.  The right ventricle was severely dilated and moderately reduced and function.  The left atrium was severely enlarged.  The right atrium was severely enlarged.  The saline study was negative.  There was mild to moderate tricuspid regurgitation.  The calculated RVSP was 67 mmHg.  4.  CT of the chest without contrast on 6/28/2019: There was interstitial edema and haziness within both lungs likely representing edema.  The pulmonary arteries were enlarged, suggestive of pulmonary hypertension.  5.  Echocardiogram at [facility] on 8/3/2020: Technically difficult study.  Ejection fraction was 55%.  The right ventricle was not well seen.  There was moderate left atrial enlargement.    Assessment:       Diagnosis Plan   1. Precordial pain  Stress Test With Pet Myocardial Perfusion (MULTI STUDY, REST AND STRESS)   2. Longstanding persistent atrial fibrillation (CMS/HCC)     3. Chronic diastolic congestive heart failure (CMS/HCC)     4. Right ventricular enlargement       Plan:     She has been having some chest discomfort which is described above.   She does have risk factors for coronary artery disease, including diabetes and rheumatoid arthritis.  I am going to check a Lexiscan PET stress test at this point.  I feel with her body habitus, this will be the best noninvasive test possible.    She has lost a significant amount of weight through her diet.  She is still watching her diet and her salt intake as well.  She is faithful about wearing her CPAP machine.  She is only on oxygen at night with the CPAP typically.  She does have stage III chronic kidney disease, although this has been fairly stable.  She will remain on the torsemide at 20 mg/day.  Her rate appears to be well controlled on the carvedilol at 12.5 mg twice a day.  She will continue on the Eliquis for anticoagulation.    I will have her follow-up with me in 4 to 6 months.

## 2020-11-11 ENCOUNTER — HOSPITAL ENCOUNTER (OUTPATIENT)
Dept: CARDIOLOGY | Facility: HOSPITAL | Age: 60
Discharge: HOME | End: 2020-11-11
Admitting: INTERNAL MEDICINE

## 2020-11-11 VITALS — WEIGHT: 293 LBS | BODY MASS INDEX: 44.41 KG/M2 | HEIGHT: 68 IN

## 2020-11-11 DIAGNOSIS — R07.2 PRECORDIAL PAIN: ICD-10-CM

## 2020-11-11 LAB
BH CV NUCLEAR PRIOR STUDY: 3
BH CV STRESS BP STAGE 1: NORMAL
BH CV STRESS COMMENTS STAGE 1: NORMAL
BH CV STRESS DOSE REGADENOSON STAGE 1: 0.4
BH CV STRESS DURATION MIN STAGE 1: 0
BH CV STRESS DURATION SEC STAGE 1: 10
BH CV STRESS HR STAGE 1: 102
BH CV STRESS PROTOCOL 1: NORMAL
BH CV STRESS RECOVERY BP: NORMAL MMHG
BH CV STRESS RECOVERY HR: 95 BPM
BH CV STRESS STAGE 1: 1
LV EF NUC BP: 70 %
MAXIMAL PREDICTED HEART RATE: 161 BPM
PERCENT MAX PREDICTED HR: 63.35 %
STRESS BASELINE BP: NORMAL MMHG
STRESS BASELINE HR: 83 BPM
STRESS PERCENT HR: 75 %
STRESS POST EXERCISE DUR SEC: 10 SEC
STRESS POST PEAK BP: NORMAL MMHG
STRESS POST PEAK HR: 102 BPM
STRESS TARGET HR: 137 BPM

## 2020-11-11 PROCEDURE — 78492 MYOCRD IMG PET MLT RST&STRS: CPT | Performed by: INTERNAL MEDICINE

## 2020-11-11 PROCEDURE — 93017 CV STRESS TEST TRACING ONLY: CPT

## 2020-11-11 PROCEDURE — 78492 MYOCRD IMG PET MLT RST&STRS: CPT

## 2020-11-11 PROCEDURE — 25010000002 REGADENOSON 0.4 MG/5ML SOLUTION: Performed by: INTERNAL MEDICINE

## 2020-11-11 PROCEDURE — 0 RUBIDIUM CHLORIDE: Performed by: INTERNAL MEDICINE

## 2020-11-11 PROCEDURE — A9555 RB82 RUBIDIUM: HCPCS | Performed by: INTERNAL MEDICINE

## 2020-11-11 PROCEDURE — 93018 CV STRESS TEST I&R ONLY: CPT | Performed by: INTERNAL MEDICINE

## 2020-11-11 PROCEDURE — 93016 CV STRESS TEST SUPVJ ONLY: CPT | Performed by: INTERNAL MEDICINE

## 2020-11-11 RX ADMIN — REGADENOSON 0.4 MG: 0.08 INJECTION, SOLUTION INTRAVENOUS at 12:20

## 2020-11-12 NOTE — PROGRESS NOTES
I called and spoke with her about the chest discomfort.  It is the same and has not changed.  It occurs more on some days rather than others.  It is not exertional.  Overall, she states it is not terrible chest discomfort.  She is very hesitant about a cardiac catheterization, although three of her brothers have coronary artery disease.  I told her at a minimum to let me know if this worsens, and to go to the ER for severe symptoms.  She is going to do this.    GM

## 2020-12-04 ENCOUNTER — HOSPITAL ENCOUNTER (OUTPATIENT)
Dept: URGENT CARE | Facility: CLINIC | Age: 60
Discharge: HOME | End: 2020-12-04

## 2020-12-09 LAB — SARS-COV-2 RNA SPEC QL NAA+PROBE: DETECTED

## 2021-03-03 RX ORDER — TORSEMIDE 20 MG/1
TABLET ORAL
Qty: 90 TABLET | Refills: 1 | Status: SHIPPED | OUTPATIENT
Start: 2021-03-03 | End: 2021-04-27 | Stop reason: SDUPTHER

## 2021-03-09 ENCOUNTER — TELEPHONE (OUTPATIENT)
Dept: CARDIOLOGY | Facility: CLINIC | Age: 61
End: 2021-03-09

## 2021-03-09 NOTE — TELEPHONE ENCOUNTER
Pt said her insurance changed and no longer covers Eliquis.  She said she needs either Xarelto or Warfarin.  She mentioned she's almost out of medication.  Thanks,  Soraya     Pt # [phone]

## 2021-03-09 NOTE — TELEPHONE ENCOUNTER
I sent in a prescription for Xarelto 20 mg/day.  Please let her know that it is once a day as opposed to Eliquis being twice a day.  She also has to eat with this, and I recommend taking it at dinnertime.  Can you please let her know?  Thanks.    ALEXIS

## 2021-04-13 ENCOUNTER — HOSPITAL ENCOUNTER (OUTPATIENT)
Dept: VACCINE CLINIC | Facility: HOSPITAL | Age: 61
Discharge: HOME | End: 2021-04-13
Attending: INTERNAL MEDICINE

## 2021-04-27 ENCOUNTER — OFFICE VISIT (OUTPATIENT)
Dept: CARDIOLOGY | Facility: CLINIC | Age: 61
End: 2021-04-27

## 2021-04-27 VITALS
OXYGEN SATURATION: 97 % | WEIGHT: 293 LBS | HEIGHT: 70 IN | SYSTOLIC BLOOD PRESSURE: 116 MMHG | DIASTOLIC BLOOD PRESSURE: 78 MMHG | HEART RATE: 61 BPM | BODY MASS INDEX: 41.95 KG/M2

## 2021-04-27 DIAGNOSIS — G47.33 OSA (OBSTRUCTIVE SLEEP APNEA): ICD-10-CM

## 2021-04-27 DIAGNOSIS — I48.11 LONGSTANDING PERSISTENT ATRIAL FIBRILLATION (HCC): Primary | ICD-10-CM

## 2021-04-27 DIAGNOSIS — E66.01 CLASS 3 SEVERE OBESITY DUE TO EXCESS CALORIES WITHOUT SERIOUS COMORBIDITY WITH BODY MASS INDEX (BMI) OF 45.0 TO 49.9 IN ADULT (HCC): ICD-10-CM

## 2021-04-27 DIAGNOSIS — R07.89 CHEST PAIN, ATYPICAL: ICD-10-CM

## 2021-04-27 DIAGNOSIS — I51.7 RIGHT VENTRICULAR ENLARGEMENT: ICD-10-CM

## 2021-04-27 DIAGNOSIS — I50.32 CHRONIC DIASTOLIC CONGESTIVE HEART FAILURE (HCC): ICD-10-CM

## 2021-04-27 DIAGNOSIS — I27.20 PULMONARY HYPERTENSION (HCC): ICD-10-CM

## 2021-04-27 PROCEDURE — 99214 OFFICE O/P EST MOD 30 MIN: CPT | Performed by: NURSE PRACTITIONER

## 2021-04-27 RX ORDER — TORSEMIDE 20 MG/1
20 TABLET ORAL DAILY
Qty: 90 TABLET | Refills: 2 | Status: SHIPPED | OUTPATIENT
Start: 2021-04-27 | End: 2022-03-10

## 2021-04-27 RX ORDER — HYDROCODONE BITARTRATE AND ACETAMINOPHEN 7.5; 325 MG/1; MG/1
1 TABLET ORAL EVERY 12 HOURS
Status: ON HOLD | COMMUNITY
End: 2022-12-23

## 2021-04-27 NOTE — PROGRESS NOTES
"    CARDIOLOGY        Patient Name: Milagros Ramirez  :1960  Age: 60 y.o.  Primary Cardiologist: Duncan Hebert MD  Encounter Provider:  MARILYNN Alonzo    Date of Service: 21          CHIEF COMPLAINT / REASON FOR OFFICE VISIT     Atrial Fibrillation (6 month f/u )      HISTORY OF PRESENT ILLNESS       HPI  Milagros Ramirez is a 60 y.o. female who presents today for semiannual evaluation.     Pt has a  history significant for morbid obesity, atrial fibrillation, hypertension, diastolic heart failure, obesity, diabetes, systemic lupus, chronic kidney disease, AMOR.    Patient presents today for re-evaluation,  She is tearful and complaining of joint pain to her knees and wrist.  She states that pain is chronic.  She complains of intermittent chest pain that is similar to pains she has experienced in the past.  She describes the pain as intermittent and sharp that are lasting seconds.  She also has a constant dull pain that will last 10 minutes and then resolve.  She does have intermittent shortness of breath that is mainly associated with moderate exertion.  She reports generalized fatigue related to deconditioning secondary to joint pains from RA and SLE.  She states that her ortho has recommended bilateral knee rep[lacement, but cannot afford her deductible.  She denies edema, she monitors her sodium intake closely.  She is diuresing well with torsemide.  She denies bleeding problems while on AC.  She has received 1st COVID vaccine.        The following portions of the patient's history were reviewed and updated as appropriate: allergies, current medications, past family history, past medical history, past social history, past surgical history and problem list.      VITAL SIGNS     Visit Vitals  /78 (BP Location: Left arm, Patient Position: Sitting, Cuff Size: Large Adult)   Pulse 61   Ht 177.8 cm (70\")   Wt (!) 147 kg (323 lb)   SpO2 97%   BMI 46.35 kg/m²         Wt Readings from Last 3 " Encounters:   04/27/21 (!) 147 kg (323 lb)   11/11/20 (!) 154 kg (340 lb)   10/13/20 (!) 150 kg (331 lb)     Body mass index is 46.35 kg/m².      REVIEW OF SYSTEMS   ROS        PHYSICAL EXAMINATION     Physical Exam      REVIEWED DATA     Procedures    Cardiac Procedures:  1. Echocardiogram 6/29/2019.  EF 62%.  Diastolic function was indeterminate.  RV cavity severely dilated.  LA cavity severely dilated.  Saline test results are negative.  Mild to moderate tricuspid valve regurgitation.  2. Lexiscan Cardiolite stress test at [facility] on 6/5/2019: There was no ischemia or infarct.  The ejection fraction was 44% with mild global hypokinesis.  3. Myocardial perfusion PET stress testing 11/11/2020.  Normal myocardial perfusion study without evidence of ischemia.  LVEF 70%.  Impressions consistent with low risk study.          ASSESSMENT & PLAN      Diagnosis Plan   1. Longstanding persistent atrial fibrillation (CMS/HCC)     2. Chronic diastolic congestive heart failure (CMS/HCC)     3. Right ventricular enlargement     4. Pulmonary hypertension (CMS/HCC)     5. Chest pain, atypical     6. AMOR (obstructive sleep apnea)     7. Class 3 severe obesity due to excess calories without serious comorbidity with body mass index (BMI) of 45.0 to 49.9 in adult (CMS/[facility])           SUMMARY/DISCUSSION  1. Persistent A. fib.  Patient with history of longstanding persistent atrial fibrillation.  Rate is controlled in the office today.  She will continue carvedilol 12.5 mg twice per day.  She is anticoagulated with Xarelto and denies any bleeding complaints.  2. Chronic diastolic heart failure.  Patient reports shortness of breath with exertion but denies any volume overload.  She monitors her sodium intake very closely.  She is unable to exercise secondary to joint pain related to systemic lupus and rheumatoid arthritis.  She appears euvolemic on exam today.  3. RV enlargement  4. Pulmonary hypertension  5. Atypical  chest pain patient continues to complain of intermittent atypical chest pain.  Pains are both dull and sharp in nature.  Sharp pains are intermittent and only lasts seconds.  Dull pain lasts for approximately 10 minutes at a time.  There are no exacerbating or alleviating symptoms.  Patient did have a myocardial perfusion PET stress test in November 2020 which was negative for ischemia.  Dr. Hebert has discussed possibility of further evaluation with cardiac catheterization but patient is hesitant to this and would like to continue to monitor symptoms at this time.  If they worsen she will notify the office.  6. AMOR on CPAP  7. Obesity  8. Rheumatoid arthritis  9. Systemic lupus  10. Follow-up with Dr. Hebert in 6 months.  Sooner for any problems or complications.        MEDICATIONS         Discharge Medications          Accurate as of April 27, 2021 10:24 AM. If you have any questions, ask your nurse or doctor.            Continue These Medications      Instructions Start Date   albuterol sulfate  (90 Base) MCG/ACT inhaler  Commonly known as: PROVENTIL HFA;VENTOLIN HFA;PROAIR HFA   2 puffs, Inhalation, Every 4 Hours PRN      budesonide-formoterol 160-4.5 MCG/ACT inhaler  Commonly known as: SYMBICORT   2 puffs, Inhalation, 2 Times Daily - RT      carvedilol 12.5 MG tablet  Commonly known as: COREG   12.5 mg, Oral, 2 Times Daily      cyclobenzaprine 10 MG tablet  Commonly known as: FLEXERIL   10 mg, Oral, As Needed      GERITOL PO   Oral, Daily      GREEN TEA PO   500 mg, Oral, Daily      Humira Pen 40 MG/0.4ML Pen-injector Kit  Generic drug: Adalimumab   1 pen, Injection, Take As Directed      HYDROcodone-acetaminophen 7.5-325 MG per tablet  Commonly known as: NORCO   hydrocodone 7.5 mg-acetaminophen 325 mg tablet   TAKE 1 TABLET EVERY 12 HOURS BY ORAL ROUTE AS NEEDED FOR 30 DAYS.      hydroxychloroquine 200 MG tablet  Commonly known as: PLAQUENIL   Oral, Daily      lisinopril 40 MG tablet  Commonly  known as: PRINIVIL,ZESTRIL   40 mg, Oral, Daily      MAGNESIUM PO   Oral, Daily      montelukast 10 MG tablet  Commonly known as: SINGULAIR   10 mg, Oral, Nightly      nitroglycerin 0.4 MG SL tablet  Commonly known as: NITROSTAT   TAKE 1 TABLET SUBLINGUALLY EVERY 5 MINS AS NEEDED FOR CHEST PAIN. AFTER THIRD TAB, [id number]      NP Thyroid 60 MG tablet  Generic drug: Thyroid   60 mg, Oral, Daily      POTASSIUM PO   Oral, Daily      rivaroxaban 20 MG tablet  Commonly known as: Xarelto   20 mg, Oral, Daily      rosuvastatin 10 MG tablet  Commonly known as: CRESTOR   10 mg, Oral, Daily      torsemide 20 MG tablet  Commonly known as: DEMADEX   20 mg, Oral, Daily      TURMERIC CURCUMIN PO   Oral, Daily      Victoza 18 MG/3ML solution pen-injector injection  Generic drug: Liraglutide   1.2 mg, Injection, Daily         Stop These Medications    traMADol 50 MG tablet  Commonly known as: ULTRAM  Stopped by: MARILYNN Alonzo                **Dragon Disclaimer:   Much of this encounter note is an electronic transcription/translation of spoken language to printed text. The electronic translation of spoken language may permit erroneous, or at times, nonsensical words or phrases to be inadvertently transcribed. Although I have reviewed the note for such errors, some may still exist.

## 2021-05-04 ENCOUNTER — HOSPITAL ENCOUNTER (OUTPATIENT)
Dept: VACCINE CLINIC | Facility: HOSPITAL | Age: 61
Discharge: HOME | End: 2021-05-04
Attending: INTERNAL MEDICINE

## 2021-05-12 ENCOUNTER — TELEPHONE (OUTPATIENT)
Dept: CARDIOLOGY | Facility: CLINIC | Age: 61
End: 2021-05-12

## 2021-05-15 VITALS
OXYGEN SATURATION: 91 % | HEIGHT: 70 IN | WEIGHT: 293 LBS | HEART RATE: 75 BPM | DIASTOLIC BLOOD PRESSURE: 75 MMHG | SYSTOLIC BLOOD PRESSURE: 151 MMHG | BODY MASS INDEX: 41.95 KG/M2

## 2021-05-15 VITALS — WEIGHT: 293 LBS | HEIGHT: 70 IN | BODY MASS INDEX: 41.95 KG/M2

## 2021-06-04 PROBLEM — E11.9 TYPE 2 DIABETES MELLITUS WITHOUT COMPLICATION: Status: ACTIVE | Noted: 2020-02-10

## 2021-06-04 PROBLEM — N18.30 STAGE 3 CHRONIC KIDNEY DISEASE (HCC): Status: ACTIVE | Noted: 2020-02-10

## 2021-06-04 PROBLEM — I10 ESSENTIAL (PRIMARY) HYPERTENSION: Status: ACTIVE | Noted: 2020-02-10

## 2021-06-04 PROBLEM — M06.9 RHEUMATOID ARTHRITIS (HCC): Status: ACTIVE | Noted: 2020-02-10

## 2021-06-04 PROBLEM — M17.9 OSTEOARTHRITIS OF KNEE: Status: ACTIVE | Noted: 2021-06-04

## 2021-06-28 ENCOUNTER — LAB REQUISITION (OUTPATIENT)
Dept: LAB | Facility: HOSPITAL | Age: 61
End: 2021-06-28

## 2021-06-28 ENCOUNTER — TRANSCRIBE ORDERS (OUTPATIENT)
Dept: ADMINISTRATIVE | Facility: HOSPITAL | Age: 61
End: 2021-06-28

## 2021-06-28 DIAGNOSIS — Z01.419 ENCOUNTER FOR GYNECOLOGICAL EXAMINATION (GENERAL) (ROUTINE) WITHOUT ABNORMAL FINDINGS: ICD-10-CM

## 2021-06-28 DIAGNOSIS — N64.4 BREAST PAIN: Primary | ICD-10-CM

## 2021-06-28 PROCEDURE — 88175 CYTOPATH C/V AUTO FLUID REDO: CPT | Performed by: NURSE PRACTITIONER

## 2021-07-01 LAB
CONV .: NORMAL
CYTOLOGIST CVX/VAG CYTO: NORMAL
CYTOLOGY CVX/VAG DOC CYTO: NORMAL
CYTOLOGY CVX/VAG DOC THIN PREP: NORMAL
DX ICD CODE: NORMAL
HIV 1 & 2 AB SER-IMP: NORMAL
OTHER STN SPEC: NORMAL
STAT OF ADQ CVX/VAG CYTO-IMP: NORMAL

## 2021-07-26 ENCOUNTER — APPOINTMENT (OUTPATIENT)
Dept: MAMMOGRAPHY | Facility: HOSPITAL | Age: 61
End: 2021-07-26

## 2021-07-26 ENCOUNTER — APPOINTMENT (OUTPATIENT)
Dept: ULTRASOUND IMAGING | Facility: HOSPITAL | Age: 61
End: 2021-07-26

## 2021-08-05 ENCOUNTER — HOSPITAL ENCOUNTER (OUTPATIENT)
Dept: ULTRASOUND IMAGING | Facility: HOSPITAL | Age: 61
Discharge: HOME | End: 2021-08-05

## 2021-08-05 ENCOUNTER — HOSPITAL ENCOUNTER (OUTPATIENT)
Dept: MAMMOGRAPHY | Facility: HOSPITAL | Age: 61
Discharge: HOME | End: 2021-08-05

## 2021-08-05 DIAGNOSIS — N64.4 BREAST PAIN: ICD-10-CM

## 2021-08-05 PROCEDURE — G0279 TOMOSYNTHESIS, MAMMO: HCPCS

## 2021-08-05 PROCEDURE — 77066 DX MAMMO INCL CAD BI: CPT

## 2021-10-12 ENCOUNTER — OFFICE VISIT (OUTPATIENT)
Dept: CARDIOLOGY | Facility: CLINIC | Age: 61
End: 2021-10-12

## 2021-10-12 VITALS
HEIGHT: 70 IN | OXYGEN SATURATION: 98 % | SYSTOLIC BLOOD PRESSURE: 104 MMHG | HEART RATE: 73 BPM | DIASTOLIC BLOOD PRESSURE: 64 MMHG | BODY MASS INDEX: 41.95 KG/M2 | WEIGHT: 293 LBS

## 2021-10-12 DIAGNOSIS — I51.7 RIGHT VENTRICULAR ENLARGEMENT: ICD-10-CM

## 2021-10-12 DIAGNOSIS — I48.11 LONGSTANDING PERSISTENT ATRIAL FIBRILLATION (HCC): Primary | ICD-10-CM

## 2021-10-12 DIAGNOSIS — I50.812 CHRONIC RIGHT-SIDED CONGESTIVE HEART FAILURE (HCC): ICD-10-CM

## 2021-10-12 DIAGNOSIS — I50.32 CHRONIC DIASTOLIC CONGESTIVE HEART FAILURE (HCC): ICD-10-CM

## 2021-10-12 PROCEDURE — 99213 OFFICE O/P EST LOW 20 MIN: CPT | Performed by: INTERNAL MEDICINE

## 2021-10-12 RX ORDER — CARVEDILOL 12.5 MG/1
12.5 TABLET ORAL 2 TIMES DAILY
Qty: 180 TABLET | Refills: 3 | Status: SHIPPED | OUTPATIENT
Start: 2021-10-12 | End: 2022-06-06

## 2021-10-17 NOTE — PROGRESS NOTES
Date of Office Visit: 10/12/2021  Encounter Provider: Norman Hebert MD  Place of Service: [facility] CARDIOLOGY  Patient Name: Milagros Ramirez  :1960    Chief complaint: Follow-up for persistent atrial fibrillation, chronic diastolic CHF, right   ventricular dilatation with right-sided heart failure.    History of Present Illness:    I again had the pleasure of seeing the patient in cardiology office on 10/12/2021.  She is a very pleasant 61 year-old white female with a history of morbid obesity, chronic hypoxic and hypercapnic respiratory failure, obesity hypoventilation syndrome, chronic diastolic CHF, right-sided heart failure, and persistent atrial fibrillation who presents for follow-up.    I initially saw the patient on 2019.  She had been admitted to [facility] in [place] in May 2019 after having months of worsening dyspnea and significant abdominal and lower extremity edema.  She had gained over 50 pounds secondary to volume retention.  She was diuresed extensively at that time, and was also found to be in atrial fibrillation, which was a new diagnosis.  She was anticoagulated with Eliquis, and an echocardiogram on 2019 showed an ejection fraction of 55%, although it was a technically difficult study.  She also had a Lexiscan nuclear stress test on 2019 which showed no ischemia or infarct.  She does have lupus and rheumatoid arthritis, which also limits her mobility significantly.  She also has chronic kidney disease.    At the time of her initial appointment with me on 2019, she was grossly volume overloaded again.  She also had been very short of breath and had been experiencing orthopnea.  She was readmitted to the hospital in 2019, and underwent extensive diuresis at that point.  She was also seen by pulmonology, and was felt to have obesity hypoventilation syndrome with chronic CO2 retention.  She also was noted to have  severe undiagnosed obstructive sleep apnea, and she was experiencing acute on chronic hypoxic and hypercapnic respiratory failure.  She did test positive for rhinovirus, and this was felt to be causing some degree of bronchospasm and cough as well.  An echocardiogram performed on 6/29/2019 showed an ejection fraction of 62%, severe biatrial enlargement, a severely dilated right ventricle with moderate reduction in function, mild to moderate tricuspid regurgitation, and a calculated RVSP of 67 mmHg.  She also had a CT scan of the chest without contrast on 6/28/2019 which showed enlarged pulmonary arteries, suggestive of pulmonary hypertension.  After extensive diuresis and pulmonary care, she did improve.  At her follow-up visit on 9/30/2019, she had lost over 80 pounds of weight from diuresis and diet modification.  She was doing much better at that time.  She was also eventually diagnosed with severe obstructive sleep apnea, and she was placed on a CPAP machine.    The patient presents today for follow-up.  She has been out of her carvedilol for several days, and resume this yesterday.  She has felt her heart rate racing and has had some dizziness and felt shaky at times.  This has improved slightly since resuming the carvedilol.  She denied any chest discomfort other than under her left breast and rib cage, which has not changed.  She still has the same shortness of breath with moderate or more exertion.  She has continued to lose weight with dietary modifications.    Past Medical History:   Diagnosis Date   • Atrial fibrillation (HCC)     Diagnosed 5/2019   • CKD (chronic kidney disease)    • Diabetes (HCC)     Diagnosed 5/2019   • Diastolic CHF ([facility])    • Hyperlipidemia    • Hypertension    • Hypothyroidism    • Morbid obesity (HCC)    • AMOR (obstructive sleep apnea)    • Rheumatoid arthritis (HCC)    • SLE (systemic lupus erythematosus) ([facility])        Past Surgical History:   Procedure Laterality Date   •  APPENDECTOMY     • CARPAL TUNNEL RELEASE     • ENDOMETRIAL ABLATION     • HEEL SPUR EXCISION     • HERNIA REPAIR     • THYROIDECTOMY, PARTIAL         Current Outpatient Medications on File Prior to Visit   Medication Sig Dispense Refill   • albuterol sulfate  (90 Base) MCG/ACT inhaler Inhale 2 puffs Every 4 (Four) Hours As Needed for Wheezing.     • budesonide-formoterol (SYMBICORT) 160-4.5 MCG/ACT inhaler Inhale 2 puffs 2 (Two) Times a Day.     • cyclobenzaprine (FLEXERIL) 10 MG tablet Take 10 mg by mouth As Needed.     • Green Tea, Sabrina sinensis, (GREEN TEA PO) Take 500 mg by mouth Daily.     • HUMIRA PEN 40 MG/0.4ML Pen-injector Kit Inject 1 pen as directed Take As Directed.     • HYDROcodone-acetaminophen (NORCO) 7.5-325 MG per tablet hydrocodone 7.5 mg-acetaminophen 325 mg tablet   TAKE 1 TABLET EVERY 12 HOURS BY ORAL ROUTE AS NEEDED FOR 30 DAYS.     • hydroxychloroquine (PLAQUENIL) 200 MG tablet Take  by mouth Daily.     • Iron-Vitamins (GERITOL PO) Take  by mouth Daily.     • lisinopril (PRINIVIL,ZESTRIL) 40 MG tablet Take 40 mg by mouth Daily.  1   • MAGNESIUM PO Take  by mouth Daily.     • montelukast (SINGULAIR) 10 MG tablet Take 10 mg by mouth Every Night.     • nitroglycerin (NITROSTAT) 0.4 MG SL tablet TAKE 1 TABLET SUBLINGUALLY EVERY 5 MINS AS NEEDED FOR CHEST PAIN. AFTER THIRD TAB, [id number]     • NP THYROID 60 MG tablet Take 60 mg by mouth Daily.  1   • POTASSIUM PO Take  by mouth Daily.     • rivaroxaban (Xarelto) 20 MG tablet Take 1 tablet by mouth Daily. 30 tablet 6   • rosuvastatin (CRESTOR) 10 MG tablet Take 10 mg by mouth Daily.     • torsemide (DEMADEX) 20 MG tablet Take 1 tablet by mouth Daily. 90 tablet 2   • TURMERIC CURCUMIN PO Take  by mouth Daily.     • VICTOZA 18 MG/3ML solution pen-injector injection Inject 1.2 mg as directed Daily.       No current facility-administered medications on file prior to visit.     Allergies as of 10/12/2021 - Reviewed 04/27/2021   Allergen  "Reaction Noted   • Other Other (See Comments) 01/17/2020   • Other Irritability 01/17/2020     Social History     Socioeconomic History   • Marital status:    Tobacco Use   • Smoking status: Former Smoker   • Smokeless tobacco: Never Used   • Tobacco comment: Quit around age 36   Substance and Sexual Activity   • Alcohol use: Yes   • Drug use: No     Family History   Problem Relation Age of Onset   • Stroke Mother    • Coronary artery disease Brother         3 brothers with stents at young ages    • Diabetes Paternal Grandfather    • Heart disease Father    • Atrial fibrillation Father    • Colon cancer Father    • Coronary artery disease Brother        Review of Systems   Constitutional: Positive for malaise/fatigue and weight loss.   Cardiovascular: Positive for dyspnea on exertion, leg swelling and palpitations.   Respiratory: Positive for shortness of breath.    Skin: Positive for color change.   Genitourinary: Positive for frequency.   Neurological: Positive for light-headedness and paresthesias.   All other systems reviewed and are negative.     Objective:     Vitals:    10/12/21 1420   BP: 104/64   Pulse: 73   SpO2: 98%   Weight: (!) 141 kg (310 lb 6.4 oz)   Height: 177.8 cm (70\")     Body mass index is 44.54 kg/m².    Physical Exam  Constitutional:       Appearance: She is well-developed.      Comments: Morbidly obese (lost weight)   HENT:      Head: Normocephalic and atraumatic.   Eyes:      Conjunctiva/sclera: Conjunctivae normal.   Cardiovascular:      Rate and Rhythm: Normal rate. Rhythm irregularly irregular.      Heart sounds: No murmur heard.  No friction rub. No gallop.    Pulmonary:      Effort: Pulmonary effort is normal.      Breath sounds: Normal breath sounds.   Abdominal:      Palpations: Abdomen is soft.      Tenderness: There is no abdominal tenderness.   Musculoskeletal:      Cervical back: Neck supple.      Comments: Trace edema of the lower extremities bilaterally   Skin:     " General: Skin is warm.   Neurological:      Mental Status: She is alert and oriented to person, place, and time.   Psychiatric:         Behavior: Behavior normal.       Lab Review:   Procedures    Cardiac Procedures:  1.  Echocardiogram at [facility] on 4/2/2019: Study was TDS.  The ejection fraction was 55%.  The right ventricle had normal function.  There was moderate left atrial enlargement.  There was mild mitral regurgitation.  2.  Lexiscan Cardiolite stress test at [facility] on 6/5/2019: There was no ischemia or infarct.  The ejection fraction was 44% with mild global hypokinesis.  3.  Echocardiogram on 6/29/2019: The ejection fraction was 62%.  There was mild LVH.  The right ventricle was severely dilated and moderately reduced and function.  The left atrium was severely enlarged.  The right atrium was severely enlarged.  The saline study was negative.  There was mild to moderate tricuspid regurgitation.  The calculated RVSP was 67 mmHg.  4.  CT of the chest without contrast on 6/28/2019: There was interstitial edema and haziness within both lungs likely representing edema.  The pulmonary arteries were enlarged, suggestive of pulmonary hypertension.  5.  Echocardiogram at [facility] on 8/3/2020: Technically difficult study.  Ejection fraction was 55%.  The right ventricle was not well seen.  There was moderate left atrial enlargement.  6.  Lexiscan PET stress test on 11/11/2020: Normal with no ischemia or infarction.    Assessment:       Diagnosis Plan   1. Longstanding persistent atrial fibrillation (HCC)     2. Chronic diastolic congestive heart failure (HCC)     3. Chronic right-sided congestive heart failure (HCC)     4. Right ventricular enlargement       Plan:     She has lost a significant amount of weight through her diet, and this has substantially helped her congestive heart failure.  She also watches her sodium and salt intake.  She is faithful about  wearing her CPAP machine.  She is only on oxygen at night with the CPAP typically.  She does have stage III chronic kidney disease, although this has been fairly stable.  She will remain on the torsemide at 20 mg/day.      I feel that her recent symptoms have been a result of running out of the carvedilol for about 1 week.  She resumed yesterday, and she feels better.  I told her that this was likely some degree of beta-blocker withdrawal and uncontrolled atrial fibrillation.  She is going to call me if she does not get better after being back on the carvedilol for several days.  She will continue on the Xarelto for anticoagulation.  She has not had any bleeding issues.    I will have her follow-up with me in 6 months.

## 2021-11-05 ENCOUNTER — OFFICE VISIT (OUTPATIENT)
Dept: ORTHOPEDIC SURGERY | Facility: CLINIC | Age: 61
End: 2021-11-05

## 2021-11-05 VITALS — WEIGHT: 293 LBS | BODY MASS INDEX: 41.95 KG/M2 | HEIGHT: 70 IN

## 2021-11-05 DIAGNOSIS — M25.561 RIGHT KNEE PAIN, UNSPECIFIED CHRONICITY: ICD-10-CM

## 2021-11-05 DIAGNOSIS — M17.11 PRIMARY OSTEOARTHRITIS OF RIGHT KNEE: Primary | ICD-10-CM

## 2021-11-05 PROCEDURE — 99214 OFFICE O/P EST MOD 30 MIN: CPT | Performed by: ORTHOPAEDIC SURGERY

## 2021-11-05 NOTE — PROGRESS NOTES
"Chief Complaint  Initial Evaluation of the Right Knee     Subjective      Milagros Ramirez presents to [facility] for an evaluation of right knee. Patient has bilateral knee osteoarthritis. However, her right knee is causing her the most trouble. She states baker’s cyst also has been causing her increasing pain. She was last seen on 1/16/20. At that time we discussed getting pulmonologist clearance for surgery. She states pain in the knees have increasing worse since we last seen her. She states she does have clearance from her pulmonologist for surgery. Patient states that she has been working on losing weight prior to surgery. Her last visit with us on 1/6/20 she weighed at 418 lbs. Today she weighs in 310 lbs.     Allergies   Allergen Reactions   • Other Other (See Comments)     RHEUMATOID ARTHRITIS INFUSION REACTION   • Other Irritability     INFUSION FOR RHEUMATOID ARTHRITIS        Social History     Socioeconomic History   • Marital status:    Tobacco Use   • Smoking status: Former Smoker   • Smokeless tobacco: Never Used   • Tobacco comment: Quit around age 36   Vaping Use   • Vaping Use: Never used   Substance and Sexual Activity   • Alcohol use: Yes   • Drug use: No        Review of Systems     Objective   Vital Signs:   Ht 177.8 cm (70\")   Wt (!) 141 kg (310 lb)   BMI 44.48 kg/m²       Physical Exam  Constitutional:       Appearance: Normal appearance. Patient is well-developed and normal weight.   HENT:      Head: Normocephalic.      Right Ear: Hearing and external ear normal.      Left Ear: Hearing and external ear normal.      Nose: Nose normal.   Eyes:      Conjunctiva/sclera: Conjunctivae normal.   Cardiovascular:      Rate and Rhythm: Normal rate.   Pulmonary:      Effort: Pulmonary effort is normal.      Breath sounds: No wheezing or rales.   Abdominal:      Palpations: Abdomen is soft.      Tenderness: There is no abdominal tenderness.   Musculoskeletal:      " Cervical back: Normal range of motion.   Skin:     Findings: No rash.   Neurological:      Mental Status: Patient is alert and oriented to person, place, and time.   Psychiatric:         Mood and Affect: Mood and affect normal.         Judgment: Judgment normal.       Ortho Exam      RIGHT KNEE: Crepitus with motion. Ambulation with a cane. Antalgic gait. Full extension. Genu valgus deformity. Flexion to 110 degrees. Dorsal Pedal Pulse 2+, posterior tibialis pulse 2+. Sensation grossly intact. Neurovascular intact.  No swelling, skin discoloration or atrophy. Stable to varus/valgus stress. Negative Lachman. Good strength to hamstrings, quadriceps, dorsiflexors and plantar flexors. Pain with flexion and extension.     Procedures      Imaging Results (Most Recent)     None           Result Review :       No results found.           Assessment and Plan     DX: Right knee osteoarthritis       Patient has tried injections and medication with little relief. On 1/6/20 patient weighed in at 418 lbs. She now weighs in at 310 lbs. She has down well to lose weight thus far. She will continue to lose weight before a total knee replacement. She needs to have her replacement done by the end of the year. Discussed there is no guarantee that she will be scheduled before the year ends. Discussed weight loss with the patient prior to surgery due to high risk of infection. Patient wishes to be referred to [facility].     Educated on risk of elevated BMI related to procedure.  Discussed options for weight loss/decreasing BMI prior to procedure including dietician consult, weight loss options and exercise program., Discussed surgery., Risks/benefits discussed with patient including, but not limited to: infection, bleeding, neurovascular damage, malunion, nonunion, aesthetic deformity, need for further surgery, and death. and Discussed with patient the implant type being used during surgery and patient understands and desires to  proceed.    Follow Up     Prn.       Patient was given instructions and counseling regarding her condition or for health maintenance advice. Please see specific information pulled into the AVS if appropriate.     Scribed for Lucian Eduardo MD by Lucy Wang.  11/05/21   10:01 EDT

## 2021-12-03 RX ORDER — RIVAROXABAN 20 MG/1
TABLET, FILM COATED ORAL
Qty: 90 TABLET | Refills: 2 | Status: SHIPPED | OUTPATIENT
Start: 2021-12-03 | End: 2022-08-18

## 2022-02-11 ENCOUNTER — LAB (OUTPATIENT)
Dept: LAB | Facility: HOSPITAL | Age: 62
End: 2022-02-11

## 2022-02-11 ENCOUNTER — TRANSCRIBE ORDERS (OUTPATIENT)
Dept: LAB | Facility: HOSPITAL | Age: 62
End: 2022-02-11

## 2022-02-11 DIAGNOSIS — M05.79 SEROPOSITIVE RHEUMATOID ARTHRITIS OF MULTIPLE SITES: ICD-10-CM

## 2022-02-11 DIAGNOSIS — Z79.899 ENCOUNTER FOR LONG-TERM (CURRENT) USE OF OTHER MEDICATIONS: ICD-10-CM

## 2022-02-11 DIAGNOSIS — Z79.899 ENCOUNTER FOR LONG-TERM (CURRENT) USE OF OTHER MEDICATIONS: Primary | ICD-10-CM

## 2022-02-11 LAB
ALBUMIN SERPL-MCNC: 4.1 G/DL (ref 3.5–5.2)
ALP SERPL-CCNC: 105 U/L (ref 39–117)
ALT SERPL W P-5'-P-CCNC: 10 U/L (ref 1–33)
AST SERPL-CCNC: 17 U/L (ref 1–32)
BASOPHILS # BLD AUTO: 0.02 10*3/MM3 (ref 0–0.2)
BASOPHILS NFR BLD AUTO: 0.3 % (ref 0–1.5)
BILIRUB CONJ SERPL-MCNC: <0.2 MG/DL (ref 0–0.3)
BILIRUB INDIRECT SERPL-MCNC: NORMAL MG/DL
BILIRUB SERPL-MCNC: 0.3 MG/DL (ref 0–1.2)
BUN SERPL-MCNC: 23 MG/DL (ref 8–23)
CLUMPED PLATELETS: PRESENT
CREAT SERPL-MCNC: 1.41 MG/DL (ref 0.57–1)
DEPRECATED RDW RBC AUTO: 41.5 FL (ref 37–54)
EOSINOPHIL # BLD AUTO: 0.16 10*3/MM3 (ref 0–0.4)
EOSINOPHIL NFR BLD AUTO: 2.1 % (ref 0.3–6.2)
ERYTHROCYTE [DISTWIDTH] IN BLOOD BY AUTOMATED COUNT: 12.7 % (ref 12.3–15.4)
GFR SERPL CREATININE-BSD FRML MDRD: 38 ML/MIN/1.73
HCT VFR BLD AUTO: 36.4 % (ref 34–46.6)
HGB BLD-MCNC: 11.7 G/DL (ref 12–15.9)
LYMPHOCYTES # BLD AUTO: 1.45 10*3/MM3 (ref 0.7–3.1)
LYMPHOCYTES NFR BLD AUTO: 18.9 % (ref 19.6–45.3)
MCH RBC QN AUTO: 29 PG (ref 26.6–33)
MCHC RBC AUTO-ENTMCNC: 32.1 G/DL (ref 31.5–35.7)
MCV RBC AUTO: 90.3 FL (ref 79–97)
MONOCYTES # BLD AUTO: 0.53 10*3/MM3 (ref 0.1–0.9)
MONOCYTES NFR BLD AUTO: 6.9 % (ref 5–12)
NEUTROPHILS NFR BLD AUTO: 5.49 10*3/MM3 (ref 1.7–7)
NEUTROPHILS NFR BLD AUTO: 71.5 % (ref 42.7–76)
PLATELET # BLD AUTO: 263 10*3/MM3 (ref 140–450)
PMV BLD AUTO: 10.7 FL (ref 6–12)
PROT SERPL-MCNC: 8.4 G/DL (ref 6–8.5)
RBC # BLD AUTO: 4.03 10*6/MM3 (ref 3.77–5.28)
RBC MORPH BLD: NORMAL
WBC MORPH BLD: NORMAL
WBC NRBC COR # BLD: 7.67 10*3/MM3 (ref 3.4–10.8)

## 2022-02-11 PROCEDURE — 80076 HEPATIC FUNCTION PANEL: CPT

## 2022-02-11 PROCEDURE — 85007 BL SMEAR W/DIFF WBC COUNT: CPT

## 2022-02-11 PROCEDURE — 82565 ASSAY OF CREATININE: CPT

## 2022-02-11 PROCEDURE — 36415 COLL VENOUS BLD VENIPUNCTURE: CPT

## 2022-02-11 PROCEDURE — 84520 ASSAY OF UREA NITROGEN: CPT

## 2022-02-11 PROCEDURE — 85025 COMPLETE CBC W/AUTO DIFF WBC: CPT

## 2022-03-01 ENCOUNTER — TELEPHONE (OUTPATIENT)
Dept: CARDIOLOGY | Facility: CLINIC | Age: 62
End: 2022-03-01

## 2022-03-01 NOTE — TELEPHONE ENCOUNTER
Jenny,     She needs to hold her Xarelto for 2 days prior to the tooth extraction.  Thanks     ALEXIS

## 2022-03-01 NOTE — TELEPHONE ENCOUNTER
Patient called to report she needs to have two teeth extracted.  Patient is currently taking Xarelto.  Please advise.  Patient's phone number is [phone]./ CÉSAR

## 2022-03-01 NOTE — TELEPHONE ENCOUNTER
Patient notified OK to hold Xarelto for two days prior to teeth extractions per Dr. Hebert.  Patient verbalized understanding./ CÉSAR

## 2022-03-10 RX ORDER — TORSEMIDE 20 MG/1
TABLET ORAL
Qty: 90 TABLET | Refills: 2 | Status: SHIPPED | OUTPATIENT
Start: 2022-03-10 | End: 2022-12-13

## 2022-06-06 ENCOUNTER — OFFICE VISIT (OUTPATIENT)
Dept: CARDIOLOGY | Facility: CLINIC | Age: 62
End: 2022-06-06

## 2022-06-06 VITALS
HEIGHT: 70 IN | OXYGEN SATURATION: 96 % | DIASTOLIC BLOOD PRESSURE: 60 MMHG | HEART RATE: 108 BPM | SYSTOLIC BLOOD PRESSURE: 84 MMHG | BODY MASS INDEX: 40.89 KG/M2 | WEIGHT: 285.6 LBS

## 2022-06-06 DIAGNOSIS — I51.7 RIGHT VENTRICULAR ENLARGEMENT: ICD-10-CM

## 2022-06-06 DIAGNOSIS — I48.11 LONGSTANDING PERSISTENT ATRIAL FIBRILLATION: Primary | ICD-10-CM

## 2022-06-06 DIAGNOSIS — G47.33 OSA (OBSTRUCTIVE SLEEP APNEA): ICD-10-CM

## 2022-06-06 DIAGNOSIS — I50.32 CHRONIC DIASTOLIC CONGESTIVE HEART FAILURE: ICD-10-CM

## 2022-06-06 DIAGNOSIS — E66.01 CLASS 3 SEVERE OBESITY DUE TO EXCESS CALORIES WITHOUT SERIOUS COMORBIDITY WITH BODY MASS INDEX (BMI) OF 45.0 TO 49.9 IN ADULT: ICD-10-CM

## 2022-06-06 DIAGNOSIS — I27.20 PULMONARY HYPERTENSION: ICD-10-CM

## 2022-06-06 DIAGNOSIS — M06.9 RHEUMATOID ARTHRITIS, INVOLVING UNSPECIFIED SITE, UNSPECIFIED WHETHER RHEUMATOID FACTOR PRESENT: ICD-10-CM

## 2022-06-06 DIAGNOSIS — I10 PRIMARY HYPERTENSION: ICD-10-CM

## 2022-06-06 PROCEDURE — 99214 OFFICE O/P EST MOD 30 MIN: CPT | Performed by: NURSE PRACTITIONER

## 2022-06-06 PROCEDURE — 93000 ELECTROCARDIOGRAM COMPLETE: CPT | Performed by: NURSE PRACTITIONER

## 2022-06-06 RX ORDER — SEMAGLUTIDE 1.34 MG/ML
INJECTION, SOLUTION SUBCUTANEOUS
COMMUNITY
Start: 2022-05-31 | End: 2022-08-30

## 2022-06-06 RX ORDER — CARVEDILOL 25 MG/1
25 TABLET ORAL 2 TIMES DAILY
Qty: 180 TABLET | Refills: 3 | Status: SHIPPED | OUTPATIENT
Start: 2022-06-06 | End: 2022-11-08 | Stop reason: HOSPADM

## 2022-06-06 NOTE — PROGRESS NOTES
"    CARDIOLOGY        Patient Name: Milagros Ramirez  :1960  Age: 61 y.o.  Primary Cardiologist: Duncan Hebert MD  Encounter Provider:  MARILYNN Alonzo    Date of Service: 21          CHIEF COMPLAINT / REASON FOR OFFICE VISIT     Atrial Fibrillation (6 month f/u)      HISTORY OF PRESENT ILLNESS       HPI  Milagros Ramirez is a 61 y.o. female who presents today for semiannual evaluation.     Pt has a  history significant for morbid obesity, atrial fibrillation, hypertension, diastolic heart failure, obesity, diabetes, systemic lupus, chronic kidney disease, AMOR.    Patient reports that she has noted some lightheadedness and dizziness for the past several weeks.  Her BP is low in clinic and patient states that she has noted this from time to time.  She does not monitor her BP regularly at home.  She has noted that at other physicians appointments her BP has been low.  She denies syncope.  She reports atypical chest pain that is similar to what patient has reported in the past. Breathing is controlled.      The following portions of the patient's history were reviewed and updated as appropriate: allergies, current medications, past family history, past medical history, past social history, past surgical history and problem list.      VITAL SIGNS     Visit Vitals  BP (!) 84/60 (BP Location: Right arm, Patient Position: Sitting, Cuff Size: Large Adult)   Pulse 108   Ht 177.8 cm (70\")   Wt 130 kg (285 lb 9.6 oz)   SpO2 96%   BMI 40.98 kg/m²         Wt Readings from Last 3 Encounters:   22 130 kg (285 lb 9.6 oz)   21 (!) 141 kg (310 lb)   10/12/21 (!) 141 kg (310 lb 6.4 oz)     Body mass index is 40.98 kg/m².      REVIEW OF SYSTEMS   Review of Systems   Constitutional: Positive for malaise/fatigue. Negative for chills, fever, weight gain and weight loss.   Cardiovascular: Negative for leg swelling.   Respiratory: Negative for cough, snoring and wheezing.    Hematologic/Lymphatic: Negative for " bleeding problem. Does not bruise/bleed easily.   Skin: Negative for color change.   Musculoskeletal: Negative for falls, joint pain and myalgias.   Gastrointestinal: Negative for melena.   Genitourinary: Negative for hematuria.   Neurological: Positive for light-headedness. Negative for excessive daytime sleepiness.   Psychiatric/Behavioral: Negative for depression. The patient is not nervous/anxious.            PHYSICAL EXAMINATION     Vitals and nursing note reviewed.   Constitutional:       Appearance: Normal appearance. Well-developed. Morbidly obese.   Eyes:      Conjunctiva/sclera: Conjunctivae normal.   Neck:      Vascular: No carotid bruit.   Pulmonary:      Breath sounds: Normal breath sounds.   Cardiovascular:      Tachycardia present. Irregularly irregular rhythm. Normal S1 with normal intensity. Normal S2 with normal intensity.      Murmurs: There is no murmur.      No gallop. No click. No rub.   Edema:     Peripheral edema absent.   Musculoskeletal: Normal range of motion. Skin:     General: Skin is warm and dry.   Neurological:      Mental Status: Alert and oriented to person, place, and time.      GCS: GCS eye subscore is 4. GCS verbal subscore is 5. GCS motor subscore is 6.   Psychiatric:         Speech: Speech normal.         Behavior: Behavior normal.         Thought Content: Thought content normal.         Judgment: Judgment normal.           REVIEWED DATA       ECG 12 Lead    Date/Time: 6/6/2022 11:41 AM  Performed by: Danica Bryan APRN  Authorized by: Danica Bryan APRN   Comparison: compared with previous ECG from 6/17/2020  Similar to previous ECG  Rhythm: atrial fibrillation  Rate: tachycardic  BPM: 108  Conduction: conduction normal  ST Segments: ST segments normal  T Waves: T waves normal  QRS axis: normal    Clinical impression: abnormal EKG            Cardiac Procedures:  1. Echocardiogram 6/29/2019.  EF 62%.  Diastolic function was indeterminate.  RV cavity severely dilated.   LA cavity severely dilated.  Saline test results are negative.  Mild to moderate tricuspid valve regurgitation.  2. Lexiscan Cardiolite stress test at [facility] on 6/5/2019: There was no ischemia or infarct.  The ejection fraction was 44% with mild global hypokinesis.  3. Myocardial perfusion PET stress testing 11/11/2020.  Normal myocardial perfusion study without evidence of ischemia.  LVEF 70%.  Impressions consistent with low risk study.          ASSESSMENT & PLAN      Diagnosis Plan   1. Longstanding persistent atrial fibrillation (HCC)     2. Chronic diastolic congestive heart failure ([facility])     3. Primary hypertension     4. Right ventricular enlargement     5. Pulmonary hypertension (HCC)     6. AMOR (obstructive sleep apnea)     7. Class 3 severe obesity due to excess calories without serious comorbidity with body mass index (BMI) of 45.0 to 49.9 in adult ([facility])     8. Rheumatoid arthritis, involving unspecified site, unspecified whether rheumatoid factor present ([facility])           SUMMARY/DISCUSSION  1. Persistent A. fib.  Heart rate uncontrolled on ECG today.  Patient also with hypotension however she is asymptomatic.  At this point I have recommended increasing carvedilol to 25 mg twice per day.  (Will discontinue lisinopril 40 mg/day) patient is anticoagulated with Xarelto.  Denies any bleeding complications.  2. Chronic diastolic heart failure.  Denies dyspnea, worsening dyspnea with exertion.  Appears euvolemic on exam.  Continue carvedilol, torsemide.  3. Hypertension.  Patient actually hypotensive with blood pressure 84/60.  Will stop lisinopril.  I do have to increase carvedilol secondary to uncontrolled atrial fibrillation.  Patient will notify the office for any episodes of lightheadedness, near syncope.  4. RV enlargement.  Denies dyspnea.  5. Pulmonary hypertension.  Denies dyspnea.  6. AMOR on CPAP  7. Obesity  8. Rheumatoid arthritis  9. Systemic lupus  Follow-up with me in 2 weeks.   Sooner for problems or complications.      MEDICATIONS         Discharge Medications          Accurate as of June 6, 2022 11:52 AM. If you have any questions, ask your nurse or doctor.            Continue These Medications      Instructions Start Date   albuterol sulfate  (90 Base) MCG/ACT inhaler  Commonly known as: PROVENTIL HFA;VENTOLIN HFA;PROAIR HFA   2 puffs, Inhalation, Every 4 Hours PRN      budesonide-formoterol 160-4.5 MCG/ACT inhaler  Commonly known as: SYMBICORT   2 puffs, Inhalation, 2 Times Daily - RT      carvedilol 12.5 MG tablet  Commonly known as: COREG   12.5 mg, Oral, 2 Times Daily      cyclobenzaprine 10 MG tablet  Commonly known as: FLEXERIL   10 mg, Oral, As Needed      GERITOL PO   Oral, Daily      GREEN TEA PO   500 mg, Oral, Daily      HYDROcodone-acetaminophen 7.5-325 MG per tablet  Commonly known as: NORCO   hydrocodone 7.5 mg-acetaminophen 325 mg tablet   TAKE 1 TABLET EVERY 12 HOURS BY ORAL ROUTE AS NEEDED FOR 30 DAYS.      hydroxychloroquine 200 MG tablet  Commonly known as: PLAQUENIL   Oral, Daily      lisinopril 40 MG tablet  Commonly known as: PRINIVIL,ZESTRIL   40 mg, Oral, Daily      MAGNESIUM PO   Oral, Daily      montelukast 10 MG tablet  Commonly known as: SINGULAIR   10 mg, Oral, Nightly      nitroglycerin 0.4 MG SL tablet  Commonly known as: NITROSTAT   TAKE 1 TABLET SUBLINGUALLY EVERY 5 MINS AS NEEDED FOR CHEST PAIN. AFTER THIRD TAB, [id number]      NP Thyroid 60 MG tablet  Generic drug: Thyroid   60 mg, Oral, Daily      Ozempic (0.25 or 0.5 MG/DOSE) 2 MG/1.5ML solution pen-injector  Generic drug: Semaglutide(0.25 or 0.5MG/DOS)   INJECT 0.5 MG SUBCUTANEOUSLY WEEKLY      POTASSIUM PO   Oral, Daily      rosuvastatin 10 MG tablet  Commonly known as: CRESTOR   10 mg, Oral, Daily      torsemide 20 MG tablet  Commonly known as: DEMADEX   TAKE 1 TABLET BY MOUTH EVERY DAY      TURMERIC CURCUMIN PO   Oral, Daily      Xarelto 20 MG tablet  Generic drug: rivaroxaban   TAKE 1 TABLET  BY MOUTH EVERY DAY         Stop These Medications    Humira Pen 40 MG/0.4ML Pen-injector Kit  Generic drug: Adalimumab  Stopped by: MARILYNN Alonzo     Victoza 18 MG/3ML solution pen-injector injection  Generic drug: Liraglutide  Stopped by: MARILYNN Alonzo                **Dragon Disclaimer:   Much of this encounter note is an electronic transcription/translation of spoken language to printed text. The electronic translation of spoken language may permit erroneous, or at times, nonsensical words or phrases to be inadvertently transcribed. Although I have reviewed the note for such errors, some may still exist.

## 2022-06-22 ENCOUNTER — OFFICE VISIT (OUTPATIENT)
Dept: CARDIOLOGY | Facility: CLINIC | Age: 62
End: 2022-06-22

## 2022-06-22 VITALS
SYSTOLIC BLOOD PRESSURE: 116 MMHG | HEART RATE: 80 BPM | HEIGHT: 70 IN | DIASTOLIC BLOOD PRESSURE: 74 MMHG | OXYGEN SATURATION: 96 % | WEIGHT: 283 LBS | BODY MASS INDEX: 40.52 KG/M2

## 2022-06-22 DIAGNOSIS — I27.20 PULMONARY HYPERTENSION: ICD-10-CM

## 2022-06-22 DIAGNOSIS — G47.33 OSA (OBSTRUCTIVE SLEEP APNEA): ICD-10-CM

## 2022-06-22 DIAGNOSIS — M06.9 RHEUMATOID ARTHRITIS, INVOLVING UNSPECIFIED SITE, UNSPECIFIED WHETHER RHEUMATOID FACTOR PRESENT: ICD-10-CM

## 2022-06-22 DIAGNOSIS — I51.7 RIGHT VENTRICULAR ENLARGEMENT: ICD-10-CM

## 2022-06-22 DIAGNOSIS — I48.11 LONGSTANDING PERSISTENT ATRIAL FIBRILLATION: Primary | ICD-10-CM

## 2022-06-22 DIAGNOSIS — I10 PRIMARY HYPERTENSION: ICD-10-CM

## 2022-06-22 DIAGNOSIS — E66.01 CLASS 3 SEVERE OBESITY DUE TO EXCESS CALORIES WITHOUT SERIOUS COMORBIDITY WITH BODY MASS INDEX (BMI) OF 45.0 TO 49.9 IN ADULT: ICD-10-CM

## 2022-06-22 DIAGNOSIS — I50.32 CHRONIC DIASTOLIC CONGESTIVE HEART FAILURE: ICD-10-CM

## 2022-06-22 PROCEDURE — 99214 OFFICE O/P EST MOD 30 MIN: CPT | Performed by: NURSE PRACTITIONER

## 2022-06-22 NOTE — PROGRESS NOTES
"    CARDIOLOGY        Patient Name: Milagros Ramirez  :1960  Age: 61 y.o.  Primary Cardiologist: Duncan Hebert MD  Encounter Provider:  MARILYNN Alonzo    Date of Service: 21          CHIEF COMPLAINT / REASON FOR OFFICE VISIT     Atrial Fibrillation (2 wk f/u)      HISTORY OF PRESENT ILLNESS       Atrial Fibrillation  Past medical history includes atrial fibrillation.     Milagros Ramirez is a 61 y.o. female who presents today for reevaluation after medication changes.    Pt has a  history significant for morbid obesity, atrial fibrillation, hypertension, diastolic heart failure, obesity, diabetes, systemic lupus, chronic kidney disease, AMOR.    Patient was evaluated by me on 2022.  At that time her heart rate was elevated and she was found to be hypotensive.  Her lisinopril was discontinued so that carvedilol could be uptitrated to 25 mg twice per day.    Patient states that she has well with the current medication regimen.  She has been keeping a close eye on her blood pressure where it is averaging in the low 100s systolic.  She has not noted any further episodes of tachycardia.  She does note some fleeting chest pains that are very short-lived.  Otherwise denies shortness of breath, dyspnea with exertion, worsening fatigue, edema.    The following portions of the patient's history were reviewed and updated as appropriate: allergies, current medications, past family history, past medical history, past social history, past surgical history and problem list.      VITAL SIGNS     Visit Vitals  /74 (BP Location: Left arm, Patient Position: Sitting, Cuff Size: Large Adult)   Pulse 80   Ht 177.8 cm (70\")   Wt 128 kg (283 lb)   SpO2 96%   BMI 40.61 kg/m²         Wt Readings from Last 3 Encounters:   22 128 kg (283 lb)   22 130 kg (285 lb 9.6 oz)   21 (!) 141 kg (310 lb)     Body mass index is 40.61 kg/m².      REVIEW OF SYSTEMS   Review of Systems   Constitutional: Positive " for malaise/fatigue. Negative for chills, fever, weight gain and weight loss.   Cardiovascular: Negative for leg swelling.   Respiratory: Negative for cough, snoring and wheezing.    Hematologic/Lymphatic: Negative for bleeding problem. Does not bruise/bleed easily.   Skin: Negative for color change.   Musculoskeletal: Negative for falls, joint pain and myalgias.   Gastrointestinal: Negative for melena.   Genitourinary: Negative for hematuria.   Neurological: Negative for excessive daytime sleepiness and light-headedness.   Psychiatric/Behavioral: Negative for depression. The patient is not nervous/anxious.            PHYSICAL EXAMINATION     Vitals and nursing note reviewed.   Constitutional:       Appearance: Normal appearance. Well-developed. Morbidly obese.   Eyes:      Conjunctiva/sclera: Conjunctivae normal.   Neck:      Vascular: No carotid bruit.   Pulmonary:      Breath sounds: Normal breath sounds.   Cardiovascular:      Irregularly irregular rhythm. Normal S1 with normal intensity. Normal S2 with normal intensity.      Murmurs: There is no murmur.      No gallop. No click. No rub.   Edema:     Peripheral edema absent.   Musculoskeletal: Normal range of motion. Skin:     General: Skin is warm and dry.   Neurological:      Mental Status: Alert and oriented to person, place, and time.      GCS: GCS eye subscore is 4. GCS verbal subscore is 5. GCS motor subscore is 6.   Psychiatric:         Speech: Speech normal.         Behavior: Behavior normal.         Thought Content: Thought content normal.         Judgment: Judgment normal.           REVIEWED DATA     Procedures    Cardiac Procedures:  1. Echocardiogram 6/29/2019.  EF 62%.  Diastolic function was indeterminate.  RV cavity severely dilated.  LA cavity severely dilated.  Saline test results are negative.  Mild to moderate tricuspid valve regurgitation.  2. Lexiscan Cardiolite stress test at [facility] on 6/5/2019: There was no ischemia or  infarct.  The ejection fraction was 44% with mild global hypokinesis.  3. Myocardial perfusion PET stress testing 11/11/2020.  Normal myocardial perfusion study without evidence of ischemia.  LVEF 70%.  Impressions consistent with low risk study.          ASSESSMENT & PLAN      Diagnosis Plan   1. Longstanding persistent atrial fibrillation (HCC)     2. Chronic diastolic congestive heart failure ([facility])     3. Primary hypertension     4. Right ventricular enlargement     5. Pulmonary hypertension ([facility])     6. AMOR (obstructive sleep apnea)     7. Class 3 severe obesity due to excess calories without serious comorbidity with body mass index (BMI) of 45.0 to 49.9 in adult ([facility])     8. Rheumatoid arthritis, involving unspecified site, unspecified whether rheumatoid factor present ([facility])           SUMMARY/DISCUSSION  1. Persistent A. fib.  Patient heart rate much more controlled with increased dose of carvedilol.  She will continue carvedilol 25 mg twice daily.  She is anticoagulated with Xarelto 20 mg/day and denies bleeding complications.  2. Chronic diastolic heart failure.  Denies dyspnea, worsening dyspnea with exertion.  Appears euvolemic on exam.  Continue carvedilol, torsemide.  3. Hypertension.  Blood pressure more controlled in clinic at 116/74.  Continue carvedilol 25 mg twice daily and torsemide 20 mg/day.  4. RV enlargement.  Denies dyspnea.  5. Pulmonary hypertension.  Denies dyspnea.  6. AMOR on CPAP  7. Obesity  8. Rheumatoid arthritis  9. Systemic lupus.  10. Follow-up with Dr. Hebert in 2 months for reevaluation.      MEDICATIONS         Discharge Medications          Accurate as of June 22, 2022  3:10 PM. If you have any questions, ask your nurse or doctor.            Continue These Medications      Instructions Start Date   albuterol sulfate  (90 Base) MCG/ACT inhaler  Commonly known as: PROVENTIL HFA;VENTOLIN HFA;PROAIR HFA   2 puffs, Inhalation, Every 4 Hours PRN      budesonide-formoterol  160-4.5 MCG/ACT inhaler  Commonly known as: SYMBICORT   2 puffs, Inhalation, 2 Times Daily - RT      carvedilol 25 MG tablet  Commonly known as: COREG   25 mg, Oral, 2 Times Daily      cyclobenzaprine 10 MG tablet  Commonly known as: FLEXERIL   10 mg, Oral, As Needed      GERITOL PO   Oral, Daily      GREEN TEA PO   500 mg, Oral, Daily      HYDROcodone-acetaminophen 7.5-325 MG per tablet  Commonly known as: NORCO   hydrocodone 7.5 mg-acetaminophen 325 mg tablet   TAKE 1 TABLET EVERY 12 HOURS BY ORAL ROUTE AS NEEDED FOR 30 DAYS.      hydroxychloroquine 200 MG tablet  Commonly known as: PLAQUENIL   Oral, Daily      MAGNESIUM PO   Oral, Daily      montelukast 10 MG tablet  Commonly known as: SINGULAIR   10 mg, Oral, Nightly      nitroglycerin 0.4 MG SL tablet  Commonly known as: NITROSTAT   TAKE 1 TABLET SUBLINGUALLY EVERY 5 MINS AS NEEDED FOR CHEST PAIN. AFTER THIRD TAB, [id number]      NP Thyroid 60 MG tablet  Generic drug: Thyroid   60 mg, Oral, Daily      Ozempic (0.25 or 0.5 MG/DOSE) 2 MG/1.5ML solution pen-injector  Generic drug: Semaglutide(0.25 or 0.5MG/DOS)   INJECT 0.5 MG SUBCUTANEOUSLY WEEKLY      POTASSIUM PO   Oral, Daily      rosuvastatin 10 MG tablet  Commonly known as: CRESTOR   10 mg, Oral, Daily      torsemide 20 MG tablet  Commonly known as: DEMADEX   TAKE 1 TABLET BY MOUTH EVERY DAY      TURMERIC CURCUMIN PO   Oral, Daily      Xarelto 20 MG tablet  Generic drug: rivaroxaban   TAKE 1 TABLET BY MOUTH EVERY DAY                 **Dragon Disclaimer:   Much of this encounter note is an electronic transcription/translation of spoken language to printed text. The electronic translation of spoken language may permit erroneous, or at times, nonsensical words or phrases to be inadvertently transcribed. Although I have reviewed the note for such errors, some may still exist.

## 2022-08-18 RX ORDER — RIVAROXABAN 20 MG/1
TABLET, FILM COATED ORAL
Qty: 90 TABLET | Refills: 0 | Status: SHIPPED | OUTPATIENT
Start: 2022-08-18 | End: 2022-11-08 | Stop reason: HOSPADM

## 2022-08-23 ENCOUNTER — TELEPHONE (OUTPATIENT)
Dept: CARDIOLOGY | Facility: CLINIC | Age: 62
End: 2022-08-23

## 2022-08-23 NOTE — TELEPHONE ENCOUNTER
08/23/22   Pt called  She is wanting to switch Xarelto to something else that cost cheaper. Her medicare  Insurance its now $400 and she is in doughnut hole.  She is going to check on Pradaxa, Eliquis and warfarin with her insurance company.   She will call back and let me know   I have given her 4 bottles of Xarelto 20 mg  LOT # [id number] exp 2/2025.   She will  at .   Michael monsalve

## 2022-08-30 ENCOUNTER — OFFICE VISIT (OUTPATIENT)
Dept: CARDIOLOGY | Facility: CLINIC | Age: 62
End: 2022-08-30

## 2022-08-30 VITALS
DIASTOLIC BLOOD PRESSURE: 60 MMHG | OXYGEN SATURATION: 97 % | WEIGHT: 280 LBS | HEART RATE: 105 BPM | HEIGHT: 70 IN | BODY MASS INDEX: 40.09 KG/M2 | SYSTOLIC BLOOD PRESSURE: 100 MMHG

## 2022-08-30 DIAGNOSIS — M06.9 RHEUMATOID ARTHRITIS, INVOLVING UNSPECIFIED SITE, UNSPECIFIED WHETHER RHEUMATOID FACTOR PRESENT: ICD-10-CM

## 2022-08-30 DIAGNOSIS — G47.33 OSA ON CPAP: ICD-10-CM

## 2022-08-30 DIAGNOSIS — I50.812 CHRONIC RIGHT-SIDED HEART FAILURE: ICD-10-CM

## 2022-08-30 DIAGNOSIS — Z99.89 OSA ON CPAP: ICD-10-CM

## 2022-08-30 DIAGNOSIS — I48.0 PAROXYSMAL ATRIAL FIBRILLATION: ICD-10-CM

## 2022-08-30 DIAGNOSIS — E66.2 EXTREME OBESITY WITH ALVEOLAR HYPOVENTILATION: ICD-10-CM

## 2022-08-30 DIAGNOSIS — N18.31 STAGE 3A CHRONIC KIDNEY DISEASE: ICD-10-CM

## 2022-08-30 DIAGNOSIS — I10 ESSENTIAL (PRIMARY) HYPERTENSION: ICD-10-CM

## 2022-08-30 DIAGNOSIS — E11.9 TYPE 2 DIABETES MELLITUS WITHOUT COMPLICATION, UNSPECIFIED WHETHER LONG TERM INSULIN USE: ICD-10-CM

## 2022-08-30 DIAGNOSIS — I50.32 CHRONIC DIASTOLIC CONGESTIVE HEART FAILURE: Primary | ICD-10-CM

## 2022-08-30 PROCEDURE — 93000 ELECTROCARDIOGRAM COMPLETE: CPT | Performed by: NURSE PRACTITIONER

## 2022-08-30 PROCEDURE — 99213 OFFICE O/P EST LOW 20 MIN: CPT | Performed by: NURSE PRACTITIONER

## 2022-08-30 NOTE — PROGRESS NOTES
[facility] CARDIOLOGY  [address]  [address]  [facility] [address]  Phone: [phone]      Patient Name: Milagros Ramirez  :1960  Age: 62 y.o.  Primary Cardiologist: Duncan Hebert MD  Encounter Provider:  MARILYNN Ramirez      Chief Complaint     Chief Complaint: Follow-up and Congestive Heart Failure      SUBJECTIVE     History of Present Illness:  Milagros Ramirez is a 62 y.o.  female whose medical history includes chronic hypoxia and hypercapnic respiratory failure,, AMOR/CPAP obesity hypoventilation syndrome, lupus, rheumatoid arthritis, and CKD.  She is followed in our office by Dr. Hebert for persistent atrial fibrillation, chronic diastolic CHF, and right ventricular dilatation.     22 Follow-up:  She is here for 2-month follow-up and I am seeing her for the first time today.  She was seen in 2022 by Yesenia Bryan and lisinopril stopped due to hypotension. Her BP at home has been 120-130/70 with HR ; she is frequently dizzy but has had no falls. She has daily episodes of the feeling of her heart racing which last a few minutes; these occur at rest and she feels them on the left side of her chest. She has been taking 40 mg torsemide daily for increased leg swelling and weight gain. She denies worsening dyspnea with exertion and no orthopnea; she is compliant with CPAP and does not require oxygen. She is having worsening bilateral knee, shoulder, and wrist pain; she is seeing pain management. She is considering surgery but is trying to lose weight and wants to improve her financial situation.     Below is a summary of pertinent cardiology findings:  • Acute heart failure and new onset atrial fibrillation - 2019 anticoagulated with apixaban.  Echocardiogram showed EF 55% but a technically difficult study.  Myocardial perfusion stress study showed no ischemia.  • 2019 echocardiogram showed EF 62%, severe biatrial enlargement,  severely dilated right ventricle with moderate reduction in function, mild to moderate tricuspid insufficiency, and RVSP elevated at 67 mmHg.  CT chest without contrast showed enlarged pulmonary arteries suggestive of pulmonary hypertension.  • 8/7/2020 echocardiogram was a technically difficult study performed at [facility].  It showed normal mitral, aortic, tricuspid valves.  EF was 55% with no focal wall abnormalities, moderate left atrial enlargement.  • 11/11/2020 myocardial perfusion stress study showed no evidence of ischemia and EF 70%.    Past Medical History:   Diagnosis Date   • Atrial fibrillation (HCC)    • CKD (chronic kidney disease)    • Diabetes (HCC)    • Diastolic CHF (HCC)    • Hyperlipidemia    • Hypertension    • Hypothyroidism    • Morbid obesity (HCC)    • AMOR (obstructive sleep apnea)    • Rheumatoid arthritis (HCC)    • SLE (systemic lupus erythematosus) (HCC)        Past Surgical History:  • Appendectomy  • Carpal tunnel release  • Endometrial ablation,  • Heel spur excision  • Hernia repair  • Partial thyroidectomy    Social History     Socioeconomic History   • Marital status:    Tobacco Use   • Smoking status: Former Smoker   • Smokeless tobacco: Never Used   • Tobacco comment: Quit around age 36   Vaping Use   • Vaping Use: Never used   Substance and Sexual Activity   • Alcohol use: Yes     Comment: rare   • Drug use: No         Review of Systems     Review of Systems   Constitutional: Positive for weight gain.   Cardiovascular: Positive for chest pain, dyspnea on exertion, leg swelling and palpitations. Negative for irregular heartbeat, near-syncope, orthopnea, paroxysmal nocturnal dyspnea and syncope.   Musculoskeletal: Positive for arthritis.   Neurological: Positive for dizziness.       Medications     Allergies as of 08/30/2022 - Reviewed 08/30/2022   Allergen Reaction Noted   • Other Other (See Comments) 01/17/2020   • Other Irritability 01/17/2020   •  "Rituximab Unknown - High Severity 11/01/2021       Current Outpatient Medications on File Prior to Visit   Medication Sig   • albuterol sulfate  (90 Base) MCG/ACT inhaler Inhale 2 puffs Every 4 (Four) Hours As Needed for Wheezing.   • budesonide-formoterol (SYMBICORT) 160-4.5 MCG/ACT inhaler Inhale 2 puffs 2 (Two) Times a Day.   • carvedilol (COREG) 25 MG tablet Take 1 tablet by mouth 2 (Two) Times a Day.   • cyclobenzaprine (FLEXERIL) 10 MG tablet Take 10 mg by mouth As Needed.   • Green Tea, Sabrina sinensis, (GREEN TEA PO) Take 500 mg by mouth Daily.   • HYDROcodone-acetaminophen (NORCO) 7.5-325 MG per tablet hydrocodone 7.5 mg-acetaminophen 325 mg tablet   TAKE 1 TABLET EVERY 12 HOURS BY ORAL ROUTE AS NEEDED FOR 30 DAYS.   • hydroxychloroquine (PLAQUENIL) 200 MG tablet Take  by mouth Daily.   • Iron-Vitamins (GERITOL PO) Take  by mouth Daily.   • MAGNESIUM PO Take  by mouth Daily.   • montelukast (SINGULAIR) 10 MG tablet Take 10 mg by mouth Every Night.   • nitroglycerin (NITROSTAT) 0.4 MG SL tablet TAKE 1 TABLET SUBLINGUALLY EVERY 5 MINS AS NEEDED FOR CHEST PAIN. AFTER THIRD TAB, [id number]   • POTASSIUM PO Take  by mouth Daily.   • rosuvastatin (CRESTOR) 10 MG tablet Take 10 mg by mouth Daily.   • torsemide (DEMADEX) 20 MG tablet TAKE 1 TABLET BY MOUTH EVERY DAY   • TURMERIC CURCUMIN PO Take  by mouth Daily.   • Xarelto 20 MG tablet TAKE 1 TABLET BY MOUTH EVERY DAY   • [DISCONTINUED] NP THYROID 60 MG tablet Take 60 mg by mouth Daily.   • [DISCONTINUED] Ozempic, 0.25 or 0.5 MG/DOSE, 2 MG/1.5ML solution pen-injector INJECT 0.5 MG SUBCUTANEOUSLY WEEKLY     No current facility-administered medications on file prior to visit.          OBJECTIVE     Vital Signs:   /60   Pulse 105   Ht 177.8 cm (70\")   Wt 127 kg (280 lb)   SpO2 97%   BMI 40.18 kg/m²       Weight:  Wt Readings from Last 3 Encounters:   08/30/22 127 kg (280 lb)   06/22/22 128 kg (283 lb)   06/06/22 130 kg (285 lb 9.6 oz)     Body mass " index is 40.18 kg/m².      Physical Exam     Physical Exam  Constitutional:       General: She is not in acute distress.  HENT:      Head: Normocephalic and atraumatic.      Mouth/Throat:      Mouth: Mucous membranes are moist.   Eyes:      General: No scleral icterus.     Extraocular Movements: Extraocular movements intact.      Conjunctiva/sclera: Conjunctivae normal.      Pupils: Pupils are equal, round, and reactive to light.   Cardiovascular:      Rate and Rhythm: Normal rate. Rhythm irregularly irregular.      Pulses: Normal pulses.      Heart sounds: S1 normal and S2 normal.   Pulmonary:      Effort: No respiratory distress.      Breath sounds: Examination of the right-lower field reveals decreased breath sounds. Examination of the left-lower field reveals decreased breath sounds. Decreased breath sounds present. No wheezing, rhonchi or rales.   Abdominal:      General: Bowel sounds are normal. There is no distension.      Palpations: Abdomen is soft.      Tenderness: There is no abdominal tenderness.   Musculoskeletal:         General: Normal range of motion.      Cervical back: Normal range of motion and neck supple.      Right lower leg: No edema.      Left lower leg: No edema.   Skin:     General: Skin is warm and dry.      Coloration: Skin is not jaundiced.   Neurological:      Mental Status: She is alert and oriented to person, place, and time.   Psychiatric:         Mood and Affect: Mood normal.         Reviewed Data     Result Review :  The following data was reviewed by MARILYNN Ramirez on 08/30/22:  • Labs 02/11/2022:  cr 1.4, Hgb 11.7, Plt 263      ECG 12 Lead    Date/Time: 8/30/2022 12:09 PM  Performed by: Agueda King APRN  Authorized by: Agueda King APRN   Comparison: compared with previous ECG from 6/6/2022  Similar to previous ECG  Rhythm: atrial fibrillation  Rate: normal  BPM: 91    Clinical impression: abnormal EKG            Assessment and Plan         Assessment and Plan     Assessment:  1. Chronic diastolic congestive heart failure (HCC)    2. Chronic right-sided heart failure (HCC)    3. Paroxysmal atrial fibrillation (HCC)    4. Essential (primary) hypertension    5. Type 2 diabetes mellitus without complication, unspecified whether long term insulin use (HCC)    6. Stage 3a chronic kidney disease (HCC)    7. Rheumatoid arthritis, involving unspecified site, unspecified whether rheumatoid factor present (HCC)    8. Extreme obesity with alveolar hypoventilation (HCC)    9. AMOR on CPAP         1. Chronic diastolic heart failure:  Admitted June 2019 with signficant volume overload; EF 55% but echocardiogram was technically difficult. Her last echocardiogram showed EF 55% and no significant valvular disease. She is compensated on 40 mg torsemide daily.   2. Right-sided heart failure:  Echocardiogram June 2019 showed severely dilated RV with moderate reduction in function and moderate to severe pulmonary hypertension. She is compensated by exam today.  3. Atrial Fibrillation:  Diagnosed in June 2019; she is in Afib today. Her HR has been difficult to control due to hypotension. She is on 25 mg carvedilol BID with better HR control this visit compared to previous. She is anticoagulated with rivaroxaban.   4. Hypertension:  She has had issues with hypotension but reports better control of late.   5. Type II diabetes:  No recent hgb A1c to review. She is having trouble affording Trulicity.  6. Stage 3 CKD:  no recent labs but at baseline at last check.  7. Rheumatoid arthritis:  No recent flares. She is following with pain management for joint pain.  8. Obesity hypoventilation: she is compliant with CPAP and no longer requires regular oxygen.  9. AMOR: compliant with CPAP.    Plan:  1. I made no medication changes.  2. She declines echocardiogram or monitor testing at this time due to cost.   3. She wishes to transfer her care to Dr. Quick, who sees her brother;  she will see him in 3 months.      Follow Up:  Return in about 3 months (around 11/30/2022) for Follow up with Dr. Quick; former [facility] patient.  Orders Placed This Encounter   Procedures   • ECG 12 Lead      No orders of the defined types were placed in this encounter.        Thank you the opportunity to participate in this patient's care.    MARILYNN Perez    This office note has been dictated.

## 2022-09-29 PROCEDURE — 36430 TRANSFUSION BLD/BLD COMPNT: CPT

## 2022-09-29 PROCEDURE — P9016 RBC LEUKOCYTES REDUCED: HCPCS

## 2022-10-05 RX ORDER — DABIGATRAN ETEXILATE 150 MG/1
150 CAPSULE ORAL 2 TIMES DAILY
Qty: 60 CAPSULE | Refills: 11 | Status: SHIPPED | OUTPATIENT
Start: 2022-10-05 | End: 2022-10-16 | Stop reason: HOSPADM

## 2022-10-11 ENCOUNTER — APPOINTMENT (OUTPATIENT)
Dept: GENERAL RADIOLOGY | Facility: HOSPITAL | Age: 62
End: 2022-10-11

## 2022-10-11 ENCOUNTER — HOSPITAL ENCOUNTER (INPATIENT)
Facility: HOSPITAL | Age: 62
LOS: 5 days | Discharge: HOME | End: 2022-10-16
Attending: EMERGENCY MEDICINE | Admitting: INTERNAL MEDICINE

## 2022-10-11 DIAGNOSIS — K92.1 MELENA: ICD-10-CM

## 2022-10-11 DIAGNOSIS — D64.9 ANEMIA, UNSPECIFIED TYPE: ICD-10-CM

## 2022-10-11 DIAGNOSIS — K92.2 GASTROINTESTINAL HEMORRHAGE, UNSPECIFIED GASTROINTESTINAL HEMORRHAGE TYPE: Primary | ICD-10-CM

## 2022-10-11 DIAGNOSIS — I48.91 ATRIAL FIBRILLATION, UNSPECIFIED TYPE: ICD-10-CM

## 2022-10-11 LAB
ABO GROUP BLD: NORMAL
ABO GROUP BLD: NORMAL
ALBUMIN SERPL-MCNC: 3.2 G/DL (ref 3.5–5.2)
ALBUMIN/GLOB SERPL: 0.8 G/DL
ALP SERPL-CCNC: 121 U/L (ref 39–117)
ALT SERPL W P-5'-P-CCNC: 5 U/L (ref 1–33)
ANION GAP SERPL CALCULATED.3IONS-SCNC: 11.1 MMOL/L (ref 5–15)
APTT PPP: 62.7 SECONDS (ref 24.2–34.2)
AST SERPL-CCNC: 8 U/L (ref 1–32)
BASOPHILS # BLD AUTO: 0.02 10*3/MM3 (ref 0–0.2)
BASOPHILS NFR BLD AUTO: 0.3 % (ref 0–1.5)
BILIRUB SERPL-MCNC: 0.3 MG/DL (ref 0–1.2)
BLD GP AB SCN SERPL QL: NEGATIVE
BUN SERPL-MCNC: 40 MG/DL (ref 8–23)
BUN/CREAT SERPL: 30.3 (ref 7–25)
CALCIUM SPEC-SCNC: 8.4 MG/DL (ref 8.6–10.5)
CHLORIDE SERPL-SCNC: 99 MMOL/L (ref 98–107)
CO2 SERPL-SCNC: 25.9 MMOL/L (ref 22–29)
CREAT SERPL-MCNC: 1.32 MG/DL (ref 0.57–1)
D-LACTATE SERPL-SCNC: 1.8 MMOL/L (ref 0.5–2)
DEPRECATED RDW RBC AUTO: 51.2 FL (ref 37–54)
EGFRCR SERPLBLD CKD-EPI 2021: 46 ML/MIN/1.73
EOSINOPHIL # BLD AUTO: 0.09 10*3/MM3 (ref 0–0.4)
EOSINOPHIL NFR BLD AUTO: 1.4 % (ref 0.3–6.2)
ERYTHROCYTE [DISTWIDTH] IN BLOOD BY AUTOMATED COUNT: 18.1 % (ref 12.3–15.4)
GLOBULIN UR ELPH-MCNC: 4.2 GM/DL
GLUCOSE SERPL-MCNC: 127 MG/DL (ref 65–99)
HCT VFR BLD AUTO: 13.5 % (ref 34–46.6)
HGB BLD-MCNC: 3.7 G/DL (ref 12–15.9)
HOLD SPECIMEN: NORMAL
HOLD SPECIMEN: NORMAL
IMM GRANULOCYTES # BLD AUTO: 0.02 10*3/MM3 (ref 0–0.05)
IMM GRANULOCYTES NFR BLD AUTO: 0.3 % (ref 0–0.5)
INR PPP: 2.49 (ref 0.86–1.15)
LYMPHOCYTES # BLD AUTO: 1.23 10*3/MM3 (ref 0.7–3.1)
LYMPHOCYTES NFR BLD AUTO: 19 % (ref 19.6–45.3)
MCH RBC QN AUTO: 21.6 PG (ref 26.6–33)
MCHC RBC AUTO-ENTMCNC: 27.4 G/DL (ref 31.5–35.7)
MCV RBC AUTO: 78.9 FL (ref 79–97)
MONOCYTES # BLD AUTO: 0.47 10*3/MM3 (ref 0.1–0.9)
MONOCYTES NFR BLD AUTO: 7.2 % (ref 5–12)
NEUTROPHILS NFR BLD AUTO: 4.66 10*3/MM3 (ref 1.7–7)
NEUTROPHILS NFR BLD AUTO: 71.8 % (ref 42.7–76)
NRBC BLD AUTO-RTO: 0.8 /100 WBC (ref 0–0.2)
NT-PROBNP SERPL-MCNC: 4413 PG/ML (ref 0–900)
PLATELET # BLD AUTO: 416 10*3/MM3 (ref 140–450)
PMV BLD AUTO: 9.4 FL (ref 6–12)
POTASSIUM SERPL-SCNC: 4.1 MMOL/L (ref 3.5–5.2)
PROT SERPL-MCNC: 7.4 G/DL (ref 6–8.5)
PROTHROMBIN TIME: 27.4 SECONDS (ref 11.8–14.9)
QT INTERVAL: 353 MS
RBC # BLD AUTO: 1.71 10*6/MM3 (ref 3.77–5.28)
RH BLD: POSITIVE
RH BLD: POSITIVE
SODIUM SERPL-SCNC: 136 MMOL/L (ref 136–145)
T&S EXPIRATION DATE: NORMAL
TROPONIN T SERPL-MCNC: <0.01 NG/ML (ref 0–0.03)
WBC NRBC COR # BLD: 6.49 10*3/MM3 (ref 3.4–10.8)
WHOLE BLOOD HOLD COAG: NORMAL
WHOLE BLOOD HOLD SPECIMEN: NORMAL

## 2022-10-11 PROCEDURE — 25010000002 MORPHINE PER 10 MG: Performed by: INTERNAL MEDICINE

## 2022-10-11 PROCEDURE — 36430 TRANSFUSION BLD/BLD COMPNT: CPT

## 2022-10-11 PROCEDURE — 99284 EMERGENCY DEPT VISIT MOD MDM: CPT

## 2022-10-11 PROCEDURE — 85025 COMPLETE CBC W/AUTO DIFF WBC: CPT | Performed by: EMERGENCY MEDICINE

## 2022-10-11 PROCEDURE — 36415 COLL VENOUS BLD VENIPUNCTURE: CPT

## 2022-10-11 PROCEDURE — 85730 THROMBOPLASTIN TIME PARTIAL: CPT | Performed by: EMERGENCY MEDICINE

## 2022-10-11 PROCEDURE — 93010 ELECTROCARDIOGRAM REPORT: CPT | Performed by: INTERNAL MEDICINE

## 2022-10-11 PROCEDURE — 85610 PROTHROMBIN TIME: CPT | Performed by: EMERGENCY MEDICINE

## 2022-10-11 PROCEDURE — 86901 BLOOD TYPING SEROLOGIC RH(D): CPT

## 2022-10-11 PROCEDURE — 80053 COMPREHEN METABOLIC PANEL: CPT | Performed by: EMERGENCY MEDICINE

## 2022-10-11 PROCEDURE — P9040 RBC LEUKOREDUCED IRRADIATED: HCPCS

## 2022-10-11 PROCEDURE — 83880 ASSAY OF NATRIURETIC PEPTIDE: CPT | Performed by: EMERGENCY MEDICINE

## 2022-10-11 PROCEDURE — 93005 ELECTROCARDIOGRAM TRACING: CPT | Performed by: EMERGENCY MEDICINE

## 2022-10-11 PROCEDURE — 86900 BLOOD TYPING SEROLOGIC ABO: CPT

## 2022-10-11 PROCEDURE — P9016 RBC LEUKOCYTES REDUCED: HCPCS

## 2022-10-11 PROCEDURE — 86901 BLOOD TYPING SEROLOGIC RH(D): CPT | Performed by: EMERGENCY MEDICINE

## 2022-10-11 PROCEDURE — 99223 1ST HOSP IP/OBS HIGH 75: CPT | Performed by: INTERNAL MEDICINE

## 2022-10-11 PROCEDURE — 25010000002 FUROSEMIDE PER 20 MG: Performed by: EMERGENCY MEDICINE

## 2022-10-11 PROCEDURE — 84484 ASSAY OF TROPONIN QUANT: CPT | Performed by: EMERGENCY MEDICINE

## 2022-10-11 PROCEDURE — 71045 X-RAY EXAM CHEST 1 VIEW: CPT

## 2022-10-11 PROCEDURE — 93005 ELECTROCARDIOGRAM TRACING: CPT

## 2022-10-11 PROCEDURE — 25010000002 PROTHROMBIN COMPLEX CONC HUMAN 500 UNITS KIT: Performed by: EMERGENCY MEDICINE

## 2022-10-11 PROCEDURE — 83605 ASSAY OF LACTIC ACID: CPT | Performed by: EMERGENCY MEDICINE

## 2022-10-11 PROCEDURE — 86850 RBC ANTIBODY SCREEN: CPT | Performed by: EMERGENCY MEDICINE

## 2022-10-11 PROCEDURE — 86900 BLOOD TYPING SEROLOGIC ABO: CPT | Performed by: EMERGENCY MEDICINE

## 2022-10-11 PROCEDURE — 86923 COMPATIBILITY TEST ELECTRIC: CPT

## 2022-10-11 RX ORDER — SODIUM CHLORIDE 9 MG/ML
40 INJECTION, SOLUTION INTRAVENOUS AS NEEDED
Status: DISCONTINUED | OUTPATIENT
Start: 2022-10-11 | End: 2022-10-16 | Stop reason: HOSPADM

## 2022-10-11 RX ORDER — NITROGLYCERIN 0.4 MG/1
0.4 TABLET SUBLINGUAL
Status: DISCONTINUED | OUTPATIENT
Start: 2022-10-11 | End: 2022-10-12

## 2022-10-11 RX ORDER — MORPHINE SULFATE 2 MG/ML
2 INJECTION, SOLUTION INTRAMUSCULAR; INTRAVENOUS EVERY 4 HOURS PRN
Status: DISCONTINUED | OUTPATIENT
Start: 2022-10-11 | End: 2022-10-12

## 2022-10-11 RX ORDER — ACETAMINOPHEN 325 MG/1
650 TABLET ORAL EVERY 4 HOURS PRN
Status: DISCONTINUED | OUTPATIENT
Start: 2022-10-11 | End: 2022-10-16 | Stop reason: HOSPADM

## 2022-10-11 RX ORDER — NALOXONE HCL 0.4 MG/ML
0.4 VIAL (ML) INJECTION
Status: DISCONTINUED | OUTPATIENT
Start: 2022-10-11 | End: 2022-10-12

## 2022-10-11 RX ORDER — SODIUM CHLORIDE 9 MG/ML
150 INJECTION, SOLUTION INTRAVENOUS CONTINUOUS
Status: DISCONTINUED | OUTPATIENT
Start: 2022-10-11 | End: 2022-10-11

## 2022-10-11 RX ORDER — FUROSEMIDE 10 MG/ML
40 INJECTION INTRAMUSCULAR; INTRAVENOUS ONCE
Status: COMPLETED | OUTPATIENT
Start: 2022-10-11 | End: 2022-10-11

## 2022-10-11 RX ORDER — CYCLOBENZAPRINE HCL 10 MG
10 TABLET ORAL ONCE
Status: COMPLETED | OUTPATIENT
Start: 2022-10-11 | End: 2022-10-11

## 2022-10-11 RX ORDER — SODIUM CHLORIDE 0.9 % (FLUSH) 0.9 %
10 SYRINGE (ML) INJECTION AS NEEDED
Status: DISCONTINUED | OUTPATIENT
Start: 2022-10-11 | End: 2022-10-16 | Stop reason: HOSPADM

## 2022-10-11 RX ORDER — ACETAMINOPHEN 650 MG/1
650 SUPPOSITORY RECTAL EVERY 4 HOURS PRN
Status: DISCONTINUED | OUTPATIENT
Start: 2022-10-11 | End: 2022-10-12

## 2022-10-11 RX ORDER — ACETAMINOPHEN 160 MG/5ML
650 SOLUTION ORAL EVERY 4 HOURS PRN
Status: DISCONTINUED | OUTPATIENT
Start: 2022-10-11 | End: 2022-10-12

## 2022-10-11 RX ORDER — SODIUM CHLORIDE 0.9 % (FLUSH) 0.9 %
10 SYRINGE (ML) INJECTION EVERY 12 HOURS SCHEDULED
Status: DISCONTINUED | OUTPATIENT
Start: 2022-10-11 | End: 2022-10-16 | Stop reason: HOSPADM

## 2022-10-11 RX ORDER — PANTOPRAZOLE SODIUM 40 MG/10ML
80 INJECTION, POWDER, LYOPHILIZED, FOR SOLUTION INTRAVENOUS ONCE
Status: COMPLETED | OUTPATIENT
Start: 2022-10-11 | End: 2022-10-11

## 2022-10-11 RX ORDER — ONDANSETRON 2 MG/ML
4 INJECTION INTRAMUSCULAR; INTRAVENOUS EVERY 6 HOURS PRN
Status: DISCONTINUED | OUTPATIENT
Start: 2022-10-11 | End: 2022-10-16 | Stop reason: HOSPADM

## 2022-10-11 RX ORDER — ONDANSETRON 4 MG/1
4 TABLET, FILM COATED ORAL EVERY 6 HOURS PRN
Status: DISCONTINUED | OUTPATIENT
Start: 2022-10-11 | End: 2022-10-16 | Stop reason: HOSPADM

## 2022-10-11 RX ORDER — SODIUM CHLORIDE 0.9 % (FLUSH) 0.9 %
10 SYRINGE (ML) INJECTION AS NEEDED
Status: DISCONTINUED | OUTPATIENT
Start: 2022-10-11 | End: 2022-10-11

## 2022-10-11 RX ORDER — LEVOTHYROXINE AND LIOTHYRONINE 38; 9 UG/1; UG/1
60 TABLET ORAL DAILY
COMMUNITY
End: 2022-11-11 | Stop reason: SDUPTHER

## 2022-10-11 RX ADMIN — PROTHROMBIN, COAGULATION FACTOR VII HUMAN, COAGULATION FACTOR IX HUMAN, COAGULATION FACTOR X HUMAN, PROTEIN C, PROTEIN S HUMAN, AND WATER 5180 UNITS: KIT at 17:23

## 2022-10-11 RX ADMIN — MORPHINE SULFATE 2 MG: 2 INJECTION, SOLUTION INTRAMUSCULAR; INTRAVENOUS at 22:01

## 2022-10-11 RX ADMIN — FUROSEMIDE 40 MG: 10 INJECTION INTRAMUSCULAR; INTRAVENOUS at 17:07

## 2022-10-11 RX ADMIN — PANTOPRAZOLE SODIUM 8 MG/HR: 40 INJECTION, POWDER, FOR SOLUTION INTRAVENOUS at 17:07

## 2022-10-11 RX ADMIN — CYCLOBENZAPRINE HYDROCHLORIDE 10 MG: 10 TABLET, FILM COATED ORAL at 23:07

## 2022-10-11 RX ADMIN — Medication 10 ML: at 23:06

## 2022-10-11 RX ADMIN — PANTOPRAZOLE SODIUM 8 MG/HR: 40 INJECTION, POWDER, FOR SOLUTION INTRAVENOUS at 23:06

## 2022-10-11 RX ADMIN — PANTOPRAZOLE SODIUM 80 MG: 40 INJECTION, POWDER, FOR SOLUTION INTRAVENOUS at 17:07

## 2022-10-11 NOTE — ED PROVIDER NOTES
Time: 3:47 PM EDT  Chief Complaint:   Chief Complaint   Patient presents with   • Shortness of Breath   • Black or Bloody Stool           History of Present Illness:  Patient is a 62 y.o. year old female who presents to the emergency department with SOB times the past couple weeks and intermittent blood in her stool for the past week and a half.  Sent over here for evaluation by pulmonologist.      Pt denies new pain. Pt follows with her pulmonologist. Pt states she feels cold all the time and gained 14 pounds. Pt states she occasionally drinks alcohol and denies liver disease and N/VD, fever, chills, abdominal pain, chest pain, sore throat. Pt legs are swollen. Pt's pulmonologist sent her since she was tachycardiac and pale.Pt takes Xarelto.  Pt has hx of COPD and a-fib ([place]).      History provided by:  Patient   used: No            Patient Care Team  Primary Care Provider: Beverley De Luna APRN    Past Medical History:     Allergies   Allergen Reactions   • Other Other (See Comments)     RHEUMATOID ARTHRITIS INFUSION REACTION   • Other Irritability     INFUSION FOR RHEUMATOID ARTHRITIS   • Rituximab Unknown - High Severity     Past Medical History:   Diagnosis Date   • Atrial fibrillation (HCC)     Diagnosed 5/2019   • CKD (chronic kidney disease)    • Diabetes (HCC)     Diagnosed 5/2019   • Diastolic CHF (HCC)    • Hyperlipidemia    • Hypertension    • Hypothyroidism    • Morbid obesity (HCC)    • AMOR (obstructive sleep apnea)    • Rheumatoid arthritis (HCC)    • SLE (systemic lupus erythematosus) (HCC)      Past Surgical History:   Procedure Laterality Date   • APPENDECTOMY     • CARPAL TUNNEL RELEASE     • ENDOMETRIAL ABLATION     • HEEL SPUR EXCISION     • HERNIA REPAIR     • THYROIDECTOMY, PARTIAL       Family History   Problem Relation Age of Onset   • Stroke Mother    • Coronary artery disease Brother         3 brothers with stents at young ages    • Diabetes Paternal  Grandfather    • Heart disease Father    • Atrial fibrillation Father    • Colon cancer Father    • Coronary artery disease Brother        Home Medications:  Prior to Admission medications    Medication Sig Start Date End Date Taking? Authorizing Provider   albuterol sulfate  (90 Base) MCG/ACT inhaler Inhale 2 puffs Every 4 (Four) Hours As Needed for Wheezing.    Gustavo Lemus MD   budesonide-formoterol (SYMBICORT) 160-4.5 MCG/ACT inhaler Inhale 2 puffs 2 (Two) Times a Day.    Gustavo Lemus MD   carvedilol (COREG) 25 MG tablet Take 1 tablet by mouth 2 (Two) Times a Day. 6/6/22   Danica Bryan APRN   cyclobenzaprine (FLEXERIL) 10 MG tablet Take 10 mg by mouth As Needed. 10/10/19   Gustavo Lemus MD   dabigatran etexilate (PRADAXA) 150 MG capsu Take 1 capsule by mouth 2 (Two) Times a Day. 10/5/22   Agueda King APRN   Green Tea, Sabrina sinensis, (GREEN TEA PO) Take 500 mg by mouth Daily.    Gustavo Lemus MD   HYDROcodone-acetaminophen (NORCO) 7.5-325 MG per tablet hydrocodone 7.5 mg-acetaminophen 325 mg tablet   TAKE 1 TABLET EVERY 12 HOURS BY ORAL ROUTE AS NEEDED FOR 30 DAYS.    Gustavo Lemus MD   hydroxychloroquine (PLAQUENIL) 200 MG tablet Take  by mouth Daily.    Gustavo Lemus MD   Iron-Vitamins (GERITOL PO) Take  by mouth Daily.    Gustavo Lemus MD   MAGNESIUM PO Take  by mouth Daily.    Gustavo Lemus MD   montelukast (SINGULAIR) 10 MG tablet Take 10 mg by mouth Every Night.    Gustavo Lemus MD   nitroglycerin (NITROSTAT) 0.4 MG SL tablet TAKE 1 TABLET SUBLINGUALLY EVERY 5 MINS AS NEEDED FOR CHEST PAIN. AFTER THIRD TAB, [id number] 6/4/20   Gustavo Lemus MD   POTASSIUM PO Take  by mouth Daily.    Gustavo Lemus MD   rosuvastatin (CRESTOR) 10 MG tablet Take 10 mg by mouth Daily.    Gustavo Lemus MD   torsemide (DEMADEX) 20 MG tablet TAKE 1 TABLET BY MOUTH EVERY DAY 3/10/22   Nomran Hebert,  "MD   TURMERIC CURCUMIN PO Take  by mouth Daily.    Provider, Historical, MD   Xarelto 20 MG tablet TAKE 1 TABLET BY MOUTH EVERY DAY 8/18/22   Danica Bryan APRN        Social History:   Social History     Tobacco Use   • Smoking status: Former   • Smokeless tobacco: Never   • Tobacco comments:     Quit around age 36   Vaping Use   • Vaping Use: Never used   Substance Use Topics   • Alcohol use: Yes     Comment: rare   • Drug use: No         Review of Systems:  Review of Systems   Constitutional: Negative for activity change, chills, fatigue and unexpected weight change.   HENT: Negative for congestion, sinus pressure, sore throat and trouble swallowing.    Eyes: Negative for pain, discharge, redness and visual disturbance.   Respiratory: Positive for shortness of breath. Negative for cough, chest tightness and wheezing.    Cardiovascular: Positive for leg swelling. Negative for chest pain and palpitations.   Gastrointestinal: Positive for blood in stool. Negative for abdominal pain, diarrhea, nausea and vomiting.   Endocrine: Negative for cold intolerance and polydipsia.   Genitourinary: Negative for dysuria, frequency, hematuria and urgency.   Musculoskeletal: Negative for arthralgias, joint swelling, neck pain and neck stiffness.   Skin: Positive for pallor. Negative for color change and rash.   Allergic/Immunologic: Negative for environmental allergies and immunocompromised state.   Neurological: Negative for dizziness, weakness and light-headedness.   Hematological: Does not bruise/bleed easily.   Psychiatric/Behavioral: Negative for agitation, confusion, dysphoric mood and suicidal ideas.        Physical Exam:  /60 (BP Location: Right arm, Patient Position: Sitting)   Pulse 77   Temp 97.8 °F (36.6 °C) (Oral)   Resp 18   Ht 177.8 cm (70\")   Wt 134 kg (295 lb 13.7 oz)   SpO2 92%   BMI 42.45 kg/m²     Physical Exam  Vitals and nursing note reviewed.   Constitutional:       General: She is not in " acute distress.     Appearance: Normal appearance. She is not toxic-appearing.   HENT:      Head: Normocephalic and atraumatic.      Jaw: There is normal jaw occlusion.      Mouth/Throat:      Mouth: Mucous membranes are moist.      Comments: Oral mucosa is pale  Eyes:      General: Lids are normal.      Extraocular Movements: Extraocular movements intact.      Conjunctiva/sclera: Conjunctivae normal.      Pupils: Pupils are equal, round, and reactive to light.      Comments: Conjunctiva is pale   Cardiovascular:      Rate and Rhythm: Normal rate. Rhythm irregularly irregular.      Pulses: Normal pulses.      Heart sounds: Normal heart sounds.   Pulmonary:      Effort: Pulmonary effort is normal. No respiratory distress.      Breath sounds: Decreased breath sounds and rales present. No wheezing or rhonchi.   Abdominal:      General: Abdomen is flat. There is no distension.      Palpations: Abdomen is soft.      Tenderness: There is no abdominal tenderness. There is no guarding or rebound.   Musculoskeletal:         General: Normal range of motion.      Cervical back: Normal range of motion and neck supple.      Right lower le+ Pitting Edema present.      Left lower le+ Pitting Edema present.   Skin:     General: Skin is warm and dry.   Neurological:      General: No focal deficit present.      Mental Status: She is alert and oriented to person, place, and time. Mental status is at baseline.      Comments: Mild distress   Psychiatric:         Mood and Affect: Mood normal.         Behavior: Behavior normal.                Medications in the Emergency Department:  Medications   sodium chloride 0.9 % flush 10 mL (has no administration in time range)   sodium chloride 0.9 % flush 10 mL (has no administration in time range)   sodium chloride 0.9 % flush 10 mL (10 mL Intravenous Given 10/12/22 0836)   sodium chloride 0.9 % infusion 40 mL (has no administration in time range)   acetaminophen (TYLENOL) tablet 650 mg  (has no administration in time range)   ondansetron (ZOFRAN) tablet 4 mg (has no administration in time range)     Or   ondansetron (ZOFRAN) injection 4 mg (has no administration in time range)   albuterol (PROVENTIL) nebulizer solution 0.083% 2.5 mg/3mL (has no administration in time range)   budesonide-formoterol (SYMBICORT) 160-4.5 MCG/ACT inhaler 2 puff ( Inhalation Canceled Entry 10/12/22 0930)   carvedilol (COREG) tablet 25 mg (25 mg Oral Given 10/12/22 0903)   cyclobenzaprine (FLEXERIL) tablet 10 mg (has no administration in time range)   HYDROcodone-acetaminophen (NORCO) 7.5-325 MG per tablet 1 tablet (1 tablet Oral Given 10/12/22 0325)   montelukast (SINGULAIR) tablet 10 mg (has no administration in time range)   rosuvastatin (CRESTOR) tablet 10 mg (10 mg Oral Given 10/12/22 0903)   Thyroid (ARMOUR) tablet 60 mg (60 mg Oral Given 10/12/22 0903)   torsemide (DEMADEX) tablet 20 mg (20 mg Oral Given 10/12/22 0903)   lactated ringers infusion ( Intravenous Anesthesia Volume Adjustment 10/12/22 1503)   polyethylene glycol (GoLYTELY) solution 4,000 mL (has no administration in time range)   pantoprazole (PROTONIX) injection 80 mg (80 mg Intravenous Given 10/11/22 1707)   furosemide (LASIX) injection 40 mg (40 mg Intravenous Given 10/11/22 1707)   prothrombin complex conc human (KCentra) IV solution 5,180 Units (5,180 Units Intravenous Given 10/11/22 1723)   cyclobenzaprine (FLEXERIL) tablet 10 mg (10 mg Oral Given 10/11/22 2307)   furosemide (LASIX) injection 20 mg (20 mg Intravenous Given 10/12/22 0314)        Labs  Lab Results (last 24 hours)     Procedure Component Value Units Date/Time    CBC & Differential [[id number]]  (Abnormal) Collected: 10/11/22 1550    Specimen: Blood Updated: 10/11/22 1623    Narrative:      The following orders were created for panel order CBC & Differential.  Procedure                               Abnormality         Status                     ---------                                -----------         ------                     CBC Auto Differential[[id number]]        Abnormal            Final result                 Please view results for these tests on the individual orders.    Comprehensive Metabolic Panel [[id number]]  (Abnormal) Collected: 10/11/22 1550    Specimen: Blood Updated: 10/11/22 1635     Glucose 127 mg/dL      BUN 40 mg/dL      Creatinine 1.32 mg/dL      Sodium 136 mmol/L      Potassium 4.1 mmol/L      Chloride 99 mmol/L      CO2 25.9 mmol/L      Calcium 8.4 mg/dL      Total Protein 7.4 g/dL      Albumin 3.20 g/dL      ALT (SGPT) 5 U/L      AST (SGOT) 8 U/L      Alkaline Phosphatase 121 U/L      Total Bilirubin 0.3 mg/dL      Globulin 4.2 gm/dL      A/G Ratio 0.8 g/dL      BUN/Creatinine Ratio 30.3     Anion Gap 11.1 mmol/L      eGFR 46.0 mL/min/1.73      Comment: National Kidney Foundation and American Society of Nephrology (ASN) Task Force recommended calculation based on the Chronic Kidney Disease Epidemiology Collaboration (CKD-EPI) equation refit without adjustment for race.       Narrative:      GFR Normal >60  Chronic Kidney Disease <60  Kidney Failure <15      BNP [[id number]]  (Abnormal) Collected: 10/11/22 1550    Specimen: Blood Updated: 10/11/22 1618     proBNP 4,413.0 pg/mL     Narrative:      Among patients with dyspnea, NT-proBNP is highly sensitive for the detection of acute congestive heart failure. In addition NT-proBNP of <300 pg/ml effectively rules out acute congestive heart failure with 99% negative predictive value.    Results may be falsely decreased if patient taking Biotin.      Troponin [[id number]]  (Normal) Collected: 10/11/22 1550    Specimen: Blood Updated: 10/11/22 1622     Troponin T <0.010 ng/mL     Narrative:      Troponin T Reference Range:  <= 0.03 ng/mL-   Negative for AMI  >0.03 ng/mL-     Abnormal for myocardial necrosis.  Clinicians would have to utilize clinical acumen, EKG, Troponin and serial changes to determine if it is an Acute  Myocardial Infarction or myocardial injury due to an underlying chronic condition.       Results may be falsely decreased if patient taking Biotin.      CBC Auto Differential [[id number]]  (Abnormal) Collected: 10/11/22 1550    Specimen: Blood Updated: 10/11/22 1623     WBC 6.49 10*3/mm3      RBC 1.71 10*6/mm3      Hemoglobin 3.7 g/dL      Hematocrit 13.5 %      MCV 78.9 fL      MCH 21.6 pg      MCHC 27.4 g/dL      RDW 18.1 %      RDW-SD 51.2 fl      MPV 9.4 fL      Platelets 416 10*3/mm3      Neutrophil % 71.8 %      Lymphocyte % 19.0 %      Monocyte % 7.2 %      Eosinophil % 1.4 %      Basophil % 0.3 %      Immature Grans % 0.3 %      Neutrophils, Absolute 4.66 10*3/mm3      Lymphocytes, Absolute 1.23 10*3/mm3      Monocytes, Absolute 0.47 10*3/mm3      Eosinophils, Absolute 0.09 10*3/mm3      Basophils, Absolute 0.02 10*3/mm3      Immature Grans, Absolute 0.02 10*3/mm3      nRBC 0.8 /100 WBC     Lactic Acid, Plasma [[id number]]  (Normal) Collected: 10/11/22 1550    Specimen: Blood Updated: 10/11/22 1621     Lactate 1.8 mmol/L     aPTT [[id number]]  (Abnormal) Collected: 10/11/22 1550    Specimen: Blood Updated: 10/11/22 1635     PTT 62.7 seconds     Protime-INR [[id number]]  (Abnormal) Collected: 10/11/22 1550    Specimen: Blood Updated: 10/11/22 1654     Protime 27.4 Seconds      INR 2.49    Narrative:      Suggested Therapeutic Ranges For Oral Anticoagulant Therapy:  Level of Therapy                      INR Target Range  Standard Dose                            2.0-3.0  High Dose                                2.5-3.5  Patients not receiving anticoagulant  Therapy Normal Range                     0.86-1.15    Hemoglobin & Hematocrit, Blood [[id number]]  (Abnormal) Collected: 10/12/22 0145    Specimen: Blood Updated: 10/12/22 0155     Hemoglobin 5.1 g/dL      Hematocrit 17.2 %     aPTT [[id number]]  (Abnormal) Collected: 10/12/22 1222    Specimen: Blood Updated: 10/12/22 1250     PTT 36.8 seconds     CBC &  Differential [[id number]]  (Abnormal) Collected: 10/12/22 1222    Specimen: Blood Updated: 10/12/22 1247    Narrative:      The following orders were created for panel order CBC & Differential.  Procedure                               Abnormality         Status                     ---------                               -----------         ------                     CBC Auto Differential[[id number]]        Abnormal            Final result                 Please view results for these tests on the individual orders.    CK [[id number]]  (Normal) Collected: 10/12/22 1222    Specimen: Blood Updated: 10/12/22 1255     Creatine Kinase 34 U/L     Comprehensive Metabolic Panel [[id number]]  (Abnormal) Collected: 10/12/22 1222    Specimen: Blood Updated: 10/12/22 1255     Glucose 123 mg/dL      BUN 32 mg/dL      Creatinine 1.12 mg/dL      Sodium 137 mmol/L      Potassium 3.5 mmol/L      Chloride 99 mmol/L      CO2 25.1 mmol/L      Calcium 8.6 mg/dL      Total Protein 7.7 g/dL      Albumin 3.10 g/dL      ALT (SGPT) 5 U/L      AST (SGOT) 10 U/L      Alkaline Phosphatase 123 U/L      Total Bilirubin 0.7 mg/dL      Globulin 4.6 gm/dL      A/G Ratio 0.7 g/dL      BUN/Creatinine Ratio 28.6     Anion Gap 12.9 mmol/L      eGFR 56.1 mL/min/1.73      Comment: National Kidney Foundation and American Society of Nephrology (ASN) Task Force recommended calculation based on the Chronic Kidney Disease Epidemiology Collaboration (CKD-EPI) equation refit without adjustment for race.       Narrative:      GFR Normal >60  Chronic Kidney Disease <60  Kidney Failure <15      Magnesium [[id number]]  (Normal) Collected: 10/12/22 1222    Specimen: Blood Updated: 10/12/22 1255     Magnesium 2.0 mg/dL     Phosphorus [[id number]]  (Normal) Collected: 10/12/22 1222    Specimen: Blood Updated: 10/12/22 1255     Phosphorus 3.4 mg/dL     Protime-INR [[id number]]  (Abnormal) Collected: 10/12/22 1222    Specimen: Blood Updated: 10/12/22 1251     Protime 16.8  Seconds      INR 1.34    Narrative:      Suggested Therapeutic Ranges For Oral Anticoagulant Therapy:  Level of Therapy                      INR Target Range  Standard Dose                            2.0-3.0  High Dose                                2.5-3.5  Patients not receiving anticoagulant  Therapy Normal Range                     0.86-1.15    CBC Auto Differential [[id number]]  (Abnormal) Collected: 10/12/22 1222    Specimen: Blood Updated: 10/12/22 1247     WBC 7.56 10*3/mm3      RBC 2.72 10*6/mm3      Hemoglobin 6.9 g/dL      Hematocrit 22.0 %      MCV 80.9 fL      MCH 25.4 pg      MCHC 31.4 g/dL      RDW 18.1 %      RDW-SD 53.0 fl      MPV 10.0 fL      Platelets 426 10*3/mm3      Neutrophil % 73.4 %      Lymphocyte % 16.0 %      Monocyte % 8.5 %      Eosinophil % 1.3 %      Basophil % 0.3 %      Immature Grans % 0.5 %      Neutrophils, Absolute 5.55 10*3/mm3      Lymphocytes, Absolute 1.21 10*3/mm3      Monocytes, Absolute 0.64 10*3/mm3      Eosinophils, Absolute 0.10 10*3/mm3      Basophils, Absolute 0.02 10*3/mm3      Immature Grans, Absolute 0.04 10*3/mm3      nRBC 0.9 /100 WBC              EKG:  Atrial fibrillation with a rate of 118 BPM  Abnormal P wave and OR interval  Normal QRS and Axis  Normal St segments and Normal QT / Qtc      Imaging:  XR Chest 1 View    Result Date: 10/11/2022  PROCEDURE: XR CHEST 1 VW  COMPARISON: [facility], , CHEST AP/PA 1 VIEW, 6/02/2019, 16:49.  INDICATIONS: SOA Triage Protocol  FINDINGS:  No new consolidations or pleural effusions are observed. The cardiac silhouette and mediastinum are unchanged.  Stable cardiomegaly is noted.  No definitive acute osseous abnormalities are seen on this single view.        1. No change from the previous study with no evidence for acute cardiopulmonary process.         EDDIE SANTIAGO MD       Electronically Signed and Approved By: EDDIE SANTIAGO MD on 10/11/2022 at 16:41               Procedures:  Procedures    Progress  ED  Course as of 10/12/22 1516   e Oct 11, 2022   1548 --- PROVIDER IN TRIAGE NOTE ---    The patient was evaluated my meSindi in triage. Orders were placed and the patient is currently awaiting disposition.    [AJ]      ED Course User Index  [AJ] Sindi Pate PA-C                            The patient was initially evaluated in the triage area where orders were placed. The patient was later dispositioned by Cameron Feliciano DO.      The patient was advised to stay for completion of workup which includes but is not limited to communication of labs and radiological results, reassessment and plan. The patient was advised that leaving prior to disposition by a provider could result in critical findings that are not communicated to the patient.     Medical Decision Making:  MDM  Number of Diagnoses or Management Options     Amount and/or Complexity of Data Reviewed  Clinical lab tests: reviewed  Tests in the radiology section of CPT®: reviewed  Tests in the medicine section of CPT®: reviewed  Decide to obtain previous medical records or to obtain history from someone other than the patient: yes  Obtain history from someone other than the patient: yes  Review and summarize past medical records: yes  Discuss the patient with other providers: yes  Independent visualization of images, tracings, or specimens: yes    Critical Care  Total time providing critical care: 30-74 minutes (50 minutes including direct patient care, review of medical records, review of ancillary studies, interpretation of ancillary studies, discussion with patient, family and admitting/ consulting physicians.)    Patient Progress  Patient progress: improved           The following orders were placed after triage and evaluation:  Orders Placed This Encounter   Procedures   • XR Chest 1 View   • [place] Draw   • Comprehensive Metabolic Panel   • BNP   • Troponin   • CBC Auto Differential   • Lactic Acid, Plasma   • aPTT   •  Protime-INR   • Hemoglobin & Hematocrit, Blood   • aPTT   • CK   • Comprehensive Metabolic Panel   • Magnesium   • Phosphorus   • Protime-INR   • CBC Auto Differential   • Diet Clear Liquid; Cardiac   • NPO Diet NPO Type: Sips with Meds   • Continuous Pulse Oximetry   • Undress & Gown   • Measure Blood Pressure   • Vital Signs   • Orthostatic Blood Pressure   • Verify Informed Consent   • Vital Signs   • Continuous Cardiac Monitoring   • Intake & Output   • Weigh Patient   • Oral Care   • Saline Lock & Maintain IV Access   • Place Sequential Compression Device   • Maintain Sequential Compression Device   • Verify Informed Consent for Blood Product Administration   • RN To Enter The Following Labs When Transfusion Complete   • Follow Anesthesia Guidelines / Protocol   • Obtain Informed Consent   • Verify Informed Consent for Blood Product Administration   • RN To Enter The Following Labs When Transfusion Complete   • Follow Anesthesia Guidelines / Protocol   • Obtain Informed Consent   • Code Status and Medical Interventions:   • Hospitalist (on-call MD unless specified)   • Gastroenterology (on-call MD unless specified)   • Inpatient Gastroenterology Consult   • Pulse Oximetry   • Oxygen Therapy- Nasal Cannula; Titrate for SPO2: 90% - 95%   • POC Glucose Once   • ECG 12 Lead   • Adult Transthoracic Echo Complete W/ Cont if Necessary Per Protocol   • Type & Screen   • ABO RH Specimen Verification   • Prepare RBC, 2 Units   • Prepare RBC, 2 Units   • Prepare RBC, 1 Units   • Insert Peripheral IV   • Insert Peripheral IV   • Inpatient Admission   • CBC & Differential   • Green Top (Gel)   • Lavender Top   • Gold Top - SST   • Light Blue Top   • CBC & Differential       Final diagnoses:   Gastrointestinal hemorrhage, unspecified gastrointestinal hemorrhage type   Anemia, unspecified type   Atrial fibrillation, unspecified type (HCC)          Disposition:  ED Disposition     ED Disposition   Decision to Admit     Condition   --    Comment   Level of Care: Progressive Care [20]   Diagnosis: Gastrointestinal hemorrhage, unspecified gastrointestinal hemorrhage type [[id number]]   Certification: I Certify That Inpatient Hospital Services Are Medically Necessary For Greater Than 2 Midnights               This medical record created using voice recognition software.    Documentation assistance provided by Marcela Richards, acting as scribe for Dr. Cameron Feliciano. Information recorded by the scribe was done at my direction and has been verified and validated by me.         Marcela Richards  10/11/22 1842       Cameron Feliciano DO  10/12/22 1900

## 2022-10-12 ENCOUNTER — ANESTHESIA EVENT (OUTPATIENT)
Dept: GASTROENTEROLOGY | Facility: HOSPITAL | Age: 62
End: 2022-10-12

## 2022-10-12 ENCOUNTER — ANESTHESIA (OUTPATIENT)
Dept: GASTROENTEROLOGY | Facility: HOSPITAL | Age: 62
End: 2022-10-12

## 2022-10-12 ENCOUNTER — APPOINTMENT (OUTPATIENT)
Dept: CARDIOLOGY | Facility: HOSPITAL | Age: 62
End: 2022-10-12

## 2022-10-12 LAB
ALBUMIN SERPL-MCNC: 3.1 G/DL (ref 3.5–5.2)
ALBUMIN/GLOB SERPL: 0.7 G/DL
ALP SERPL-CCNC: 123 U/L (ref 39–117)
ALT SERPL W P-5'-P-CCNC: 5 U/L (ref 1–33)
ANION GAP SERPL CALCULATED.3IONS-SCNC: 12.9 MMOL/L (ref 5–15)
APTT PPP: 36.8 SECONDS (ref 24.2–34.2)
AST SERPL-CCNC: 10 U/L (ref 1–32)
BASOPHILS # BLD AUTO: 0.02 10*3/MM3 (ref 0–0.2)
BASOPHILS NFR BLD AUTO: 0.3 % (ref 0–1.5)
BH CV ECHO MEAS - AO MAX PG: 16 MMHG
BH CV ECHO MEAS - AO MEAN PG: 9 MMHG
BH CV ECHO MEAS - AO ROOT DIAM: 3.1 CM
BH CV ECHO MEAS - AO V2 MAX: 2 CM/SEC
BH CV ECHO MEAS - AO V2 VTI: 43 CM
BH CV ECHO MEAS - AVA(I,D): 1.3 CM2
BH CV ECHO MEAS - EDV(MOD-SP2): 128 ML
BH CV ECHO MEAS - EDV(MOD-SP4): 130 ML
BH CV ECHO MEAS - EF(MOD-BP): 46 %
BH CV ECHO MEAS - ESV(MOD-SP2): 57 ML
BH CV ECHO MEAS - ESV(MOD-SP4): 77 ML
BH CV ECHO MEAS - IVSD: 1.3 CM
BH CV ECHO MEAS - LA DIMENSION: 5 CM
BH CV ECHO MEAS - LV MAX PG: 3 MMHG
BH CV ECHO MEAS - LV MEAN PG: 2 MMHG
BH CV ECHO MEAS - LV V1 MAX: 86 CM/SEC
BH CV ECHO MEAS - LV V1 VTI: 17 CM
BH CV ECHO MEAS - LVIDD: 5.7 CM
BH CV ECHO MEAS - LVIDS: 4.2 CM
BH CV ECHO MEAS - LVOT DIAM: 2 CM
BH CV ECHO MEAS - LVPWD: 1.3 CM
BH CV ECHO MEAS - MR MAX PG: 94 MMHG
BH CV ECHO MEAS - MR MAX VEL: 485 CM/SEC
BH CV ECHO MEAS - MR MEAN PG: 62 MMHG
BH CV ECHO MEAS - MR MEAN VEL: 366 CM/SEC
BH CV ECHO MEAS - MR VTI: 143 CM
BH CV ECHO MEAS - RAP SYSTOLE: 15 MMHG
BH CV ECHO MEAS - RVDD: 3.1 CM
BH CV ECHO MEAS - RVSP: 64 MMHG
BH CV ECHO MEAS - TR MAX PG: 49 MMHG
BH CV ECHO MEAS - TR MAX VEL: 351 CM/SEC
BILIRUB SERPL-MCNC: 0.7 MG/DL (ref 0–1.2)
BUN SERPL-MCNC: 32 MG/DL (ref 8–23)
BUN/CREAT SERPL: 28.6 (ref 7–25)
CALCIUM SPEC-SCNC: 8.6 MG/DL (ref 8.6–10.5)
CHLORIDE SERPL-SCNC: 99 MMOL/L (ref 98–107)
CK SERPL-CCNC: 34 U/L (ref 20–180)
CO2 SERPL-SCNC: 25.1 MMOL/L (ref 22–29)
CREAT SERPL-MCNC: 1.12 MG/DL (ref 0.57–1)
DEPRECATED RDW RBC AUTO: 53 FL (ref 37–54)
EGFRCR SERPLBLD CKD-EPI 2021: 56.1 ML/MIN/1.73
EOSINOPHIL # BLD AUTO: 0.1 10*3/MM3 (ref 0–0.4)
EOSINOPHIL NFR BLD AUTO: 1.3 % (ref 0.3–6.2)
ERYTHROCYTE [DISTWIDTH] IN BLOOD BY AUTOMATED COUNT: 18.1 % (ref 12.3–15.4)
GLOBULIN UR ELPH-MCNC: 4.6 GM/DL
GLUCOSE SERPL-MCNC: 123 MG/DL (ref 65–99)
HCT VFR BLD AUTO: 17.2 % (ref 34–46.6)
HCT VFR BLD AUTO: 22 % (ref 34–46.6)
HCT VFR BLD AUTO: 25.7 % (ref 34–46.6)
HGB BLD-MCNC: 5.1 G/DL (ref 12–15.9)
HGB BLD-MCNC: 6.9 G/DL (ref 12–15.9)
HGB BLD-MCNC: 7.9 G/DL (ref 12–15.9)
IMM GRANULOCYTES # BLD AUTO: 0.04 10*3/MM3 (ref 0–0.05)
IMM GRANULOCYTES NFR BLD AUTO: 0.5 % (ref 0–0.5)
INR PPP: 1.34 (ref 0.86–1.15)
LEFT ATRIUM VOLUME INDEX: 55.3 ML/M2
LYMPHOCYTES # BLD AUTO: 1.21 10*3/MM3 (ref 0.7–3.1)
LYMPHOCYTES NFR BLD AUTO: 16 % (ref 19.6–45.3)
MAGNESIUM SERPL-MCNC: 2 MG/DL (ref 1.6–2.4)
MAXIMAL PREDICTED HEART RATE: 159 BPM
MCH RBC QN AUTO: 25.4 PG (ref 26.6–33)
MCHC RBC AUTO-ENTMCNC: 31.4 G/DL (ref 31.5–35.7)
MCV RBC AUTO: 80.9 FL (ref 79–97)
MONOCYTES # BLD AUTO: 0.64 10*3/MM3 (ref 0.1–0.9)
MONOCYTES NFR BLD AUTO: 8.5 % (ref 5–12)
NEUTROPHILS NFR BLD AUTO: 5.55 10*3/MM3 (ref 1.7–7)
NEUTROPHILS NFR BLD AUTO: 73.4 % (ref 42.7–76)
NRBC BLD AUTO-RTO: 0.9 /100 WBC (ref 0–0.2)
PHOSPHATE SERPL-MCNC: 3.4 MG/DL (ref 2.5–4.5)
PLATELET # BLD AUTO: 426 10*3/MM3 (ref 140–450)
PMV BLD AUTO: 10 FL (ref 6–12)
POTASSIUM SERPL-SCNC: 3.5 MMOL/L (ref 3.5–5.2)
PROT SERPL-MCNC: 7.7 G/DL (ref 6–8.5)
PROTHROMBIN TIME: 16.8 SECONDS (ref 11.8–14.9)
RBC # BLD AUTO: 2.72 10*6/MM3 (ref 3.77–5.28)
SODIUM SERPL-SCNC: 137 MMOL/L (ref 136–145)
STRESS TARGET HR: 135 BPM
WBC NRBC COR # BLD: 7.56 10*3/MM3 (ref 3.4–10.8)

## 2022-10-12 PROCEDURE — 94640 AIRWAY INHALATION TREATMENT: CPT

## 2022-10-12 PROCEDURE — 25010000002 PROPOFOL 10 MG/ML EMULSION: Performed by: NURSE ANESTHETIST, CERTIFIED REGISTERED

## 2022-10-12 PROCEDURE — 86900 BLOOD TYPING SEROLOGIC ABO: CPT

## 2022-10-12 PROCEDURE — 85730 THROMBOPLASTIN TIME PARTIAL: CPT | Performed by: INTERNAL MEDICINE

## 2022-10-12 PROCEDURE — 85610 PROTHROMBIN TIME: CPT | Performed by: INTERNAL MEDICINE

## 2022-10-12 PROCEDURE — 94799 UNLISTED PULMONARY SVC/PX: CPT

## 2022-10-12 PROCEDURE — 85018 HEMOGLOBIN: CPT | Performed by: INTERNAL MEDICINE

## 2022-10-12 PROCEDURE — 0DJ08ZZ INSPECTION OF UPPER INTESTINAL TRACT, VIA NATURAL OR ARTIFICIAL OPENING ENDOSCOPIC: ICD-10-PCS | Performed by: INTERNAL MEDICINE

## 2022-10-12 PROCEDURE — 85025 COMPLETE CBC W/AUTO DIFF WBC: CPT | Performed by: INTERNAL MEDICINE

## 2022-10-12 PROCEDURE — 86923 COMPATIBILITY TEST ELECTRIC: CPT

## 2022-10-12 PROCEDURE — 83735 ASSAY OF MAGNESIUM: CPT | Performed by: INTERNAL MEDICINE

## 2022-10-12 PROCEDURE — 82550 ASSAY OF CK (CPK): CPT | Performed by: INTERNAL MEDICINE

## 2022-10-12 PROCEDURE — 85014 HEMATOCRIT: CPT | Performed by: INTERNAL MEDICINE

## 2022-10-12 PROCEDURE — 99233 SBSQ HOSP IP/OBS HIGH 50: CPT | Performed by: INTERNAL MEDICINE

## 2022-10-12 PROCEDURE — 25010000002 FUROSEMIDE PER 20 MG: Performed by: PHYSICIAN ASSISTANT

## 2022-10-12 PROCEDURE — 99222 1ST HOSP IP/OBS MODERATE 55: CPT | Performed by: INTERNAL MEDICINE

## 2022-10-12 PROCEDURE — P9016 RBC LEUKOCYTES REDUCED: HCPCS

## 2022-10-12 PROCEDURE — 43235 EGD DIAGNOSTIC BRUSH WASH: CPT | Performed by: INTERNAL MEDICINE

## 2022-10-12 PROCEDURE — 84100 ASSAY OF PHOSPHORUS: CPT | Performed by: INTERNAL MEDICINE

## 2022-10-12 PROCEDURE — 93306 TTE W/DOPPLER COMPLETE: CPT

## 2022-10-12 PROCEDURE — 36430 TRANSFUSION BLD/BLD COMPNT: CPT

## 2022-10-12 PROCEDURE — 80053 COMPREHEN METABOLIC PANEL: CPT | Performed by: INTERNAL MEDICINE

## 2022-10-12 RX ORDER — LEVOTHYROXINE AND LIOTHYRONINE 38; 9 UG/1; UG/1
60 TABLET ORAL DAILY
Status: DISCONTINUED | OUTPATIENT
Start: 2022-10-12 | End: 2022-10-16 | Stop reason: HOSPADM

## 2022-10-12 RX ORDER — FUROSEMIDE 10 MG/ML
20 INJECTION INTRAMUSCULAR; INTRAVENOUS ONCE
Status: COMPLETED | OUTPATIENT
Start: 2022-10-12 | End: 2022-10-12

## 2022-10-12 RX ORDER — ALBUTEROL SULFATE 2.5 MG/3ML
2.5 SOLUTION RESPIRATORY (INHALATION) EVERY 6 HOURS PRN
Status: DISCONTINUED | OUTPATIENT
Start: 2022-10-12 | End: 2022-10-16 | Stop reason: HOSPADM

## 2022-10-12 RX ORDER — CARVEDILOL 25 MG/1
25 TABLET ORAL 2 TIMES DAILY
Status: DISCONTINUED | OUTPATIENT
Start: 2022-10-12 | End: 2022-10-16 | Stop reason: HOSPADM

## 2022-10-12 RX ORDER — TORSEMIDE 20 MG/1
20 TABLET ORAL DAILY
Status: DISCONTINUED | OUTPATIENT
Start: 2022-10-12 | End: 2022-10-15

## 2022-10-12 RX ORDER — LIDOCAINE HYDROCHLORIDE 20 MG/ML
INJECTION, SOLUTION EPIDURAL; INFILTRATION; INTRACAUDAL; PERINEURAL AS NEEDED
Status: DISCONTINUED | OUTPATIENT
Start: 2022-10-12 | End: 2022-10-12 | Stop reason: SURG

## 2022-10-12 RX ORDER — ROSUVASTATIN CALCIUM 5 MG/1
10 TABLET, COATED ORAL DAILY
Status: DISCONTINUED | OUTPATIENT
Start: 2022-10-12 | End: 2022-10-16 | Stop reason: HOSPADM

## 2022-10-12 RX ORDER — SODIUM CHLORIDE, SODIUM LACTATE, POTASSIUM CHLORIDE, CALCIUM CHLORIDE 600; 310; 30; 20 MG/100ML; MG/100ML; MG/100ML; MG/100ML
30 INJECTION, SOLUTION INTRAVENOUS CONTINUOUS
Status: DISCONTINUED | OUTPATIENT
Start: 2022-10-12 | End: 2022-10-12

## 2022-10-12 RX ORDER — PROPOFOL 10 MG/ML
VIAL (ML) INTRAVENOUS AS NEEDED
Status: DISCONTINUED | OUTPATIENT
Start: 2022-10-12 | End: 2022-10-12 | Stop reason: SURG

## 2022-10-12 RX ORDER — BUDESONIDE AND FORMOTEROL FUMARATE DIHYDRATE 160; 4.5 UG/1; UG/1
2 AEROSOL RESPIRATORY (INHALATION)
Status: DISCONTINUED | OUTPATIENT
Start: 2022-10-12 | End: 2022-10-16 | Stop reason: HOSPADM

## 2022-10-12 RX ORDER — MONTELUKAST SODIUM 10 MG/1
10 TABLET ORAL NIGHTLY
Status: DISCONTINUED | OUTPATIENT
Start: 2022-10-12 | End: 2022-10-16 | Stop reason: HOSPADM

## 2022-10-12 RX ORDER — CYCLOBENZAPRINE HCL 10 MG
10 TABLET ORAL AS NEEDED
Status: DISCONTINUED | OUTPATIENT
Start: 2022-10-12 | End: 2022-10-16 | Stop reason: HOSPADM

## 2022-10-12 RX ORDER — HYDROCODONE BITARTRATE AND ACETAMINOPHEN 7.5; 325 MG/1; MG/1
1 TABLET ORAL EVERY 8 HOURS PRN
Status: DISCONTINUED | OUTPATIENT
Start: 2022-10-12 | End: 2022-10-16 | Stop reason: HOSPADM

## 2022-10-12 RX ADMIN — PANTOPRAZOLE SODIUM 8 MG/HR: 40 INJECTION, POWDER, FOR SOLUTION INTRAVENOUS at 08:10

## 2022-10-12 RX ADMIN — CYCLOBENZAPRINE 10 MG: 10 TABLET, FILM COATED ORAL at 21:30

## 2022-10-12 RX ADMIN — PROPOFOL 150 MCG/KG/MIN: 10 INJECTION, EMULSION INTRAVENOUS at 14:53

## 2022-10-12 RX ADMIN — PANTOPRAZOLE SODIUM 8 MG/HR: 40 INJECTION, POWDER, FOR SOLUTION INTRAVENOUS at 03:55

## 2022-10-12 RX ADMIN — MONTELUKAST 10 MG: 10 TABLET, FILM COATED ORAL at 21:30

## 2022-10-12 RX ADMIN — POLYETHYLENE GLYCOL 3350, SODIUM SULFATE ANHYDROUS, SODIUM BICARBONATE, SODIUM CHLORIDE, POTASSIUM CHLORIDE 4000 ML: 236; 22.74; 6.74; 5.86; 2.97 POWDER, FOR SOLUTION ORAL at 16:32

## 2022-10-12 RX ADMIN — PANTOPRAZOLE SODIUM 8 MG/HR: 40 INJECTION, POWDER, FOR SOLUTION INTRAVENOUS at 13:31

## 2022-10-12 RX ADMIN — Medication 10 ML: at 21:30

## 2022-10-12 RX ADMIN — PROPOFOL 50 MG: 10 INJECTION, EMULSION INTRAVENOUS at 14:55

## 2022-10-12 RX ADMIN — TORSEMIDE 20 MG: 20 TABLET ORAL at 09:03

## 2022-10-12 RX ADMIN — ROSUVASTATIN CALCIUM 10 MG: 5 TABLET, FILM COATED ORAL at 09:03

## 2022-10-12 RX ADMIN — CARVEDILOL 25 MG: 25 TABLET, FILM COATED ORAL at 21:30

## 2022-10-12 RX ADMIN — LIDOCAINE HYDROCHLORIDE 30 MG: 20 INJECTION, SOLUTION EPIDURAL; INFILTRATION; INTRACAUDAL; PERINEURAL at 14:53

## 2022-10-12 RX ADMIN — BUDESONIDE AND FORMOTEROL FUMARATE DIHYDRATE 2 PUFF: 160; 4.5 AEROSOL RESPIRATORY (INHALATION) at 08:27

## 2022-10-12 RX ADMIN — CARVEDILOL 25 MG: 25 TABLET, FILM COATED ORAL at 09:03

## 2022-10-12 RX ADMIN — HYDROCODONE BITARTRATE AND ACETAMINOPHEN 1 TABLET: 7.5; 325 TABLET ORAL at 21:30

## 2022-10-12 RX ADMIN — PROPOFOL 100 MG: 10 INJECTION, EMULSION INTRAVENOUS at 14:53

## 2022-10-12 RX ADMIN — FUROSEMIDE 20 MG: 10 INJECTION INTRAMUSCULAR; INTRAVENOUS at 03:14

## 2022-10-12 RX ADMIN — BUDESONIDE AND FORMOTEROL FUMARATE DIHYDRATE 2 PUFF: 160; 4.5 AEROSOL RESPIRATORY (INHALATION) at 19:00

## 2022-10-12 RX ADMIN — Medication 10 ML: at 08:36

## 2022-10-12 RX ADMIN — SODIUM CHLORIDE, POTASSIUM CHLORIDE, SODIUM LACTATE AND CALCIUM CHLORIDE 30 ML/HR: 600; 310; 30; 20 INJECTION, SOLUTION INTRAVENOUS at 14:37

## 2022-10-12 RX ADMIN — HYDROCODONE BITARTRATE AND ACETAMINOPHEN 1 TABLET: 7.5; 325 TABLET ORAL at 03:25

## 2022-10-12 RX ADMIN — LEVOTHYROXINE, LIOTHYRONINE 60 MG: 38; 9 TABLET ORAL at 09:03

## 2022-10-12 NOTE — ANESTHESIA POSTPROCEDURE EVALUATION
Patient: Milagros Ramirez    Procedure Summary     Date: 10/12/22 Room / Location: [facility] ENDOSCOPY 4 / [facility] ENDOSCOPY    Anesthesia Start: 1447 Anesthesia Stop: 1510    Procedure: ESOPHAGOGASTRODUODENOSCOPY Diagnosis:       Gastrointestinal hemorrhage, unspecified gastrointestinal hemorrhage type      (Gastrointestinal hemorrhage, unspecified gastrointestinal hemorrhage type [K92.2])    Surgeons: Sara Jarrett MD Provider: Reyes, Mirabelle, DO    Anesthesia Type: general ASA Status: 3          Anesthesia Type: general    Vitals  Vitals Value Taken Time   /80 10/12/22 1527   Temp 36.7 °C (98 °F) 10/12/22 1522   Pulse 84 10/12/22 1527   Resp 17 10/12/22 1522   SpO2 96 % 10/12/22 1527   Vitals shown include unvalidated device data.        Post Anesthesia Care and Evaluation    Patient location during evaluation: bedside  Patient participation: complete - patient participated  Level of consciousness: awake  Pain management: adequate    Airway patency: patent  Anesthetic complications: No anesthetic complications  PONV Status: none  Cardiovascular status: acceptable and stable  Respiratory status: acceptable  Hydration status: acceptable    Comments: An Anesthesiologist personally participated in the most demanding procedures (including induction and emergence if applicable) in the anesthesia plan, monitored the course of anesthesia administration at frequent intervals and remained physically present and available for immediate diagnosis and treatment of emergencies.

## 2022-10-12 NOTE — PLAN OF CARE
Problem: Adult Inpatient Plan of Care  Goal: Plan of Care Review  Outcome: Ongoing, Progressing  Flowsheets (Taken 10/12/2022 1508)  Outcome Evaluation: EGD today, needing one more unit when patient returns to floor. Protonix drip.  Goal: Patient-Specific Goal (Individualized)  Outcome: Ongoing, Progressing  Goal: Absence of Hospital-Acquired Illness or Injury  Outcome: Ongoing, Progressing  Intervention: Identify and Manage Fall Risk  Recent Flowsheet Documentation  Taken 10/12/2022 0816 by Angeline John RN  Safety Promotion/Fall Prevention: safety round/check completed  Intervention: Prevent Skin Injury  Recent Flowsheet Documentation  Taken 10/12/2022 0816 by Angeline John RN  Body Position: position changed independently  Intervention: Prevent and Manage VTE (Venous Thromboembolism) Risk  Recent Flowsheet Documentation  Taken 10/12/2022 0816 by Angeline John RN  Activity Management: up in chair  Goal: Optimal Comfort and Wellbeing  Outcome: Ongoing, Progressing  Goal: Readiness for Transition of Care  Outcome: Ongoing, Progressing     Problem: Fall Injury Risk  Goal: Absence of Fall and Fall-Related Injury  Outcome: Ongoing, Progressing  Intervention: Promote Injury-Free Environment  Recent Flowsheet Documentation  Taken 10/12/2022 0816 by Angeline John RN  Safety Promotion/Fall Prevention: safety round/check completed     Problem: Skin Injury Risk Increased  Goal: Skin Health and Integrity  Outcome: Ongoing, Progressing   Goal Outcome Evaluation:              Outcome Evaluation: EGD today, needing one more unit when patient returns to floor. Protonix drip.

## 2022-10-12 NOTE — PLAN OF CARE
Goal Outcome Evaluation:  Plan of Care Reviewed With: patient        Progress: improving  Outcome Evaluation: Patient receiving 3 units with one more ordered for low hemoglobin. Patient has reported improvement in her weakness, dizziness, and shortness of air since receiving units of blood. Stevie Alvarado RN     Reported chronic pain in shoulders and knees. PRN medication given per orders. Stevie Alvarado RN

## 2022-10-12 NOTE — PROGRESS NOTES
[facility]   Hospitalist Progress Note  Date: 10/12/2022  Patient Name: Milagros Ramirez     Subjective   Subjective     Chief Complaint:   Melena    Summary:   62-year-old female with A. fib on Xarelto, CKD, CHF, HLD, HTN, AMOR, lupus presents with shortness of breath and blood in her stool for a week.  Hemoglobin 3.7 on admission.  Admitted and given blood transfusions, EGD.  Given Kcentra in the emergency department    Interval Followup: This morning on evaluation patient is feeling somewhat better after blood transfusions but still feels that her shoulders and arms and muscles are sore, she is hoping her muscle cramps improve after the continue transfusions.    Review of Systems   No current nausea vomiting or diarrhea    Objective   Objective     Vitals:   Temp:  [97.6 °F (36.4 °C)-99.3 °F (37.4 °C)] 98 °F (36.7 °C)  Heart Rate:  [] 82  Resp:  [14-25] 18  BP: (106-139)/(52-84) 127/66  Physical Exam   Gen: NAD, morbidly obese female, pale, sitting up in the recliner  HEENT: NCAT, mmm  Resp: CTAB no wrr, no dyspnea  CV: RRR systolic murmur, no LE edema  GI: Abdomen soft NT, ND +bs  Psych: AOx3, normal mood and affect    Result Review    Result Review:  I have personally reviewed the results from 10/12/2022 16:43 EDT and agree with these findings:  [x]  Laboratory   Creatinine 1.1  Hemoglobin 6.9 on most recent check  []  Microbiology  [x]  Radiology chest x-ray: No change from prior study  [x]  EKG/Telemetry telemetry personally reviewed: Atrial fibrillation in the 80s  [x]  Cardiology/Vascular echo: Pending read  []  Pathology  []  Old records  []  Other:    Assessment & Plan   Assessment / Plan     Assessment/Plan:  • Symptomatic anemia secondary to GI bleed, acute blood loss anemia  • CHF  • Atrial fibrillation  • AMOR  • Lupus  • CKD    Plan:   • Transfuse to goal of hemoglobin greater than 7, already received Lasix and Kcentra  • PPI drip  • GI consulted, EGD today, colonoscopy tomorrow  • Monitor  fluid status given CHF history  • Echocardiogram pending read  • Holding Xarelto for now  • Does not tolerate home BiPAP  • Holding Plaquenil for now  • Renal function at baseline  • Consider IV iron however may have received an adequate load after transfusions  • K pad for muscle pain     DVT ppx: SCDs  Diet: Clears, n.p.o. after midnight  Discussed with bedside RN and gastroenterology

## 2022-10-12 NOTE — CONSULTS
[facility]  Initial Inpatient Consult Note    Referring Provider: Dr. Feliciano    Reason for Consultation: anemia    Subjective     History of present illness:    62 y.o. female with history of afib (on Xarelto), CKD, DM, CHF, HTN, HLT, hypothyroidism, RA, SLE, and AMOR who presents with c/o shortness of breath and dark blood in stool x 1.5 week.  She reports stools have been dark.  No nausea, vomiting, abd pain.  She reports no previous EGD/colonoscopy.  Hgb 3.7 on presentation.    Past Medical History:  Past Medical History:   Diagnosis Date   • Atrial fibrillation (HCC)     Diagnosed 5/2019   • CKD (chronic kidney disease)    • Diabetes (HCC)     Diagnosed 5/2019   • Diastolic CHF (HCC)    • Hyperlipidemia    • Hypertension    • Hypothyroidism    • Morbid obesity (HCC)    • AMOR (obstructive sleep apnea)    • Rheumatoid arthritis (HCC)    • SLE (systemic lupus erythematosus) (HCC)      Past Surgical History:  Past Surgical History:   Procedure Laterality Date   • APPENDECTOMY     • CARPAL TUNNEL RELEASE     • ENDOMETRIAL ABLATION     • HEEL SPUR EXCISION     • HERNIA REPAIR     • THYROIDECTOMY, PARTIAL        Social History:   Social History     Tobacco Use   • Smoking status: Former   • Smokeless tobacco: Never   • Tobacco comments:     Quit around age 36   Substance Use Topics   • Alcohol use: Yes     Comment: rare      Family History:  Family History   Problem Relation Age of Onset   • Stroke Mother    • Coronary artery disease Brother         3 brothers with stents at young ages    • Diabetes Paternal Grandfather    • Heart disease Father    • Atrial fibrillation Father    • Colon cancer Father    • Coronary artery disease Brother        Home Meds:  Medications Prior to Admission   Medication Sig Dispense Refill Last Dose   • carvedilol (COREG) 25 MG tablet Take 1 tablet by mouth 2 (Two) Times a Day. 180 tablet 3 10/11/2022   • cyclobenzaprine (FLEXERIL) 10 MG tablet Take 10 mg by mouth  As Needed.   10/10/2022   • Green Tea, Sabrina sinensis, (GREEN TEA PO) Take 500 mg by mouth Daily.   10/11/2022   • HYDROcodone-acetaminophen (NORCO) 7.5-325 MG per tablet Take 1 tablet by mouth Every 12 (Twelve) Hours.   10/11/2022   • hydroxychloroquine (PLAQUENIL) 200 MG tablet Take 1 tablet by mouth Every Other Day.   10/10/2022   • MAGNESIUM PO Take  by mouth Daily.   10/11/2022   • montelukast (SINGULAIR) 10 MG tablet Take 10 mg by mouth Every Night.   10/11/2022   • POTASSIUM PO Take  by mouth Daily.   10/10/2022   • rosuvastatin (CRESTOR) 10 MG tablet Take 10 mg by mouth Daily.   10/11/2022   • Thyroid 60 MG PO tablet Take 1 tablet by mouth Daily.   10/11/2022   • torsemide (DEMADEX) 20 MG tablet TAKE 1 TABLET BY MOUTH EVERY DAY 90 tablet 2 10/11/2022   • TURMERIC CURCUMIN PO Take  by mouth Daily.   10/11/2022   • Xarelto 20 MG tablet TAKE 1 TABLET BY MOUTH EVERY DAY 90 tablet 0 10/11/2022   • albuterol sulfate  (90 Base) MCG/ACT inhaler Inhale 2 puffs Every 4 (Four) Hours As Needed for Wheezing.   More than a month   • budesonide-formoterol (SYMBICORT) 160-4.5 MCG/ACT inhaler Inhale 2 puffs 2 (Two) Times a Day.   More than a month   • dabigatran etexilate (PRADAXA) 150 MG capsu Take 1 capsule by mouth 2 (Two) Times a Day. 60 capsule 11 Unknown   • Iron-Vitamins (GERITOL PO) Take  by mouth Daily.      • nitroglycerin (NITROSTAT) 0.4 MG SL tablet TAKE 1 TABLET SUBLINGUALLY EVERY 5 MINS AS NEEDED FOR CHEST PAIN. AFTER THIRD TAB, TULL682   More than a month     Current Meds:   budesonide-formoterol, 2 puff, Inhalation, BID - RT  carvedilol, 25 mg, Oral, BID  montelukast, 10 mg, Oral, Nightly  rosuvastatin, 10 mg, Oral, Daily  sodium chloride, 10 mL, Intravenous, Q12H  Thyroid, 60 mg, Oral, Daily  torsemide, 20 mg, Oral, Daily      Allergies:  Allergies   Allergen Reactions   • Other Other (See Comments)     RHEUMATOID ARTHRITIS INFUSION REACTION   • Other Irritability     INFUSION FOR RHEUMATOID  ARTHRITIS   • Rituximab Unknown - High Severity     Review of Systems  Pertinent items are noted in HPI, all other systems reviewed and negative     Objective     Vital Signs  Temp:  [97.8 °F (36.6 °C)-99.3 °F (37.4 °C)] 98.6 °F (37 °C)  Heart Rate:  [] 93  Resp:  [16-25] 16  BP: ()/(47-72) 131/66  Physical Exam:  General Appearance:    Alert, cooperative, in no acute distress   Head:    Normocephalic, without obvious abnormality, atraumatic   Eyes:          conjunctivae and sclerae normal, no icterus   Throat:   no thrush, oral mucosa moist   Neck:   Supple, no adenopathy   Lungs:     Breathing unlabored    Heart:    No chest tenderness    Chest Wall:    No abnormalities observed   Abdomen:     Soft, nondistended, nontender   Extremities:   no edema, no redness   Skin:   No bruising or rash   Psychiatric:  normal mood and insight     Results Review:   I reviewed the patient's new clinical results.    Results from last 7 days   Lab Units 10/12/22  0145 10/11/22  1550   WBC 10*3/mm3  --  6.49   HEMOGLOBIN g/dL 5.1* 3.7*   HEMATOCRIT % 17.2* 13.5*   PLATELETS 10*3/mm3  --  416     Results from last 7 days   Lab Units 10/11/22  1550   SODIUM mmol/L 136   POTASSIUM mmol/L 4.1   CHLORIDE mmol/L 99   CO2 mmol/L 25.9   BUN mg/dL 40*   CREATININE mg/dL 1.32*   CALCIUM mg/dL 8.4*   BILIRUBIN mg/dL 0.3   ALK PHOS U/L 121*   ALT (SGPT) U/L 5   AST (SGOT) U/L 8   GLUCOSE mg/dL 127*     Results from last 7 days   Lab Units 10/11/22  1550   INR  2.49*     No results found for: LIPASE    Radiology:  XR Chest 1 View   Final Result       1. No change from the previous study with no evidence for acute cardiopulmonary process.                                EDDIE SANTIAGO MD          Electronically Signed and Approved By: EDDIE SANTIAGO MD on 10/11/2022 at 16:41                               Assessment & Plan   Patient Active Problem List   Diagnosis   • CHF (congestive heart failure) (HCC)   • Extreme obesity with  alveolar hypoventilation (HCC)   • Paroxysmal atrial fibrillation (HCC)   • Essential (primary) hypertension   • Osteoarthritis of knee   • Rheumatoid arthritis (HCC)   • Stage 3 chronic kidney disease (HCC)   • Type 2 diabetes mellitus without complication (HCC)   • AMOR on CPAP   • Gastrointestinal hemorrhage, unspecified gastrointestinal hemorrhage type       Assessment:  1. Acute blood loss anemia  2. Melena    Plan:  · On PPI drip  · Will proceed with EGD for further evaluation  · Benefits vs risks of procedure d/w patient; risks include but are not limited to bleeding, infection, perforation, and risk of sedation  · Pt understands risks and agrees to proceed      I discussed the patients findings and my recommendations with patient and primary care team.    Sara Jarrett MD

## 2022-10-12 NOTE — PAYOR COMM NOTE
"Lizett Ramirez (62 y.o. Female)     Date of Birth   1960    Social Security Number       Address   [address] [place] [address]    Home Phone   [phone]    MRN   [id number]       Lutheran   Episcopalian    Marital Status                               Admission Date   10/11/22    Admission Type   Emergency    Admitting Provider   Jeremy Clark MD    Attending Provider   Latesha Chavez MD    Department, Room/Bed   [facility] 5TH FLOOR SURGICAL TELEMETRY UNIT, 508/1       Discharge Date       Discharge Disposition       Discharge Destination                               Attending Provider: Latesha Chavez MD    Allergies: Other, Other, Rituximab    Isolation: None   Infection: None   Code Status: CPR    Ht: 177.8 cm (70\")   Wt: 134 kg (295 lb 13.7 oz)    Admission Cmt: None   Principal Problem: Gastrointestinal hemorrhage, unspecified gastrointestinal hemorrhage type [K92.2]                 Active Insurance as of 10/11/2022     Primary Coverage     Payor Plan Insurance Group Employer/Plan Group    [facility] MEDICARE REPLACEMENT [facility] MEDICARE REPLACEMENT [id number]     Payor Plan Address Payor Plan Phone Number Payor Plan Fax Number Effective Dates    [address] [address]   11/1/2021 - None Entered    [facility] [id number]       Subscriber Name Subscriber Birth Date Member ID       LIZETT RAMIREZ 1960 [id number]                 Emergency Contacts      (Rel.) Home Phone Work Phone Mobile Phone    JEREMIAH RAMIREZ (Spouse) [phone] -- --    Terry Ramirez (Son) [phone] -- --    Skyler Moreno (Son) -- -- [phone]    Maisha Rangel -- -- [phone]           Gastrointestinal Bleeding, Upper RRG - Inpatient Care by Kaya Wilhelm       Met (Selected): Reviewed on 10/12/2022 by Kaya Wilhelm       Created Using Review Status Review Entered   Indicia® Completed 10/12/2022 1032       Created By   Kaya Wilhelm     "   Criteria Set Name - Subset   Gastrointestinal Bleeding, Upper RRG - Inpatient Care       Selected?   Yes - Inpatient Care is selected for the Gastrointestinal Bleeding, Upper RRG criteria set.      Criteria Review      Inpatient Care    Most Recent : Kaya Wilhelm Most Recent Date: 10/12/2022 10:32:06 EDST    (X) Admission is indicated for  1 or more  of the following :       (X) Hemodynamic instability       10/12/2022 10:32:06 EDST by Kaya Wilhelm         , Resp. 24, BP 80/58       (X) Ongoing active bleeding (eg, decreasing hematocrit)       10/12/2022 10:32:06 EDST by Kaya Wilhelm         Hemoglobin 3.7, Hematocrit 13.5       Definitions    Hemodynamic instability    (X) Hemodynamic instability, as indicated by  1 or more  of the following  (1) (2) (3) (4) (5):       (X) Vital sign abnormality not readily corrected by appropriate treatment, as indicated by  1       or more  of the following  [A]:          (X) Tachycardia that persists despite appropriate treatment (eg, volume repletion, treatment of          pain, treatment of underlying cause)       Tachycardia    (X) Tachycardia, [A] as indicated by  1 or more  of the following  (1) (2):       (X) Heart rate greater than 100 beats per minute in adult or child age 6 years or older [A]        (X) Other Indication: symptoms      Entered 10/12/2022 10:32:06 EDST by Kaya Wilhelm     Shortness of breath, leg swelling, blood in stool, pallor. Has gained 14 pounds.    Notes:    10/12/2022 10:32:06 EDST by Kaya Wilhelm    Subject: Admission    Active Hospital Problems         Diagnosis         •    **Gastrointestinal hemorrhage, unspecified gastrointestinal hemorrhage type                   Plan:    Symptomatic anemia    Bloody stool    -On Xarelto for A. Fib: HELD    -Kcentra ordered in the ED    -2 units RBCs ordered in ED                -Repeat hemoglobin 5.1    -2 additional units ordered                -Lasix ordered as well     -PPI drip    [] Follow-up repeat labs    [] Monitor for fluid overload    [] Follow-up with GI (unclear if the ED actually spoke to them)         CHF    -BNP 4400    -Caution in the setting of significant RBC resuscitation                [] Monitor for fluid overload                [] Dose Lasix or torsemide as needed    -Continue home CHF meds including torsemide    -Patient notes she has had lower extremity edema left for some time now    [] Follow-up repeat echocardiogram    [] Consider cardiology consult         Atrial fibrillation    -HOLD Xarelto    -Continue other home meds         AMOR    -Patient cannot tolerate CPAP         Lupus    -Patient takes Plaquenil on stagger days (not ordered)         CKD    -Appears to be near baseline         DVT prophylaxis: SCDs; no pharmacological prophylaxis in the setting of bleed       10/12/2022 10:32:06 EDST by Kaya Wilhelm    Subject: Admission    Chief Complaint: Shortness of breath/blood in stool/low hemoglobin         History of Present Illness    62-year-old morbidly obese female former smoker with A. fib (on Xarelto) CKD, CHF, HLD, HTN, AMOR, and lupus presents with SOB for more than a week and intermittent blood in her stool for the past week and a half.  Sent over here for evaluation by pulmonologist due to tachycardia and pallor         Patient reports associated dizziness and blurry vision.  She denies fevers, chills, chest pain, abdominal pain, diarrhea, melena, hematemesis.  She does report lower extremity edema which is a chronic issue for her.  She also has chronic orthopedic pain.         Pt states she feels cold all the time and gained 14 pounds.    Pt states she occasionally drinks alcohol and denies liver disease.         Vitals notable for tachycardia and tachypnea with an episode of hypotension.         Notable labs:    Troponin: Negative    BNP 4413 (up from 2300)         BUN/creatinine 40/1.32 (close to baseline)         Hgb: 3.7    Platelets  416         PTT 62.7    INR 2.49         ED ordered Kcentra and 2 units RBCs.              Review of Systems    Constitutional: Negative for fevers and chills..    HENT: Negative for congestion, sinus pressure, sore throat and trouble swallowing.      Eyes: Positive for some blurry vision.  Negative for pain, discharge, redness.    Respiratory: Positive for shortness of breath. Negative for cough, chest tightness and wheezing.      Cardiovascular: Positive for leg swelling. Negative for chest pain and palpitations.    Gastrointestinal: Positive for blood in stool. Negative for abdominal pain, diarrhea, nausea and vomiting.    Endocrine: Negative for cold intolerance and polydipsia.    Genitourinary: Negative for dysuria, frequency, hematuria and urgency.    Musculoskeletal: Negative for arthralgias, joint swelling, neck pain and neck stiffness.    Skin: Positive for pallor. Negative for color change and rash.    Allergic/Immunologic: Negative for environmental allergies and immunocompromised state.    Neurological: Positive for dizziness and weakness.    Hematological: Does not bruise/bleed easily.    Psychiatric/Behavioral: Negative for agitation, confusion, dysphoric mood and suicidal ideas.     Personal History          Mejias: NPI [id number] Tax ID [id number]  Problem List           Codes Noted - Resolved       Hospital    * (Principal) Gastrointestinal hemorrhage, unspecified gastrointestinal hemorrhage type ICD-10-CM: K92.2  ICD-9-CM: 578.9 10/11/2022 - Present       Non-Hospital    AMOR on CPAP ICD-10-CM: G47.33, Z99.89  ICD-9-CM: 327.23, V46.8 8/30/2022 - Present    Osteoarthritis of knee ICD-10-CM: M17.9  ICD-9-CM: 715.36 6/4/2021 - Present    Essential (primary) hypertension ICD-10-CM: I10  ICD-9-CM: 401.9 2/10/2020 - Present    Rheumatoid arthritis (HCC) ICD-10-CM: M06.9  ICD-9-CM: 714.0 2/10/2020 - Present    Stage 3 chronic kidney disease (HCC) ICD-10-CM: N18.30  ICD-9-CM: 585.3 2/10/2020 - Present     Type 2 diabetes mellitus without complication (HCC) ICD-10-CM: E11.9  ICD-9-CM: 250.00 2/10/2020 - Present    Paroxysmal atrial fibrillation (HCC) ICD-10-CM: I48.0  ICD-9-CM: 427.31 2019 - Present    Extreme obesity with alveolar hypoventilation (HCC) ICD-10-CM: E66.2  ICD-9-CM: 278.03 2019 - Present    CHF (congestive heart failure) (HCC) ICD-10-CM: I50.9  ICD-9-CM: 428.0 2019 - Present        History & Physical      [place], Jeremy Taylor MD at 10/11/22 4019            [facility]   HISTORY AND PHYSICAL    Patient Name: Milagros Ramirez  : 1960  MRN: [id number]  Primary Care Physician: Beverley De Luna APRN  Date of admission: 10/11/2022    Subjective   Subjective     Chief Complaint: Shortness of breath/blood in stool/low hemoglobin    History of Present Illness  62-year-old morbidly obese female former smoker with A. fib (on Xarelto) CKD, CHF, HLD, HTN, AMOR, and lupus presents with SOB for more than a week and intermittent blood in her stool for the past week and a half.  Sent over here for evaluation by pulmonologist due to tachycardia and pallor    Patient reports associated dizziness and blurry vision.  She denies fevers, chills, chest pain, abdominal pain, diarrhea, melena, hematemesis.  She does report lower extremity edema which is a chronic issue for her.  She also has chronic orthopedic pain.     Pt states she feels cold all the time and gained 14 pounds.   Pt states she occasionally drinks alcohol and denies liver disease.    Vitals notable for tachycardia and tachypnea with an episode of hypotension.    Notable labs:  Troponin: Negative  BNP 4413 (up from 2300)    BUN/creatinine 40/1.32 (close to baseline)    Hgb: 3.7  Platelets 416    PTT 62.7  INR 2.49    ED ordered Kcentra and 2 units RBCs.      Review of Systems   Constitutional: Negative for fevers and chills..   HENT: Negative for congestion, sinus pressure, sore throat and trouble swallowing.    Eyes: Positive for some  blurry vision.  Negative for pain, discharge, redness.  Respiratory: Positive for shortness of breath. Negative for cough, chest tightness and wheezing.    Cardiovascular: Positive for leg swelling. Negative for chest pain and palpitations.   Gastrointestinal: Positive for blood in stool. Negative for abdominal pain, diarrhea, nausea and vomiting.   Endocrine: Negative for cold intolerance and polydipsia.   Genitourinary: Negative for dysuria, frequency, hematuria and urgency.   Musculoskeletal: Negative for arthralgias, joint swelling, neck pain and neck stiffness.   Skin: Positive for pallor. Negative for color change and rash.   Allergic/Immunologic: Negative for environmental allergies and immunocompromised state.   Neurological: Positive for dizziness and weakness.  Hematological: Does not bruise/bleed easily.   Psychiatric/Behavioral: Negative for agitation, confusion, dysphoric mood and suicidal ideas.   Personal History     Past Medical History:   Diagnosis Date   • Atrial fibrillation (HCC)     Diagnosed 5/2019   • CKD (chronic kidney disease)    • Diabetes (HCC)     Diagnosed 5/2019   • Diastolic CHF (HCC)    • Hyperlipidemia    • Hypertension    • Hypothyroidism    • Morbid obesity (HCC)    • AMOR (obstructive sleep apnea)    • Rheumatoid arthritis (HCC)    • SLE (systemic lupus erythematosus) (HCC)        Past Surgical History:   Procedure Laterality Date   • APPENDECTOMY     • CARPAL TUNNEL RELEASE     • ENDOMETRIAL ABLATION     • HEEL SPUR EXCISION     • HERNIA REPAIR     • THYROIDECTOMY, PARTIAL         Family History: family history includes Atrial fibrillation in her father; Colon cancer in her father; Coronary artery disease in her brother and brother; Diabetes in her paternal grandfather; Heart disease in her father; Stroke in her mother. Otherwise pertinent FHx was reviewed and not pertinent to current issue.    Social History:  reports that she has quit smoking. She has never used smokeless  tobacco. She reports current alcohol use. She reports that she does not use drugs.    Home Medications:  Green Tea (Sabrina sinensis), HYDROcodone-acetaminophen, Iron-Vitamins, Magnesium, Potassium, Thyroid, Turmeric, albuterol sulfate HFA, budesonide-formoterol, carvedilol, cyclobenzaprine, dabigatran etexilate, hydroxychloroquine, montelukast, nitroglycerin, rivaroxaban, rosuvastatin, and torsemide      Allergies:  Allergies   Allergen Reactions   • Other Other (See Comments)     RHEUMATOID ARTHRITIS INFUSION REACTION   • Other Irritability     INFUSION FOR RHEUMATOID ARTHRITIS   • Rituximab Unknown - High Severity       Objective    Objective     Vitals:  Temp:  [97.8 °F (36.6 °C)-99.3 °F (37.4 °C)] 99 °F (37.2 °C)  Heart Rate:  [] 91  Resp:  [16-25] 18  BP: ()/(47-72) 129/59    Physical Exam  Constitutional:       General: She is in some distress from knee pain; sitting up on the side of the bed.     Appearance: Morbidly obese.  Normal appearance. She is not toxic-appearing.   HENT:      Head: Normocephalic and atraumatic.      Mouth/Throat:      Mouth: Mucous membranes are moist.      Comments: Oral mucosa is pale  Eyes:      General: Lids are normal.      Extraocular Movements: Extraocular movements intact.      Conjunctiva/sclera: Conjunctivae normal.      Pupils: Pupils are equal, round, and reactive to light.      Comments: Conjunctiva is pale   Cardiovascular:      Rate and Rhythm: Normal rate. Rhythm irregularly irregular.      Pulses: Normal pulses.      Heart sounds: Normal heart sounds.   Pulmonary:      Effort: Pulmonary effort is normal. No respiratory distress.      Breath sounds: Decreased breath sounds and rales present. No wheezing or rhonchi.   Abdominal: Morbidly obese     General: Abdomen is flat. There is no distension.      Palpations: Abdomen is soft.      Tenderness: There is no abdominal tenderness. There is no guarding or rebound.   Musculoskeletal:         General: Normal  range of motion.      Cervical back: Normal range of motion and neck supple.      Right lower le+ Pitting Edema present.      Left lower le+ Pitting Edema present.   Skin:     General: Skin is warm and dry.   Neurological:      General: No focal deficit present.      Mental Status: She is alert and oriented to person, place, and time. Mental status is at baseline.   Psychiatric:         Mood and Affect: Mood normal.         Behavior: Behavior normal.   Result Review    Result Review:  I have personally reviewed the results from the time of this admission to 10/11/2022 22:55 EDT and agree with these findings:  [x]  Laboratory list / accordion  []  Microbiology  [x]  Radiology  [x]  EKG/Telemetry   []  Cardiology/Vascular   []  Pathology  []  Old records  []  Other:  Most notable findings include: Hemoglobin 3.7; BNP 4400; elevated coags      Assessment & Plan   Assessment / Plan     Brief Patient Summary:  62-year-old morbidly obese female former smoker with A. fib (on Xarelto) CKD, CHF, HLD, HTN, AMOR, and lupus presents with SOB for more than a week and intermittent blood in her stool for the past week and a half.  Found to have hemoglobin of 3.7.    Active Hospital Problems:  Active Hospital Problems    Diagnosis    • **Gastrointestinal hemorrhage, unspecified gastrointestinal hemorrhage type        Plan:   Symptomatic anemia  Bloody stool  -On Xarelto for A. Fib: HELD  -Kcentra ordered in the ED  -2 units RBCs ordered in ED   -Repeat hemoglobin 5.1  -2 additional units ordered   -Lasix ordered as well  -PPI drip  [] Follow-up repeat labs  [] Monitor for fluid overload  [] Follow-up with GI (unclear if the ED actually spoke to them)    CHF  -BNP 4400  -Caution in the setting of significant RBC resuscitation   [] Monitor for fluid overload    [] Dose Lasix or torsemide as needed  -Continue home CHF meds including torsemide  -Patient notes she has had lower extremity edema left for some time now  [] Follow-up  repeat echocardiogram  [] Consider cardiology consult    Atrial fibrillation  -HOLD Xarelto  -Continue other home meds    AMOR  -Patient cannot tolerate CPAP    Lupus  -Patient takes Plaquenil on stagger days (not ordered)    CKD  -Appears to be near baseline    DVT prophylaxis: SCDs; no pharmacological prophylaxis in the setting of bleed    CODE STATUS:    Code Status and Medical Interventions:   Ordered at: 10/11/22 4817     Code Status (Patient has no pulse and is not breathing):    CPR (Attempt to Resuscitate)     Medical Interventions (Patient has pulse or is breathing):    Full Support       Admission Status:  I believe this patient meets inpatient status.    Jeremy Clark MD,    Electronically signed by Jereym Clark MD at 10/12/22 0314          Emergency Department Notes      Letty Brian RN at 10/11/22 1542        Pt complains of shortness of breath. Pt states she went to her pulmonologist today and he told her to follow up at ED for shortness of breath and possible low HGB. She said she has noticed that her stool is dark and all of her symptoms have started over the last few weeks.     Electronically signed by Letty Brian, RN at 10/11/22 6250       Vital Signs (last day)     Date/Time Temp Temp src Pulse Resp BP Patient Position SpO2    10/12/22 0828 -- -- 94 18 -- -- --    10/12/22 0826 97.6 (36.4) Oral 90 16 134/64 -- 96    10/12/22 0810 98.7 (37.1) Oral 91 20 132/74 -- 93    10/12/22 0732 98.6 (37) Oral 93 16 131/66 -- 95    10/12/22 0725 -- -- 82 -- -- -- 93    10/12/22 0650 99.2 (37.3) Oral 90 16 132/69 Sitting 94    10/12/22 0405 99.1 (37.3) Oral 86 16 121/63 -- 92    10/12/22 0311 98.6 (37) Oral 89 18 115/57 Sitting 93    10/12/22 0003 98 (36.7) -- 94 18 121/55 -- 92    10/11/22 2245 99 (37.2) Axillary 91 18 129/59 Sitting 97    10/11/22 2207 99.3 (37.4) Oral -- 18 132/52 -- 96    10/11/22 2117 98.5 (36.9) Oral 109 20 122/64 Sitting 99    10/11/22 1945 98.4 (36.9) Oral  100 20 130/72 Sitting 100    10/11/22 1747 97.8 (36.6) -- 111 25 131/68 -- 100    10/11/22 17:34:53 97.9 (36.6) Oral 106 16 106/72 -- 97    10/11/22 1645 -- -- 92 22 114/70 -- 96    10/11/22 1615 -- -- 101 -- 102/47 -- 96    10/11/22 1544 97.9 (36.6) -- 110 24 80/58 -- 98            Facility-Administered Medications as of 10/12/2022   Medication Dose Route Frequency Provider Last Rate Last Admin   • acetaminophen (TYLENOL) tablet 650 mg  650 mg Oral Q4H PRN Jeremy Clark MD       • albuterol (PROVENTIL) nebulizer solution 0.083% 2.5 mg/3mL  2.5 mg Nebulization Q6H PRN Jeremy Clark MD       • budesonide-formoterol (SYMBICORT) 160-4.5 MCG/ACT inhaler 2 puff  2 puff Inhalation BID - RT Jeremy Clark MD   2 puff at 10/12/22 0827   • carvedilol (COREG) tablet 25 mg  25 mg Oral BID Jeremy Clark MD   25 mg at 10/12/22 0903   • [COMPLETED] cyclobenzaprine (FLEXERIL) tablet 10 mg  10 mg Oral Once [place], [place]   10 mg at 10/11/22 2307   • cyclobenzaprine (FLEXERIL) tablet 10 mg  10 mg Oral PRN Jeremy Clark MD       • [COMPLETED] furosemide (LASIX) injection 20 mg  20 mg Intravenous Once [place], [place]   20 mg at 10/12/22 0314   • [COMPLETED] furosemide (LASIX) injection 40 mg  40 mg Intravenous Once Cameron Feliciano DO   40 mg at 10/11/22 1707   • HYDROcodone-acetaminophen (NORCO) 7.5-325 MG per tablet 1 tablet  1 tablet Oral Q8H PRN Jeremy Clark MD   1 tablet at 10/12/22 0325   • montelukast (SINGULAIR) tablet 10 mg  10 mg Oral Nightly Jeremy Clark MD       • ondansetron (ZOFRAN) tablet 4 mg  4 mg Oral Q6H PRN Jeremy Clark MD        Or   • ondansetron (ZOFRAN) injection 4 mg  4 mg Intravenous Q6H PRN Jeremy Clark MD       • pantoprazole (PROTONIX) 40 mg in 100mL NS IVPB  8 mg/hr Intravenous Continuous Jeremy Clark MD 20 mL/hr at 10/12/22 0810 8 mg/hr at 10/12/22 0810   • [COMPLETED] pantoprazole  (PROTONIX) injection 80 mg  80 mg Intravenous Once Cameron Feliciano DO   80 mg at 10/11/22 1707   • [COMPLETED] prothrombin complex conc human (KCentra) IV solution 5,180 Units  5,180 Units Intravenous Once Cameron Feliciano DO   5,180 Units at 10/11/22 1723   • rosuvastatin (CRESTOR) tablet 10 mg  10 mg Oral Daily Jeremy Clark MD   10 mg at 10/12/22 0903   • sodium chloride 0.9 % flush 10 mL  10 mL Intravenous PRN Jeremy Clark MD       • sodium chloride 0.9 % flush 10 mL  10 mL Intravenous PRN Jeremy Clark MD       • sodium chloride 0.9 % flush 10 mL  10 mL Intravenous Q12H Jeremy Clark MD   10 mL at 10/12/22 0836   • sodium chloride 0.9 % infusion 40 mL  40 mL Intravenous PRN Jeremy Clark MD       • Thyroid (ARMOUR) tablet 60 mg  60 mg Oral Daily Jeremy Clark MD   60 mg at 10/12/22 0903   • torsemide (DEMADEX) tablet 20 mg  20 mg Oral Daily Jeremy Clark MD   20 mg at 10/12/22 0903     Orders (last 24 hrs)      Start     Ordered    10/12/22 2100  montelukast (SINGULAIR) tablet 10 mg  Nightly         10/12/22 0151    10/12/22 0930  budesonide-formoterol (SYMBICORT) 160-4.5 MCG/ACT inhaler 2 puff  2 Times Daily - RT         10/12/22 0151    10/12/22 0911  Hemoglobin & Hematocrit, Blood  Once         10/12/22 0910    10/12/22 0900  carvedilol (COREG) tablet 25 mg  2 Times Daily         10/12/22 0151    10/12/22 0900  rosuvastatin (CRESTOR) tablet 10 mg  Daily         10/12/22 0151    10/12/22 0900  Thyroid (ARMOUR) tablet 60 mg  Daily         10/12/22 0151    10/12/22 0900  torsemide (DEMADEX) tablet 20 mg  Daily         10/12/22 0151    10/12/22 0800  Case Request  Once         10/12/22 0759    10/12/22 0600  CBC Auto Differential  Morning Draw,   Status:  Canceled         10/11/22 1734    10/12/22 0600  CK  Morning Draw,   Status:  Canceled         10/11/22 1734    10/12/22 0600  Comprehensive Metabolic Panel  Morning Draw,   Status:   Canceled         10/11/22 1734    10/12/22 0600  Protime-INR  Morning Draw,   Status:  Canceled         10/11/22 1734    10/12/22 0600  Magnesium  Morning Draw,   Status:  Canceled         10/11/22 1734    10/12/22 0600  Phosphorus  Morning Draw,   Status:  Canceled         10/11/22 1734    10/12/22 0600  aPTT  Morning Draw,   Status:  Canceled         10/11/22 1734    10/12/22 0309  Adult Transthoracic Echo Complete W/ Cont if Necessary Per Protocol  Once         10/12/22 0309    10/12/22 0300  furosemide (LASIX) injection 20 mg  Once         10/12/22 0213    10/12/22 0210  Verify Informed Consent for Blood Product Administration  Once         10/12/22 0213    10/12/22 0210  RN To Enter The Following Labs When Transfusion Complete  Continuous         10/12/22 0213    10/12/22 0210  Prepare RBC, 2 Units  Blood - Once         10/12/22 0213    10/12/22 0209  Transfuse RBC, 2 Units Infuse Each Unit Over: 3.5H  Transfusion       10/12/22 0213    10/12/22 0150  HYDROcodone-acetaminophen (NORCO) 7.5-325 MG per tablet 1 tablet  Every 8 Hours PRN         10/12/22 0151    10/12/22 0150  cyclobenzaprine (FLEXERIL) tablet 10 mg  As Needed         10/12/22 0151    10/12/22 0146  albuterol (PROVENTIL) nebulizer solution 0.083% 2.5 mg/3mL  Every 6 Hours PRN         10/12/22 0151    10/12/22 0030  Hemoglobin & Hematocrit, Blood  Timed         10/12/22 0058    10/12/22 0001  NPO Diet NPO Type: Sips with Meds, Ice Chips  Diet Effective Midnight         10/11/22 1734    10/11/22 2215  cyclobenzaprine (FLEXERIL) tablet 10 mg  Once         10/11/22 2121    10/11/22 2100  sodium chloride 0.9 % flush 10 mL  Every 12 Hours Scheduled         10/11/22 1734    10/11/22 2000  Vital Signs  Every 4 Hours       10/11/22 1734    10/11/22 1800  Oral Care  2 Times Daily       10/11/22 1734    10/11/22 1745  sodium chloride 0.9 % infusion  Continuous,   Status:  Discontinued         10/11/22 1734    10/11/22 1734  ondansetron (ZOFRAN) tablet 4 mg   Every 6 Hours PRN        See Hyperspace for full Linked Orders Report.    10/11/22 1734    10/11/22 1734  ondansetron (ZOFRAN) injection 4 mg  Every 6 Hours PRN        See Hyperspace for full Linked Orders Report.    10/11/22 1734    10/11/22 1733  morphine injection 2 mg  Every 4 Hours PRN,   Status:  Discontinued        See Hyperspace for full Linked Orders Report.    10/11/22 1734    10/11/22 1733  naloxone (NARCAN) injection 0.4 mg  Every 5 Minutes PRN,   Status:  Discontinued        See Hyperspace for full Linked Orders Report.    10/11/22 1734    10/11/22 1733  acetaminophen (TYLENOL) tablet 650 mg  Every 4 Hours PRN        See Hyperspace for full Linked Orders Report.    10/11/22 1734    10/11/22 1733  acetaminophen (TYLENOL) 160 MG/5ML solution 650 mg  Every 4 Hours PRN,   Status:  Discontinued        See Hyperspace for full Linked Orders Report.    10/11/22 1734    10/11/22 1733  acetaminophen (TYLENOL) suppository 650 mg  Every 4 Hours PRN,   Status:  Discontinued        See Hyperspace for full Linked Orders Report.    10/11/22 1734    10/11/22 1731  Inpatient Gastroenterology Consult  Once        Specialty:  Gastroenterology  Provider:  Sara Jarrett MD    10/11/22 1734    10/11/22 1728  Diet Regular; Cardiac, Renal  Diet Effective Now,   Status:  Canceled         10/11/22 1734    10/11/22 1726  Place Sequential Compression Device  Once         10/11/22 1734    10/11/22 1726  Maintain Sequential Compression Device  Continuous         10/11/22 1734    10/11/22 1726  Code Status and Medical Interventions:  Continuous         10/11/22 1734    10/11/22 1721  Telemetry - Maintain IV Access  Continuous,   Status:  Canceled         10/11/22 1734    10/11/22 1721  Continuous Cardiac Monitoring  Continuous         10/11/22 1734    10/11/22 1721  Notify Provider if ACLS Protocol Activated  Until Discontinued,   Status:  Canceled         10/11/22 1734    10/11/22 1721  Intake & Output  Every Shift        10/11/22 1734    10/11/22 1721  Weigh Patient  Once         10/11/22 1734    10/11/22 1721  Oxygen Therapy- Nasal Cannula; Titrate for SPO2: 90% - 95%  Continuous         10/11/22 1734    10/11/22 1721  Insert Peripheral IV  Once         10/11/22 1734    10/11/22 1721  Saline Lock & Maintain IV Access  Continuous         10/11/22 1734    10/11/22 1721  Inpatient Admission  Once         10/11/22 1734    10/11/22 1720  Potassium  As Needed,   Status:  Canceled      Comments: For Ventricular Arrhythmias      10/11/22 1734    10/11/22 1720  Magnesium  As Needed,   Status:  Canceled      Comments: For Ventricular Arrhythmias      10/11/22 1734    10/11/22 1720  Troponin  As Needed,   Status:  Canceled      Comments: For Chest Pain      10/11/22 1734    10/11/22 1720  Blood Gas, Arterial -With Co-Ox Panel: Yes  As Needed,   Status:  Canceled      Comments: Draw for Acute Dyspnea, Cyanosis, Suspected Hypoxemia or UnresponsivenessNotify Provider of Results      10/11/22 1734    10/11/22 1720  ACLS Protocol For Life Threatening Dysrhythmias (Unless Code Status Indicates Otherwise)  As Needed,   Status:  Canceled       10/11/22 1734    10/11/22 1720  sodium chloride 0.9 % flush 10 mL  As Needed         10/11/22 1734    10/11/22 1720  sodium chloride 0.9 % infusion 40 mL  As Needed         10/11/22 1734    10/11/22 1720  Telemetry - Pulse Oximetry  Continuous PRN,   Status:  Canceled      Comments: If Patient Develops Unresponsiveness, Acute Dyspnea, Cyanosis or Suspected Hypoxemia Start Continuous Pulse Ox Monitoring, Apply Oxygen & Notify Provider    10/11/22 1734    10/11/22 1720  Oxygen Therapy- Nasal Cannula; Titrate for SPO2: 90% - 95%  Continuous PRN,   Status:  Canceled      Comments: If Patient Develops Unresponsiveness, Acute Dyspnea, Cyanosis or Suspected Hypoxemia Start Continuous Pulse Ox Monitoring, Apply Oxygen & Notify Provider    10/11/22 1734    10/11/22 1720  nitroglycerin (NITROSTAT) SL tablet 0.4 mg  Every  5 Minutes PRN,   Status:  Discontinued         10/11/22 1734    10/11/22 1720  ECG 12 Lead  As Needed,   Status:  Canceled      Comments: Nurse to Release if Patient Expericences Acute Chest Pain or Dysrhythmias    10/11/22 1734    10/11/22 1712  Gastroenterology (on-call MD unless specified)  Once        Specialty:  Gastroenterology  Provider:  Sara Jarrett MD    10/11/22 1712    10/11/22 1700  pantoprazole (PROTONIX) injection 80 mg  Once         10/11/22 1647    10/11/22 1700  pantoprazole (PROTONIX) 40 mg in 100mL NS IVPB  Continuous         10/11/22 1647    10/11/22 1700  furosemide (LASIX) injection 40 mg  Once         10/11/22 1647    10/11/22 1700  prothrombin complex conc human (KCentra) IV solution 5,180 Units  Once         10/11/22 1648    10/11/22 1653  Hospitalist (on-call MD unless specified)  Once        Specialty:  Hospitalist  Provider:  Jeremy Clark MD    10/11/22 1652    10/11/22 1645  Protime-INR  Once         10/11/22 1644    10/11/22 1645  Verify Informed Consent  Once         10/11/22 1647    10/11/22 1645  Prepare RBC, 2 Units  Blood - Once         10/11/22 1647    10/11/22 1644  Transfuse RBC, 2 Units Infuse Each Unit Over: 2H  Transfusion       10/11/22 1647    10/11/22 1616  ABO RH Specimen Verification  STAT         10/11/22 1615    10/11/22 1552  aPTT  Once         10/11/22 1551    10/11/22 1548  Occult Blood, Fecal By Immunoassay - Stool, Per Rectum  Once,   Status:  Canceled         10/11/22 1547    10/11/22 1547  NPO Diet NPO Type: Strict NPO  Diet Effective Now,   Status:  Canceled         10/11/22 1546    10/11/22 1547  Undress & Gown  Once,   Status:  Canceled         10/11/22 1546    10/11/22 1547  Cardiac Monitoring  Continuous,   Status:  Canceled         10/11/22 1546    10/11/22 1547  Continuous Pulse Oximetry  Continuous         10/11/22 1546    10/11/22 1547  Vital Signs  Per Hospital Policy,   Status:  Canceled         10/11/22 1546    10/11/22 1547   ECG 12 Lead  Once         10/11/22 1546    10/11/22 1547  XR Chest 1 View  1 Time Imaging         10/11/22 1546    10/11/22 1547  Insert Peripheral IV  Once,   Status:  Canceled         10/11/22 1546    10/11/22 1547  [place] Draw  Once         10/11/22 1546    10/11/22 1547  CBC & Differential  Once         10/11/22 1546    10/11/22 1547  Comprehensive Metabolic Panel  Once         10/11/22 1546    10/11/22 1547  BNP  Once         10/11/22 1546    10/11/22 1547  Troponin  Once         10/11/22 1546    10/11/22 1547  Green Top (Gel)  PROCEDURE ONCE         10/11/22 1546    10/11/22 1547  Lavender Top  PROCEDURE ONCE         10/11/22 1546    10/11/22 1547  Gold Top - SST  PROCEDURE ONCE         10/11/22 1546    10/11/22 1547  Light Blue Top  PROCEDURE ONCE         10/11/22 1546    10/11/22 1547  CBC Auto Differential  PROCEDURE ONCE         10/11/22 1546    10/11/22 1547  NPO Diet NPO Type: Strict NPO  Diet Effective Now,   Status:  Canceled         10/11/22 1546    10/11/22 1547  Undress & Gown  Once         10/11/22 1546    10/11/22 1547  Cardiac Monitoring  Continuous,   Status:  Canceled         10/11/22 1546    10/11/22 1547  Pulse Oximetry  Per Hospital Policy         10/11/22 1546    10/11/22 1547  Measure Blood Pressure  Per Hospital Policy         10/11/22 1546    10/11/22 1547  Vital Signs  Per Hospital Policy         10/11/22 1546    10/11/22 1547  Orthostatic Blood Pressure  Once         10/11/22 1546    10/11/22 1547  Insert Peripheral IV  Once         10/11/22 1546    10/11/22 1547  [place] Draw  Once,   Status:  Canceled         10/11/22 1546    10/11/22 1547  Type & Screen  Once         10/11/22 1546    10/11/22 1547  Lactic Acid, Plasma  Once         10/11/22 1546    10/11/22 1547  Green Top (Gel)  PROCEDURE ONCE,   Status:  Canceled         10/11/22 1546    10/11/22 1547  Lavender Top  PROCEDURE ONCE,   Status:  Canceled         10/11/22 1546    10/11/22 1547  Gold Top - SST  PROCEDURE ONCE,    Status:  Canceled         10/11/22 1546    10/11/22 1547  Light Blue Top  PROCEDURE ONCE,   Status:  Canceled         10/11/22 1546    10/11/22 1546  Oxygen Therapy- Nasal Cannula; 2 LPM; Titrate for SPO2: equal to or greater than, 92%  Continuous PRN,   Status:  Canceled       10/11/22 1546    10/11/22 1546  sodium chloride 0.9 % flush 10 mL  As Needed         10/11/22 1546    10/11/22 1546  Oxygen Therapy- Nasal Cannula; 2 LPM; Titrate for SPO2: equal to or greater than, 92%  Continuous PRN,   Status:  Canceled       10/11/22 1546    10/11/22 1546  sodium chloride 0.9 % flush 10 mL  As Needed,   Status:  Discontinued         10/11/22 1546    --  Thyroid 60 MG PO tablet  Daily         10/11/22 1614    Signed and Held  Follow Anesthesia Guidelines / Protocol  Once         Signed and Held    Signed and Held  Obtain Informed Consent  Once         Signed and Held                Physician Progress Notes (last 24 hours)  Notes from 10/11/22 1041 through 10/12/22 1041   No notes of this type exist for this encounter.            Consult Notes (last 24 hours)      Sara Jarrett MD at 10/12/22 0759      Consult Orders    1. Inpatient Gastroenterology Consult [[id number]] ordered by Jeremy Clark MD at 10/11/22 2218    2. Gastroenterology (on-call MD unless specified) [[id number]] ordered by Jeremy Clark MD at 10/11/22 1712               [facility]  Initial Inpatient Consult Note    Referring Provider: Dr. Feliciano    Reason for Consultation: anemia    Subjective     History of present illness:    62 y.o. female with history of afib (on Xarelto), CKD, DM, CHF, HTN, HLT, hypothyroidism, RA, SLE, and AMOR who presents with c/o shortness of breath and dark blood in stool x 1.5 week.  She reports stools have been dark.  No nausea, vomiting, abd pain.  She reports no previous EGD/colonoscopy.  Hgb 3.7 on presentation.    Past Medical History:  Past Medical History:   Diagnosis  Date   • Atrial fibrillation (HCC)     Diagnosed 5/2019   • CKD (chronic kidney disease)    • Diabetes (HCC)     Diagnosed 5/2019   • Diastolic CHF (HCC)    • Hyperlipidemia    • Hypertension    • Hypothyroidism    • Morbid obesity (HCC)    • AMOR (obstructive sleep apnea)    • Rheumatoid arthritis (HCC)    • SLE (systemic lupus erythematosus) (HCC)      Past Surgical History:  Past Surgical History:   Procedure Laterality Date   • APPENDECTOMY     • CARPAL TUNNEL RELEASE     • ENDOMETRIAL ABLATION     • HEEL SPUR EXCISION     • HERNIA REPAIR     • THYROIDECTOMY, PARTIAL        Social History:   Social History     Tobacco Use   • Smoking status: Former   • Smokeless tobacco: Never   • Tobacco comments:     Quit around age 36   Substance Use Topics   • Alcohol use: Yes     Comment: rare      Family History:  Family History   Problem Relation Age of Onset   • Stroke Mother    • Coronary artery disease Brother         3 brothers with stents at young ages    • Diabetes Paternal Grandfather    • Heart disease Father    • Atrial fibrillation Father    • Colon cancer Father    • Coronary artery disease Brother        Home Meds:  Medications Prior to Admission   Medication Sig Dispense Refill Last Dose   • carvedilol (COREG) 25 MG tablet Take 1 tablet by mouth 2 (Two) Times a Day. 180 tablet 3 10/11/2022   • cyclobenzaprine (FLEXERIL) 10 MG tablet Take 10 mg by mouth As Needed.   10/10/2022   • Green Tea, Sabrina sinensis, (GREEN TEA PO) Take 500 mg by mouth Daily.   10/11/2022   • HYDROcodone-acetaminophen (NORCO) 7.5-325 MG per tablet Take 1 tablet by mouth Every 12 (Twelve) Hours.   10/11/2022   • hydroxychloroquine (PLAQUENIL) 200 MG tablet Take 1 tablet by mouth Every Other Day.   10/10/2022   • MAGNESIUM PO Take  by mouth Daily.   10/11/2022   • montelukast (SINGULAIR) 10 MG tablet Take 10 mg by mouth Every Night.   10/11/2022   • POTASSIUM PO Take  by mouth Daily.   10/10/2022   • rosuvastatin (CRESTOR) 10 MG tablet  Take 10 mg by mouth Daily.   10/11/2022   • Thyroid 60 MG PO tablet Take 1 tablet by mouth Daily.   10/11/2022   • torsemide (DEMADEX) 20 MG tablet TAKE 1 TABLET BY MOUTH EVERY DAY 90 tablet 2 10/11/2022   • TURMERIC CURCUMIN PO Take  by mouth Daily.   10/11/2022   • Xarelto 20 MG tablet TAKE 1 TABLET BY MOUTH EVERY DAY 90 tablet 0 10/11/2022   • albuterol sulfate  (90 Base) MCG/ACT inhaler Inhale 2 puffs Every 4 (Four) Hours As Needed for Wheezing.   More than a month   • budesonide-formoterol (SYMBICORT) 160-4.5 MCG/ACT inhaler Inhale 2 puffs 2 (Two) Times a Day.   More than a month   • dabigatran etexilate (PRADAXA) 150 MG capsu Take 1 capsule by mouth 2 (Two) Times a Day. 60 capsule 11 Unknown   • Iron-Vitamins (GERITOL PO) Take  by mouth Daily.      • nitroglycerin (NITROSTAT) 0.4 MG SL tablet TAKE 1 TABLET SUBLINGUALLY EVERY 5 MINS AS NEEDED FOR CHEST PAIN. AFTER THIRD TAB, [id number]   More than a month     Current Meds:   budesonide-formoterol, 2 puff, Inhalation, BID - RT  carvedilol, 25 mg, Oral, BID  montelukast, 10 mg, Oral, Nightly  rosuvastatin, 10 mg, Oral, Daily  sodium chloride, 10 mL, Intravenous, Q12H  Thyroid, 60 mg, Oral, Daily  torsemide, 20 mg, Oral, Daily      Allergies:  Allergies   Allergen Reactions   • Other Other (See Comments)     RHEUMATOID ARTHRITIS INFUSION REACTION   • Other Irritability     INFUSION FOR RHEUMATOID ARTHRITIS   • Rituximab Unknown - High Severity     Review of Systems  Pertinent items are noted in HPI, all other systems reviewed and negative     Objective     Vital Signs  Temp:  [97.8 °F (36.6 °C)-99.3 °F (37.4 °C)] 98.6 °F (37 °C)  Heart Rate:  [] 93  Resp:  [16-25] 16  BP: ()/(47-72) 131/66  Physical Exam:  General Appearance:    Alert, cooperative, in no acute distress   Head:    Normocephalic, without obvious abnormality, atraumatic   Eyes:          conjunctivae and sclerae normal, no icterus   Throat:   no thrush, oral mucosa moist   Neck:    Supple, no adenopathy   Lungs:     Breathing unlabored    Heart:    No chest tenderness    Chest Wall:    No abnormalities observed   Abdomen:     Soft, nondistended, nontender   Extremities:   no edema, no redness   Skin:   No bruising or rash   Psychiatric:  normal mood and insight     Results Review:   I reviewed the patient's new clinical results.    Results from last 7 days   Lab Units 10/12/22  0145 10/11/22  1550   WBC 10*3/mm3  --  6.49   HEMOGLOBIN g/dL 5.1* 3.7*   HEMATOCRIT % 17.2* 13.5*   PLATELETS 10*3/mm3  --  416     Results from last 7 days   Lab Units 10/11/22  1550   SODIUM mmol/L 136   POTASSIUM mmol/L 4.1   CHLORIDE mmol/L 99   CO2 mmol/L 25.9   BUN mg/dL 40*   CREATININE mg/dL 1.32*   CALCIUM mg/dL 8.4*   BILIRUBIN mg/dL 0.3   ALK PHOS U/L 121*   ALT (SGPT) U/L 5   AST (SGOT) U/L 8   GLUCOSE mg/dL 127*     Results from last 7 days   Lab Units 10/11/22  1550   INR  2.49*     No results found for: LIPASE    Radiology:  XR Chest 1 View   Final Result       1. No change from the previous study with no evidence for acute cardiopulmonary process.                                EDDIE SANTIAGO MD          Electronically Signed and Approved By: EDDIE SANTIAGO MD on 10/11/2022 at 16:41                               Assessment & Plan   Patient Active Problem List   Diagnosis   • CHF (congestive heart failure) (HCC)   • Extreme obesity with alveolar hypoventilation (HCC)   • Paroxysmal atrial fibrillation (HCC)   • Essential (primary) hypertension   • Osteoarthritis of knee   • Rheumatoid arthritis (HCC)   • Stage 3 chronic kidney disease (HCC)   • Type 2 diabetes mellitus without complication (HCC)   • AMOR on CPAP   • Gastrointestinal hemorrhage, unspecified gastrointestinal hemorrhage type       Assessment:  Acute blood loss anemia  Melena    Plan:  On PPI drip  Will proceed with EGD for further evaluation  Benefits vs risks of procedure d/w patient; risks include but are not limited to bleeding,  infection, perforation, and risk of sedation  Pt understands risks and agrees to proceed      I discussed the patients findings and my recommendations with patient and primary care team.    Sara Jarrett MD            Electronically signed by Sara Jarrett MD at 10/12/22 0809

## 2022-10-12 NOTE — H&P
[facility]   HISTORY AND PHYSICAL    Patient Name: Milagros Ramirez  : 1960  MRN: [id number]  Primary Care Physician: Beverley De Luna APRN  Date of admission: 10/11/2022    Subjective   Subjective     Chief Complaint: Shortness of breath/blood in stool/low hemoglobin    History of Present Illness  62-year-old morbidly obese female former smoker with A. fib (on Xarelto) CKD, CHF, HLD, HTN, AMOR, and lupus presents with SOB for more than a week and intermittent blood in her stool for the past week and a half.  Sent over here for evaluation by pulmonologist due to tachycardia and pallor    Patient reports associated dizziness and blurry vision.  She denies fevers, chills, chest pain, abdominal pain, diarrhea, melena, hematemesis.  She does report lower extremity edema which is a chronic issue for her.  She also has chronic orthopedic pain.     Pt states she feels cold all the time and gained 14 pounds.   Pt states she occasionally drinks alcohol and denies liver disease.    Vitals notable for tachycardia and tachypnea with an episode of hypotension.    Notable labs:  Troponin: Negative  BNP 4413 (up from 2300)    BUN/creatinine 40/1.32 (close to baseline)    Hgb: 3.7  Platelets 416    PTT 62.7  INR 2.49    ED ordered Kcentra and 2 units RBCs.      Review of Systems   Constitutional: Negative for fevers and chills..   HENT: Negative for congestion, sinus pressure, sore throat and trouble swallowing.    Eyes: Positive for some blurry vision.  Negative for pain, discharge, redness.  Respiratory: Positive for shortness of breath. Negative for cough, chest tightness and wheezing.    Cardiovascular: Positive for leg swelling. Negative for chest pain and palpitations.   Gastrointestinal: Positive for blood in stool. Negative for abdominal pain, diarrhea, nausea and vomiting.   Endocrine: Negative for cold intolerance and polydipsia.   Genitourinary: Negative for dysuria, frequency, hematuria and urgency.    Musculoskeletal: Negative for arthralgias, joint swelling, neck pain and neck stiffness.   Skin: Positive for pallor. Negative for color change and rash.   Allergic/Immunologic: Negative for environmental allergies and immunocompromised state.   Neurological: Positive for dizziness and weakness.  Hematological: Does not bruise/bleed easily.   Psychiatric/Behavioral: Negative for agitation, confusion, dysphoric mood and suicidal ideas.   Personal History     Past Medical History:   Diagnosis Date   • Atrial fibrillation (HCC)     Diagnosed 5/2019   • CKD (chronic kidney disease)    • Diabetes (HCC)     Diagnosed 5/2019   • Diastolic CHF (HCC)    • Hyperlipidemia    • Hypertension    • Hypothyroidism    • Morbid obesity (HCC)    • AMOR (obstructive sleep apnea)    • Rheumatoid arthritis (HCC)    • SLE (systemic lupus erythematosus) (HCC)        Past Surgical History:   Procedure Laterality Date   • APPENDECTOMY     • CARPAL TUNNEL RELEASE     • ENDOMETRIAL ABLATION     • HEEL SPUR EXCISION     • HERNIA REPAIR     • THYROIDECTOMY, PARTIAL         Family History: family history includes Atrial fibrillation in her father; Colon cancer in her father; Coronary artery disease in her brother and brother; Diabetes in her paternal grandfather; Heart disease in her father; Stroke in her mother. Otherwise pertinent FHx was reviewed and not pertinent to current issue.    Social History:  reports that she has quit smoking. She has never used smokeless tobacco. She reports current alcohol use. She reports that she does not use drugs.    Home Medications:  Green Tea (Sabrina sinensis), HYDROcodone-acetaminophen, Iron-Vitamins, Magnesium, Potassium, Thyroid, Turmeric, albuterol sulfate HFA, budesonide-formoterol, carvedilol, cyclobenzaprine, dabigatran etexilate, hydroxychloroquine, montelukast, nitroglycerin, rivaroxaban, rosuvastatin, and torsemide      Allergies:  Allergies   Allergen Reactions   • Other Other (See Comments)      RHEUMATOID ARTHRITIS INFUSION REACTION   • Other Irritability     INFUSION FOR RHEUMATOID ARTHRITIS   • Rituximab Unknown - High Severity       Objective    Objective     Vitals:  Temp:  [97.8 °F (36.6 °C)-99.3 °F (37.4 °C)] 99 °F (37.2 °C)  Heart Rate:  [] 91  Resp:  [16-25] 18  BP: ()/(47-72) 129/59    Physical Exam  Constitutional:       General: She is in some distress from knee pain; sitting up on the side of the bed.     Appearance: Morbidly obese.  Normal appearance. She is not toxic-appearing.   HENT:      Head: Normocephalic and atraumatic.      Mouth/Throat:      Mouth: Mucous membranes are moist.      Comments: Oral mucosa is pale  Eyes:      General: Lids are normal.      Extraocular Movements: Extraocular movements intact.      Conjunctiva/sclera: Conjunctivae normal.      Pupils: Pupils are equal, round, and reactive to light.      Comments: Conjunctiva is pale   Cardiovascular:      Rate and Rhythm: Normal rate. Rhythm irregularly irregular.      Pulses: Normal pulses.      Heart sounds: Normal heart sounds.   Pulmonary:      Effort: Pulmonary effort is normal. No respiratory distress.      Breath sounds: Decreased breath sounds and rales present. No wheezing or rhonchi.   Abdominal: Morbidly obese     General: Abdomen is flat. There is no distension.      Palpations: Abdomen is soft.      Tenderness: There is no abdominal tenderness. There is no guarding or rebound.   Musculoskeletal:         General: Normal range of motion.      Cervical back: Normal range of motion and neck supple.      Right lower le+ Pitting Edema present.      Left lower le+ Pitting Edema present.   Skin:     General: Skin is warm and dry.   Neurological:      General: No focal deficit present.      Mental Status: She is alert and oriented to person, place, and time. Mental status is at baseline.   Psychiatric:         Mood and Affect: Mood normal.         Behavior: Behavior normal.   Result Review    Result  Review:  I have personally reviewed the results from the time of this admission to 10/11/2022 22:55 EDT and agree with these findings:  [x]  Laboratory list / accordion  []  Microbiology  [x]  Radiology  [x]  EKG/Telemetry   []  Cardiology/Vascular   []  Pathology  []  Old records  []  Other:  Most notable findings include: Hemoglobin 3.7; BNP 4400; elevated coags      Assessment & Plan   Assessment / Plan     Brief Patient Summary:  62-year-old morbidly obese female former smoker with A. fib (on Xarelto) CKD, CHF, HLD, HTN, AMOR, and lupus presents with SOB for more than a week and intermittent blood in her stool for the past week and a half.  Found to have hemoglobin of 3.7.    Active Hospital Problems:  Active Hospital Problems    Diagnosis    • **Gastrointestinal hemorrhage, unspecified gastrointestinal hemorrhage type        Plan:   Symptomatic anemia  Bloody stool  -On Xarelto for A. Fib: HELD  -Kcentra ordered in the ED  -2 units RBCs ordered in ED   -Repeat hemoglobin 5.1  -2 additional units ordered   -Lasix ordered as well  -PPI drip  [] Follow-up repeat labs  [] Monitor for fluid overload  [] Follow-up with GI (unclear if the ED actually spoke to them)    CHF  -BNP 4400  -Caution in the setting of significant RBC resuscitation   [] Monitor for fluid overload    [] Dose Lasix or torsemide as needed  -Continue home CHF meds including torsemide  -Patient notes she has had lower extremity edema left for some time now  [] Follow-up repeat echocardiogram  [] Consider cardiology consult    Atrial fibrillation  -HOLD Xarelto  -Continue other home meds    AMOR  -Patient cannot tolerate CPAP    Lupus  -Patient takes Plaquenil on stagger days (not ordered)    CKD  -Appears to be near baseline    DVT prophylaxis: SCDs; no pharmacological prophylaxis in the setting of bleed    CODE STATUS:    Code Status and Medical Interventions:   Ordered at: 10/11/22 1734     Code Status (Patient has no pulse and is not breathing):     CPR (Attempt to Resuscitate)     Medical Interventions (Patient has pulse or is breathing):    Full Support       Admission Status:  I believe this patient meets inpatient status.    Jeremy Clark MD,

## 2022-10-12 NOTE — SIGNIFICANT NOTE
10/12/22 9670   Plan   Plan Met with patient to assess needs.  Patient sitting in recliner reports lives with her spouse and 26 year old son.  Good family support.  Patient reports going to her pulmonologist and being told she was pale and go to ED.  Hgb was 3.7.  Reports feeling better while receiving blood transfusion.  Patient currently on Xarelto at home and receives free samples from cardiologist.  Reports cardiologist office has been assisting with medication and may have to change due to cost.  Facesheet verified.  Patient plans to return home with no needs at this time.

## 2022-10-12 NOTE — ANESTHESIA PREPROCEDURE EVALUATION
Anesthesia Evaluation     Patient summary reviewed and Nursing notes reviewed   no history of anesthetic complications:  NPO Solid Status: > 8 hours  NPO Liquid Status: > 2 hours           Airway   Mallampati: I  TM distance: >3 FB  Neck ROM: full  No difficulty expected  Dental          Pulmonary - normal exam    breath sounds clear to auscultation  (+) sleep apnea (severe sleep apnea) on CPAP,   Cardiovascular   Exercise tolerance: good (4-7 METS)    ECG reviewed  Rhythm: irregular  Rate: abnormal    (+) hypertension, dysrhythmias Atrial Fib, CHF , hyperlipidemia,     ROS comment: Atrial fibrillation  RSR' in V1 or V2, right VCD or RVH  Borderline repolarization abnormality    Stress test 2020 negative EF 70%    Neuro/Psych- negative ROS  GI/Hepatic/Renal/Endo    (+) morbid obesity, GI bleeding active bleeding, renal disease CRI, diabetes mellitus type 2, thyroid problem hypothyroidism    Musculoskeletal     Abdominal    Substance History - negative use     OB/GYN negative ob/gyn ROS         Other   arthritis, autoimmune disease rheumatoid arthritis, blood dyscrasia (acute s/p blood transfusion) anemia,     ROS/Med Hx Other: PAT Nursing Notes unavailable.                 Anesthesia Plan    ASA 3     general     (Patient understands anesthesia not responsible for dental damage.)  intravenous induction     Anesthetic plan, risks, benefits, and alternatives have been provided, discussed and informed consent has been obtained with: patient.  Pre-procedure education provided  Use of blood products discussed with patient .   Plan discussed with CRNA.        CODE STATUS:    Code Status (Patient has no pulse and is not breathing): CPR (Attempt to Resuscitate)  Medical Interventions (Patient has pulse or is breathing): Full Support

## 2022-10-13 ENCOUNTER — ANESTHESIA EVENT (OUTPATIENT)
Dept: GASTROENTEROLOGY | Facility: HOSPITAL | Age: 62
End: 2022-10-13

## 2022-10-13 ENCOUNTER — ANESTHESIA (OUTPATIENT)
Dept: GASTROENTEROLOGY | Facility: HOSPITAL | Age: 62
End: 2022-10-13

## 2022-10-13 PROBLEM — D64.9 ANEMIA: Status: ACTIVE | Noted: 2022-10-11

## 2022-10-13 PROBLEM — K92.1 MELENA: Status: ACTIVE | Noted: 2022-10-11

## 2022-10-13 LAB
ALBUMIN SERPL-MCNC: 3.1 G/DL (ref 3.5–5.2)
ALBUMIN/GLOB SERPL: 0.7 G/DL
ALP SERPL-CCNC: 115 U/L (ref 39–117)
ALT SERPL W P-5'-P-CCNC: 5 U/L (ref 1–33)
ANION GAP SERPL CALCULATED.3IONS-SCNC: 12 MMOL/L (ref 5–15)
AST SERPL-CCNC: 9 U/L (ref 1–32)
BH BB BLOOD EXPIRATION DATE: NORMAL
BH BB BLOOD TYPE BARCODE: 5100
BH BB DISPENSE STATUS: NORMAL
BH BB PRODUCT CODE: NORMAL
BH BB UNIT NUMBER: NORMAL
BILIRUB SERPL-MCNC: 0.5 MG/DL (ref 0–1.2)
BUN SERPL-MCNC: 24 MG/DL (ref 8–23)
BUN/CREAT SERPL: 25.3 (ref 7–25)
CALCIUM SPEC-SCNC: 8.7 MG/DL (ref 8.6–10.5)
CHLORIDE SERPL-SCNC: 99 MMOL/L (ref 98–107)
CO2 SERPL-SCNC: 27 MMOL/L (ref 22–29)
CREAT SERPL-MCNC: 0.95 MG/DL (ref 0.57–1)
CROSSMATCH INTERPRETATION: NORMAL
DEPRECATED RDW RBC AUTO: 53.4 FL (ref 37–54)
EGFRCR SERPLBLD CKD-EPI 2021: 68.3 ML/MIN/1.73
ERYTHROCYTE [DISTWIDTH] IN BLOOD BY AUTOMATED COUNT: 17.9 % (ref 12.3–15.4)
GLOBULIN UR ELPH-MCNC: 4.4 GM/DL
GLUCOSE BLDC GLUCOMTR-MCNC: 114 MG/DL (ref 70–99)
GLUCOSE SERPL-MCNC: 117 MG/DL (ref 65–99)
HCT VFR BLD AUTO: 23.3 % (ref 34–46.6)
HCT VFR BLD AUTO: 23.8 % (ref 34–46.6)
HGB BLD-MCNC: 7.1 G/DL (ref 12–15.9)
HGB BLD-MCNC: 7.4 G/DL (ref 12–15.9)
MAGNESIUM SERPL-MCNC: 1.9 MG/DL (ref 1.6–2.4)
MCH RBC QN AUTO: 25.5 PG (ref 26.6–33)
MCHC RBC AUTO-ENTMCNC: 31.1 G/DL (ref 31.5–35.7)
MCV RBC AUTO: 82.1 FL (ref 79–97)
PLATELET # BLD AUTO: 372 10*3/MM3 (ref 140–450)
PMV BLD AUTO: 11.3 FL (ref 6–12)
POTASSIUM SERPL-SCNC: 3.4 MMOL/L (ref 3.5–5.2)
PROT SERPL-MCNC: 7.5 G/DL (ref 6–8.5)
RBC # BLD AUTO: 2.9 10*6/MM3 (ref 3.77–5.28)
SODIUM SERPL-SCNC: 138 MMOL/L (ref 136–145)
UNIT  ABO: NORMAL
UNIT  RH: NORMAL
WBC NRBC COR # BLD: 7.42 10*3/MM3 (ref 3.4–10.8)

## 2022-10-13 PROCEDURE — 82962 GLUCOSE BLOOD TEST: CPT

## 2022-10-13 PROCEDURE — 85014 HEMATOCRIT: CPT | Performed by: INTERNAL MEDICINE

## 2022-10-13 PROCEDURE — 94799 UNLISTED PULMONARY SVC/PX: CPT

## 2022-10-13 PROCEDURE — 25010000002 PROPOFOL 10 MG/ML EMULSION: Performed by: NURSE ANESTHETIST, CERTIFIED REGISTERED

## 2022-10-13 PROCEDURE — 45385 COLONOSCOPY W/LESION REMOVAL: CPT | Performed by: INTERNAL MEDICINE

## 2022-10-13 PROCEDURE — 85018 HEMOGLOBIN: CPT | Performed by: INTERNAL MEDICINE

## 2022-10-13 PROCEDURE — 85027 COMPLETE CBC AUTOMATED: CPT | Performed by: INTERNAL MEDICINE

## 2022-10-13 PROCEDURE — 0W3P8ZZ CONTROL BLEEDING IN GASTROINTESTINAL TRACT, VIA NATURAL OR ARTIFICIAL OPENING ENDOSCOPIC: ICD-10-PCS | Performed by: INTERNAL MEDICINE

## 2022-10-13 PROCEDURE — 83735 ASSAY OF MAGNESIUM: CPT | Performed by: INTERNAL MEDICINE

## 2022-10-13 PROCEDURE — 99233 SBSQ HOSP IP/OBS HIGH 50: CPT | Performed by: INTERNAL MEDICINE

## 2022-10-13 PROCEDURE — 80053 COMPREHEN METABOLIC PANEL: CPT | Performed by: INTERNAL MEDICINE

## 2022-10-13 DEVICE — DEV CLIP ENDO RESOLUTION360 CONTRL ROT 235CM: Type: IMPLANTABLE DEVICE | Site: COLON | Status: FUNCTIONAL

## 2022-10-13 RX ORDER — SODIUM CHLORIDE, SODIUM LACTATE, POTASSIUM CHLORIDE, CALCIUM CHLORIDE 600; 310; 30; 20 MG/100ML; MG/100ML; MG/100ML; MG/100ML
30 INJECTION, SOLUTION INTRAVENOUS CONTINUOUS
Status: DISCONTINUED | OUTPATIENT
Start: 2022-10-13 | End: 2022-10-13

## 2022-10-13 RX ORDER — POTASSIUM CHLORIDE 750 MG/1
40 CAPSULE, EXTENDED RELEASE ORAL ONCE
Status: COMPLETED | OUTPATIENT
Start: 2022-10-13 | End: 2022-10-13

## 2022-10-13 RX ORDER — PHENYLEPHRINE HCL IN 0.9% NACL 1 MG/10 ML
SYRINGE (ML) INTRAVENOUS AS NEEDED
Status: DISCONTINUED | OUTPATIENT
Start: 2022-10-13 | End: 2022-10-13 | Stop reason: SURG

## 2022-10-13 RX ORDER — PROPOFOL 10 MG/ML
VIAL (ML) INTRAVENOUS AS NEEDED
Status: DISCONTINUED | OUTPATIENT
Start: 2022-10-13 | End: 2022-10-13 | Stop reason: SURG

## 2022-10-13 RX ORDER — LIDOCAINE HYDROCHLORIDE 20 MG/ML
INJECTION, SOLUTION EPIDURAL; INFILTRATION; INTRACAUDAL; PERINEURAL AS NEEDED
Status: DISCONTINUED | OUTPATIENT
Start: 2022-10-13 | End: 2022-10-13 | Stop reason: SURG

## 2022-10-13 RX ADMIN — PROPOFOL 250 MCG/KG/MIN: 10 INJECTION, EMULSION INTRAVENOUS at 14:28

## 2022-10-13 RX ADMIN — BUDESONIDE AND FORMOTEROL FUMARATE DIHYDRATE 2 PUFF: 160; 4.5 AEROSOL RESPIRATORY (INHALATION) at 18:34

## 2022-10-13 RX ADMIN — LEVOTHYROXINE, LIOTHYRONINE 60 MG: 38; 9 TABLET ORAL at 08:47

## 2022-10-13 RX ADMIN — HYDROCODONE BITARTRATE AND ACETAMINOPHEN 1 TABLET: 7.5; 325 TABLET ORAL at 19:12

## 2022-10-13 RX ADMIN — SODIUM CHLORIDE, POTASSIUM CHLORIDE, SODIUM LACTATE AND CALCIUM CHLORIDE 30 ML/HR: 600; 310; 30; 20 INJECTION, SOLUTION INTRAVENOUS at 14:28

## 2022-10-13 RX ADMIN — LIDOCAINE HYDROCHLORIDE 100 MG: 20 INJECTION, SOLUTION EPIDURAL; INFILTRATION; INTRACAUDAL; PERINEURAL at 14:28

## 2022-10-13 RX ADMIN — TORSEMIDE 20 MG: 20 TABLET ORAL at 08:47

## 2022-10-13 RX ADMIN — ROSUVASTATIN CALCIUM 10 MG: 5 TABLET, FILM COATED ORAL at 08:47

## 2022-10-13 RX ADMIN — POTASSIUM CHLORIDE 40 MEQ: 10 CAPSULE, COATED, EXTENDED RELEASE ORAL at 08:47

## 2022-10-13 RX ADMIN — MONTELUKAST 10 MG: 10 TABLET, FILM COATED ORAL at 20:39

## 2022-10-13 RX ADMIN — CARVEDILOL 25 MG: 25 TABLET, FILM COATED ORAL at 20:39

## 2022-10-13 RX ADMIN — Medication 10 ML: at 20:39

## 2022-10-13 RX ADMIN — PROPOFOL 40 MG: 10 INJECTION, EMULSION INTRAVENOUS at 14:28

## 2022-10-13 RX ADMIN — CARVEDILOL 25 MG: 25 TABLET, FILM COATED ORAL at 08:47

## 2022-10-13 RX ADMIN — Medication 100 MCG: at 14:51

## 2022-10-13 NOTE — ANESTHESIA POSTPROCEDURE EVALUATION
Patient: Milagros Ramirez    Procedure Summary     Date: 10/13/22 Room / Location: [facility] ENDOSCOPY 2 / [facility] ENDOSCOPY    Anesthesia Start: 1426 Anesthesia Stop: 1502    Procedure: COLONOSCOPY Diagnosis:       Anemia, unspecified type      Melena      (Anemia, unspecified type [D64.9])      (Melena [K92.1])    Surgeons: Sara Jarrett MD Provider: Camilo Laughlin MD    Anesthesia Type: general ASA Status: 3          Anesthesia Type: general    Vitals  Vitals Value Taken Time   /72 10/13/22 1515   Temp 36.9 °C (98.5 °F) 10/13/22 1515   Pulse 87 10/13/22 1520   Resp 18 10/13/22 1515   SpO2 94 % 10/13/22 1517   Vitals shown include unvalidated device data.        Post Anesthesia Care and Evaluation    Patient location during evaluation: bedside  Patient participation: complete - patient participated  Level of consciousness: awake  Pain management: adequate    Airway patency: patent  Anesthetic complications: No anesthetic complications  PONV Status: none  Cardiovascular status: acceptable and stable  Respiratory status: acceptable  Hydration status: acceptable    Comments: An Anesthesiologist personally participated in the most demanding procedures (including induction and emergence if applicable) in the anesthesia plan, monitored the course of anesthesia administration at frequent intervals and remained physically present and available for immediate diagnosis and treatment of emergencies.

## 2022-10-13 NOTE — PLAN OF CARE
Problem: Adult Inpatient Plan of Care  Goal: Plan of Care Review  Outcome: Ongoing, Progressing  Flowsheets (Taken 10/13/2022 1613)  Outcome Evaluation: Colonoscopy today, No issues during shift, VSS  Goal: Patient-Specific Goal (Individualized)  Outcome: Ongoing, Progressing  Goal: Absence of Hospital-Acquired Illness or Injury  Outcome: Ongoing, Progressing  Intervention: Identify and Manage Fall Risk  Recent Flowsheet Documentation  Taken 10/13/2022 0851 by Angeline John, RN  Safety Promotion/Fall Prevention: safety round/check completed  Intervention: Prevent and Manage VTE (Venous Thromboembolism) Risk  Recent Flowsheet Documentation  Taken 10/13/2022 0851 by Angeline John, RN  Activity Management: up to bedside commode  Goal: Optimal Comfort and Wellbeing  Outcome: Ongoing, Progressing  Goal: Readiness for Transition of Care  Outcome: Ongoing, Progressing     Problem: Fall Injury Risk  Goal: Absence of Fall and Fall-Related Injury  Outcome: Ongoing, Progressing  Intervention: Promote Injury-Free Environment  Recent Flowsheet Documentation  Taken 10/13/2022 0851 by Angeline John, RN  Safety Promotion/Fall Prevention: safety round/check completed     Problem: Skin Injury Risk Increased  Goal: Skin Health and Integrity  Outcome: Ongoing, Progressing   Goal Outcome Evaluation:              Outcome Evaluation: Colonoscopy today, No issues during shift, VSS

## 2022-10-13 NOTE — PROGRESS NOTES
[facility]  Inpatient Progress Note    Reason for Follow Up:  anemia    Subjective     Interval History:   Pt presents to endoscopy for further evaluation of anemia.    Current Facility-Administered Medications:   •  acetaminophen (TYLENOL) tablet 650 mg, 650 mg, Oral, Q4H PRN **OR** [DISCONTINUED] acetaminophen (TYLENOL) 160 MG/5ML solution 650 mg, 650 mg, Oral, Q4H PRN **OR** [DISCONTINUED] acetaminophen (TYLENOL) suppository 650 mg, 650 mg, Rectal, Q4H PRN, Jeremy Clark MD  •  albuterol (PROVENTIL) nebulizer solution 0.083% 2.5 mg/3mL, 2.5 mg, Nebulization, Q6H PRN, Sara Jarrett MD  •  budesonide-formoterol (SYMBICORT) 160-4.5 MCG/ACT inhaler 2 puff, 2 puff, Inhalation, BID - RT, Sara Jarrett MD, 2 puff at 10/12/22 1900  •  carvedilol (COREG) tablet 25 mg, 25 mg, Oral, BID, Sara Jarrett MD, 25 mg at 10/13/22 0847  •  cyclobenzaprine (FLEXERIL) tablet 10 mg, 10 mg, Oral, PRN, Sara Jarrett MD, 10 mg at 10/12/22 2130  •  HYDROcodone-acetaminophen (NORCO) 7.5-325 MG per tablet 1 tablet, 1 tablet, Oral, Q8H PRN, Sara Jarrett MD, 1 tablet at 10/12/22 2130  •  montelukast (SINGULAIR) tablet 10 mg, 10 mg, Oral, Nightly, Sara Jarrett MD, 10 mg at 10/12/22 2130  •  ondansetron (ZOFRAN) tablet 4 mg, 4 mg, Oral, Q6H PRN **OR** ondansetron (ZOFRAN) injection 4 mg, 4 mg, Intravenous, Q6H PRN, Sara Jarrett MD  •  rosuvastatin (CRESTOR) tablet 10 mg, 10 mg, Oral, Daily, Sara Jarrett MD, 10 mg at 10/13/22 0847  •  sodium chloride 0.9 % flush 10 mL, 10 mL, Intravenous, PRN, Sara Jarrett MD  •  sodium chloride 0.9 % flush 10 mL, 10 mL, Intravenous, PRN, Sara Jarrett MD  •  sodium chloride 0.9 % flush 10 mL, 10 mL, Intravenous, Q12H, Sara Jarrett MD, 10 mL at 10/12/22 2130  •  sodium chloride 0.9 % infusion 40 mL, 40 mL, Intravenous, PRN, Sara Jarrett MD  •  Thyroid  (ARMOUR) tablet 60 mg, 60 mg, Oral, Daily, Sara Jarrett MD, 60 mg at 10/13/22 0847  •  torsemide (DEMADEX) tablet 20 mg, 20 mg, Oral, Daily, Sara Jarrett MD, 20 mg at 10/13/22 0847  Review of Systems:    The following systems were reviewed and negative;  constitution, respiratory and cardiovascular    Objective     Vital Signs  Temp:  [97.8 °F (36.6 °C)-98.7 °F (37.1 °C)] 98.7 °F (37.1 °C)  Heart Rate:  [71-98] 93  Resp:  [14-18] 18  BP: (107-139)/(46-84) 110/46  Body mass index is 42.45 kg/m².    Intake/Output Summary (Last 24 hours) at 10/13/2022 1357  Last data filed at 10/12/2022 1842  Gross per 24 hour   Intake 630 ml   Output --   Net 630 ml     No intake/output data recorded.     Physical Exam:   General: awake, alert and in no acute distress   Eyes: eyes move symmetrical in all directions, no scleral icterus   Neck: supple, trachea is midline   Skin: warm and dry, not jaundiced   Cardiovascular: no chest tenderness   Pulm: breathing unlabored   Abdomen: soft, nontender, nondistended   Rectal: deferred   Extremities: no rash or edema   Psychiatric: mental status within normal limits, alert and oriented      Results Review:     I reviewed the patient's new clinical results.    Results from last 7 days   Lab Units 10/13/22  1206 10/13/22  0429 10/12/22  2041 10/12/22  1222 10/12/22  0145 10/11/22  1550   WBC 10*3/mm3  --  7.42  --  7.56  --  6.49   HEMOGLOBIN g/dL 7.1* 7.4* 7.9* 6.9*   < > 3.7*   HEMATOCRIT % 23.3* 23.8* 25.7* 22.0*   < > 13.5*   PLATELETS 10*3/mm3  --  372  --  426  --  416    < > = values in this interval not displayed.     Results from last 7 days   Lab Units 10/13/22  0429 10/12/22  1222 10/11/22  1550   SODIUM mmol/L 138 137 136   POTASSIUM mmol/L 3.4* 3.5 4.1   CHLORIDE mmol/L 99 99 99   CO2 mmol/L 27.0 25.1 25.9   BUN mg/dL 24* 32* 40*   CREATININE mg/dL 0.95 1.12* 1.32*   CALCIUM mg/dL 8.7 8.6 8.4*   BILIRUBIN mg/dL 0.5 0.7 0.3   ALK PHOS U/L 115 123* 121*   ALT  (SGPT) U/L 5 5 5   AST (SGOT) U/L 9 10 8   GLUCOSE mg/dL 117* 123* 127*     Results from last 7 days   Lab Units 10/12/22  1222 10/11/22  1550   INR  1.34* 2.49*     No results found for: LIPASE    Radiology:  [unfilled]      Assessment & Plan   Assessment:     Acute blood loss anemia  Melena    Plan:     • Will proceed with colonoscopy for further evaluation  • Benefits vs risks of procedure d/w patient; risks include but are not limited to bleeding, infection, perforation, and risk of sedation  • Pt understands risks and agrees to proceed    I discussed the patients findings and my recommendations with patient.         Sara Jarrett M.D.  [place] [id number]  [place]  [address]  Office: [phone]

## 2022-10-13 NOTE — ANESTHESIA PREPROCEDURE EVALUATION
Anesthesia Evaluation     Patient summary reviewed and Nursing notes reviewed   no history of anesthetic complications:  NPO Solid Status: > 8 hours  NPO Liquid Status: > 2 hours           Airway   Mallampati: II  TM distance: >3 FB  Neck ROM: full  No difficulty expected  Dental      Pulmonary - normal exam    breath sounds clear to auscultation  (+) sleep apnea,   Cardiovascular - normal exam  Exercise tolerance: good (4-7 METS)    Rhythm: regular  Rate: normal    (+) hypertension, dysrhythmias Atrial Fib, CHF , hyperlipidemia,       Neuro/Psych- negative ROS  GI/Hepatic/Renal/Endo    (+)  GI bleeding , renal disease, diabetes mellitus, thyroid problem hypothyroidism    Musculoskeletal (-) negative ROS    Abdominal    Substance History - negative use     OB/GYN negative ob/gyn ROS         Other - negative ROS       ROS/Med Hx Other: PAT Nursing Notes unavailable.                   Anesthesia Plan    ASA 3     general     (Total IV Anesthesia    Patient understands anesthesia not responsible for dental damage.      Gi bleed, egd yesterday)  intravenous induction     Anesthetic plan, risks, benefits, and alternatives have been provided, discussed and informed consent has been obtained with: patient.    Plan discussed with CRNA.        CODE STATUS:    Code Status (Patient has no pulse and is not breathing): CPR (Attempt to Resuscitate)  Medical Interventions (Patient has pulse or is breathing): Full Support

## 2022-10-13 NOTE — PLAN OF CARE
Goal Outcome Evaluation:  Plan of Care Reviewed With: patient        Progress: improving  Outcome Evaluation: Patient completed bowel prep for colonoscopy. No issues during shift. Stevie Alvarado RN

## 2022-10-13 NOTE — PROGRESS NOTES
[facility]   Hospitalist Progress Note  Date: 10/13/2022  Patient Name: Milagros Ramirez     Subjective   Subjective     Chief Complaint:   Melena    Summary:   62-year-old female with A. fib on Xarelto, CKD, CHF, HLD, HTN, AMOR, lupus presents with shortness of breath and blood in her stool for a week.  Hemoglobin 3.7 on admission.  Admitted and given blood transfusions, EGD.  Given Kcentra in the emergency department    Interval Followup: Patient tolerated her bowel prep overnight, muscle aches are improving after transfusions.  Patient has had continued hematochezia overnight with her bowel prep but no abdominal pain.    Review of Systems   No current nausea vomiting or diarrhea    Objective   Objective     Vitals:   Temp:  [97.8 °F (36.6 °C)-98.7 °F (37.1 °C)] 98.7 °F (37.1 °C)  Heart Rate:  [71-98] 93  Resp:  [14-18] 18  BP: (107-139)/(46-84) 110/46  Physical Exam   Gen: NAD, morbidly obese female, pale, sitting up in the recliner comfortably  HEENT: NCAT, mmm  Resp: CTAB no wrr, no dyspnea  CV: RRR systolic murmur, no LE edema  GI: Abdomen soft NT, ND +bs  Psych: AOx3, normal mood and affect    Result Review    Result Review:  I have personally reviewed the results from 10/13/2022 11:52 EDT and agree with these findings:  [x]  Laboratory   Creatinine 0.9  Potassium 3.4  Hemoglobin 7.4  []  Microbiology  [x]  Radiology chest x-ray: No change from prior study  [x]  EKG/Telemetry telemetry personally reviewed: Atrial fibrillation in the low 100s  [x]  Cardiology/Vascular echo: EF 46%, mild aortic stenosis, elevated RVSP  []  Pathology  []  Old records  []  Other:    Assessment & Plan   Assessment / Plan     Assessment/Plan:  • Symptomatic anemia secondary to GI bleed, acute blood loss anemia  • CHF  • Atrial fibrillation  • AMOR  • Lupus  • CKD    Plan:   • Transfuse to goal of hemoglobin greater than 7, already received Lasix and Kcentra  • PPI drip discontinued after EEG  • Po torsemide  • GI consulted,  colonoscopy planned for today  • Monitor fluid status given CHF history  • Will need to have cardiology follow-up given her echocardiogram findings as well, will need to establish which cardiologist she sees  • Holding Xarelto for now  • Does not tolerate home BiPAP at this would help with her underlying pulmonary hypertension, may be secondary to lupus  • Holding Plaquenil for now    DVT ppx: SCDs  Diet: Clears, n.p.o. after midnight  Discussed with bedside RN and gastroenterology

## 2022-10-14 LAB
ANION GAP SERPL CALCULATED.3IONS-SCNC: 9.5 MMOL/L (ref 5–15)
BH BB BLOOD EXPIRATION DATE: NORMAL
BH BB BLOOD TYPE BARCODE: 5100
BH BB DISPENSE STATUS: NORMAL
BH BB PRODUCT CODE: NORMAL
BH BB UNIT NUMBER: NORMAL
BUN SERPL-MCNC: 17 MG/DL (ref 8–23)
BUN/CREAT SERPL: 17.7 (ref 7–25)
CALCIUM SPEC-SCNC: 8.6 MG/DL (ref 8.6–10.5)
CHLORIDE SERPL-SCNC: 100 MMOL/L (ref 98–107)
CO2 SERPL-SCNC: 26.5 MMOL/L (ref 22–29)
CREAT SERPL-MCNC: 0.96 MG/DL (ref 0.57–1)
CROSSMATCH INTERPRETATION: NORMAL
DEPRECATED RDW RBC AUTO: 55.8 FL (ref 37–54)
EGFRCR SERPLBLD CKD-EPI 2021: 67.5 ML/MIN/1.73
ERYTHROCYTE [DISTWIDTH] IN BLOOD BY AUTOMATED COUNT: 18.4 % (ref 12.3–15.4)
GLUCOSE SERPL-MCNC: 113 MG/DL (ref 65–99)
HCT VFR BLD AUTO: 23 % (ref 34–46.6)
HGB BLD-MCNC: 7 G/DL (ref 12–15.9)
IRON 24H UR-MRATE: 20 MCG/DL (ref 37–145)
IRON SATN MFR SERPL: 5 % (ref 20–50)
MCH RBC QN AUTO: 25.9 PG (ref 26.6–33)
MCHC RBC AUTO-ENTMCNC: 30.4 G/DL (ref 31.5–35.7)
MCV RBC AUTO: 85.2 FL (ref 79–97)
PLATELET # BLD AUTO: 335 10*3/MM3 (ref 140–450)
PMV BLD AUTO: 9.9 FL (ref 6–12)
POTASSIUM SERPL-SCNC: 3.7 MMOL/L (ref 3.5–5.2)
RBC # BLD AUTO: 2.7 10*6/MM3 (ref 3.77–5.28)
SODIUM SERPL-SCNC: 136 MMOL/L (ref 136–145)
TIBC SERPL-MCNC: 368 MCG/DL (ref 298–536)
TRANSFERRIN SERPL-MCNC: 247 MG/DL (ref 200–360)
UNIT  ABO: NORMAL
UNIT  RH: NORMAL
WBC NRBC COR # BLD: 6.6 10*3/MM3 (ref 3.4–10.8)

## 2022-10-14 PROCEDURE — 25010000002 NA FERRIC GLUC CPLX PER 12.5 MG: Performed by: INTERNAL MEDICINE

## 2022-10-14 PROCEDURE — 99233 SBSQ HOSP IP/OBS HIGH 50: CPT | Performed by: INTERNAL MEDICINE

## 2022-10-14 PROCEDURE — 85027 COMPLETE CBC AUTOMATED: CPT | Performed by: INTERNAL MEDICINE

## 2022-10-14 PROCEDURE — 83540 ASSAY OF IRON: CPT | Performed by: INTERNAL MEDICINE

## 2022-10-14 PROCEDURE — 94664 DEMO&/EVAL PT USE INHALER: CPT

## 2022-10-14 PROCEDURE — 80048 BASIC METABOLIC PNL TOTAL CA: CPT | Performed by: INTERNAL MEDICINE

## 2022-10-14 PROCEDURE — 84466 ASSAY OF TRANSFERRIN: CPT | Performed by: INTERNAL MEDICINE

## 2022-10-14 PROCEDURE — 99232 SBSQ HOSP IP/OBS MODERATE 35: CPT | Performed by: INTERNAL MEDICINE

## 2022-10-14 PROCEDURE — 94799 UNLISTED PULMONARY SVC/PX: CPT

## 2022-10-14 PROCEDURE — 25010000002 FUROSEMIDE PER 20 MG: Performed by: INTERNAL MEDICINE

## 2022-10-14 RX ORDER — FUROSEMIDE 10 MG/ML
40 INJECTION INTRAMUSCULAR; INTRAVENOUS ONCE
Status: COMPLETED | OUTPATIENT
Start: 2022-10-14 | End: 2022-10-14

## 2022-10-14 RX ORDER — HYDROXYCHLOROQUINE SULFATE 200 MG/1
200 TABLET, FILM COATED ORAL EVERY OTHER DAY
Status: DISCONTINUED | OUTPATIENT
Start: 2022-10-15 | End: 2022-10-16 | Stop reason: HOSPADM

## 2022-10-14 RX ADMIN — Medication 10 ML: at 20:07

## 2022-10-14 RX ADMIN — BUDESONIDE AND FORMOTEROL FUMARATE DIHYDRATE 2 PUFF: 160; 4.5 AEROSOL RESPIRATORY (INHALATION) at 08:09

## 2022-10-14 RX ADMIN — LEVOTHYROXINE, LIOTHYRONINE 60 MG: 38; 9 TABLET ORAL at 08:12

## 2022-10-14 RX ADMIN — ROSUVASTATIN CALCIUM 10 MG: 5 TABLET, FILM COATED ORAL at 08:12

## 2022-10-14 RX ADMIN — CARVEDILOL 25 MG: 25 TABLET, FILM COATED ORAL at 20:06

## 2022-10-14 RX ADMIN — SODIUM CHLORIDE 250 MG: 9 INJECTION, SOLUTION INTRAVENOUS at 15:18

## 2022-10-14 RX ADMIN — HYDROCODONE BITARTRATE AND ACETAMINOPHEN 1 TABLET: 7.5; 325 TABLET ORAL at 22:37

## 2022-10-14 RX ADMIN — CYCLOBENZAPRINE 10 MG: 10 TABLET, FILM COATED ORAL at 22:37

## 2022-10-14 RX ADMIN — TORSEMIDE 20 MG: 20 TABLET ORAL at 08:12

## 2022-10-14 RX ADMIN — FUROSEMIDE 40 MG: 10 INJECTION INTRAMUSCULAR; INTRAVENOUS at 14:58

## 2022-10-14 RX ADMIN — MONTELUKAST 10 MG: 10 TABLET, FILM COATED ORAL at 20:06

## 2022-10-14 RX ADMIN — BUDESONIDE AND FORMOTEROL FUMARATE DIHYDRATE 2 PUFF: 160; 4.5 AEROSOL RESPIRATORY (INHALATION) at 21:10

## 2022-10-14 RX ADMIN — CARVEDILOL 25 MG: 25 TABLET, FILM COATED ORAL at 08:12

## 2022-10-14 NOTE — PROGRESS NOTES
[facility]   Hospitalist Progress Note  Date: 10/14/2022  Patient Name: Milagros Ramirez     Subjective   Subjective     Chief Complaint:   Melena    Summary:   62-year-old female with A. fib on Xarelto, CKD, CHF, HLD, HTN, AMOR, lupus presents with shortness of breath and blood in her stool for a week.  Hemoglobin 3.7 on admission.  Admitted and given blood transfusions, EGD negative.  Given Kcentra in the emergency department.  Colonoscopy performed October 13, area of recent bleeding clipped.      Interval Followup: Patient feeling much better today.  Reports no rectal bleeding since her colonoscopy prep was completed yesterday.  He is eating and ambulating in her room and hopeful that she will be able to go home soon.    Review of Systems   No current nausea vomiting or diarrhea    Objective   Objective     Vitals:   Temp:  [97.3 °F (36.3 °C)-98.5 °F (36.9 °C)] 97.9 °F (36.6 °C)  Heart Rate:  [] 91  Resp:  [12-20] 14  BP: ()/(45-79) 116/60  Flow (L/min):  [2] 2  Physical Exam   Gen: NAD, morbidly obese female, pale, sitting up in the recliner comfortably  HEENT: NCAT, mmm  Resp: CTAB no wrr, no dyspnea  CV: RRR systolic murmur, 2+ pitting LE edema  GI: Abdomen soft NT, ND +bs  Psych: AOx3, normal mood and affect    Result Review    Result Review:  I have personally reviewed the results from 10/14/2022 14:14 EDT and agree with these findings:  [x]  Laboratory   Creatinine 0.9  Potassium 3.7  Hemoglobin 7.0  []  Microbiology  [x]  Radiology chest x-ray: No change from prior study  []  EKG/Telemetry   [x]  Cardiology/Vascular echo: EF 46%, mild aortic stenosis, elevated RVSP  []  Pathology  []  Old records  []  Other:    Assessment & Plan   Assessment / Plan     Assessment/Plan:  • Symptomatic anemia secondary to GI bleed, acute blood loss anemia  • CHF  • Atrial fibrillation  • AMOR  • Lupus  • CKD    Plan:   • IV iron today and tomorrow  • Po torsemide, and additional IV Lasix x1 today due to pitting  edema  • GI consulted, planning for Eliquis in 48 hours if no evidence of bleeding--could reinitiate tomorrow night  • Monitor fluid status given CHF history  • Will need to have cardiology follow-up given her echocardiogram findings as well, will need to establish which cardiologist she sees  • Holding Xarelto for now  • Does not tolerate home BiPAP at this would help with her underlying pulmonary hypertension, may be secondary to lupus  • Resume Eliquis today    DVT ppx: SCDs  Diet: cardiac  Discussed with bedside RN and gastroenterology

## 2022-10-14 NOTE — PLAN OF CARE
Goal Outcome Evaluation:           Progress: improving  Outcome Evaluation: VSS. Medicated once for pain. No bowel movement occurred. No overt signs of bleeding present. Ambulated in room ad halle from chair to bed per pt tolerance.

## 2022-10-14 NOTE — PLAN OF CARE
Problem: Adult Inpatient Plan of Care  Goal: Plan of Care Review  Outcome: Ongoing, Progressing  Flowsheets (Taken 10/14/2022 1625)  Outcome Evaluation: VSS. Up to bedside commode. Iron transfusion. Ambulated Ad halle in room.  Goal: Patient-Specific Goal (Individualized)  Outcome: Ongoing, Progressing  Goal: Absence of Hospital-Acquired Illness or Injury  Outcome: Ongoing, Progressing  Intervention: Identify and Manage Fall Risk  Recent Flowsheet Documentation  Taken 10/14/2022 0850 by Angeline John RN  Safety Promotion/Fall Prevention: safety round/check completed  Intervention: Prevent Infection  Recent Flowsheet Documentation  Taken 10/14/2022 0850 by Angeline John RN  Infection Prevention: hand hygiene promoted  Goal: Optimal Comfort and Wellbeing  Outcome: Ongoing, Progressing  Intervention: Provide Person-Centered Care  Recent Flowsheet Documentation  Taken 10/14/2022 0850 by Angeline John RN  Trust Relationship/Rapport:   care explained   choices provided  Goal: Readiness for Transition of Care  Outcome: Ongoing, Progressing     Problem: Fall Injury Risk  Goal: Absence of Fall and Fall-Related Injury  Outcome: Ongoing, Progressing  Intervention: Promote Injury-Free Environment  Recent Flowsheet Documentation  Taken 10/14/2022 0850 by Angeline Jhon RN  Safety Promotion/Fall Prevention: safety round/check completed     Problem: Skin Injury Risk Increased  Goal: Skin Health and Integrity  Outcome: Ongoing, Progressing   Goal Outcome Evaluation:              Outcome Evaluation: VSS. Up to bedside commode. Iron transfusion. Ambulated Ad halle in room.

## 2022-10-14 NOTE — PROGRESS NOTES
[facility]  Inpatient Progress Note    Reason for Follow Up:  Acute blood loss anemia    Subjective     Interval History:   Pt reports no bowel movements over night.  No nausea, vomiting, abd pain.    Current Facility-Administered Medications:   •  acetaminophen (TYLENOL) tablet 650 mg, 650 mg, Oral, Q4H PRN **OR** [DISCONTINUED] acetaminophen (TYLENOL) 160 MG/5ML solution 650 mg, 650 mg, Oral, Q4H PRN **OR** [DISCONTINUED] acetaminophen (TYLENOL) suppository 650 mg, 650 mg, Rectal, Q4H PRN, Jeremy Clark MD  •  albuterol (PROVENTIL) nebulizer solution 0.083% 2.5 mg/3mL, 2.5 mg, Nebulization, Q6H PRN, Sara Jarrett MD  •  budesonide-formoterol (SYMBICORT) 160-4.5 MCG/ACT inhaler 2 puff, 2 puff, Inhalation, BID - RT, Sara Jarrett MD, 2 puff at 10/14/22 0809  •  carvedilol (COREG) tablet 25 mg, 25 mg, Oral, BID, Sara Jarrett MD, 25 mg at 10/14/22 0812  •  cyclobenzaprine (FLEXERIL) tablet 10 mg, 10 mg, Oral, PRN, Sara Jarrett MD, 10 mg at 10/12/22 2130  •  furosemide (LASIX) injection 40 mg, 40 mg, Intravenous, Once, Latesha Chavez MD  •  HYDROcodone-acetaminophen (NORCO) 7.5-325 MG per tablet 1 tablet, 1 tablet, Oral, Q8H PRN, Sara Jarrett MD, 1 tablet at 10/13/22 1912  •  montelukast (SINGULAIR) tablet 10 mg, 10 mg, Oral, Nightly, Sara Jarrett MD, 10 mg at 10/13/22 2039  •  ondansetron (ZOFRAN) tablet 4 mg, 4 mg, Oral, Q6H PRN **OR** ondansetron (ZOFRAN) injection 4 mg, 4 mg, Intravenous, Q6H PRN, Sara Jarrett MD  •  rosuvastatin (CRESTOR) tablet 10 mg, 10 mg, Oral, Daily, Sara Jarrett MD, 10 mg at 10/14/22 0812  •  sodium chloride 0.9 % flush 10 mL, 10 mL, Intravenous, PRN, Sara Jarrett MD  •  sodium chloride 0.9 % flush 10 mL, 10 mL, Intravenous, PRN, Sara Jarrett MD  •  sodium chloride 0.9 % flush 10 mL, 10 mL, Intravenous, Q12H, Sara Jarrett MD, 10 mL at  10/13/22 2039  •  sodium chloride 0.9 % infusion 40 mL, 40 mL, Intravenous, PRN, Sara Jarrett MD  •  Thyroid (ARMOUR) tablet 60 mg, 60 mg, Oral, Daily, Sara Jarrett MD, 60 mg at 10/14/22 0812  •  torsemide (DEMADEX) tablet 20 mg, 20 mg, Oral, Daily, Sara Jarrett MD, 20 mg at 10/14/22 0812  Review of Systems:    The following systems were reviewed and negative;  constitution, respiratory and cardiovascular    Objective     Vital Signs  Temp:  [97.3 °F (36.3 °C)-98.5 °F (36.9 °C)] 97.9 °F (36.6 °C)  Heart Rate:  [] 91  Resp:  [12-20] 14  BP: ()/(45-79) 116/60  Body mass index is 42.45 kg/m².    Intake/Output Summary (Last 24 hours) at 10/14/2022 1406  Last data filed at 10/13/2022 1502  Gross per 24 hour   Intake 100 ml   Output --   Net 100 ml     No intake/output data recorded.     Physical Exam:   General: awake, alert and in no acute distress   Eyes: eyes move symmetrical in all directions, no scleral icterus   Neck: supple, trachea is midline   Skin: warm and dry, not jaundiced   Cardiovascular: no chest tenderness   Pulm: breathing unlabored   Abdomen: soft, nontender, nondistended   Rectal: deferred   Extremities: no rash or edema   Psychiatric: mental status within normal limits, alert and oriented      Results Review:     I reviewed the patient's new clinical results.    Results from last 7 days   Lab Units 10/14/22  0419 10/13/22  1206 10/13/22  0429 10/12/22  2041 10/12/22  1222   WBC 10*3/mm3 6.60  --  7.42  --  7.56   HEMOGLOBIN g/dL 7.0* 7.1* 7.4*   < > 6.9*   HEMATOCRIT % 23.0* 23.3* 23.8*   < > 22.0*   PLATELETS 10*3/mm3 335  --  372  --  426    < > = values in this interval not displayed.     Results from last 7 days   Lab Units 10/14/22  0419 10/13/22  0429 10/12/22  1222 10/11/22  1550   SODIUM mmol/L 136 138 137 136   POTASSIUM mmol/L 3.7 3.4* 3.5 4.1   CHLORIDE mmol/L 100 99 99 99   CO2 mmol/L 26.5 27.0 25.1 25.9   BUN mg/dL 17 24* 32* 40*   CREATININE  mg/dL 0.96 0.95 1.12* 1.32*   CALCIUM mg/dL 8.6 8.7 8.6 8.4*   BILIRUBIN mg/dL  --  0.5 0.7 0.3   ALK PHOS U/L  --  115 123* 121*   ALT (SGPT) U/L  --  5 5 5   AST (SGOT) U/L  --  9 10 8   GLUCOSE mg/dL 113* 117* 123* 127*     Results from last 7 days   Lab Units 10/12/22  1222 10/11/22  1550   INR  1.34* 2.49*     No results found for: LIPASE    Radiology:  [unfilled]      Assessment & Plan   Assessment:     Acute blood loss anemia    Plan:     - s/p colonoscopy with diverticulosis; one diverticula with ulcerated tissue suggestive of recent bleed, clip placed  - would hold angicoagulation 48 hours and if no bleeding then resume  - if were to have recurrent bleeding, would need to consider IR eval    I discussed the patients findings and my recommendations with patient.         Sara Jarrett M.D.  [place] [address] [place]  [address]  Office: [phone]

## 2022-10-15 LAB
ANION GAP SERPL CALCULATED.3IONS-SCNC: 8 MMOL/L (ref 5–15)
BUN SERPL-MCNC: 14 MG/DL (ref 8–23)
BUN/CREAT SERPL: 16.5 (ref 7–25)
CALCIUM SPEC-SCNC: 8.5 MG/DL (ref 8.6–10.5)
CHLORIDE SERPL-SCNC: 98 MMOL/L (ref 98–107)
CO2 SERPL-SCNC: 30 MMOL/L (ref 22–29)
CREAT SERPL-MCNC: 0.85 MG/DL (ref 0.57–1)
DEPRECATED RDW RBC AUTO: 58.8 FL (ref 37–54)
EGFRCR SERPLBLD CKD-EPI 2021: 78.1 ML/MIN/1.73
ERYTHROCYTE [DISTWIDTH] IN BLOOD BY AUTOMATED COUNT: 19.1 % (ref 12.3–15.4)
GLUCOSE SERPL-MCNC: 89 MG/DL (ref 65–99)
HCT VFR BLD AUTO: 23.7 % (ref 34–46.6)
HGB BLD-MCNC: 7 G/DL (ref 12–15.9)
MCH RBC QN AUTO: 25.2 PG (ref 26.6–33)
MCHC RBC AUTO-ENTMCNC: 29.5 G/DL (ref 31.5–35.7)
MCV RBC AUTO: 85.3 FL (ref 79–97)
PLATELET # BLD AUTO: 337 10*3/MM3 (ref 140–450)
PMV BLD AUTO: 10.9 FL (ref 6–12)
POTASSIUM SERPL-SCNC: 3.4 MMOL/L (ref 3.5–5.2)
RBC # BLD AUTO: 2.78 10*6/MM3 (ref 3.77–5.28)
SODIUM SERPL-SCNC: 136 MMOL/L (ref 136–145)
WBC NRBC COR # BLD: 7.08 10*3/MM3 (ref 3.4–10.8)

## 2022-10-15 PROCEDURE — 80048 BASIC METABOLIC PNL TOTAL CA: CPT | Performed by: INTERNAL MEDICINE

## 2022-10-15 PROCEDURE — 94799 UNLISTED PULMONARY SVC/PX: CPT

## 2022-10-15 PROCEDURE — 99233 SBSQ HOSP IP/OBS HIGH 50: CPT | Performed by: INTERNAL MEDICINE

## 2022-10-15 PROCEDURE — 25010000002 NA FERRIC GLUC CPLX PER 12.5 MG: Performed by: INTERNAL MEDICINE

## 2022-10-15 PROCEDURE — 85027 COMPLETE CBC AUTOMATED: CPT | Performed by: INTERNAL MEDICINE

## 2022-10-15 PROCEDURE — 25010000002 FUROSEMIDE PER 20 MG: Performed by: INTERNAL MEDICINE

## 2022-10-15 PROCEDURE — 94664 DEMO&/EVAL PT USE INHALER: CPT

## 2022-10-15 RX ORDER — FUROSEMIDE 10 MG/ML
40 INJECTION INTRAMUSCULAR; INTRAVENOUS ONCE
Status: COMPLETED | OUTPATIENT
Start: 2022-10-15 | End: 2022-10-15

## 2022-10-15 RX ORDER — POTASSIUM CHLORIDE 750 MG/1
40 CAPSULE, EXTENDED RELEASE ORAL ONCE
Status: COMPLETED | OUTPATIENT
Start: 2022-10-15 | End: 2022-10-15

## 2022-10-15 RX ADMIN — POTASSIUM CHLORIDE 40 MEQ: 10 CAPSULE, COATED, EXTENDED RELEASE ORAL at 11:32

## 2022-10-15 RX ADMIN — Medication 10 ML: at 21:57

## 2022-10-15 RX ADMIN — MONTELUKAST 10 MG: 10 TABLET, FILM COATED ORAL at 21:57

## 2022-10-15 RX ADMIN — CARVEDILOL 25 MG: 25 TABLET, FILM COATED ORAL at 09:11

## 2022-10-15 RX ADMIN — BUDESONIDE AND FORMOTEROL FUMARATE DIHYDRATE 2 PUFF: 160; 4.5 AEROSOL RESPIRATORY (INHALATION) at 06:50

## 2022-10-15 RX ADMIN — Medication 10 ML: at 09:10

## 2022-10-15 RX ADMIN — CYCLOBENZAPRINE 10 MG: 10 TABLET, FILM COATED ORAL at 22:24

## 2022-10-15 RX ADMIN — FUROSEMIDE 40 MG: 10 INJECTION, SOLUTION INTRAMUSCULAR; INTRAVENOUS at 11:32

## 2022-10-15 RX ADMIN — HYDROXYCHLOROQUINE SULFATE 200 MG: 200 TABLET ORAL at 09:11

## 2022-10-15 RX ADMIN — RIVAROXABAN 20 MG: 20 TABLET, FILM COATED ORAL at 17:26

## 2022-10-15 RX ADMIN — SODIUM CHLORIDE 250 MG: 9 INJECTION, SOLUTION INTRAVENOUS at 09:10

## 2022-10-15 RX ADMIN — TORSEMIDE 20 MG: 20 TABLET ORAL at 09:11

## 2022-10-15 RX ADMIN — LEVOTHYROXINE, LIOTHYRONINE 60 MG: 38; 9 TABLET ORAL at 09:10

## 2022-10-15 RX ADMIN — BUDESONIDE AND FORMOTEROL FUMARATE DIHYDRATE 2 PUFF: 160; 4.5 AEROSOL RESPIRATORY (INHALATION) at 19:49

## 2022-10-15 RX ADMIN — ROSUVASTATIN CALCIUM 10 MG: 5 TABLET, FILM COATED ORAL at 09:11

## 2022-10-15 RX ADMIN — CARVEDILOL 25 MG: 25 TABLET, FILM COATED ORAL at 21:57

## 2022-10-15 RX ADMIN — HYDROCODONE BITARTRATE AND ACETAMINOPHEN 1 TABLET: 7.5; 325 TABLET ORAL at 21:57

## 2022-10-15 NOTE — PLAN OF CARE
Goal Outcome Evaluation:           Progress: improving  Outcome Evaluation: VSS. Medicated x1 for pain. No complaints of nausea. Ambulated in room ad halle. No bowel movement.

## 2022-10-15 NOTE — PROGRESS NOTES
[facility]   Hospitalist Progress Note  Date: 10/15/2022  Patient Name: Milagros Ramirez     Subjective   Subjective     Chief Complaint:   Melena    Summary:   62-year-old female with A. fib on Xarelto, CKD, CHF, HLD, HTN, AMOR, lupus presents with shortness of breath and blood in her stool for a week.  Hemoglobin 3.7 on admission.  Admitted and given blood transfusions, EGD negative.  Given Kcentra in the emergency department.  Colonoscopy performed October 13, area of recent bleeding clipped.      Interval Followup: Has had no bleeding since scope.  Did not sleep well last night.  Is agreeable to staying another day so we can initiate anticoagulation while she is in the hospital.    Review of Systems   No current nausea vomiting or diarrhea    Objective   Objective     Vitals:   Temp:  [97.5 °F (36.4 °C)-98.7 °F (37.1 °C)] 98.7 °F (37.1 °C)  Heart Rate:  [] 87  Resp:  [16-22] 20  BP: (108-134)/(43-72) 108/43  Physical Exam   Gen: NAD, morbidly obese female, pale, lying flat in bed comfortably  HEENT: NCAT, mmm  Resp: CTAB no wrr, no dyspnea  CV: RRR systolic murmur, 1+ pitting LE edema  GI: Abdomen soft NT, ND +bs  Psych: AOx3, normal mood and affect    Result Review    Result Review:  I have personally reviewed the results from 10/15/2022 12:33 EDT and agree with these findings:  [x]  Laboratory   Creatinine 0.8  Potassium 3.4  Hemoglobin 7.0  []  Microbiology  [x]  Radiology chest x-ray: No change from prior study   []  EKG/Telemetry   [x]  Cardiology/Vascular echo: EF 46%, mild aortic stenosis, elevated RVSP  []  Pathology  []  Old records  []  Other:    Assessment & Plan   Assessment / Plan     Assessment/Plan:  • Symptomatic anemia secondary to GI bleed, acute blood loss anemia  • CHF  • Atrial fibrillation  • AMOR  • Lupus  • CKD    Plan:   • IV iron second dose today  • Po torsemide, and additional IV Lasix x1 today again due to pitting edema  • GI consulted, planning for Eliquis in 48 hours if no  evidence of bleeding--reinitiating tonight for a trial in the hospital  • Monitor fluid status given CHF history  • Will need to have cardiology follow-up given her echocardiogram findings as well, will need to establish which cardiologist she sees  • Resume Xarelto tonight, recheck CBC in a.m.  • Does not tolerate home BiPAP at this would help with her underlying pulmonary hypertension, may be secondary to lupus  • P.o. potassium x1  • Discussed with social work about if there is any way to help this patient afford her medications    DVT ppx: SCDs  Diet: cardiac  Discussed with bedside RN and gastroenterology

## 2022-10-16 ENCOUNTER — READMISSION MANAGEMENT (OUTPATIENT)
Dept: CALL CENTER | Facility: HOSPITAL | Age: 62
End: 2022-10-16

## 2022-10-16 VITALS
WEIGHT: 293 LBS | HEART RATE: 88 BPM | DIASTOLIC BLOOD PRESSURE: 53 MMHG | RESPIRATION RATE: 18 BRPM | TEMPERATURE: 99.1 F | OXYGEN SATURATION: 95 % | SYSTOLIC BLOOD PRESSURE: 116 MMHG | HEIGHT: 70 IN | BODY MASS INDEX: 41.95 KG/M2

## 2022-10-16 LAB
ANION GAP SERPL CALCULATED.3IONS-SCNC: 8.8 MMOL/L (ref 5–15)
BUN SERPL-MCNC: 16 MG/DL (ref 8–23)
BUN/CREAT SERPL: 16.5 (ref 7–25)
CALCIUM SPEC-SCNC: 8.5 MG/DL (ref 8.6–10.5)
CHLORIDE SERPL-SCNC: 97 MMOL/L (ref 98–107)
CO2 SERPL-SCNC: 30.2 MMOL/L (ref 22–29)
CREAT SERPL-MCNC: 0.97 MG/DL (ref 0.57–1)
DEPRECATED RDW RBC AUTO: 60.4 FL (ref 37–54)
EGFRCR SERPLBLD CKD-EPI 2021: 66.6 ML/MIN/1.73
ERYTHROCYTE [DISTWIDTH] IN BLOOD BY AUTOMATED COUNT: 19.8 % (ref 12.3–15.4)
GLUCOSE SERPL-MCNC: 104 MG/DL (ref 65–99)
HCT VFR BLD AUTO: 23.1 % (ref 34–46.6)
HGB BLD-MCNC: 7 G/DL (ref 12–15.9)
MCH RBC QN AUTO: 25.9 PG (ref 26.6–33)
MCHC RBC AUTO-ENTMCNC: 30.3 G/DL (ref 31.5–35.7)
MCV RBC AUTO: 85.6 FL (ref 79–97)
PLATELET # BLD AUTO: 361 10*3/MM3 (ref 140–450)
PMV BLD AUTO: 9.7 FL (ref 6–12)
POTASSIUM SERPL-SCNC: 3.6 MMOL/L (ref 3.5–5.2)
RBC # BLD AUTO: 2.7 10*6/MM3 (ref 3.77–5.28)
SODIUM SERPL-SCNC: 136 MMOL/L (ref 136–145)
WBC NRBC COR # BLD: 8.22 10*3/MM3 (ref 3.4–10.8)

## 2022-10-16 PROCEDURE — 80048 BASIC METABOLIC PNL TOTAL CA: CPT | Performed by: INTERNAL MEDICINE

## 2022-10-16 PROCEDURE — 94664 DEMO&/EVAL PT USE INHALER: CPT

## 2022-10-16 PROCEDURE — 85027 COMPLETE CBC AUTOMATED: CPT | Performed by: INTERNAL MEDICINE

## 2022-10-16 PROCEDURE — 94799 UNLISTED PULMONARY SVC/PX: CPT

## 2022-10-16 PROCEDURE — 99239 HOSP IP/OBS DSCHRG MGMT >30: CPT | Performed by: INTERNAL MEDICINE

## 2022-10-16 RX ADMIN — BUDESONIDE AND FORMOTEROL FUMARATE DIHYDRATE 2 PUFF: 160; 4.5 AEROSOL RESPIRATORY (INHALATION) at 07:14

## 2022-10-16 RX ADMIN — CARVEDILOL 25 MG: 25 TABLET, FILM COATED ORAL at 09:17

## 2022-10-16 RX ADMIN — HYDROCODONE BITARTRATE AND ACETAMINOPHEN 1 TABLET: 7.5; 325 TABLET ORAL at 06:07

## 2022-10-16 RX ADMIN — ROSUVASTATIN CALCIUM 10 MG: 5 TABLET, FILM COATED ORAL at 09:17

## 2022-10-16 RX ADMIN — LEVOTHYROXINE, LIOTHYRONINE 60 MG: 38; 9 TABLET ORAL at 09:17

## 2022-10-16 RX ADMIN — CYCLOBENZAPRINE 10 MG: 10 TABLET, FILM COATED ORAL at 06:07

## 2022-10-16 RX ADMIN — Medication 10 ML: at 09:18

## 2022-10-16 NOTE — PLAN OF CARE
Goal Outcome Evaluation:  Plan of Care Reviewed With: patient        Progress: improving       Will D/C home today.

## 2022-10-16 NOTE — PLAN OF CARE
Goal Outcome Evaluation:           Progress: no change  Outcome Evaluation: VSS. Medicated x1 for pain. No bowel movement or overt signs of bleeding.

## 2022-10-16 NOTE — DISCHARGE SUMMARY
[facility]         HOSPITALIST  DISCHARGE SUMMARY    Patient Name: Milagros Ramirez  : 1960  MRN: [id number]    Date of Admission: 10/11/2022  Date of Discharge:  10/16/22  Primary Care Physician: Beverley De Luna APRN    Consultants:  Gastroenterology Dr. Jarrett    Discharge Diagnosis:  • Symptomatic anemia secondary to GI bleed, acute blood loss anemia  • CHF not in acute exacerbation  • Atrial fibrillation  • AMOR  • Lupus  • CKD    Hospital Course     Hospital Course:  62-year-old female with A. fib on Xarelto, CKD, CHF, HLD, HTN, AMOR, lupus presents with shortness of breath and blood in her stool for a week.  Hemoglobin 3.7 on admission, given Kcentra for reversal in the emergency department.  Admitted and given blood transfusions, EGD negative.   Colonoscopy performed , area of recent bleeding clipped.    Postprocedure patient did not require any more blood transfusions and did not visualize any more rectal bleeding.  Hemoglobin was stable at 7.0 for 3 days, Xarelto reinitiated the day prior to discharge and patient did not have any bleeding.  She is happy to be discharged home today to follow-up as an outpatient.  Patient did receive 500 mg of IV iron during her stay to treat her iron deficiency anemia.  She is being discharged home today to follow-up with her PCP in 1 to 2 weeks with repeat blood work.  She has been advised that if she sees repeat rectal bleeding she needs to report to the emergency department.    DISCHARGE Follow Up Recommendations:   Continue taking home multivitamin and iron supplementation  Report to the emergency department if you note any rectal bleeding  Follow-up with PCP for monitoring of anemia  Follow-up with your cardiologist as previously scheduled      Day of Discharge     Vital Signs:  Temp:  [97.4 °F (36.3 °C)-99.1 °F (37.3 °C)] 99.1 °F (37.3 °C)  Heart Rate:  [86-98] 88  Resp:  [18-22] 18  BP: (111-135)/(51-68) 116/53  Physical Exam:   Gen:  NAD, WDWN  Resp: no dyspnea  CV: Trace pitting LE edema  GI: Abdomen soft +bs  Psych: AOx3, normal mood and affect    Discharge Details        Discharge Medications      Continue These Medications      Instructions Start Date   albuterol sulfate  (90 Base) MCG/ACT inhaler  Commonly known as: PROVENTIL HFA;VENTOLIN HFA;PROAIR HFA   2 puffs, Inhalation, Every 4 Hours PRN      budesonide-formoterol 160-4.5 MCG/ACT inhaler  Commonly known as: SYMBICORT   2 puffs, Inhalation, 2 Times Daily - RT      carvedilol 25 MG tablet  Commonly known as: COREG   25 mg, Oral, 2 Times Daily      cyclobenzaprine 10 MG tablet  Commonly known as: FLEXERIL   10 mg, Oral, As Needed      GERITOL PO   Oral, Daily      GREEN TEA PO   500 mg, Oral, Daily      HYDROcodone-acetaminophen 7.5-325 MG per tablet  Commonly known as: NORCO   Take 1 tablet by mouth Every 12 (Twelve) Hours.      hydroxychloroquine 200 MG tablet  Commonly known as: PLAQUENIL   200 mg, Oral, Every Other Day      MAGNESIUM PO   Oral, Daily      montelukast 10 MG tablet  Commonly known as: SINGULAIR   10 mg, Oral, Nightly      nitroglycerin 0.4 MG SL tablet  Commonly known as: NITROSTAT   TAKE 1 TABLET SUBLINGUALLY EVERY 5 MINS AS NEEDED FOR CHEST PAIN. AFTER THIRD TAB, [id number]      POTASSIUM PO   Oral, Daily      rosuvastatin 10 MG tablet  Commonly known as: CRESTOR   10 mg, Oral, Daily      Thyroid 60 MG tablet  Commonly known as: ARMOUR   60 mg, Oral, Daily      torsemide 20 MG tablet  Commonly known as: DEMADEX   TAKE 1 TABLET BY MOUTH EVERY DAY      TURMERIC CURCUMIN PO   Oral, Daily      Xarelto 20 MG tablet  Generic drug: rivaroxaban   TAKE 1 TABLET BY MOUTH EVERY DAY         Stop These Medications    dabigatran etexilate 150 MG capsu  Commonly known as: PRADAXA            Discharge Disposition:   Home or Self Care    Discharge Condition: Stable    Diet:  Hospital:  Diet Order   Procedures   • Diet Regular; Cardiac       Discharge Activity: As  tolerated      Future Appointments   Date Time Provider Department Center   11/29/2022  2:00 PM Quang Quick III, MD [facility] ELISA       Additional Instructions for the Follow-ups that You Need to Schedule     Discharge Follow-up with PCP   As directed       Currently Documented PCP:    Beverley De Luna APRN    PCP Phone Number:    [phone]     Follow Up Details: 1 week               Pertinent  and/or Most Recent Results     PROCEDURES:   EGD and colonoscopy    LAB and IMAGING RESULTS:      Lab 10/16/22  0508 10/15/22  0549 10/14/22  0419 10/13/22  1206 10/13/22  0429 10/12/22  2041 10/12/22  1222 10/12/22  0145 10/11/22  1550   WBC 8.22 7.08 6.60  --  7.42  --  7.56  --  6.49   HEMOGLOBIN 7.0* 7.0* 7.0* 7.1* 7.4*   < > 6.9*   < > 3.7*   HEMATOCRIT 23.1* 23.7* 23.0* 23.3* 23.8*   < > 22.0*   < > 13.5*   PLATELETS 361 337 335  --  372  --  426  --  416   NEUTROS ABS  --   --   --   --   --   --  5.55  --  4.66   IMMATURE GRANS (ABS)  --   --   --   --   --   --  0.04  --  0.02   LYMPHS ABS  --   --   --   --   --   --  1.21  --  1.23   MONOS ABS  --   --   --   --   --   --  0.64  --  0.47   EOS ABS  --   --   --   --   --   --  0.10  --  0.09   MCV 85.6 85.3 85.2  --  82.1  --  80.9  --  78.9*   LACTATE  --   --   --   --   --   --   --   --  1.8   PROTIME  --   --   --   --   --   --  16.8*  --  27.4*   APTT  --   --   --   --   --   --  36.8*  --  62.7*    < > = values in this interval not displayed.         Lab 10/16/22  0508 10/15/22  0549 10/14/22  0419 10/13/22  0429 10/12/22  1222   SODIUM 136 136 136 138 137   POTASSIUM 3.6 3.4* 3.7 3.4* 3.5   CHLORIDE 97* 98 100 99 99   CO2 30.2* 30.0* 26.5 27.0 25.1   ANION GAP 8.8 8.0 9.5 12.0 12.9   BUN 16 14 17 24* 32*   CREATININE 0.97 0.85 0.96 0.95 1.12*   EGFR 66.6 78.1 67.5 68.3 56.1*   GLUCOSE 104* 89 113* 117* 123*   CALCIUM 8.5* 8.5* 8.6 8.7 8.6   MAGNESIUM  --   --   --  1.9 2.0   PHOSPHORUS  --   --   --   --  3.4         Lab 10/13/22  0429  10/12/22  1222 10/11/22  1550   TOTAL PROTEIN 7.5 7.7 7.4   ALBUMIN 3.10* 3.10* 3.20*   GLOBULIN 4.4 4.6 4.2   ALT (SGPT) 5 5 5   AST (SGOT) 9 10 8   BILIRUBIN 0.5 0.7 0.3   ALK PHOS 115 123* 121*         Lab 10/12/22  1222 10/11/22  1550   PROBNP  --  4,413.0*   TROPONIN T  --  <0.010   PROTIME 16.8* 27.4*   INR 1.34* 2.49*             Lab 10/14/22  0419 10/11/22  1643 10/11/22  1550   IRON 20*  --   --    IRON SATURATION 5*  --   --    TIBC 368  --   --    TRANSFERRIN 247  --   --    ABO TYPING  --  O O   RH TYPING  --  Positive Positive   ANTIBODY SCREEN  --   --  Negative       Results for orders placed during the hospital encounter of 10/11/22    Adult Transthoracic Echo Complete W/ Cont if Necessary Per Protocol    Interpretation Summary  •  Calculated left ventricular EF = 46% Estimated left ventricular EF was in agreement with the calculated left ventricular EF.  •  Left ventricular wall thickness is consistent with concentric hypertrophy.  •  Left ventricular diastolic function was not assessed.  •  Mild aortic valve stenosis is present.  •  Moderate mitral valve regurgitation is present.  •  Moderate tricuspid valve regurgitation is present.  •  Estimated right ventricular systolic pressure from tricuspid regurgitation is markedly elevated (>55 mmHg).      Labs Pending at Discharge: None      Time spent on Discharge including face to face service: Greater than 35 minutes    Electronically signed by Latesha Chavez MD, 10/16/22, 12:57 PM EDT.

## 2022-10-17 ENCOUNTER — TELEPHONE (OUTPATIENT)
Dept: GASTROENTEROLOGY | Facility: CLINIC | Age: 62
End: 2022-10-17

## 2022-10-17 DIAGNOSIS — K62.5 RECTAL BLEEDING: Primary | ICD-10-CM

## 2022-10-17 NOTE — TELEPHONE ENCOUNTER
Patient has called the office and states that she was discharged from [facility] where Dr Jarrett followed her. She states that she is still seeing bright red blood and wants to know if that is expected and how long she should still see the residual blood. Please advise.

## 2022-10-17 NOTE — OUTREACH NOTE
Prep Survey    Flowsheet Row Responses   [facility] patient discharged from? [facility]   Is LACE score < 7 ? No   Emergency Room discharge w/ pulse ox? No   Eligibility Readm Mgmt   Discharge diagnosis Gastrointestinal hemorrhage, unspecified gastrointestinal hemorrhage type   Does the patient have one of the following disease processes/diagnoses(primary or secondary)? Other   Does the patient have Home health ordered? No   Is there a DME ordered? No   Prep survey completed? Yes          RANJIT VERDUGO - Registered Nurse

## 2022-10-18 NOTE — TELEPHONE ENCOUNTER
Pt aware of recommendations. Pt states that her stool was loose and dark. Said there was some red but it looks similar to what she had prior to her colonoscopy.

## 2022-10-19 ENCOUNTER — LAB (OUTPATIENT)
Dept: LAB | Facility: HOSPITAL | Age: 62
End: 2022-10-19

## 2022-10-19 LAB
BASOPHILS # BLD AUTO: 0.03 10*3/MM3 (ref 0–0.2)
BASOPHILS NFR BLD AUTO: 0.4 % (ref 0–1.5)
DEPRECATED RDW RBC AUTO: 57.2 FL (ref 37–54)
EOSINOPHIL # BLD AUTO: 0.23 10*3/MM3 (ref 0–0.4)
EOSINOPHIL NFR BLD AUTO: 3.1 % (ref 0.3–6.2)
ERYTHROCYTE [DISTWIDTH] IN BLOOD BY AUTOMATED COUNT: 18.9 % (ref 12.3–15.4)
HCT VFR BLD AUTO: 24.1 % (ref 34–46.6)
HGB BLD-MCNC: 7.1 G/DL (ref 12–15.9)
IMM GRANULOCYTES # BLD AUTO: 0.04 10*3/MM3 (ref 0–0.05)
IMM GRANULOCYTES NFR BLD AUTO: 0.5 % (ref 0–0.5)
LYMPHOCYTES # BLD AUTO: 1.39 10*3/MM3 (ref 0.7–3.1)
LYMPHOCYTES NFR BLD AUTO: 18.9 % (ref 19.6–45.3)
MCH RBC QN AUTO: 24.9 PG (ref 26.6–33)
MCHC RBC AUTO-ENTMCNC: 29.5 G/DL (ref 31.5–35.7)
MCV RBC AUTO: 84.6 FL (ref 79–97)
MONOCYTES # BLD AUTO: 0.64 10*3/MM3 (ref 0.1–0.9)
MONOCYTES NFR BLD AUTO: 8.7 % (ref 5–12)
NEUTROPHILS NFR BLD AUTO: 5.04 10*3/MM3 (ref 1.7–7)
NEUTROPHILS NFR BLD AUTO: 68.4 % (ref 42.7–76)
NRBC BLD AUTO-RTO: 0 /100 WBC (ref 0–0.2)
PLATELET # BLD AUTO: 244 10*3/MM3 (ref 140–450)
PMV BLD AUTO: 11.9 FL (ref 6–12)
RBC # BLD AUTO: 2.85 10*6/MM3 (ref 3.77–5.28)
WBC NRBC COR # BLD: 7.37 10*3/MM3 (ref 3.4–10.8)

## 2022-10-19 PROCEDURE — 85025 COMPLETE CBC W/AUTO DIFF WBC: CPT | Performed by: INTERNAL MEDICINE

## 2022-10-19 PROCEDURE — 36415 COLL VENOUS BLD VENIPUNCTURE: CPT

## 2022-10-19 NOTE — TELEPHONE ENCOUNTER
Pt called the office requesting a return call. Pt aware that she can walk in to have labs completed and does not require an appt.    Melolabial Transposition Flap Text: The defect edges were debeveled with a #15 scalpel blade.  Given the location of the defect and the proximity to free margins a melolabial flap was deemed most appropriate.  Using a sterile surgical marker, an appropriate melolabial transposition flap was drawn incorporating the defect.    The area thus outlined was incised deep to adipose tissue with a #15 scalpel blade.  The skin margins were undermined to an appropriate distance in all directions utilizing iris scissors.

## 2022-10-20 ENCOUNTER — TELEPHONE (OUTPATIENT)
Dept: GASTROENTEROLOGY | Facility: CLINIC | Age: 62
End: 2022-10-20

## 2022-10-20 ENCOUNTER — READMISSION MANAGEMENT (OUTPATIENT)
Dept: CALL CENTER | Facility: HOSPITAL | Age: 62
End: 2022-10-20

## 2022-10-20 NOTE — OUTREACH NOTE
Patientt left a message with her name, phone number,  only. Tried returning call, unsuccessful reaching patient.

## 2022-10-20 NOTE — TELEPHONE ENCOUNTER
Patient called and left a vm requesting the lab results from 10/19. Attempted to notify patient they have not yet been resulted. Left a vm requesting a return call.

## 2022-10-20 NOTE — OUTREACH NOTE
Medical Week 1 Survey    Flowsheet Row Responses   [facility] patient discharged from? [facility]   Does the patient have one of the following disease processes/diagnoses(primary or secondary)? Other   Week 1 attempt successful? Yes   Call start time 0842   Call end time 0844   Discharge diagnosis Gastrointestinal hemorrhage, unspecified gastrointestinal hemorrhage type   Is patient permission given to speak with other caregiver? Yes   List who call center can speak with Terry son and pt    Person spoke with today (if not patient) and relationship Terry son and pt    Meds reviewed with patient/caregiver? Yes   Is the patient having any side effects they believe may be caused by any medication additions or changes? No   Does the patient have all medications ordered at discharge? N/A   Is the patient taking all medications as directed (includes completed medication regime)? Yes   Does the patient have a primary care provider?  Yes   Does the patient have an appointment with their PCP within 7 days of discharge? Yes   Comments regarding PCP PCP FU APT ON 10/24/22   Has the patient kept scheduled appointments due by today? Yes  [Had blood work performed 10/19/22]   Psychosocial issues? No   Did the patient receive a copy of their discharge instructions? Yes   Nursing interventions Reviewed instructions with patient   What is the patient's perception of their health status since discharge? Same   Is the patient/caregiver able to teach back signs and symptoms related to disease process for when to call PCP? Yes   Is the patient/caregiver able to teach back signs and symptoms related to disease process for when to call 911? Yes   Is the patient/caregiver able to teach back the hierarchy of who to call/visit for symptoms/problems? PCP, Specialist, Home health nurse, Urgent Care, ED, 911 Yes   If the patient is a current smoker, are they able to teach back resources for cessation? Not a smoker   Week 1 call completed? Yes    Wrap up additional comments Pt still having blood in stool with every BM - doctor aware-lab work was performed yesterday (10/19/22) ,  waiting for results           DEB H - Registered Nurse

## 2022-10-21 ENCOUNTER — TELEPHONE (OUTPATIENT)
Dept: GASTROENTEROLOGY | Facility: CLINIC | Age: 62
End: 2022-10-21

## 2022-10-21 DIAGNOSIS — K62.5 RECTAL BLEEDING: Primary | ICD-10-CM

## 2022-10-21 NOTE — TELEPHONE ENCOUNTER
----- Message from Sara Jarrett MD sent at 10/21/2022  3:09 PM EDT -----  Please notify patient that Hgb has remained stable.  Would recommend repeat CBC in 1 week.  Please arrange.  thanks

## 2022-10-21 NOTE — TELEPHONE ENCOUNTER
Attempted to contact pt with results. Left a vm requesting a return call.   Pt states that she is still passing blood.

## 2022-10-21 NOTE — TELEPHONE ENCOUNTER
Pt aware of results and recommendations.   She states that she is still passing blood with every bowel movement.

## 2022-10-26 ENCOUNTER — LAB (OUTPATIENT)
Dept: LAB | Facility: HOSPITAL | Age: 62
End: 2022-10-26

## 2022-10-26 PROCEDURE — 36415 COLL VENOUS BLD VENIPUNCTURE: CPT

## 2022-10-26 PROCEDURE — 85025 COMPLETE CBC W/AUTO DIFF WBC: CPT | Performed by: INTERNAL MEDICINE

## 2022-10-27 LAB
BASOPHILS # BLD AUTO: 0.01 10*3/MM3 (ref 0–0.2)
BASOPHILS NFR BLD AUTO: 0.2 % (ref 0–1.5)
DEPRECATED RDW RBC AUTO: 56.4 FL (ref 37–54)
EOSINOPHIL # BLD AUTO: 0.08 10*3/MM3 (ref 0–0.4)
EOSINOPHIL NFR BLD AUTO: 1.6 % (ref 0.3–6.2)
ERYTHROCYTE [DISTWIDTH] IN BLOOD BY AUTOMATED COUNT: 18.6 % (ref 12.3–15.4)
HCT VFR BLD AUTO: 19.6 % (ref 34–46.6)
HGB BLD-MCNC: 5.8 G/DL (ref 12–15.9)
IMM GRANULOCYTES # BLD AUTO: 0.01 10*3/MM3 (ref 0–0.05)
IMM GRANULOCYTES NFR BLD AUTO: 0.2 % (ref 0–0.5)
LYMPHOCYTES # BLD AUTO: 0.85 10*3/MM3 (ref 0.7–3.1)
LYMPHOCYTES NFR BLD AUTO: 17.3 % (ref 19.6–45.3)
MCH RBC QN AUTO: 24.9 PG (ref 26.6–33)
MCHC RBC AUTO-ENTMCNC: 29.6 G/DL (ref 31.5–35.7)
MCV RBC AUTO: 84.1 FL (ref 79–97)
MONOCYTES # BLD AUTO: 0.47 10*3/MM3 (ref 0.1–0.9)
MONOCYTES NFR BLD AUTO: 9.6 % (ref 5–12)
NEUTROPHILS NFR BLD AUTO: 3.49 10*3/MM3 (ref 1.7–7)
NEUTROPHILS NFR BLD AUTO: 71.1 % (ref 42.7–76)
NRBC BLD AUTO-RTO: 0 /100 WBC (ref 0–0.2)
PLATELET # BLD AUTO: 164 10*3/MM3 (ref 140–450)
PMV BLD AUTO: 11.9 FL (ref 6–12)
RBC # BLD AUTO: 2.33 10*6/MM3 (ref 3.77–5.28)
WBC NRBC COR # BLD: 4.91 10*3/MM3 (ref 3.4–10.8)

## 2022-10-28 ENCOUNTER — APPOINTMENT (OUTPATIENT)
Dept: CT IMAGING | Facility: HOSPITAL | Age: 62
End: 2022-10-28

## 2022-10-28 ENCOUNTER — HOSPITAL ENCOUNTER (INPATIENT)
Facility: HOSPITAL | Age: 62
LOS: 11 days | Discharge: HOME | End: 2022-11-08
Attending: EMERGENCY MEDICINE | Admitting: STUDENT IN AN ORGANIZED HEALTH CARE EDUCATION/TRAINING PROGRAM

## 2022-10-28 ENCOUNTER — TELEPHONE (OUTPATIENT)
Dept: GASTROENTEROLOGY | Facility: CLINIC | Age: 62
End: 2022-10-28

## 2022-10-28 DIAGNOSIS — D64.9 SEVERE ANEMIA: ICD-10-CM

## 2022-10-28 DIAGNOSIS — Z78.9 DECREASED ACTIVITIES OF DAILY LIVING (ADL): ICD-10-CM

## 2022-10-28 DIAGNOSIS — R26.2 DIFFICULTY WALKING: ICD-10-CM

## 2022-10-28 DIAGNOSIS — K92.1 GASTROINTESTINAL HEMORRHAGE WITH MELENA: ICD-10-CM

## 2022-10-28 DIAGNOSIS — K92.2 GASTROINTESTINAL HEMORRHAGE, UNSPECIFIED GASTROINTESTINAL HEMORRHAGE TYPE: Primary | ICD-10-CM

## 2022-10-28 LAB
ABO GROUP BLD: NORMAL
ALBUMIN SERPL-MCNC: 3 G/DL (ref 3.5–5.2)
ALBUMIN/GLOB SERPL: 0.7 G/DL
ALP SERPL-CCNC: 138 U/L (ref 39–117)
ALT SERPL W P-5'-P-CCNC: 5 U/L (ref 1–33)
ANION GAP SERPL CALCULATED.3IONS-SCNC: 9.6 MMOL/L (ref 5–15)
APTT PPP: 56 SECONDS (ref 24.2–34.2)
AST SERPL-CCNC: 11 U/L (ref 1–32)
BASOPHILS # BLD AUTO: 0.03 10*3/MM3 (ref 0–0.2)
BASOPHILS NFR BLD AUTO: 0.6 % (ref 0–1.5)
BILIRUB SERPL-MCNC: 0.4 MG/DL (ref 0–1.2)
BLD GP AB SCN SERPL QL: NEGATIVE
BUN SERPL-MCNC: 25 MG/DL (ref 8–23)
BUN/CREAT SERPL: 25.8 (ref 7–25)
CALCIUM SPEC-SCNC: 8.2 MG/DL (ref 8.6–10.5)
CHLORIDE SERPL-SCNC: 99 MMOL/L (ref 98–107)
CO2 SERPL-SCNC: 30.4 MMOL/L (ref 22–29)
CREAT SERPL-MCNC: 0.97 MG/DL (ref 0.57–1)
D-LACTATE SERPL-SCNC: 1.5 MMOL/L (ref 0.5–2)
DEPRECATED RDW RBC AUTO: 64.8 FL (ref 37–54)
EGFRCR SERPLBLD CKD-EPI 2021: 66.6 ML/MIN/1.73
EOSINOPHIL # BLD AUTO: 0.13 10*3/MM3 (ref 0–0.4)
EOSINOPHIL NFR BLD AUTO: 2.6 % (ref 0.3–6.2)
ERYTHROCYTE [DISTWIDTH] IN BLOOD BY AUTOMATED COUNT: 20.9 % (ref 12.3–15.4)
GLOBULIN UR ELPH-MCNC: 4.2 GM/DL
GLUCOSE SERPL-MCNC: 124 MG/DL (ref 65–99)
HCT VFR BLD AUTO: 20.3 % (ref 34–46.6)
HGB BLD-MCNC: 5.6 G/DL (ref 12–15.9)
HOLD SPECIMEN: NORMAL
HOLD SPECIMEN: NORMAL
IMM GRANULOCYTES # BLD AUTO: 0.02 10*3/MM3 (ref 0–0.05)
IMM GRANULOCYTES NFR BLD AUTO: 0.4 % (ref 0–0.5)
INR PPP: 1.72 (ref 0.86–1.15)
LYMPHOCYTES # BLD AUTO: 1.14 10*3/MM3 (ref 0.7–3.1)
LYMPHOCYTES NFR BLD AUTO: 22.7 % (ref 19.6–45.3)
MCH RBC QN AUTO: 24.1 PG (ref 26.6–33)
MCHC RBC AUTO-ENTMCNC: 27.6 G/DL (ref 31.5–35.7)
MCV RBC AUTO: 87.5 FL (ref 79–97)
MONOCYTES # BLD AUTO: 0.55 10*3/MM3 (ref 0.1–0.9)
MONOCYTES NFR BLD AUTO: 10.9 % (ref 5–12)
NEUTROPHILS NFR BLD AUTO: 3.16 10*3/MM3 (ref 1.7–7)
NEUTROPHILS NFR BLD AUTO: 62.8 % (ref 42.7–76)
NRBC BLD AUTO-RTO: 0.6 /100 WBC (ref 0–0.2)
PLATELET # BLD AUTO: 358 10*3/MM3 (ref 140–450)
PMV BLD AUTO: 9.2 FL (ref 6–12)
POTASSIUM SERPL-SCNC: 3.7 MMOL/L (ref 3.5–5.2)
PROT SERPL-MCNC: 7.2 G/DL (ref 6–8.5)
PROTHROMBIN TIME: 20.4 SECONDS (ref 11.8–14.9)
RBC # BLD AUTO: 2.32 10*6/MM3 (ref 3.77–5.28)
RH BLD: POSITIVE
SODIUM SERPL-SCNC: 139 MMOL/L (ref 136–145)
T&S EXPIRATION DATE: NORMAL
WBC NRBC COR # BLD: 5.03 10*3/MM3 (ref 3.4–10.8)
WHOLE BLOOD HOLD COAG: NORMAL
WHOLE BLOOD HOLD SPECIMEN: NORMAL

## 2022-10-28 PROCEDURE — 85730 THROMBOPLASTIN TIME PARTIAL: CPT | Performed by: PHYSICIAN ASSISTANT

## 2022-10-28 PROCEDURE — 86900 BLOOD TYPING SEROLOGIC ABO: CPT

## 2022-10-28 PROCEDURE — 80053 COMPREHEN METABOLIC PANEL: CPT

## 2022-10-28 PROCEDURE — 36430 TRANSFUSION BLD/BLD COMPNT: CPT

## 2022-10-28 PROCEDURE — 99284 EMERGENCY DEPT VISIT MOD MDM: CPT

## 2022-10-28 PROCEDURE — 99285 EMERGENCY DEPT VISIT HI MDM: CPT

## 2022-10-28 PROCEDURE — 74177 CT ABD & PELVIS W/CONTRAST: CPT

## 2022-10-28 PROCEDURE — P9016 RBC LEUKOCYTES REDUCED: HCPCS

## 2022-10-28 PROCEDURE — 0 IOPAMIDOL PER 1 ML: Performed by: EMERGENCY MEDICINE

## 2022-10-28 PROCEDURE — 85025 COMPLETE CBC W/AUTO DIFF WBC: CPT

## 2022-10-28 PROCEDURE — 36415 COLL VENOUS BLD VENIPUNCTURE: CPT | Performed by: PHYSICIAN ASSISTANT

## 2022-10-28 PROCEDURE — 83605 ASSAY OF LACTIC ACID: CPT

## 2022-10-28 PROCEDURE — 86923 COMPATIBILITY TEST ELECTRIC: CPT

## 2022-10-28 PROCEDURE — 86901 BLOOD TYPING SEROLOGIC RH(D): CPT

## 2022-10-28 PROCEDURE — 99223 1ST HOSP IP/OBS HIGH 75: CPT | Performed by: INTERNAL MEDICINE

## 2022-10-28 PROCEDURE — 86850 RBC ANTIBODY SCREEN: CPT

## 2022-10-28 PROCEDURE — 85610 PROTHROMBIN TIME: CPT | Performed by: PHYSICIAN ASSISTANT

## 2022-10-28 RX ORDER — SODIUM CHLORIDE 9 MG/ML
INJECTION, SOLUTION INTRAVENOUS
Status: COMPLETED
Start: 2022-10-28 | End: 2022-10-28

## 2022-10-28 RX ORDER — SODIUM CHLORIDE 0.9 % (FLUSH) 0.9 %
10 SYRINGE (ML) INJECTION AS NEEDED
Status: DISCONTINUED | OUTPATIENT
Start: 2022-10-28 | End: 2022-11-08 | Stop reason: HOSPADM

## 2022-10-28 RX ADMIN — SODIUM CHLORIDE: 9 INJECTION, SOLUTION INTRAVENOUS at 22:06

## 2022-10-28 RX ADMIN — IOPAMIDOL 100 ML: 755 INJECTION, SOLUTION INTRAVENOUS at 19:58

## 2022-10-29 ENCOUNTER — READMISSION MANAGEMENT (OUTPATIENT)
Dept: CALL CENTER | Facility: HOSPITAL | Age: 62
End: 2022-10-29

## 2022-10-29 LAB
ALBUMIN SERPL-MCNC: 2.9 G/DL (ref 3.5–5.2)
ALBUMIN/GLOB SERPL: 0.7 G/DL
ALP SERPL-CCNC: 120 U/L (ref 39–117)
ALT SERPL W P-5'-P-CCNC: <5 U/L (ref 1–33)
ANION GAP SERPL CALCULATED.3IONS-SCNC: 9.1 MMOL/L (ref 5–15)
AST SERPL-CCNC: 11 U/L (ref 1–32)
BASOPHILS # BLD AUTO: 0.02 10*3/MM3 (ref 0–0.2)
BASOPHILS NFR BLD AUTO: 0.3 % (ref 0–1.5)
BILIRUB SERPL-MCNC: 0.7 MG/DL (ref 0–1.2)
BUN SERPL-MCNC: 23 MG/DL (ref 8–23)
BUN/CREAT SERPL: 26.1 (ref 7–25)
CALCIUM SPEC-SCNC: 8.1 MG/DL (ref 8.6–10.5)
CHLORIDE SERPL-SCNC: 100 MMOL/L (ref 98–107)
CO2 SERPL-SCNC: 28.9 MMOL/L (ref 22–29)
CREAT SERPL-MCNC: 0.88 MG/DL (ref 0.57–1)
DEPRECATED RDW RBC AUTO: 61.1 FL (ref 37–54)
EGFRCR SERPLBLD CKD-EPI 2021: 74.9 ML/MIN/1.73
EOSINOPHIL # BLD AUTO: 0.16 10*3/MM3 (ref 0–0.4)
EOSINOPHIL NFR BLD AUTO: 2.4 % (ref 0.3–6.2)
ERYTHROCYTE [DISTWIDTH] IN BLOOD BY AUTOMATED COUNT: 19.6 % (ref 12.3–15.4)
FERRITIN SERPL-MCNC: 55.09 NG/ML (ref 13–150)
GLOBULIN UR ELPH-MCNC: 3.9 GM/DL
GLUCOSE SERPL-MCNC: 100 MG/DL (ref 65–99)
HCT VFR BLD AUTO: 21.3 % (ref 34–46.6)
HCT VFR BLD AUTO: 22 % (ref 34–46.6)
HCT VFR BLD AUTO: 28 % (ref 34–46.6)
HGB BLD-MCNC: 6.3 G/DL (ref 12–15.9)
HGB BLD-MCNC: 6.5 G/DL (ref 12–15.9)
HGB BLD-MCNC: 8.5 G/DL (ref 12–15.9)
IMM GRANULOCYTES # BLD AUTO: 0.03 10*3/MM3 (ref 0–0.05)
IMM GRANULOCYTES NFR BLD AUTO: 0.4 % (ref 0–0.5)
IRON 24H UR-MRATE: 58 MCG/DL (ref 37–145)
IRON SATN MFR SERPL: 19 % (ref 20–50)
LYMPHOCYTES # BLD AUTO: 1.26 10*3/MM3 (ref 0.7–3.1)
LYMPHOCYTES NFR BLD AUTO: 18.8 % (ref 19.6–45.3)
MCH RBC QN AUTO: 25.7 PG (ref 26.6–33)
MCHC RBC AUTO-ENTMCNC: 30 G/DL (ref 31.5–35.7)
MCV RBC AUTO: 85.7 FL (ref 79–97)
MONOCYTES # BLD AUTO: 0.66 10*3/MM3 (ref 0.1–0.9)
MONOCYTES NFR BLD AUTO: 9.8 % (ref 5–12)
NEUTROPHILS NFR BLD AUTO: 4.58 10*3/MM3 (ref 1.7–7)
NEUTROPHILS NFR BLD AUTO: 68.3 % (ref 42.7–76)
NRBC BLD AUTO-RTO: 0.3 /100 WBC (ref 0–0.2)
NT-PROBNP SERPL-MCNC: 1681 PG/ML (ref 0–900)
PLATELET # BLD AUTO: 313 10*3/MM3 (ref 140–450)
PMV BLD AUTO: 9.8 FL (ref 6–12)
POTASSIUM SERPL-SCNC: 3.3 MMOL/L (ref 3.5–5.2)
PROT SERPL-MCNC: 6.8 G/DL (ref 6–8.5)
RBC # BLD AUTO: 2.45 10*6/MM3 (ref 3.77–5.28)
SODIUM SERPL-SCNC: 138 MMOL/L (ref 136–145)
TIBC SERPL-MCNC: 307 MCG/DL (ref 298–536)
TRANSFERRIN SERPL-MCNC: 206 MG/DL (ref 200–360)
WBC NRBC COR # BLD: 6.71 10*3/MM3 (ref 3.4–10.8)

## 2022-10-29 PROCEDURE — 94799 UNLISTED PULMONARY SVC/PX: CPT

## 2022-10-29 PROCEDURE — 86923 COMPATIBILITY TEST ELECTRIC: CPT

## 2022-10-29 PROCEDURE — 99222 1ST HOSP IP/OBS MODERATE 55: CPT | Performed by: INTERNAL MEDICINE

## 2022-10-29 PROCEDURE — 36430 TRANSFUSION BLD/BLD COMPNT: CPT

## 2022-10-29 PROCEDURE — 25010000002 NA FERRIC GLUC CPLX PER 12.5 MG: Performed by: INTERNAL MEDICINE

## 2022-10-29 PROCEDURE — 83540 ASSAY OF IRON: CPT | Performed by: INTERNAL MEDICINE

## 2022-10-29 PROCEDURE — 80053 COMPREHEN METABOLIC PANEL: CPT | Performed by: INTERNAL MEDICINE

## 2022-10-29 PROCEDURE — 86900 BLOOD TYPING SEROLOGIC ABO: CPT

## 2022-10-29 PROCEDURE — 85018 HEMOGLOBIN: CPT | Performed by: INTERNAL MEDICINE

## 2022-10-29 PROCEDURE — 85025 COMPLETE CBC W/AUTO DIFF WBC: CPT | Performed by: INTERNAL MEDICINE

## 2022-10-29 PROCEDURE — 25010000002 FUROSEMIDE PER 20 MG: Performed by: INTERNAL MEDICINE

## 2022-10-29 PROCEDURE — 94664 DEMO&/EVAL PT USE INHALER: CPT

## 2022-10-29 PROCEDURE — 83880 ASSAY OF NATRIURETIC PEPTIDE: CPT | Performed by: INTERNAL MEDICINE

## 2022-10-29 PROCEDURE — 85014 HEMATOCRIT: CPT | Performed by: INTERNAL MEDICINE

## 2022-10-29 PROCEDURE — P9040 RBC LEUKOREDUCED IRRADIATED: HCPCS

## 2022-10-29 PROCEDURE — 94640 AIRWAY INHALATION TREATMENT: CPT

## 2022-10-29 PROCEDURE — 82728 ASSAY OF FERRITIN: CPT | Performed by: INTERNAL MEDICINE

## 2022-10-29 PROCEDURE — 99233 SBSQ HOSP IP/OBS HIGH 50: CPT | Performed by: INTERNAL MEDICINE

## 2022-10-29 PROCEDURE — P9016 RBC LEUKOCYTES REDUCED: HCPCS

## 2022-10-29 PROCEDURE — 84466 ASSAY OF TRANSFERRIN: CPT | Performed by: INTERNAL MEDICINE

## 2022-10-29 RX ORDER — PANTOPRAZOLE SODIUM 40 MG/10ML
40 INJECTION, POWDER, LYOPHILIZED, FOR SOLUTION INTRAVENOUS EVERY 12 HOURS SCHEDULED
Status: DISCONTINUED | OUTPATIENT
Start: 2022-10-29 | End: 2022-11-08 | Stop reason: HOSPADM

## 2022-10-29 RX ORDER — POTASSIUM CHLORIDE 750 MG/1
40 CAPSULE, EXTENDED RELEASE ORAL ONCE
Status: COMPLETED | OUTPATIENT
Start: 2022-10-29 | End: 2022-10-29

## 2022-10-29 RX ORDER — LEVOTHYROXINE AND LIOTHYRONINE 38; 9 UG/1; UG/1
60 TABLET ORAL DAILY
Status: DISCONTINUED | OUTPATIENT
Start: 2022-10-30 | End: 2022-11-08 | Stop reason: HOSPADM

## 2022-10-29 RX ORDER — SODIUM CHLORIDE 9 MG/ML
40 INJECTION, SOLUTION INTRAVENOUS AS NEEDED
Status: DISCONTINUED | OUTPATIENT
Start: 2022-10-29 | End: 2022-11-08 | Stop reason: HOSPADM

## 2022-10-29 RX ORDER — HYDROCODONE BITARTRATE AND ACETAMINOPHEN 7.5; 325 MG/1; MG/1
1 TABLET ORAL EVERY 12 HOURS PRN
Status: DISCONTINUED | OUTPATIENT
Start: 2022-10-29 | End: 2022-10-30

## 2022-10-29 RX ORDER — SODIUM CHLORIDE 0.9 % (FLUSH) 0.9 %
10 SYRINGE (ML) INJECTION EVERY 12 HOURS SCHEDULED
Status: DISCONTINUED | OUTPATIENT
Start: 2022-10-29 | End: 2022-11-08 | Stop reason: HOSPADM

## 2022-10-29 RX ORDER — ACETAMINOPHEN 160 MG/5ML
650 SOLUTION ORAL EVERY 4 HOURS PRN
Status: DISCONTINUED | OUTPATIENT
Start: 2022-10-29 | End: 2022-11-08 | Stop reason: HOSPADM

## 2022-10-29 RX ORDER — FUROSEMIDE 10 MG/ML
40 INJECTION INTRAMUSCULAR; INTRAVENOUS ONCE
Status: COMPLETED | OUTPATIENT
Start: 2022-10-29 | End: 2022-10-29

## 2022-10-29 RX ORDER — ACETAMINOPHEN 650 MG/1
650 SUPPOSITORY RECTAL EVERY 4 HOURS PRN
Status: DISCONTINUED | OUTPATIENT
Start: 2022-10-29 | End: 2022-11-08 | Stop reason: HOSPADM

## 2022-10-29 RX ORDER — FERROUS SULFATE 325(65) MG
325 TABLET ORAL
Status: DISCONTINUED | OUTPATIENT
Start: 2022-11-01 | End: 2022-11-08 | Stop reason: HOSPADM

## 2022-10-29 RX ORDER — SODIUM CHLORIDE, SODIUM LACTATE, POTASSIUM CHLORIDE, CALCIUM CHLORIDE 600; 310; 30; 20 MG/100ML; MG/100ML; MG/100ML; MG/100ML
75 INJECTION, SOLUTION INTRAVENOUS CONTINUOUS
Status: DISCONTINUED | OUTPATIENT
Start: 2022-10-29 | End: 2022-10-29

## 2022-10-29 RX ORDER — ACETAMINOPHEN 325 MG/1
650 TABLET ORAL EVERY 4 HOURS PRN
Status: DISCONTINUED | OUTPATIENT
Start: 2022-10-29 | End: 2022-11-08 | Stop reason: HOSPADM

## 2022-10-29 RX ORDER — NITROGLYCERIN 0.4 MG/1
0.4 TABLET SUBLINGUAL
Status: DISCONTINUED | OUTPATIENT
Start: 2022-10-29 | End: 2022-11-08 | Stop reason: HOSPADM

## 2022-10-29 RX ORDER — CYCLOBENZAPRINE HCL 10 MG
10 TABLET ORAL 3 TIMES DAILY PRN
Status: DISCONTINUED | OUTPATIENT
Start: 2022-10-29 | End: 2022-11-08 | Stop reason: HOSPADM

## 2022-10-29 RX ORDER — SODIUM CHLORIDE 0.9 % (FLUSH) 0.9 %
10 SYRINGE (ML) INJECTION AS NEEDED
Status: DISCONTINUED | OUTPATIENT
Start: 2022-10-29 | End: 2022-11-08 | Stop reason: HOSPADM

## 2022-10-29 RX ORDER — BUDESONIDE AND FORMOTEROL FUMARATE DIHYDRATE 160; 4.5 UG/1; UG/1
2 AEROSOL RESPIRATORY (INHALATION)
Status: DISCONTINUED | OUTPATIENT
Start: 2022-10-29 | End: 2022-11-08 | Stop reason: HOSPADM

## 2022-10-29 RX ORDER — ALBUTEROL SULFATE 90 UG/1
2 AEROSOL, METERED RESPIRATORY (INHALATION) EVERY 4 HOURS PRN
Status: DISCONTINUED | OUTPATIENT
Start: 2022-10-29 | End: 2022-11-08 | Stop reason: HOSPADM

## 2022-10-29 RX ADMIN — HYDROCODONE BITARTRATE AND ACETAMINOPHEN 1 TABLET: 7.5; 325 TABLET ORAL at 22:25

## 2022-10-29 RX ADMIN — SODIUM CHLORIDE, POTASSIUM CHLORIDE, SODIUM LACTATE AND CALCIUM CHLORIDE 75 ML/HR: 600; 310; 30; 20 INJECTION, SOLUTION INTRAVENOUS at 01:35

## 2022-10-29 RX ADMIN — FUROSEMIDE 40 MG: 10 INJECTION, SOLUTION INTRAMUSCULAR; INTRAVENOUS at 12:03

## 2022-10-29 RX ADMIN — PANTOPRAZOLE SODIUM 40 MG: 40 INJECTION, POWDER, FOR SOLUTION INTRAVENOUS at 21:24

## 2022-10-29 RX ADMIN — Medication 10 ML: at 01:35

## 2022-10-29 RX ADMIN — Medication 10 ML: at 21:25

## 2022-10-29 RX ADMIN — SODIUM CHLORIDE 125 MG: 9 INJECTION, SOLUTION INTRAVENOUS at 18:11

## 2022-10-29 RX ADMIN — ACETAMINOPHEN 650 MG: 325 TABLET ORAL at 03:36

## 2022-10-29 RX ADMIN — POTASSIUM CHLORIDE 40 MEQ: 10 CAPSULE, COATED, EXTENDED RELEASE ORAL at 14:37

## 2022-10-29 RX ADMIN — FUROSEMIDE 40 MG: 10 INJECTION, SOLUTION INTRAMUSCULAR; INTRAVENOUS at 17:56

## 2022-10-29 RX ADMIN — BUDESONIDE AND FORMOTEROL FUMARATE DIHYDRATE 2 PUFF: 160; 4.5 AEROSOL RESPIRATORY (INHALATION) at 21:02

## 2022-10-29 RX ADMIN — Medication 10 ML: at 12:04

## 2022-10-29 RX ADMIN — PANTOPRAZOLE SODIUM 40 MG: 40 INJECTION, POWDER, FOR SOLUTION INTRAVENOUS at 12:03

## 2022-10-29 RX ADMIN — PANTOPRAZOLE SODIUM 40 MG: 40 INJECTION, POWDER, FOR SOLUTION INTRAVENOUS at 01:34

## 2022-10-29 RX ADMIN — BUDESONIDE AND FORMOTEROL FUMARATE DIHYDRATE 2 PUFF: 160; 4.5 AEROSOL RESPIRATORY (INHALATION) at 08:44

## 2022-10-29 RX ADMIN — CYCLOBENZAPRINE 10 MG: 10 TABLET, FILM COATED ORAL at 22:25

## 2022-10-29 RX ADMIN — Medication 10 ML: at 12:03

## 2022-10-29 NOTE — OUTREACH NOTE
Medical Week 2 Survey    Flowsheet Row Responses   [facility] patient discharged from? [facility]   Does the patient have one of the following disease processes/diagnoses(primary or secondary)? Other   Week 2 attempt successful? No   Unsuccessful attempts Attempt 1   Revoke Readmitted          GRACIE ALMANZA - Registered Nurse

## 2022-10-30 ENCOUNTER — APPOINTMENT (OUTPATIENT)
Dept: GENERAL RADIOLOGY | Facility: HOSPITAL | Age: 62
End: 2022-10-30

## 2022-10-30 LAB
ANION GAP SERPL CALCULATED.3IONS-SCNC: 9.7 MMOL/L (ref 5–15)
BASOPHILS # BLD AUTO: 0.03 10*3/MM3 (ref 0–0.2)
BASOPHILS NFR BLD AUTO: 0.5 % (ref 0–1.5)
BH BB BLOOD EXPIRATION DATE: NORMAL
BH BB BLOOD EXPIRATION DATE: NORMAL
BH BB BLOOD TYPE BARCODE: 5100
BH BB BLOOD TYPE BARCODE: 5100
BH BB DISPENSE STATUS: NORMAL
BH BB DISPENSE STATUS: NORMAL
BH BB PRODUCT CODE: NORMAL
BH BB PRODUCT CODE: NORMAL
BH BB UNIT NUMBER: NORMAL
BH BB UNIT NUMBER: NORMAL
BUN SERPL-MCNC: 21 MG/DL (ref 8–23)
BUN/CREAT SERPL: 20.2 (ref 7–25)
CALCIUM SPEC-SCNC: 8.3 MG/DL (ref 8.6–10.5)
CHLORIDE SERPL-SCNC: 97 MMOL/L (ref 98–107)
CO2 SERPL-SCNC: 29.3 MMOL/L (ref 22–29)
CREAT SERPL-MCNC: 1.04 MG/DL (ref 0.57–1)
CROSSMATCH INTERPRETATION: NORMAL
CROSSMATCH INTERPRETATION: NORMAL
DEPRECATED RDW RBC AUTO: 61.1 FL (ref 37–54)
EGFRCR SERPLBLD CKD-EPI 2021: 61.3 ML/MIN/1.73
EOSINOPHIL # BLD AUTO: 0.13 10*3/MM3 (ref 0–0.4)
EOSINOPHIL NFR BLD AUTO: 2.1 % (ref 0.3–6.2)
ERYTHROCYTE [DISTWIDTH] IN BLOOD BY AUTOMATED COUNT: 20 % (ref 12.3–15.4)
GLUCOSE SERPL-MCNC: 94 MG/DL (ref 65–99)
HCT VFR BLD AUTO: 24.9 % (ref 34–46.6)
HCT VFR BLD AUTO: 27.3 % (ref 34–46.6)
HGB BLD-MCNC: 7.7 G/DL (ref 12–15.9)
HGB BLD-MCNC: 8.2 G/DL (ref 12–15.9)
IMM GRANULOCYTES # BLD AUTO: 0.03 10*3/MM3 (ref 0–0.05)
IMM GRANULOCYTES NFR BLD AUTO: 0.5 % (ref 0–0.5)
LYMPHOCYTES # BLD AUTO: 1.63 10*3/MM3 (ref 0.7–3.1)
LYMPHOCYTES NFR BLD AUTO: 26.3 % (ref 19.6–45.3)
MAGNESIUM SERPL-MCNC: 1.8 MG/DL (ref 1.6–2.4)
MCH RBC QN AUTO: 26.4 PG (ref 26.6–33)
MCHC RBC AUTO-ENTMCNC: 30.9 G/DL (ref 31.5–35.7)
MCV RBC AUTO: 85.3 FL (ref 79–97)
MONOCYTES # BLD AUTO: 0.65 10*3/MM3 (ref 0.1–0.9)
MONOCYTES NFR BLD AUTO: 10.5 % (ref 5–12)
NEUTROPHILS NFR BLD AUTO: 3.72 10*3/MM3 (ref 1.7–7)
NEUTROPHILS NFR BLD AUTO: 60.1 % (ref 42.7–76)
NRBC BLD AUTO-RTO: 0.3 /100 WBC (ref 0–0.2)
PHOSPHATE SERPL-MCNC: 3.2 MG/DL (ref 2.5–4.5)
PLATELET # BLD AUTO: 296 10*3/MM3 (ref 140–450)
PMV BLD AUTO: 10.6 FL (ref 6–12)
POTASSIUM SERPL-SCNC: 3.5 MMOL/L (ref 3.5–5.2)
RBC # BLD AUTO: 2.92 10*6/MM3 (ref 3.77–5.28)
SODIUM SERPL-SCNC: 136 MMOL/L (ref 136–145)
UNIT  ABO: NORMAL
UNIT  ABO: NORMAL
UNIT  RH: NORMAL
UNIT  RH: NORMAL
WBC NRBC COR # BLD: 6.19 10*3/MM3 (ref 3.4–10.8)

## 2022-10-30 PROCEDURE — 94799 UNLISTED PULMONARY SVC/PX: CPT

## 2022-10-30 PROCEDURE — 99232 SBSQ HOSP IP/OBS MODERATE 35: CPT | Performed by: INTERNAL MEDICINE

## 2022-10-30 PROCEDURE — 85014 HEMATOCRIT: CPT | Performed by: INTERNAL MEDICINE

## 2022-10-30 PROCEDURE — 85018 HEMOGLOBIN: CPT | Performed by: INTERNAL MEDICINE

## 2022-10-30 PROCEDURE — 73030 X-RAY EXAM OF SHOULDER: CPT

## 2022-10-30 PROCEDURE — 80048 BASIC METABOLIC PNL TOTAL CA: CPT | Performed by: INTERNAL MEDICINE

## 2022-10-30 PROCEDURE — 84100 ASSAY OF PHOSPHORUS: CPT | Performed by: INTERNAL MEDICINE

## 2022-10-30 PROCEDURE — 25010000002 NA FERRIC GLUC CPLX PER 12.5 MG: Performed by: INTERNAL MEDICINE

## 2022-10-30 PROCEDURE — 83735 ASSAY OF MAGNESIUM: CPT | Performed by: INTERNAL MEDICINE

## 2022-10-30 PROCEDURE — 85025 COMPLETE CBC W/AUTO DIFF WBC: CPT | Performed by: INTERNAL MEDICINE

## 2022-10-30 PROCEDURE — 94660 CPAP INITIATION&MGMT: CPT

## 2022-10-30 PROCEDURE — 0 PHYTONADIONE 10 MG/ML SOLUTION 1 ML AMPULE: Performed by: INTERNAL MEDICINE

## 2022-10-30 PROCEDURE — 99233 SBSQ HOSP IP/OBS HIGH 50: CPT | Performed by: INTERNAL MEDICINE

## 2022-10-30 RX ORDER — HYDROCODONE BITARTRATE AND ACETAMINOPHEN 7.5; 325 MG/1; MG/1
1 TABLET ORAL EVERY 6 HOURS PRN
Status: DISCONTINUED | OUTPATIENT
Start: 2022-10-30 | End: 2022-11-08 | Stop reason: HOSPADM

## 2022-10-30 RX ADMIN — METOPROLOL TARTRATE 12.5 MG: 25 TABLET, FILM COATED ORAL at 09:37

## 2022-10-30 RX ADMIN — HYDROCODONE BITARTRATE AND ACETAMINOPHEN 1 TABLET: 7.5; 325 TABLET ORAL at 23:38

## 2022-10-30 RX ADMIN — PANTOPRAZOLE SODIUM 40 MG: 40 INJECTION, POWDER, FOR SOLUTION INTRAVENOUS at 21:42

## 2022-10-30 RX ADMIN — Medication 10 ML: at 21:42

## 2022-10-30 RX ADMIN — CYCLOBENZAPRINE 10 MG: 10 TABLET, FILM COATED ORAL at 21:46

## 2022-10-30 RX ADMIN — SODIUM CHLORIDE 125 MG: 9 INJECTION, SOLUTION INTRAVENOUS at 13:30

## 2022-10-30 RX ADMIN — HYDROCODONE BITARTRATE AND ACETAMINOPHEN 1 TABLET: 7.5; 325 TABLET ORAL at 16:49

## 2022-10-30 RX ADMIN — BUDESONIDE AND FORMOTEROL FUMARATE DIHYDRATE 2 PUFF: 160; 4.5 AEROSOL RESPIRATORY (INHALATION) at 18:45

## 2022-10-30 RX ADMIN — PANTOPRAZOLE SODIUM 40 MG: 40 INJECTION, POWDER, FOR SOLUTION INTRAVENOUS at 09:38

## 2022-10-30 RX ADMIN — BUDESONIDE AND FORMOTEROL FUMARATE DIHYDRATE 2 PUFF: 160; 4.5 AEROSOL RESPIRATORY (INHALATION) at 07:38

## 2022-10-30 RX ADMIN — HYDROCODONE BITARTRATE AND ACETAMINOPHEN 1 TABLET: 7.5; 325 TABLET ORAL at 09:37

## 2022-10-30 RX ADMIN — PHYTONADIONE 10 MG: 10 INJECTION, EMULSION INTRAMUSCULAR; INTRAVENOUS; SUBCUTANEOUS at 13:31

## 2022-10-30 RX ADMIN — Medication 10 ML: at 09:38

## 2022-10-30 RX ADMIN — METOPROLOL TARTRATE 12.5 MG: 25 TABLET, FILM COATED ORAL at 21:42

## 2022-10-30 RX ADMIN — MAGNESIUM OXIDE TAB 400 MG (241.3 MG ELEMENTAL MG) 400 MG: 400 (241.3 MG) TAB at 09:38

## 2022-10-30 RX ADMIN — LEVOTHYROXINE, LIOTHYRONINE 60 MG: 38; 9 TABLET ORAL at 13:30

## 2022-10-31 LAB
ANION GAP SERPL CALCULATED.3IONS-SCNC: 5.8 MMOL/L (ref 5–15)
BASOPHILS # BLD AUTO: 0.02 10*3/MM3 (ref 0–0.2)
BASOPHILS NFR BLD AUTO: 0.3 % (ref 0–1.5)
BH BB BLOOD EXPIRATION DATE: NORMAL
BH BB BLOOD EXPIRATION DATE: NORMAL
BH BB BLOOD TYPE BARCODE: 5100
BH BB BLOOD TYPE BARCODE: 9500
BH BB DISPENSE STATUS: NORMAL
BH BB DISPENSE STATUS: NORMAL
BH BB PRODUCT CODE: NORMAL
BH BB PRODUCT CODE: NORMAL
BH BB UNIT NUMBER: NORMAL
BH BB UNIT NUMBER: NORMAL
BUN SERPL-MCNC: 15 MG/DL (ref 8–23)
BUN/CREAT SERPL: 15.5 (ref 7–25)
CALCIUM SPEC-SCNC: 8.5 MG/DL (ref 8.6–10.5)
CHLORIDE SERPL-SCNC: 96 MMOL/L (ref 98–107)
CO2 SERPL-SCNC: 30.2 MMOL/L (ref 22–29)
CREAT SERPL-MCNC: 0.97 MG/DL (ref 0.57–1)
CROSSMATCH INTERPRETATION: NORMAL
CROSSMATCH INTERPRETATION: NORMAL
DEPRECATED RDW RBC AUTO: 61.7 FL (ref 37–54)
EGFRCR SERPLBLD CKD-EPI 2021: 66.6 ML/MIN/1.73
EOSINOPHIL # BLD AUTO: 0.21 10*3/MM3 (ref 0–0.4)
EOSINOPHIL NFR BLD AUTO: 3.2 % (ref 0.3–6.2)
ERYTHROCYTE [DISTWIDTH] IN BLOOD BY AUTOMATED COUNT: 20.6 % (ref 12.3–15.4)
GLUCOSE SERPL-MCNC: 118 MG/DL (ref 65–99)
HCT VFR BLD AUTO: 26.4 % (ref 34–46.6)
HGB BLD-MCNC: 8 G/DL (ref 12–15.9)
IMM GRANULOCYTES # BLD AUTO: 0.03 10*3/MM3 (ref 0–0.05)
IMM GRANULOCYTES NFR BLD AUTO: 0.5 % (ref 0–0.5)
INR PPP: 1.18 (ref 0.86–1.15)
LYMPHOCYTES # BLD AUTO: 1.5 10*3/MM3 (ref 0.7–3.1)
LYMPHOCYTES NFR BLD AUTO: 22.7 % (ref 19.6–45.3)
MAGNESIUM SERPL-MCNC: 2.1 MG/DL (ref 1.6–2.4)
MCH RBC QN AUTO: 26.4 PG (ref 26.6–33)
MCHC RBC AUTO-ENTMCNC: 30.3 G/DL (ref 31.5–35.7)
MCV RBC AUTO: 87.1 FL (ref 79–97)
MONOCYTES # BLD AUTO: 0.66 10*3/MM3 (ref 0.1–0.9)
MONOCYTES NFR BLD AUTO: 10 % (ref 5–12)
NEUTROPHILS NFR BLD AUTO: 4.18 10*3/MM3 (ref 1.7–7)
NEUTROPHILS NFR BLD AUTO: 63.3 % (ref 42.7–76)
NRBC BLD AUTO-RTO: 0 /100 WBC (ref 0–0.2)
PHOSPHATE SERPL-MCNC: 3.2 MG/DL (ref 2.5–4.5)
PLATELET # BLD AUTO: 317 10*3/MM3 (ref 140–450)
PMV BLD AUTO: 9.1 FL (ref 6–12)
POTASSIUM SERPL-SCNC: 3.8 MMOL/L (ref 3.5–5.2)
PROTHROMBIN TIME: 15.2 SECONDS (ref 11.8–14.9)
RBC # BLD AUTO: 3.03 10*6/MM3 (ref 3.77–5.28)
SODIUM SERPL-SCNC: 132 MMOL/L (ref 136–145)
UNIT  ABO: NORMAL
UNIT  ABO: NORMAL
UNIT  RH: NORMAL
UNIT  RH: NORMAL
WBC NRBC COR # BLD: 6.6 10*3/MM3 (ref 3.4–10.8)

## 2022-10-31 PROCEDURE — 84100 ASSAY OF PHOSPHORUS: CPT | Performed by: INTERNAL MEDICINE

## 2022-10-31 PROCEDURE — 97165 OT EVAL LOW COMPLEX 30 MIN: CPT

## 2022-10-31 PROCEDURE — 94799 UNLISTED PULMONARY SVC/PX: CPT

## 2022-10-31 PROCEDURE — 80048 BASIC METABOLIC PNL TOTAL CA: CPT | Performed by: INTERNAL MEDICINE

## 2022-10-31 PROCEDURE — 99233 SBSQ HOSP IP/OBS HIGH 50: CPT | Performed by: INTERNAL MEDICINE

## 2022-10-31 PROCEDURE — 94664 DEMO&/EVAL PT USE INHALER: CPT

## 2022-10-31 PROCEDURE — 99221 1ST HOSP IP/OBS SF/LOW 40: CPT | Performed by: ORTHOPAEDIC SURGERY

## 2022-10-31 PROCEDURE — 85025 COMPLETE CBC W/AUTO DIFF WBC: CPT | Performed by: INTERNAL MEDICINE

## 2022-10-31 PROCEDURE — 85610 PROTHROMBIN TIME: CPT | Performed by: INTERNAL MEDICINE

## 2022-10-31 PROCEDURE — 83735 ASSAY OF MAGNESIUM: CPT | Performed by: INTERNAL MEDICINE

## 2022-10-31 PROCEDURE — 97161 PT EVAL LOW COMPLEX 20 MIN: CPT

## 2022-10-31 RX ORDER — SIMETHICONE 80 MG
160 TABLET,CHEWABLE ORAL ONCE
Status: COMPLETED | OUTPATIENT
Start: 2022-10-31 | End: 2022-10-31

## 2022-10-31 RX ADMIN — CYCLOBENZAPRINE 10 MG: 10 TABLET, FILM COATED ORAL at 05:41

## 2022-10-31 RX ADMIN — HYDROCODONE BITARTRATE AND ACETAMINOPHEN 1 TABLET: 7.5; 325 TABLET ORAL at 18:13

## 2022-10-31 RX ADMIN — METOPROLOL TARTRATE 12.5 MG: 25 TABLET, FILM COATED ORAL at 20:50

## 2022-10-31 RX ADMIN — HYDROCODONE BITARTRATE AND ACETAMINOPHEN 1 TABLET: 7.5; 325 TABLET ORAL at 11:31

## 2022-10-31 RX ADMIN — SIMETHICONE 160 MG: 80 TABLET, CHEWABLE ORAL at 18:12

## 2022-10-31 RX ADMIN — MAGNESIUM OXIDE TAB 400 MG (241.3 MG ELEMENTAL MG) 400 MG: 400 (241.3 MG) TAB at 09:15

## 2022-10-31 RX ADMIN — PANTOPRAZOLE SODIUM 40 MG: 40 INJECTION, POWDER, FOR SOLUTION INTRAVENOUS at 20:49

## 2022-10-31 RX ADMIN — Medication 10 ML: at 09:15

## 2022-10-31 RX ADMIN — HYDROCODONE BITARTRATE AND ACETAMINOPHEN 1 TABLET: 7.5; 325 TABLET ORAL at 05:09

## 2022-10-31 RX ADMIN — METOPROLOL TARTRATE 12.5 MG: 25 TABLET, FILM COATED ORAL at 09:15

## 2022-10-31 RX ADMIN — METOPROLOL TARTRATE 12.5 MG: 25 TABLET, FILM COATED ORAL at 05:13

## 2022-10-31 RX ADMIN — BUDESONIDE AND FORMOTEROL FUMARATE DIHYDRATE 2 PUFF: 160; 4.5 AEROSOL RESPIRATORY (INHALATION) at 18:40

## 2022-10-31 RX ADMIN — Medication 10 ML: at 20:50

## 2022-10-31 RX ADMIN — BUDESONIDE AND FORMOTEROL FUMARATE DIHYDRATE 2 PUFF: 160; 4.5 AEROSOL RESPIRATORY (INHALATION) at 07:21

## 2022-10-31 RX ADMIN — LEVOTHYROXINE, LIOTHYRONINE 60 MG: 38; 9 TABLET ORAL at 09:15

## 2022-10-31 RX ADMIN — POLYETHYLENE GLYCOL 3350, SODIUM SULFATE ANHYDROUS, SODIUM BICARBONATE, SODIUM CHLORIDE, POTASSIUM CHLORIDE 1000 ML: 236; 22.74; 6.74; 5.86; 2.97 POWDER, FOR SOLUTION ORAL at 18:09

## 2022-10-31 RX ADMIN — PANTOPRAZOLE SODIUM 40 MG: 40 INJECTION, POWDER, FOR SOLUTION INTRAVENOUS at 09:15

## 2022-11-01 ENCOUNTER — PROCEDURE VISIT (OUTPATIENT)
Dept: GASTROENTEROLOGY | Facility: CLINIC | Age: 62
End: 2022-11-01

## 2022-11-01 DIAGNOSIS — D64.9 ANEMIA, UNSPECIFIED TYPE: Primary | ICD-10-CM

## 2022-11-01 LAB
ANION GAP SERPL CALCULATED.3IONS-SCNC: 8.4 MMOL/L (ref 5–15)
BASOPHILS # BLD AUTO: 0.02 10*3/MM3 (ref 0–0.2)
BASOPHILS NFR BLD AUTO: 0.3 % (ref 0–1.5)
BUN SERPL-MCNC: 13 MG/DL (ref 8–23)
BUN/CREAT SERPL: 16.9 (ref 7–25)
CALCIUM SPEC-SCNC: 8.7 MG/DL (ref 8.6–10.5)
CHLORIDE SERPL-SCNC: 98 MMOL/L (ref 98–107)
CO2 SERPL-SCNC: 31.6 MMOL/L (ref 22–29)
CREAT SERPL-MCNC: 0.77 MG/DL (ref 0.57–1)
DEPRECATED RDW RBC AUTO: 65.1 FL (ref 37–54)
EGFRCR SERPLBLD CKD-EPI 2021: 87.9 ML/MIN/1.73
EOSINOPHIL # BLD AUTO: 0.21 10*3/MM3 (ref 0–0.4)
EOSINOPHIL NFR BLD AUTO: 3.4 % (ref 0.3–6.2)
ERYTHROCYTE [DISTWIDTH] IN BLOOD BY AUTOMATED COUNT: 21.4 % (ref 12.3–15.4)
GLUCOSE SERPL-MCNC: 108 MG/DL (ref 65–99)
HCT VFR BLD AUTO: 25.6 % (ref 34–46.6)
HGB BLD-MCNC: 7.7 G/DL (ref 12–15.9)
IMM GRANULOCYTES # BLD AUTO: 0.03 10*3/MM3 (ref 0–0.05)
IMM GRANULOCYTES NFR BLD AUTO: 0.5 % (ref 0–0.5)
LYMPHOCYTES # BLD AUTO: 1.48 10*3/MM3 (ref 0.7–3.1)
LYMPHOCYTES NFR BLD AUTO: 23.8 % (ref 19.6–45.3)
MCH RBC QN AUTO: 26.6 PG (ref 26.6–33)
MCHC RBC AUTO-ENTMCNC: 30.1 G/DL (ref 31.5–35.7)
MCV RBC AUTO: 88.6 FL (ref 79–97)
MONOCYTES # BLD AUTO: 0.63 10*3/MM3 (ref 0.1–0.9)
MONOCYTES NFR BLD AUTO: 10.1 % (ref 5–12)
NEUTROPHILS NFR BLD AUTO: 3.86 10*3/MM3 (ref 1.7–7)
NEUTROPHILS NFR BLD AUTO: 61.9 % (ref 42.7–76)
NRBC BLD AUTO-RTO: 0 /100 WBC (ref 0–0.2)
PLATELET # BLD AUTO: 312 10*3/MM3 (ref 140–450)
PMV BLD AUTO: 10.2 FL (ref 6–12)
POTASSIUM SERPL-SCNC: 3.7 MMOL/L (ref 3.5–5.2)
RBC # BLD AUTO: 2.89 10*6/MM3 (ref 3.77–5.28)
SODIUM SERPL-SCNC: 138 MMOL/L (ref 136–145)
WBC NRBC COR # BLD: 6.23 10*3/MM3 (ref 3.4–10.8)

## 2022-11-01 PROCEDURE — 80048 BASIC METABOLIC PNL TOTAL CA: CPT | Performed by: INTERNAL MEDICINE

## 2022-11-01 PROCEDURE — 3E0U33Z INTRODUCTION OF ANTI-INFLAMMATORY INTO JOINTS, PERCUTANEOUS APPROACH: ICD-10-PCS | Performed by: ORTHOPAEDIC SURGERY

## 2022-11-01 PROCEDURE — 94799 UNLISTED PULMONARY SVC/PX: CPT

## 2022-11-01 PROCEDURE — 0 LIDOCAINE 1 % SOLUTION: Performed by: ORTHOPAEDIC SURGERY

## 2022-11-01 PROCEDURE — 91110 GI TRC IMG INTRAL ESOPH-ILE: CPT | Performed by: NURSE PRACTITIONER

## 2022-11-01 PROCEDURE — 25010000002 TRIAMCINOLONE PER 10 MG: Performed by: ORTHOPAEDIC SURGERY

## 2022-11-01 PROCEDURE — 0DJ07ZZ INSPECTION OF UPPER INTESTINAL TRACT, VIA NATURAL OR ARTIFICIAL OPENING: ICD-10-PCS | Performed by: INTERNAL MEDICINE

## 2022-11-01 PROCEDURE — 85025 COMPLETE CBC W/AUTO DIFF WBC: CPT | Performed by: INTERNAL MEDICINE

## 2022-11-01 PROCEDURE — 20610 DRAIN/INJ JOINT/BURSA W/O US: CPT | Performed by: ORTHOPAEDIC SURGERY

## 2022-11-01 PROCEDURE — 99233 SBSQ HOSP IP/OBS HIGH 50: CPT | Performed by: INTERNAL MEDICINE

## 2022-11-01 RX ORDER — TRIAMCINOLONE ACETONIDE 40 MG/ML
80 INJECTION, SUSPENSION INTRA-ARTICULAR; INTRAMUSCULAR ONCE
Status: COMPLETED | OUTPATIENT
Start: 2022-11-01 | End: 2022-11-01

## 2022-11-01 RX ORDER — LIDOCAINE HYDROCHLORIDE 10 MG/ML
10 INJECTION, SOLUTION INFILTRATION; PERINEURAL ONCE
Status: COMPLETED | OUTPATIENT
Start: 2022-11-01 | End: 2022-11-01

## 2022-11-01 RX ADMIN — Medication 10 ML: at 21:32

## 2022-11-01 RX ADMIN — METOPROLOL TARTRATE 12.5 MG: 25 TABLET, FILM COATED ORAL at 07:22

## 2022-11-01 RX ADMIN — METOPROLOL TARTRATE 12.5 MG: 25 TABLET, FILM COATED ORAL at 10:35

## 2022-11-01 RX ADMIN — PANTOPRAZOLE SODIUM 40 MG: 40 INJECTION, POWDER, FOR SOLUTION INTRAVENOUS at 21:31

## 2022-11-01 RX ADMIN — BUDESONIDE AND FORMOTEROL FUMARATE DIHYDRATE 2 PUFF: 160; 4.5 AEROSOL RESPIRATORY (INHALATION) at 07:35

## 2022-11-01 RX ADMIN — BUDESONIDE AND FORMOTEROL FUMARATE DIHYDRATE 2 PUFF: 160; 4.5 AEROSOL RESPIRATORY (INHALATION) at 20:01

## 2022-11-01 RX ADMIN — LEVOTHYROXINE, LIOTHYRONINE 60 MG: 38; 9 TABLET ORAL at 10:17

## 2022-11-01 RX ADMIN — MAGNESIUM OXIDE TAB 400 MG (241.3 MG ELEMENTAL MG) 400 MG: 400 (241.3 MG) TAB at 10:17

## 2022-11-01 RX ADMIN — CYCLOBENZAPRINE 10 MG: 10 TABLET, FILM COATED ORAL at 00:08

## 2022-11-01 RX ADMIN — FERROUS SULFATE TAB 325 MG (65 MG ELEMENTAL FE) 325 MG: 325 (65 FE) TAB at 10:17

## 2022-11-01 RX ADMIN — HYDROCODONE BITARTRATE AND ACETAMINOPHEN 1 TABLET: 7.5; 325 TABLET ORAL at 21:31

## 2022-11-01 RX ADMIN — CYCLOBENZAPRINE 10 MG: 10 TABLET, FILM COATED ORAL at 21:31

## 2022-11-01 RX ADMIN — HYDROCODONE BITARTRATE AND ACETAMINOPHEN 1 TABLET: 7.5; 325 TABLET ORAL at 06:02

## 2022-11-01 RX ADMIN — METOPROLOL TARTRATE 12.5 MG: 25 TABLET, FILM COATED ORAL at 21:31

## 2022-11-01 RX ADMIN — PANTOPRAZOLE SODIUM 40 MG: 40 INJECTION, POWDER, FOR SOLUTION INTRAVENOUS at 10:17

## 2022-11-01 RX ADMIN — LIDOCAINE HYDROCHLORIDE 10 ML: 10 INJECTION, SOLUTION INFILTRATION; PERINEURAL at 18:01

## 2022-11-01 RX ADMIN — TRIAMCINOLONE ACETONIDE 80 MG: 40 INJECTION, SUSPENSION INTRA-ARTICULAR; INTRAMUSCULAR at 18:00

## 2022-11-01 RX ADMIN — HYDROCODONE BITARTRATE AND ACETAMINOPHEN 1 TABLET: 7.5; 325 TABLET ORAL at 00:09

## 2022-11-01 NOTE — PROGRESS NOTES
Procedure   Endoscopy, GI With Capsule    Date/Time: 11/1/2022 9:07 AM  Performed by: Erin Price APRN  Authorized by: Sara Jarrett MD   Local anesthesia used: no    Anesthesia:  Local anesthesia used: no    Sedation:  Patient sedated: no    Patient tolerance: patient tolerated the procedure well with no immediate complications

## 2022-11-01 NOTE — PLAN OF CARE
Problem: Adult Inpatient Plan of Care  Goal: Plan of Care Review  Outcome: Ongoing, Progressing  Flowsheets (Taken 11/1/2022 1821)  Outcome Evaluation: VSS. No complaints of pain. Dr. Beltran did bilateral injections of shoulders.  Goal: Patient-Specific Goal (Individualized)  Outcome: Ongoing, Progressing  Goal: Absence of Hospital-Acquired Illness or Injury  Outcome: Ongoing, Progressing  Intervention: Identify and Manage Fall Risk  Recent Flowsheet Documentation  Taken 11/1/2022 0945 by Angeline John RN  Safety Promotion/Fall Prevention: safety round/check completed  Intervention: Prevent Skin Injury  Recent Flowsheet Documentation  Taken 11/1/2022 0945 by Angeline John RN  Body Position: position changed independently  Intervention: Prevent and Manage VTE (Venous Thromboembolism) Risk  Recent Flowsheet Documentation  Taken 11/1/2022 0945 by Angeline John RN  Activity Management: up to bedside commode  Goal: Optimal Comfort and Wellbeing  Outcome: Ongoing, Progressing  Intervention: Provide Person-Centered Care  Recent Flowsheet Documentation  Taken 11/1/2022 0945 by Angeline John RN  Trust Relationship/Rapport:   choices provided   care explained  Goal: Readiness for Transition of Care  Outcome: Ongoing, Progressing     Problem: Adjustment to Illness (Gastrointestinal Bleeding)  Goal: Optimal Coping with Acute Illness  Outcome: Ongoing, Progressing     Problem: Bleeding (Gastrointestinal Bleeding)  Goal: Hemostasis  Outcome: Ongoing, Progressing     Problem: Fall Injury Risk  Goal: Absence of Fall and Fall-Related Injury  Outcome: Ongoing, Progressing  Intervention: Promote Injury-Free Environment  Recent Flowsheet Documentation  Taken 11/1/2022 0945 by Angeline John RN  Safety Promotion/Fall Prevention: safety round/check completed     Problem: Skin Injury Risk Increased  Goal: Skin Health and Integrity  Outcome: Ongoing, Progressing   Goal Outcome Evaluation:              Outcome  Evaluation: VSS. No complaints of pain. Dr. Beltran did bilateral injections of shoulders.

## 2022-11-01 NOTE — PROGRESS NOTES
[facility]   Hospitalist Progress Note    Date of admission: 10/28/2022  Patient Name: Milagros Ramirez  1960  Date: 11/1/2022      Subjective     Chief Complaint   Patient presents with   • Black or Bloody Stool     Pt states she was sent to this ED by her PCP for decreased HGB of 5.8 two days ago. She states she was seen here last week with an HGB of 3.8 and received 5 units of blood. Pt says she is still having blood in her stools.        Summary: 62 y.o. female with recent GI bleeding earlier this month underwent EGD and colonoscopy at that time and required clipping of a lesion in her lower intestines who presents after outpatient labs showed anemia and patient developed melena.  Has had 4 units of blood thus far.  Dr. Young/GI assisting in patient pending Endoscopy on 11/1.  Patient Also Having Severe Bilateral Shoulder Pain and Orthopedic Surgery/Dr Beltran consulted for additional assistance.  PT and OT also helping and patient may likely require rehab for discharge.    Interval Followup: No acute events overnight, did have some dark stools yesterday, status post capsule endoscopy, no chest pain or palpitations    Review of Systems  No chest pain or palpitations  No nausea no vomiting  No fever no chills    Objective     Vitals:   Temp:  [97.6 °F (36.4 °C)-99.1 °F (37.3 °C)] 98.8 °F (37.1 °C)  Heart Rate:  [] 118  Resp:  [16-20] 18  BP: (110-124)/(52-84) 114/75    Physical Exam  General appearance: NAD, conversant   Eyes: anicteric sclerae, moist conjunctivae; no lid-lag; PERRLA  HENT: Atraumatic; oropharynx clear with moist mucous membranes and no mucosal ulcerations; normal hard and soft palate  Neck: Trachea midline; FROM, supple, no thyromegaly or lymphadenopathy  Lungs: CTA, with normal respiratory effort and no intercostal retractions  CV: Regular Rate and Rhythm, no Murmurs, Rubs, or Gallops   Abdomen: Soft, non-tender; no masses or Heptosplenomegally  Extremities: No peripheral  edema or extremity lymphadenopathy  Skin: Normal temperature, turgor and texture; no rash, ulcers or subcutaneous nodules  Psych: Appropriate affect, alert and oriented to person, place and time  Neuro: CN II - XII intact no motor deficits, no sensory defecits    Result Review:  Vital signs, labs and recent relevant imaging reviewed.      budesonide-formoterol, 2 puff, Inhalation, BID - RT  ferrous sulfate, 325 mg, Oral, Daily With Breakfast  lidocaine, 10 mL, Intradermal, Once  magnesium oxide, 400 mg, Oral, Daily  metoprolol tartrate, 12.5 mg, Oral, Q12H  pantoprazole, 40 mg, Intravenous, Q12H  sodium chloride, 10 mL, Intravenous, Q12H  Thyroid, 60 mg, Oral, Daily  triamcinolone acetonide, 80 mg, Intramuscular, Once      •  acetaminophen **OR** acetaminophen **OR** acetaminophen  •  albuterol sulfate HFA  •  cyclobenzaprine  •  HYDROcodone-acetaminophen  •  influenza vaccine  •  metoprolol tartrate  •  nitroglycerin  •  sodium chloride  •  sodium chloride  •  sodium chloride    10/2020 stress test normal at that time EF reportedly 70%    Assessment / Plan     Assessment/Plan:  Acute GI bleed  Severe blood loss anemia requiring transfusion  Status post colonoscopy with clipping on 10/13/2022  History of iron deficiency anemia requiring IV iron earlier this month  A. fib, with RVR, on Xarelto as outpatient  Morbid obesity  Hypokalemia  Possible congestive hepatopathy versus venoocclusive disease  Diastolic CHF with borderline reduced EF 46% per 10/12/2022 echo  Moderate MV regurgitation and tricuspid valve regurgitation  Mild to moderate cardiomegaly and small pericardial effusion  Anasarca  Hypothyroidism and history of partial thyroidectomy  History of hypertension  AMOR  SLE and rheumatoid arthritis history on hydroxychloroquine    Telemetry: Reviewed, A. fib with episodes of RVR    -Patient complaining of dark stool yesterday  - Patient currently undergoing capsule endoscopy  -Monitor labs for contraction  alkalosis  -Transfuse as needed to maintain hemoglobin above 7.  -Bilateral shoulder films without acute fracture noted some questionable chronic impingement on the left shoulder noted.  Still with notable debility on exam.  Will have orthopedic surgery see.  Defer further imaging with potential MRI to their evaluation.  May minimally benefit from joint injection.  Continue pain medications and PT and OT.  Current bleeding and anticoagulation likely limiting treatment options  - CPAP as able but suspect will just need to use nasal cannula and follow-up further outpatient for continued monitoring  - PT and OT, suspect will need rehab placement   - Replace magnesium as needed  - Has received IV iron, continuing on oral iron     Continue inpatient monitoring and treatment as above.  Home versus rehab once medically stable.     DVT prophylaxis:  Mechanical DVT prophylaxis orders are present.    Code Status (Patient has no pulse and is not breathing): CPR (Attempt to Resuscitate)  Medical Interventions (Patient has pulse or is breathing): Full Support        CBC    CBC 10/30/22 10/30/22 10/31/22 11/1/22    0411 1544     WBC 6.19  6.60 6.23   RBC 2.92 (A)  3.03 (A) 2.89 (A)   Hemoglobin 7.7 (A) 8.2 (A) 8.0 (A) 7.7 (A)   Hematocrit 24.9 (A) 27.3 (A) 26.4 (A) 25.6 (A)   MCV 85.3  87.1 88.6   MCH 26.4 (A)  26.4 (A) 26.6   MCHC 30.9 (A)  30.3 (A) 30.1 (A)   RDW 20.0 (A)  20.6 (A) 21.4 (A)   Platelets 296  317 312   (A) Abnormal value              CMP    CMP 10/30/22 10/31/22 11/1/22   Glucose 94 118 (A) 108 (A)   BUN 21 15 13   Creatinine 1.04 (A) 0.97 0.77   Sodium 136 132 (A) 138   Potassium 3.5 3.8 3.7   Chloride 97 (A) 96 (A) 98   Calcium 8.3 (A) 8.5 (A) 8.7   (A) Abnormal value              Electronically signed by Abiodun Betts MD, 11/01/22, 2:20 PM EDT.

## 2022-11-01 NOTE — PLAN OF CARE
Goal Outcome Evaluation:           Progress: no change  Outcome Evaluation: VSS. Medicated x2 for pain. NPO at midnight. On GoLytely in preparation for capsule endoscopy today. A-fib on monitor. HR increased to 150s when patient is up to bedside commode and getting back into bed. HR decreases back to normal range at rest.

## 2022-11-01 NOTE — PROCEDURES
Preprocedure diagnosis: Bilateral shoulder impingement, tendinitis    Post procedure diagnosis: Bilateral shoulder impingement, tendinitis    Procedure: Bilateral shoulder subacromial steroid injection    Surgeon: Dr. Beltran    Complications: None    Indications: The patient is a 62-year-old female with chronic bilateral shoulder pain that is worsening.  She has tried conservative measures without relief.  She wishes to undergo bilateral shoulder injections.  Risk and benefits of surgery were discussed and informed consent was obtained.  She wishes to proceed.    Description of procedure: The bilateral shoulders were prepped with ChloraPrep.  From a posterior subacromial approach a 21-gauge needle was used to inject 4 cc of 1% lidocaine and 40 mg Kenalog into the subacromial space of the left shoulder.  The skin was anesthetized with ethyl chloride spray.  A sterile Band-Aid was then placed.  The same procedure was then repeated for the right side.  The patient tolerated the procedure well.

## 2022-11-02 LAB
ALBUMIN SERPL-MCNC: 2.9 G/DL (ref 3.5–5.2)
ALBUMIN/GLOB SERPL: 0.7 G/DL
ALP SERPL-CCNC: 123 U/L (ref 39–117)
ALT SERPL W P-5'-P-CCNC: 5 U/L (ref 1–33)
ANION GAP SERPL CALCULATED.3IONS-SCNC: 8.4 MMOL/L (ref 5–15)
AST SERPL-CCNC: 12 U/L (ref 1–32)
BASOPHILS # BLD AUTO: 0.01 10*3/MM3 (ref 0–0.2)
BASOPHILS NFR BLD AUTO: 0.2 % (ref 0–1.5)
BILIRUB SERPL-MCNC: 0.3 MG/DL (ref 0–1.2)
BUN SERPL-MCNC: 9 MG/DL (ref 8–23)
BUN/CREAT SERPL: 13 (ref 7–25)
CALCIUM SPEC-SCNC: 8.8 MG/DL (ref 8.6–10.5)
CHLORIDE SERPL-SCNC: 100 MMOL/L (ref 98–107)
CO2 SERPL-SCNC: 29.6 MMOL/L (ref 22–29)
CREAT SERPL-MCNC: 0.69 MG/DL (ref 0.57–1)
DEPRECATED RDW RBC AUTO: 66.4 FL (ref 37–54)
EGFRCR SERPLBLD CKD-EPI 2021: 98.9 ML/MIN/1.73
EOSINOPHIL # BLD AUTO: 0 10*3/MM3 (ref 0–0.4)
EOSINOPHIL NFR BLD AUTO: 0 % (ref 0.3–6.2)
ERYTHROCYTE [DISTWIDTH] IN BLOOD BY AUTOMATED COUNT: 21.1 % (ref 12.3–15.4)
GLOBULIN UR ELPH-MCNC: 4.4 GM/DL
GLUCOSE SERPL-MCNC: 156 MG/DL (ref 65–99)
HCT VFR BLD AUTO: 25.9 % (ref 34–46.6)
HGB BLD-MCNC: 7.9 G/DL (ref 12–15.9)
IMM GRANULOCYTES # BLD AUTO: 0.01 10*3/MM3 (ref 0–0.05)
IMM GRANULOCYTES NFR BLD AUTO: 0.2 % (ref 0–0.5)
LYMPHOCYTES # BLD AUTO: 0.6 10*3/MM3 (ref 0.7–3.1)
LYMPHOCYTES NFR BLD AUTO: 13.5 % (ref 19.6–45.3)
MAGNESIUM SERPL-MCNC: 2.2 MG/DL (ref 1.6–2.4)
MCH RBC QN AUTO: 27 PG (ref 26.6–33)
MCHC RBC AUTO-ENTMCNC: 30.5 G/DL (ref 31.5–35.7)
MCV RBC AUTO: 88.4 FL (ref 79–97)
MONOCYTES # BLD AUTO: 0.1 10*3/MM3 (ref 0.1–0.9)
MONOCYTES NFR BLD AUTO: 2.3 % (ref 5–12)
NEUTROPHILS NFR BLD AUTO: 3.71 10*3/MM3 (ref 1.7–7)
NEUTROPHILS NFR BLD AUTO: 83.8 % (ref 42.7–76)
NRBC BLD AUTO-RTO: 0 /100 WBC (ref 0–0.2)
PHOSPHATE SERPL-MCNC: 3.9 MG/DL (ref 2.5–4.5)
PLATELET # BLD AUTO: 263 10*3/MM3 (ref 140–450)
PMV BLD AUTO: 10.3 FL (ref 6–12)
POTASSIUM SERPL-SCNC: 4 MMOL/L (ref 3.5–5.2)
PROT SERPL-MCNC: 7.3 G/DL (ref 6–8.5)
RBC # BLD AUTO: 2.93 10*6/MM3 (ref 3.77–5.28)
SODIUM SERPL-SCNC: 138 MMOL/L (ref 136–145)
WBC NRBC COR # BLD: 4.43 10*3/MM3 (ref 3.4–10.8)

## 2022-11-02 PROCEDURE — 85025 COMPLETE CBC W/AUTO DIFF WBC: CPT | Performed by: INTERNAL MEDICINE

## 2022-11-02 PROCEDURE — 94799 UNLISTED PULMONARY SVC/PX: CPT

## 2022-11-02 PROCEDURE — 80053 COMPREHEN METABOLIC PANEL: CPT | Performed by: INTERNAL MEDICINE

## 2022-11-02 PROCEDURE — 84100 ASSAY OF PHOSPHORUS: CPT | Performed by: INTERNAL MEDICINE

## 2022-11-02 PROCEDURE — 83735 ASSAY OF MAGNESIUM: CPT | Performed by: INTERNAL MEDICINE

## 2022-11-02 PROCEDURE — 94664 DEMO&/EVAL PT USE INHALER: CPT

## 2022-11-02 PROCEDURE — 99233 SBSQ HOSP IP/OBS HIGH 50: CPT | Performed by: INTERNAL MEDICINE

## 2022-11-02 RX ADMIN — MAGNESIUM OXIDE TAB 400 MG (241.3 MG ELEMENTAL MG) 400 MG: 400 (241.3 MG) TAB at 08:03

## 2022-11-02 RX ADMIN — PANTOPRAZOLE SODIUM 40 MG: 40 INJECTION, POWDER, FOR SOLUTION INTRAVENOUS at 20:57

## 2022-11-02 RX ADMIN — BUDESONIDE AND FORMOTEROL FUMARATE DIHYDRATE 2 PUFF: 160; 4.5 AEROSOL RESPIRATORY (INHALATION) at 21:09

## 2022-11-02 RX ADMIN — LEVOTHYROXINE, LIOTHYRONINE 60 MG: 38; 9 TABLET ORAL at 08:03

## 2022-11-02 RX ADMIN — FERROUS SULFATE TAB 325 MG (65 MG ELEMENTAL FE) 325 MG: 325 (65 FE) TAB at 08:03

## 2022-11-02 RX ADMIN — METOPROLOL TARTRATE 12.5 MG: 25 TABLET, FILM COATED ORAL at 20:57

## 2022-11-02 RX ADMIN — HYDROCODONE BITARTRATE AND ACETAMINOPHEN 1 TABLET: 7.5; 325 TABLET ORAL at 20:57

## 2022-11-02 RX ADMIN — BUDESONIDE AND FORMOTEROL FUMARATE DIHYDRATE 2 PUFF: 160; 4.5 AEROSOL RESPIRATORY (INHALATION) at 07:42

## 2022-11-02 RX ADMIN — Medication 10 ML: at 08:04

## 2022-11-02 RX ADMIN — PANTOPRAZOLE SODIUM 40 MG: 40 INJECTION, POWDER, FOR SOLUTION INTRAVENOUS at 08:03

## 2022-11-02 RX ADMIN — CYCLOBENZAPRINE 10 MG: 10 TABLET, FILM COATED ORAL at 08:05

## 2022-11-02 RX ADMIN — Medication 10 ML: at 20:58

## 2022-11-02 RX ADMIN — METOPROLOL TARTRATE 12.5 MG: 25 TABLET, FILM COATED ORAL at 08:04

## 2022-11-02 NOTE — PLAN OF CARE
Goal Outcome Evaluation:      VSS. AFIB controlled on monitor. UOP. Up ad halle. No c/o pain. Ondina Bill, RNA        Progress: no change

## 2022-11-02 NOTE — PROGRESS NOTES
[facility]   Hospitalist Progress Note    Date of admission: 10/28/2022  Patient Name: Milagros Ramirez  1960  Date: 11/2/2022      Subjective     Chief Complaint   Patient presents with   • Black or Bloody Stool     Pt states she was sent to this ED by her PCP for decreased HGB of 5.8 two days ago. She states she was seen here last week with an HGB of 3.8 and received 5 units of blood. Pt says she is still having blood in her stools.        Summary: 62 y.o. female with recent GI bleeding earlier this month underwent EGD and colonoscopy at that time and required clipping of a lesion in her lower intestines who presents after outpatient labs showed anemia and patient developed melena.  Has had 4 units of blood thus far.  Dr. Young/GI assisting in patient pending Endoscopy on 11/1.  Patient Also Having Severe Bilateral Shoulder Pain and Orthopedic Surgery/Dr Beltran consulted for additional assistance.  PT and OT also helping and patient may likely require rehab for discharge.    Interval Followup: No acute events overnight, hemoglobin roughly stable, patient status post bilateral shoulder injections yesterday evening, patient status post capsule endoscopy.  She reports improvement in her shoulder pain    Review of Systems  No chest pain or palpitations  No nausea no vomiting  No fever no chills    Objective     Vitals:   Temp:  [97.4 °F (36.3 °C)-98.8 °F (37.1 °C)] 97.4 °F (36.3 °C)  Heart Rate:  [] 106  Resp:  [18-20] 20  BP: (114-136)/(62-76) 132/67    Physical Exam  General appearance: NAD, conversant   Eyes: anicteric sclerae, moist conjunctivae; no lid-lag; PERRLA  HENT: Atraumatic; oropharynx clear with moist mucous membranes and no mucosal ulcerations; normal hard and soft palate  Neck: Trachea midline; FROM, supple, no thyromegaly or lymphadenopathy  Lungs: CTA, with normal respiratory effort and no intercostal retractions  CV: Regular Rate and Rhythm, no Murmurs, Rubs, or Gallops    Abdomen: Soft, non-tender; no masses or Heptosplenomegally  Extremities: No peripheral edema or extremity lymphadenopathy  Skin: Normal temperature, turgor and texture; no rash, ulcers or subcutaneous nodules  Psych: Appropriate affect, alert and oriented to person, place and time  Neuro: CN II - XII intact no motor deficits, no sensory defecits    Result Review:  Vital signs, labs and recent relevant imaging reviewed.      !NOT ORDERED- rivaroxaban (Xarelto), , Does not apply, Daily With Dinner  budesonide-formoterol, 2 puff, Inhalation, BID - RT  ferrous sulfate, 325 mg, Oral, Daily With Breakfast  magnesium oxide, 400 mg, Oral, Daily  metoprolol tartrate, 12.5 mg, Oral, Q12H  pantoprazole, 40 mg, Intravenous, Q12H  sodium chloride, 10 mL, Intravenous, Q12H  Thyroid, 60 mg, Oral, Daily      •  acetaminophen **OR** acetaminophen **OR** acetaminophen  •  albuterol sulfate HFA  •  cyclobenzaprine  •  HYDROcodone-acetaminophen  •  influenza vaccine  •  metoprolol tartrate  •  nitroglycerin  •  sodium chloride  •  sodium chloride  •  sodium chloride    10/2020 stress test normal at that time EF reportedly 70%    Assessment / Plan     Assessment/Plan:  Acute GI bleed  Severe blood loss anemia requiring transfusion  Status post colonoscopy with clipping on 10/13/2022  History of iron deficiency anemia requiring IV iron earlier this month  A. fib, with RVR, on Xarelto as outpatient  Morbid obesity  Hypokalemia  Possible congestive hepatopathy versus venoocclusive disease  Diastolic CHF with borderline reduced EF 46% per 10/12/2022 echo  Moderate MV regurgitation and tricuspid valve regurgitation  Mild to moderate cardiomegaly and small pericardial effusion  Anasarca  Hypothyroidism and history of partial thyroidectomy  History of hypertension  AMOR  SLE and rheumatoid arthritis history on hydroxychloroquine    Telemetry: Reviewed by me, rate controlled atrial fibrillation    -Patient status post capsule endoscopy,  awaiting results  -Hemoglobin has remained stable overnight  -Monitor labs for contraction alkalosis  -Transfuse as needed to maintain hemoglobin above 7.  -Patient underwent bilateral shoulder subacromial steroid injections by Dr. Beltran on 11/2/2022  - CPAP as able but suspect will just need to use nasal cannula and follow-up further outpatient for continued monitoring  - Continue PT and OT, suspect will need rehab placement   - Potassium and magnesium stable  -Glucose in acceptable range  - Has received IV iron, continuing on oral iron     Continue inpatient monitoring and treatment as above.  Home versus rehab once medically stable.     DVT prophylaxis:  Medical and mechanical DVT prophylaxis orders are present.    Code Status (Patient has no pulse and is not breathing): CPR (Attempt to Resuscitate)  Medical Interventions (Patient has pulse or is breathing): Full Support        CBC    CBC 10/31/22 11/1/22 11/2/22   WBC 6.60 6.23 4.43   RBC 3.03 (A) 2.89 (A) 2.93 (A)   Hemoglobin 8.0 (A) 7.7 (A) 7.9 (A)   Hematocrit 26.4 (A) 25.6 (A) 25.9 (A)   MCV 87.1 88.6 88.4   MCH 26.4 (A) 26.6 27.0   MCHC 30.3 (A) 30.1 (A) 30.5 (A)   RDW 20.6 (A) 21.4 (A) 21.1 (A)   Platelets 317 312 263   (A) Abnormal value              CMP    CMP 10/31/22 11/1/22 11/2/22   Glucose 118 (A) 108 (A) 156 (A)   BUN 15 13 9   Creatinine 0.97 0.77 0.69   Sodium 132 (A) 138 138   Potassium 3.8 3.7 4.0   Chloride 96 (A) 98 100   Calcium 8.5 (A) 8.7 8.8   Albumin   2.90 (A)   Total Bilirubin   0.3   Alkaline Phosphatase   123 (A)   AST (SGOT)   12   ALT (SGPT)   5   (A) Abnormal value              Electronically signed by Abiodun Betts MD, 11/02/22, 8:38 AM EDT.

## 2022-11-03 LAB
ALBUMIN SERPL-MCNC: 3.2 G/DL (ref 3.5–5.2)
ALBUMIN/GLOB SERPL: 0.8 G/DL
ALP SERPL-CCNC: 124 U/L (ref 39–117)
ALT SERPL W P-5'-P-CCNC: 6 U/L (ref 1–33)
ANION GAP SERPL CALCULATED.3IONS-SCNC: 7.7 MMOL/L (ref 5–15)
AST SERPL-CCNC: 11 U/L (ref 1–32)
BASOPHILS # BLD AUTO: 0.01 10*3/MM3 (ref 0–0.2)
BASOPHILS NFR BLD AUTO: 0.2 % (ref 0–1.5)
BILIRUB SERPL-MCNC: 0.3 MG/DL (ref 0–1.2)
BUN SERPL-MCNC: 12 MG/DL (ref 8–23)
BUN/CREAT SERPL: 15.8 (ref 7–25)
CALCIUM SPEC-SCNC: 8.6 MG/DL (ref 8.6–10.5)
CHLORIDE SERPL-SCNC: 98 MMOL/L (ref 98–107)
CO2 SERPL-SCNC: 29.3 MMOL/L (ref 22–29)
CREAT SERPL-MCNC: 0.76 MG/DL (ref 0.57–1)
DEPRECATED RDW RBC AUTO: 68.3 FL (ref 37–54)
EGFRCR SERPLBLD CKD-EPI 2021: 89.3 ML/MIN/1.73
EOSINOPHIL # BLD AUTO: 0.01 10*3/MM3 (ref 0–0.4)
EOSINOPHIL NFR BLD AUTO: 0.2 % (ref 0.3–6.2)
ERYTHROCYTE [DISTWIDTH] IN BLOOD BY AUTOMATED COUNT: 21.1 % (ref 12.3–15.4)
GLOBULIN UR ELPH-MCNC: 4 GM/DL
GLUCOSE SERPL-MCNC: 114 MG/DL (ref 65–99)
HCT VFR BLD AUTO: 25 % (ref 34–46.6)
HGB BLD-MCNC: 7.4 G/DL (ref 12–15.9)
IMM GRANULOCYTES # BLD AUTO: 0.02 10*3/MM3 (ref 0–0.05)
IMM GRANULOCYTES NFR BLD AUTO: 0.3 % (ref 0–0.5)
LYMPHOCYTES # BLD AUTO: 0.81 10*3/MM3 (ref 0.7–3.1)
LYMPHOCYTES NFR BLD AUTO: 13.6 % (ref 19.6–45.3)
MAGNESIUM SERPL-MCNC: 2.3 MG/DL (ref 1.6–2.4)
MCH RBC QN AUTO: 26.1 PG (ref 26.6–33)
MCHC RBC AUTO-ENTMCNC: 29.6 G/DL (ref 31.5–35.7)
MCV RBC AUTO: 88.3 FL (ref 79–97)
MONOCYTES # BLD AUTO: 0.33 10*3/MM3 (ref 0.1–0.9)
MONOCYTES NFR BLD AUTO: 5.5 % (ref 5–12)
NEUTROPHILS NFR BLD AUTO: 4.79 10*3/MM3 (ref 1.7–7)
NEUTROPHILS NFR BLD AUTO: 80.2 % (ref 42.7–76)
NRBC BLD AUTO-RTO: 0 /100 WBC (ref 0–0.2)
PHOSPHATE SERPL-MCNC: 3.6 MG/DL (ref 2.5–4.5)
PLATELET # BLD AUTO: 220 10*3/MM3 (ref 140–450)
PMV BLD AUTO: 11.4 FL (ref 6–12)
POTASSIUM SERPL-SCNC: 4.1 MMOL/L (ref 3.5–5.2)
PROT SERPL-MCNC: 7.2 G/DL (ref 6–8.5)
RBC # BLD AUTO: 2.83 10*6/MM3 (ref 3.77–5.28)
SODIUM SERPL-SCNC: 135 MMOL/L (ref 136–145)
WBC NRBC COR # BLD: 5.97 10*3/MM3 (ref 3.4–10.8)

## 2022-11-03 PROCEDURE — 99231 SBSQ HOSP IP/OBS SF/LOW 25: CPT | Performed by: INTERNAL MEDICINE

## 2022-11-03 PROCEDURE — 94799 UNLISTED PULMONARY SVC/PX: CPT

## 2022-11-03 PROCEDURE — 80053 COMPREHEN METABOLIC PANEL: CPT | Performed by: INTERNAL MEDICINE

## 2022-11-03 PROCEDURE — 85025 COMPLETE CBC W/AUTO DIFF WBC: CPT | Performed by: INTERNAL MEDICINE

## 2022-11-03 PROCEDURE — 83735 ASSAY OF MAGNESIUM: CPT | Performed by: INTERNAL MEDICINE

## 2022-11-03 PROCEDURE — 84100 ASSAY OF PHOSPHORUS: CPT | Performed by: INTERNAL MEDICINE

## 2022-11-03 PROCEDURE — 99233 SBSQ HOSP IP/OBS HIGH 50: CPT | Performed by: INTERNAL MEDICINE

## 2022-11-03 RX ADMIN — PANTOPRAZOLE SODIUM 40 MG: 40 INJECTION, POWDER, FOR SOLUTION INTRAVENOUS at 20:32

## 2022-11-03 RX ADMIN — Medication 10 ML: at 20:33

## 2022-11-03 RX ADMIN — MAGNESIUM OXIDE TAB 400 MG (241.3 MG ELEMENTAL MG) 400 MG: 400 (241.3 MG) TAB at 08:47

## 2022-11-03 RX ADMIN — HYDROCODONE BITARTRATE AND ACETAMINOPHEN 1 TABLET: 7.5; 325 TABLET ORAL at 20:32

## 2022-11-03 RX ADMIN — METOPROLOL TARTRATE 12.5 MG: 25 TABLET, FILM COATED ORAL at 08:48

## 2022-11-03 RX ADMIN — PANTOPRAZOLE SODIUM 40 MG: 40 INJECTION, POWDER, FOR SOLUTION INTRAVENOUS at 08:48

## 2022-11-03 RX ADMIN — CYCLOBENZAPRINE 10 MG: 10 TABLET, FILM COATED ORAL at 08:47

## 2022-11-03 RX ADMIN — BUDESONIDE AND FORMOTEROL FUMARATE DIHYDRATE 2 PUFF: 160; 4.5 AEROSOL RESPIRATORY (INHALATION) at 06:37

## 2022-11-03 RX ADMIN — FERROUS SULFATE TAB 325 MG (65 MG ELEMENTAL FE) 325 MG: 325 (65 FE) TAB at 08:47

## 2022-11-03 RX ADMIN — Medication 10 ML: at 08:47

## 2022-11-03 RX ADMIN — CYCLOBENZAPRINE 10 MG: 10 TABLET, FILM COATED ORAL at 20:32

## 2022-11-03 RX ADMIN — LEVOTHYROXINE, LIOTHYRONINE 60 MG: 38; 9 TABLET ORAL at 08:47

## 2022-11-03 RX ADMIN — METOPROLOL TARTRATE 12.5 MG: 25 TABLET, FILM COATED ORAL at 20:33

## 2022-11-03 RX ADMIN — BUDESONIDE AND FORMOTEROL FUMARATE DIHYDRATE 2 PUFF: 160; 4.5 AEROSOL RESPIRATORY (INHALATION) at 19:49

## 2022-11-03 NOTE — PROGRESS NOTES
[facility]  Inpatient Progress Note    Reason for Follow Up:  anemia    Subjective     Interval History:   Pt reports still having dark bowel movements.  No abd pain.    Current Facility-Administered Medications:   •  !NOT ORDERED- rivaroxaban (Xarelto), , Does not apply, Daily With Dinner, Abiodun Betts MD  •  acetaminophen (TYLENOL) tablet 650 mg, 650 mg, Oral, Q4H PRN, 650 mg at 10/29/22 0336 **OR** acetaminophen (TYLENOL) 160 MG/5ML solution 650 mg, 650 mg, Oral, Q4H PRN **OR** acetaminophen (TYLENOL) suppository 650 mg, 650 mg, Rectal, Q4H PRN, Edgard Rai Jr., MD  •  albuterol sulfate HFA (PROVENTIL HFA;VENTOLIN HFA;PROAIR HFA) inhaler 2 puff, 2 puff, Inhalation, Q4H PRN, Edgard Rai Jr., MD  •  budesonide-formoterol (SYMBICORT) 160-4.5 MCG/ACT inhaler 2 puff, 2 puff, Inhalation, BID - RT, Edgard Rai Jr., MD, 2 puff at 11/03/22 0637  •  cyclobenzaprine (FLEXERIL) tablet 10 mg, 10 mg, Oral, TID PRN, Edgard Rai Jr., MD, 10 mg at 11/03/22 0847  •  ferrous sulfate tablet 325 mg, 325 mg, Oral, Daily With Breakfast, Steve Arnold MD, 325 mg at 11/03/22 0847  •  HYDROcodone-acetaminophen (NORCO) 7.5-325 MG per tablet 1 tablet, 1 tablet, Oral, Q6H PRN, Steve Arnold MD, 1 tablet at 11/02/22 2057  •  influenza vac split quad (FLUZONE,FLUARIX,AFLURIA,FLULAVAL) injection 0.5 mL, 0.5 mL, Intramuscular, During Hospitalization, Edgard Rai Jr., MD  •  magnesium oxide (MAG-OX) tablet 400 mg, 400 mg, Oral, Daily, Steve Arnold MD, 400 mg at 11/03/22 0847  •  metoprolol tartrate (LOPRESSOR) tablet 12.5 mg, 12.5 mg, Oral, Q8H PRN, Steve Arnold MD, 12.5 mg at 11/01/22 0722  •  metoprolol tartrate (LOPRESSOR) tablet 12.5 mg, 12.5 mg, Oral, Q12H, Steve Arnold MD, 12.5 mg at 11/03/22 0848  •  nitroglycerin (NITROSTAT) SL tablet 0.4 mg, 0.4 mg, Sublingual, Q5 Min PRN, Edgard Rai Jr., MD  •  pantoprazole (PROTONIX) injection 40 mg, 40 mg, Intravenous, Q12H, Cecelia  Edgard UNDERWOOD Jr., MD, 40 mg at 11/03/22 0848  •  sodium chloride 0.9 % flush 10 mL, 10 mL, Intravenous, PRN, Edgard Rai Jr., MD  •  sodium chloride 0.9 % flush 10 mL, 10 mL, Intravenous, PRN, Edgard Rai Jr., MD, 10 mL at 10/29/22 1203  •  sodium chloride 0.9 % flush 10 mL, 10 mL, Intravenous, Q12H, Edgard Rai Jr., MD, 10 mL at 11/03/22 0847  •  sodium chloride 0.9 % infusion 40 mL, 40 mL, Intravenous, PRN, Edgard Rai Jr., MD  •  Thyroid (ARMOUR) tablet 60 mg, 60 mg, Oral, Daily, Edgard Rai Jr., MD, 60 mg at 11/03/22 0847  Review of Systems:    The following systems were reviewed and negative;  constitution, respiratory and cardiovascular    Objective     Vital Signs  Temp:  [97.2 °F (36.2 °C)-98.3 °F (36.8 °C)] 97.5 °F (36.4 °C)  Heart Rate:  [] 99  Resp:  [16-18] 18  BP: (110-132)/(49-75) 131/75  Body mass index is 41.15 kg/m².    Intake/Output Summary (Last 24 hours) at 11/3/2022 0915  Last data filed at 11/2/2022 1445  Gross per 24 hour   Intake 477 ml   Output 125 ml   Net 352 ml     No intake/output data recorded.     Physical Exam:   General: awake, alert and in no acute distress   Eyes: eyes move symmetrical in all directions, no scleral icterus   Neck: supple, trachea is midline   Skin: warm and dry, not jaundiced   Pulm: breathing unlabored   Extremities: no rash or edema   Psychiatric: mental status within normal limits, alert and oriented      Results Review:     I reviewed the patient's new clinical results.    Results from last 7 days   Lab Units 11/03/22  0425 11/02/22  0410 11/01/22  0542   WBC 10*3/mm3 5.97 4.43 6.23   HEMOGLOBIN g/dL 7.4* 7.9* 7.7*   HEMATOCRIT % 25.0* 25.9* 25.6*   PLATELETS 10*3/mm3 220 263 312     Results from last 7 days   Lab Units 11/03/22  0425 11/02/22  0410 11/01/22  0542 10/30/22  0411 10/29/22  0436   SODIUM mmol/L 135* 138 138   < > 138   POTASSIUM mmol/L 4.1 4.0 3.7   < > 3.3*   CHLORIDE mmol/L 98 100 98   < > 100   CO2 mmol/L 29.3* 29.6*  31.6*   < > 28.9   BUN mg/dL 12 9 13   < > 23   CREATININE mg/dL 0.76 0.69 0.77   < > 0.88   CALCIUM mg/dL 8.6 8.8 8.7   < > 8.1*   BILIRUBIN mg/dL 0.3 0.3  --   --  0.7   ALK PHOS U/L 124* 123*  --   --  120*   ALT (SGPT) U/L 6 5  --   --  <5   AST (SGOT) U/L 11 12  --   --  11   GLUCOSE mg/dL 114* 156* 108*   < > 100*    < > = values in this interval not displayed.     Results from last 7 days   Lab Units 10/31/22  0528 10/28/22  1825   INR  1.18* 1.72*     No results found for: LIPASE    Radiology:  [unfilled]      Assessment & Plan   Assessment:     Anemia  Melena    Plan:     - capsule endoscopy results pending  - discussed option of push enteroscopy while awaiting capsule results given ongoing melena and worsening anemia; pt would like to hold for now  - will make NPO after MN in the event Hgb continues to drop and melena persists for possible enteroscopy tomorrow      I discussed the patients findings and my recommendations with patient, family and primary care team.         Sara Jarrett M.D.  [place] [address]  [place]  [id number]  Office: [phone]

## 2022-11-03 NOTE — PLAN OF CARE
Goal Outcome Evaluation:           Progress: improving  Outcome Evaluation: Patient Alert and oriented x 4, is able to verbalize all needs and concerns to staff as needed. Patient has no complaints or concerns at this time. Currently on tele, patient will run sinus tach with ambulation. Patient wears C-PAP at night and with naps.

## 2022-11-03 NOTE — PROGRESS NOTES
[facility]   Hospitalist Progress Note    Date of admission: 10/28/2022  Patient Name: Milagros Ramirez  1960  Date: 11/3/2022      Subjective     Chief Complaint   Patient presents with   • Black or Bloody Stool     Pt states she was sent to this ED by her PCP for decreased HGB of 5.8 two days ago. She states she was seen here last week with an HGB of 3.8 and received 5 units of blood. Pt says she is still having blood in her stools.        Summary: 62 y.o. female with recent GI bleeding earlier this month underwent EGD and colonoscopy at that time and required clipping of a lesion in her lower intestines who presents after outpatient labs showed anemia and patient developed melena.  Has had 4 units of blood thus far.  Dr. Young/GI assisting in patient pending Endoscopy on 11/1.  Patient Also Having Severe Bilateral Shoulder Pain and Orthopedic Surgery/Dr Beltran consulted for additional assistance.  PT and OT also helping and patient may likely require rehab for discharge.    Interval Followup: No acute events overnight, patient resting comfortably in bed.  Patient hemoglobin still downtrending, awaiting repeat evaluation by GI    Review of Systems  No reported chest pain or palpitations  No reported nausea no vomiting  No reported fever no chills    Objective     Vitals:   Temp:  [97.2 °F (36.2 °C)-98.3 °F (36.8 °C)] 97.5 °F (36.4 °C)  Heart Rate:  [] 99  Resp:  [16-18] 18  BP: (110-132)/(49-75) 131/75    Physical Exam  General appearance: NAD, resting comfortably in bed  Eyes: anicteric sclerae, moist conjunctivae; no lid-lag; PERRLA  HENT: Atraumatic; oropharynx clear with moist mucous membranes and no mucosal ulcerations; normal hard and soft palate  Neck: Trachea midline; FROM, supple, no thyromegaly or lymphadenopathy  Lungs: CTA, with normal respiratory effort and no intercostal retractions  CV: Regular Rate and Rhythm, no Murmurs, Rubs, or Gallops   Abdomen: Soft, non-tender; no masses  or Heptosplenomegally  Extremities: No peripheral edema or extremity lymphadenopathy  Skin: Normal temperature, turgor and texture; no rash, ulcers or subcutaneous nodules  Psych: Appropriate affect, alert and oriented to person, place and time  Neuro: CN II - XII intact no motor deficits, no sensory defecits    Result Review:  Vital signs, labs and recent relevant imaging reviewed.      !NOT ORDERED- rivaroxaban (Xarelto), , Does not apply, Daily With Dinner  budesonide-formoterol, 2 puff, Inhalation, BID - RT  ferrous sulfate, 325 mg, Oral, Daily With Breakfast  magnesium oxide, 400 mg, Oral, Daily  metoprolol tartrate, 12.5 mg, Oral, Q12H  pantoprazole, 40 mg, Intravenous, Q12H  sodium chloride, 10 mL, Intravenous, Q12H  Thyroid, 60 mg, Oral, Daily      •  acetaminophen **OR** acetaminophen **OR** acetaminophen  •  albuterol sulfate HFA  •  cyclobenzaprine  •  HYDROcodone-acetaminophen  •  influenza vaccine  •  metoprolol tartrate  •  nitroglycerin  •  sodium chloride  •  sodium chloride  •  sodium chloride    10/2020 stress test normal at that time EF reportedly 70%    Assessment / Plan     Assessment/Plan:  Acute GI bleed  Severe blood loss anemia requiring transfusion  Status post colonoscopy with clipping on 10/13/2022  History of iron deficiency anemia requiring IV iron earlier this month  A. fib, with RVR, on Xarelto as outpatient  Morbid obesity  Hypokalemia  Possible congestive hepatopathy versus venoocclusive disease  Diastolic CHF with borderline reduced EF 46% per 10/12/2022 echo  Moderate MV regurgitation and tricuspid valve regurgitation  Mild to moderate cardiomegaly and small pericardial effusion  Anasarca  Hypothyroidism and history of partial thyroidectomy  History of hypertension  AMOR  SLE and rheumatoid arthritis history on hydroxychloroquine    Telemetry: Reviewed by me,  rate controlled fib    -Hemoglobin continues to downtrend, 7.4 today  -Monitor labs for contraction alkalosis  -Transfuse  as needed to maintain hemoglobin above 7.  -Patient underwent bilateral shoulder subacromial steroid injections by Dr. Beltran on 11/2/2022  - CPAP as able but suspect will just need to use nasal cannula and follow-up further outpatient for continued monitoring  - Continue PT and OT, suspect will need rehab placement   - Potassium and magnesium stable, continue to monitor  --Discussed with GI, will reevaluate today  --Capsule endoscopy still pending results  -Glucose in acceptable range  - Has received IV iron, continuing on oral iron     Continue inpatient monitoring and treatment as above.  Home versus rehab once medically stable.     DVT prophylaxis:  Medical and mechanical DVT prophylaxis orders are present.    Code Status (Patient has no pulse and is not breathing): CPR (Attempt to Resuscitate)  Medical Interventions (Patient has pulse or is breathing): Full Support        CBC    CBC 11/1/22 11/2/22 11/3/22   WBC 6.23 4.43 5.97   RBC 2.89 (A) 2.93 (A) 2.83 (A)   Hemoglobin 7.7 (A) 7.9 (A) 7.4 (A)   Hematocrit 25.6 (A) 25.9 (A) 25.0 (A)   MCV 88.6 88.4 88.3   MCH 26.6 27.0 26.1 (A)   MCHC 30.1 (A) 30.5 (A) 29.6 (A)   RDW 21.4 (A) 21.1 (A) 21.1 (A)   Platelets 312 263 220   (A) Abnormal value              CMP    CMP 11/1/22 11/2/22 11/3/22   Glucose 108 (A) 156 (A) 114 (A)   BUN 13 9 12   Creatinine 0.77 0.69 0.76   Sodium 138 138 135 (A)   Potassium 3.7 4.0 4.1   Chloride 98 100 98   Calcium 8.7 8.8 8.6   Albumin  2.90 (A) 3.20 (A)   Total Bilirubin  0.3 0.3   Alkaline Phosphatase  123 (A) 124 (A)   AST (SGOT)  12 11   ALT (SGPT)  5 6   (A) Abnormal value              Electronically signed by Abiodun Betts MD, 11/03/22, 9:07 AM EDT.

## 2022-11-04 ENCOUNTER — ANESTHESIA EVENT (OUTPATIENT)
Dept: GASTROENTEROLOGY | Facility: HOSPITAL | Age: 62
End: 2022-11-04

## 2022-11-04 ENCOUNTER — ANESTHESIA (OUTPATIENT)
Dept: GASTROENTEROLOGY | Facility: HOSPITAL | Age: 62
End: 2022-11-04

## 2022-11-04 LAB
ABO GROUP BLD: NORMAL
ALBUMIN SERPL-MCNC: 3.2 G/DL (ref 3.5–5.2)
ALBUMIN/GLOB SERPL: 0.9 G/DL
ALP SERPL-CCNC: 114 U/L (ref 39–117)
ALT SERPL W P-5'-P-CCNC: 5 U/L (ref 1–33)
ANION GAP SERPL CALCULATED.3IONS-SCNC: 4.3 MMOL/L (ref 5–15)
AST SERPL-CCNC: 9 U/L (ref 1–32)
BASOPHILS # BLD AUTO: 0.02 10*3/MM3 (ref 0–0.2)
BASOPHILS NFR BLD AUTO: 0.3 % (ref 0–1.5)
BILIRUB SERPL-MCNC: 0.2 MG/DL (ref 0–1.2)
BLD GP AB SCN SERPL QL: NEGATIVE
BUN SERPL-MCNC: 15 MG/DL (ref 8–23)
BUN/CREAT SERPL: 16.7 (ref 7–25)
CALCIUM SPEC-SCNC: 8.6 MG/DL (ref 8.6–10.5)
CHLORIDE SERPL-SCNC: 102 MMOL/L (ref 98–107)
CO2 SERPL-SCNC: 28.7 MMOL/L (ref 22–29)
CREAT SERPL-MCNC: 0.9 MG/DL (ref 0.57–1)
DEPRECATED RDW RBC AUTO: 68.1 FL (ref 37–54)
EGFRCR SERPLBLD CKD-EPI 2021: 72.9 ML/MIN/1.73
EOSINOPHIL # BLD AUTO: 0.01 10*3/MM3 (ref 0–0.4)
EOSINOPHIL NFR BLD AUTO: 0.2 % (ref 0.3–6.2)
ERYTHROCYTE [DISTWIDTH] IN BLOOD BY AUTOMATED COUNT: 21.2 % (ref 12.3–15.4)
GLOBULIN UR ELPH-MCNC: 3.5 GM/DL
GLUCOSE SERPL-MCNC: 143 MG/DL (ref 65–99)
HCT VFR BLD AUTO: 24.3 % (ref 34–46.6)
HCT VFR BLD AUTO: 28.6 % (ref 34–46.6)
HGB BLD-MCNC: 7 G/DL (ref 12–15.9)
HGB BLD-MCNC: 8.6 G/DL (ref 12–15.9)
IMM GRANULOCYTES # BLD AUTO: 0.01 10*3/MM3 (ref 0–0.05)
IMM GRANULOCYTES NFR BLD AUTO: 0.2 % (ref 0–0.5)
LYMPHOCYTES # BLD AUTO: 0.9 10*3/MM3 (ref 0.7–3.1)
LYMPHOCYTES NFR BLD AUTO: 15.2 % (ref 19.6–45.3)
MAGNESIUM SERPL-MCNC: 2.2 MG/DL (ref 1.6–2.4)
MCH RBC QN AUTO: 25.7 PG (ref 26.6–33)
MCHC RBC AUTO-ENTMCNC: 28.8 G/DL (ref 31.5–35.7)
MCV RBC AUTO: 89.3 FL (ref 79–97)
MONOCYTES # BLD AUTO: 0.36 10*3/MM3 (ref 0.1–0.9)
MONOCYTES NFR BLD AUTO: 6.1 % (ref 5–12)
NEUTROPHILS NFR BLD AUTO: 4.61 10*3/MM3 (ref 1.7–7)
NEUTROPHILS NFR BLD AUTO: 78 % (ref 42.7–76)
NRBC BLD AUTO-RTO: 0 /100 WBC (ref 0–0.2)
PHOSPHATE SERPL-MCNC: 3.8 MG/DL (ref 2.5–4.5)
PLATELET # BLD AUTO: 315 10*3/MM3 (ref 140–450)
PMV BLD AUTO: 9.3 FL (ref 6–12)
POTASSIUM SERPL-SCNC: 4.4 MMOL/L (ref 3.5–5.2)
PROT SERPL-MCNC: 6.7 G/DL (ref 6–8.5)
RBC # BLD AUTO: 2.72 10*6/MM3 (ref 3.77–5.28)
RH BLD: POSITIVE
SODIUM SERPL-SCNC: 135 MMOL/L (ref 136–145)
T&S EXPIRATION DATE: NORMAL
WBC NRBC COR # BLD: 5.91 10*3/MM3 (ref 3.4–10.8)

## 2022-11-04 PROCEDURE — P9016 RBC LEUKOCYTES REDUCED: HCPCS

## 2022-11-04 PROCEDURE — 94799 UNLISTED PULMONARY SVC/PX: CPT

## 2022-11-04 PROCEDURE — 86850 RBC ANTIBODY SCREEN: CPT | Performed by: INTERNAL MEDICINE

## 2022-11-04 PROCEDURE — 0DJ08ZZ INSPECTION OF UPPER INTESTINAL TRACT, VIA NATURAL OR ARTIFICIAL OPENING ENDOSCOPIC: ICD-10-PCS | Performed by: INTERNAL MEDICINE

## 2022-11-04 PROCEDURE — 85014 HEMATOCRIT: CPT | Performed by: INTERNAL MEDICINE

## 2022-11-04 PROCEDURE — 85025 COMPLETE CBC W/AUTO DIFF WBC: CPT | Performed by: INTERNAL MEDICINE

## 2022-11-04 PROCEDURE — 84100 ASSAY OF PHOSPHORUS: CPT | Performed by: INTERNAL MEDICINE

## 2022-11-04 PROCEDURE — 86923 COMPATIBILITY TEST ELECTRIC: CPT

## 2022-11-04 PROCEDURE — 86900 BLOOD TYPING SEROLOGIC ABO: CPT

## 2022-11-04 PROCEDURE — 83735 ASSAY OF MAGNESIUM: CPT | Performed by: INTERNAL MEDICINE

## 2022-11-04 PROCEDURE — 86901 BLOOD TYPING SEROLOGIC RH(D): CPT | Performed by: INTERNAL MEDICINE

## 2022-11-04 PROCEDURE — 44360 SMALL BOWEL ENDOSCOPY: CPT | Performed by: INTERNAL MEDICINE

## 2022-11-04 PROCEDURE — 85018 HEMOGLOBIN: CPT | Performed by: INTERNAL MEDICINE

## 2022-11-04 PROCEDURE — 99233 SBSQ HOSP IP/OBS HIGH 50: CPT | Performed by: INTERNAL MEDICINE

## 2022-11-04 PROCEDURE — 80053 COMPREHEN METABOLIC PANEL: CPT | Performed by: INTERNAL MEDICINE

## 2022-11-04 PROCEDURE — 36430 TRANSFUSION BLD/BLD COMPNT: CPT

## 2022-11-04 PROCEDURE — 99232 SBSQ HOSP IP/OBS MODERATE 35: CPT | Performed by: INTERNAL MEDICINE

## 2022-11-04 PROCEDURE — 86900 BLOOD TYPING SEROLOGIC ABO: CPT | Performed by: INTERNAL MEDICINE

## 2022-11-04 PROCEDURE — 25010000002 PROPOFOL 10 MG/ML EMULSION: Performed by: NURSE ANESTHETIST, CERTIFIED REGISTERED

## 2022-11-04 RX ORDER — SODIUM CHLORIDE, SODIUM LACTATE, POTASSIUM CHLORIDE, CALCIUM CHLORIDE 600; 310; 30; 20 MG/100ML; MG/100ML; MG/100ML; MG/100ML
30 INJECTION, SOLUTION INTRAVENOUS CONTINUOUS
Status: DISCONTINUED | OUTPATIENT
Start: 2022-11-04 | End: 2022-11-04

## 2022-11-04 RX ORDER — PROPOFOL 10 MG/ML
VIAL (ML) INTRAVENOUS AS NEEDED
Status: DISCONTINUED | OUTPATIENT
Start: 2022-11-04 | End: 2022-11-04 | Stop reason: SURG

## 2022-11-04 RX ORDER — LIDOCAINE HYDROCHLORIDE 20 MG/ML
INJECTION, SOLUTION EPIDURAL; INFILTRATION; INTRACAUDAL; PERINEURAL AS NEEDED
Status: DISCONTINUED | OUTPATIENT
Start: 2022-11-04 | End: 2022-11-04 | Stop reason: SURG

## 2022-11-04 RX ADMIN — SODIUM CHLORIDE, POTASSIUM CHLORIDE, SODIUM LACTATE AND CALCIUM CHLORIDE: 600; 310; 30; 20 INJECTION, SOLUTION INTRAVENOUS at 09:43

## 2022-11-04 RX ADMIN — BUDESONIDE AND FORMOTEROL FUMARATE DIHYDRATE 2 PUFF: 160; 4.5 AEROSOL RESPIRATORY (INHALATION) at 06:47

## 2022-11-04 RX ADMIN — Medication 10 ML: at 21:20

## 2022-11-04 RX ADMIN — PROPOFOL 200 MCG/KG/MIN: 10 INJECTION, EMULSION INTRAVENOUS at 09:46

## 2022-11-04 RX ADMIN — METOPROLOL TARTRATE 12.5 MG: 25 TABLET, FILM COATED ORAL at 21:16

## 2022-11-04 RX ADMIN — HYDROCODONE BITARTRATE AND ACETAMINOPHEN 1 TABLET: 7.5; 325 TABLET ORAL at 21:20

## 2022-11-04 RX ADMIN — LIDOCAINE HYDROCHLORIDE 100 MG: 20 INJECTION, SOLUTION EPIDURAL; INFILTRATION; INTRACAUDAL; PERINEURAL at 09:46

## 2022-11-04 RX ADMIN — PROPOFOL 100 MG: 10 INJECTION, EMULSION INTRAVENOUS at 09:46

## 2022-11-04 RX ADMIN — CYCLOBENZAPRINE 10 MG: 10 TABLET, FILM COATED ORAL at 21:16

## 2022-11-04 RX ADMIN — Medication 10 ML: at 11:33

## 2022-11-04 RX ADMIN — BUDESONIDE AND FORMOTEROL FUMARATE DIHYDRATE 2 PUFF: 160; 4.5 AEROSOL RESPIRATORY (INHALATION) at 19:45

## 2022-11-04 RX ADMIN — PANTOPRAZOLE SODIUM 40 MG: 40 INJECTION, POWDER, FOR SOLUTION INTRAVENOUS at 21:16

## 2022-11-04 NOTE — CONSULTS
"Nutrition Services    Patient Name: Milagros Ramirez  YOB: 1960  MRN: [id number]  Admission date: 10/28/2022      CLINICAL NUTRITION ASSESSMENT      Reason for Assessment  LOS     H&P:    Past Medical History:   Diagnosis Date   • Atrial fibrillation (HCC)     Diagnosed 5/2019   • CKD (chronic kidney disease)    • Diabetes (HCC)     Diagnosed 5/2019   • Diastolic CHF (HCC)    • Hyperlipidemia    • Hypertension    • Hypothyroidism    • Morbid obesity (HCC)    • AMOR (obstructive sleep apnea)    • Rheumatoid arthritis (HCC)    • SLE (systemic lupus erythematosus) (HCC)         Current Problems:   Active Hospital Problems    Diagnosis    • **GI bleed    • Gastrointestinal hemorrhage, unspecified gastrointestinal hemorrhage type         Nutrition/Diet History         Narrative     Pt followed by RD for LOS. Pt is at low risk per nutrition risk screening. Pt NPO/CL x 4, advanced to regular diet x 2 d w/100% PO intake per documentation. NPO today for possible enteroscopy r/t GIB. RD will continue to follow and monitor per protocol.       Anthropometrics        Current Height, Weight Height: 177.8 cm (70\")  Weight: 130 kg (286 lb 13.1 oz)   Current BMI Body mass index is 41.15 kg/m².       Weight Hx  Wt Readings from Last 30 Encounters:   10/29/22 0010 130 kg (286 lb 13.1 oz)   10/28/22 1805 130 kg (286 lb 13.1 oz)   10/11/22 1917 134 kg (295 lb 13.7 oz)   10/11/22 1541 135 kg (296 lb 8.3 oz)   08/30/22 1128 127 kg (280 lb)   06/22/22 1430 128 kg (283 lb)   06/06/22 1136 130 kg (285 lb 9.6 oz)   11/05/21 0959 (!) 141 kg (310 lb)   10/12/21 1420 (!) 141 kg (310 lb 6.4 oz)   04/27/21 1002 (!) 147 kg (323 lb)   11/11/20 1208 (!) 154 kg (340 lb)   10/13/20 1127 (!) 150 kg (331 lb)   06/17/20 1033 (!) 154 kg (339 lb)   01/17/20 1040 (!) 153 kg (338 lb)   01/16/20 0000 (!) 190 kg (417 lb 15.9 oz)   09/30/19 1340 (!) 158 kg (349 lb)   07/26/19 1103 (!) 184 kg (405 lb)   07/03/19 0020 (!) 184 kg (405 lb 1.6 oz) "   07/02/19 0100 (!) 184 kg (405 lb 15.3 oz)   07/01/19 0500 (!) 186 kg (411 lb)   06/30/19 0552 (!) 194 kg (426 lb 9.6 oz)   06/29/19 2332 (!) 194 kg (426 lb 9.6 oz)   06/29/19 0559 (!) 195 kg (429 lb 1.6 oz)   06/29/19 0326 (!) 195 kg (429 lb 1.6 oz)   06/28/19 0337 (!) 195 kg (428 lb 14.4 oz)   06/27/19 1436 (!) 196 kg (433 lb 3.3 oz)   06/17/19 0948 (!) 190 kg (419 lb)   06/11/19 0000 (!) 190 kg (417 lb 15.9 oz)            Wt Change Observation 44# wt loss x 1 yr; 13%, not clinically significant     Estimated/Assessed Needs       Energy Requirements 25 - 30 kcal/kg Adj. Body wt (84.1 kg)   EST Needs (kcal/day) 2100 - 2520       Protein Requirements 1 - 1.5 g/kg adj. Body wt   EST Daily Needs (g/day) 84 - 126       Fluid Requirements 25 - 30  mL/kg actual body wt    Estimated Needs (mL/day) 3250 - 3900     Labs/Medications         Pertinent Labs Reviewed.   Results from last 7 days   Lab Units 11/04/22 0432 11/03/22 0425 11/02/22  0410   SODIUM mmol/L 135* 135* 138   POTASSIUM mmol/L 4.4 4.1 4.0   CHLORIDE mmol/L 102 98 100   CO2 mmol/L 28.7 29.3* 29.6*   BUN mg/dL 15 12 9   CREATININE mg/dL 0.90 0.76 0.69   CALCIUM mg/dL 8.6 8.6 8.8   BILIRUBIN mg/dL 0.2 0.3 0.3   ALK PHOS U/L 114 124* 123*   ALT (SGPT) U/L 5 6 5   AST (SGOT) U/L 9 11 12   GLUCOSE mg/dL 143* 114* 156*     Results from last 7 days   Lab Units 11/04/22 0432 11/03/22 0425 11/02/22  0410   MAGNESIUM mg/dL 2.2 2.3 2.2   PHOSPHORUS mg/dL 3.8 3.6 3.9   HEMOGLOBIN g/dL 7.0* 7.4* 7.9*   HEMATOCRIT % 24.3* 25.0* 25.9*     Coronavirus (COVID-19)   Date Value Ref Range Status   12/05/2020 DETECTED (A) NA Final     Comment:     The SARS-CoV-2 assay is a real-time, RT-PCR test intended  for the qualitative detection of nucleic acid from the  SARS-CoV-2 in respiratory specimens from individuals,  testing performed at [facility] reference lab.       Lab Results   Component Value Date    HGBA1C 6.50 (H) 06/28/2019         Pertinent Medications  Reviewed.     Current Nutrition Orders & Evaluation of Intake       Oral Nutrition     Current PO Diet NPO Diet NPO Type: Sips with Meds   Supplement Orders Placed This Encounter      DIET MESSAGE No High Fiber or High protien foods, No red or purple dyes in food.       Malnutrition Severity Assessment                Nutrition Diagnosis         Nutrition Dx Problem 1 No nutrition diagnosis at this time.       Nutrition Intervention         *If unable to advance pt diet, recommend to consider nutrition support     Medical Nutrition Therapy/Nutrition Education          Learner     Readiness N/A  N/A     Method     Response N/A  N/A     Monitor/Evaluation        Monitor Per protocol.       Nutrition Discharge Plan         No nutrition needs identified at this time.       Electronically signed by:  Yesenia Licona RD  11/04/22 08:56 EDT

## 2022-11-04 NOTE — PROGRESS NOTES
[facility]   Hospitalist Progress Note    Date of admission: 10/28/2022  Patient Name: Milagros Ramirez  1960  Date: 11/4/2022      Subjective     Chief Complaint   Patient presents with   • Black or Bloody Stool     Pt states she was sent to this ED by her PCP for decreased HGB of 5.8 two days ago. She states she was seen here last week with an HGB of 3.8 and received 5 units of blood. Pt says she is still having blood in her stools.        Summary: 62 y.o. female with recent GI bleeding earlier this month underwent EGD and colonoscopy at that time and required clipping of a lesion in her lower intestines who presents after outpatient labs showed anemia and patient developed melena.  Has had 4 units of blood thus far.  Dr. Young/GI assisting in patient pending Endoscopy on 11/1.  Patient Also Having Severe Bilateral Shoulder Pain and Orthopedic Surgery/Dr Beltran consulted for additional assistance.  PT and OT also helping and patient may likely require rehab for discharge.    Interval Followup: Overnight patient's hemoglobin dropped to 7, patient seen after endoscopy today.  She is drowsy, no obvious sources of bleeding were found    Review of Systems  No reported chest pain or palpitations  No reported nausea no vomiting  No reported fever no chills    Objective     Vitals:   Temp:  [97.6 °F (36.4 °C)-98.5 °F (36.9 °C)] 98.2 °F (36.8 °C)  Heart Rate:  [] 102  Resp:  [16-18] 18  BP: (120-139)/(64-81) 135/74    Physical Exam  General appearance: NAD, resting comfortably, drowsy but arousable  Eyes: anicteric sclerae, moist conjunctivae; no lid-lag; PERRLA  HENT: Atraumatic; oropharynx clear with moist mucous membranes and no mucosal ulcerations; normal hard and soft palate  Neck: Trachea midline; FROM, supple, no thyromegaly or lymphadenopathy  Lungs: CTA, with normal respiratory effort and no intercostal retractions  CV: Regular Rate and Rhythm, no Murmurs, Rubs, or Gallops   Abdomen: Soft,  non-tender; no masses or Heptosplenomegally  Extremities: No peripheral edema or extremity lymphadenopathy  Skin: Normal temperature, turgor and texture; no rash, ulcers or subcutaneous nodules  Psych: Appropriate affect, alert and oriented to person, place and time  Neuro: CN II - XII intact no motor deficits, no sensory defecits    Result Review:  Vital signs, labs and recent relevant imaging reviewed.      !NOT ORDERED- rivaroxaban (Xarelto), , Does not apply, Daily With Dinner  budesonide-formoterol, 2 puff, Inhalation, BID - RT  ferrous sulfate, 325 mg, Oral, Daily With Breakfast  magnesium oxide, 400 mg, Oral, Daily  metoprolol tartrate, 12.5 mg, Oral, Q12H  pantoprazole, 40 mg, Intravenous, Q12H  sodium chloride, 10 mL, Intravenous, Q12H  Thyroid, 60 mg, Oral, Daily      •  acetaminophen **OR** acetaminophen **OR** acetaminophen  •  albuterol sulfate HFA  •  cyclobenzaprine  •  HYDROcodone-acetaminophen  •  influenza vaccine  •  metoprolol tartrate  •  nitroglycerin  •  sodium chloride  •  sodium chloride  •  sodium chloride    10/2020 stress test normal at that time EF reportedly 70%    Assessment / Plan     Assessment/Plan:  Acute GI bleed  Severe blood loss anemia requiring transfusion  Status post colonoscopy with clipping on 10/13/2022  History of iron deficiency anemia requiring IV iron earlier this month  ACelio grimaldo, with RVR, on Xarelto as outpatient  Morbid obesity  Hypokalemia  Possible congestive hepatopathy versus venoocclusive disease  Diastolic CHF with borderline reduced EF 46% per 10/12/2022 echo  Moderate MV regurgitation and tricuspid valve regurgitation  Mild to moderate cardiomegaly and small pericardial effusion  Anasarca  Hypothyroidism and history of partial thyroidectomy  History of hypertension  AMOR  SLE and rheumatoid arthritis history on hydroxychloroquine    Telemetry: Reviewed by PHOEBE daniels fib    - Hemoglobin continues to downtrend, 7.0 today, given continuous bleeding will transfuse 1  unit PRBCs, patient remains hemodynamically stable  - Monitor labs for contraction alkalosis  - Gastroenterology planning for push enteroscopy today, results of capsule study still pending  - Patient underwent bilateral shoulder subacromial steroid injections by Dr. Beltran on 11/2/2022  - CPAP as able but suspect will just need to use nasal cannula and follow-up further outpatient for continued monitoring  - Continue PT and OT, suspect will need rehab placement   - Potassium and magnesium stable, continue to monitor  - GI following  --Capsule endoscopy still pending results  - Glucose in acceptable range, continue to monitor  - Has received IV iron, continuing on oral iron     Continue inpatient monitoring and treatment as above.  Home versus rehab once medically stable.     DVT prophylaxis:  Medical and mechanical DVT prophylaxis orders are present.    Code Status (Patient has no pulse and is not breathing): CPR (Attempt to Resuscitate)  Medical Interventions (Patient has pulse or is breathing): Full Support        CBC    CBC 11/2/22 11/3/22 11/4/22   WBC 4.43 5.97 5.91   RBC 2.93 (A) 2.83 (A) 2.72 (A)   Hemoglobin 7.9 (A) 7.4 (A) 7.0 (A)   Hematocrit 25.9 (A) 25.0 (A) 24.3 (A)   MCV 88.4 88.3 89.3   MCH 27.0 26.1 (A) 25.7 (A)   MCHC 30.5 (A) 29.6 (A) 28.8 (A)   RDW 21.1 (A) 21.1 (A) 21.2 (A)   Platelets 263 220 315   (A) Abnormal value              CMP    CMP 11/2/22 11/3/22 11/4/22   Glucose 156 (A) 114 (A) 143 (A)   BUN 9 12 15   Creatinine 0.69 0.76 0.90   Sodium 138 135 (A) 135 (A)   Potassium 4.0 4.1 4.4   Chloride 100 98 102   Calcium 8.8 8.6 8.6   Albumin 2.90 (A) 3.20 (A) 3.20 (A)   Total Bilirubin 0.3 0.3 0.2   Alkaline Phosphatase 123 (A) 124 (A) 114   AST (SGOT) 12 11 9   ALT (SGPT) 5 6 5   (A) Abnormal value              Electronically signed by Abiodun Betts MD, 11/04/22, 8:52 AM EDT.

## 2022-11-04 NOTE — PLAN OF CARE
Goal Outcome Evaluation:  Plan of Care Reviewed With: patient        Progress: no change  Outcome Evaluation: pt NPO for possible enteroscopy today. hgb down to 7.0 this morning. no new issues or complaints during the shift. vital signs stable.

## 2022-11-04 NOTE — ANESTHESIA PREPROCEDURE EVALUATION
Anesthesia Evaluation     Patient summary reviewed and Nursing notes reviewed   no history of anesthetic complications:  NPO Solid Status: > 8 hours  NPO Liquid Status: > 2 hours           Airway   Mallampati: II  TM distance: >3 FB  Neck ROM: full  No difficulty expected  Dental          Pulmonary - normal exam    breath sounds clear to auscultation  (+) sleep apnea on CPAP,   Cardiovascular - normal exam  Exercise tolerance: good (4-7 METS)    ECG reviewed  PT is on anticoagulation therapy  Patient on routine beta blocker and Beta blocker not taken-may be given intraoperatively  Rhythm: regular  Rate: normal    (+) hypertension, dysrhythmias Paroxysmal Atrial Fib, CHF , hyperlipidemia,     ROS comment: Atrial fibrillation  RSR' in V1 or V2, right VCD or RVH  Borderline repolarization abnormality    EF 46%    Neuro/Psych- negative ROS  GI/Hepatic/Renal/Endo    (+) morbid obesity, GI bleeding lower active bleeding, renal disease CRI, diabetes mellitus type 2, thyroid problem hypothyroidism    Musculoskeletal     Abdominal    Substance History - negative use     OB/GYN negative ob/gyn ROS         Other   arthritis, blood dyscrasia (acute-- received blood transfusion) anemia,     ROS/Med Hx Other: PAT Nursing Notes unavailable.                   Anesthesia Plan    ASA 3     general     (Patient understands anesthesia not responsible for dental damage.)  intravenous induction     Anesthetic plan, risks, benefits, and alternatives have been provided, discussed and informed consent has been obtained with: patient.  Pre-procedure education provided  Use of blood products discussed with patient .   Plan discussed with CRNA.        CODE STATUS:    Code Status (Patient has no pulse and is not breathing): CPR (Attempt to Resuscitate)  Medical Interventions (Patient has pulse or is breathing): Full Support

## 2022-11-04 NOTE — PLAN OF CARE
Goal Outcome Evaluation:           Progress: improving  Outcome Evaluation: Patient had enteroscopy today, MD could not find any active bleeding, and collected specimens. 1 unit PRBC's ordered, and is currently infusing at this time. Patient tolerating well. Patient has no complaints or concerns at this time, patient is able to verbalize all needs and concerns to staff as needed.

## 2022-11-04 NOTE — PROGRESS NOTES
[facility]     Progress Note    Patient Name: Milagros Ramirez  : 1960  MRN: [id number]  Primary Care Physician:  Beverley De Luna APRN  Date of admission: 10/28/2022    Subjective   Subjective     HPI:  Patient Reports doing okay this morning.  Her shoulders are much improved from injection.  She is scheduled for an endoscopy today.    Review of Systems   See HPI    Objective   Objective     Vitals:   Temp:  [97.3 °F (36.3 °C)-98.5 °F (36.9 °C)] 97.3 °F (36.3 °C)  Heart Rate:  [] 92  Resp:  [13-20] 20  BP: (115-142)/(64-94) 115/69  Physical Exam    General: Alert, no acute distress   Chest: Unlabored breathing, cardiovascular: Regular heart rate   Musculoskeletal: Improved range of motion to shoulders.  Less pain with impingement testing and rotation.  Strength improved.  Nontender.  Neurovascular intact.    Result Review      WBC   Date Value Ref Range Status   2022 5.91 3.40 - 10.80 10*3/mm3 Final     RBC   Date Value Ref Range Status   2022 2.72 (L) 3.77 - 5.28 10*6/mm3 Final     Hemoglobin   Date Value Ref Range Status   2022 7.0 (L) 12.0 - 15.9 g/dL Final     Hematocrit   Date Value Ref Range Status   2022 24.3 (L) 34.0 - 46.6 % Final     MCV   Date Value Ref Range Status   2022 89.3 79.0 - 97.0 fL Final     MCH   Date Value Ref Range Status   2022 25.7 (L) 26.6 - 33.0 pg Final     MCHC   Date Value Ref Range Status   2022 28.8 (L) 31.5 - 35.7 g/dL Final     RDW   Date Value Ref Range Status   2022 21.2 (H) 12.3 - 15.4 % Final     RDW-SD   Date Value Ref Range Status   2022 68.1 (H) 37.0 - 54.0 fl Final     MPV   Date Value Ref Range Status   2022 9.3 6.0 - 12.0 fL Final     Platelets   Date Value Ref Range Status   2022 315 140 - 450 10*3/mm3 Final     Neutrophil %   Date Value Ref Range Status   2022 78.0 (H) 42.7 - 76.0 % Final     Lymphocyte %   Date Value Ref Range Status   2022 15.2 (L) 19.6 - 45.3 % Final      Monocyte %   Date Value Ref Range Status   11/04/2022 6.1 5.0 - 12.0 % Final     Eosinophil %   Date Value Ref Range Status   11/04/2022 0.2 (L) 0.3 - 6.2 % Final     Basophil %   Date Value Ref Range Status   11/04/2022 0.3 0.0 - 1.5 % Final     Immature Grans %   Date Value Ref Range Status   11/04/2022 0.2 0.0 - 0.5 % Final     Neutrophils, Absolute   Date Value Ref Range Status   11/04/2022 4.61 1.70 - 7.00 10*3/mm3 Final     Lymphocytes, Absolute   Date Value Ref Range Status   11/04/2022 0.90 0.70 - 3.10 10*3/mm3 Final     Monocytes, Absolute   Date Value Ref Range Status   11/04/2022 0.36 0.10 - 0.90 10*3/mm3 Final     Eosinophils, Absolute   Date Value Ref Range Status   11/04/2022 0.01 0.00 - 0.40 10*3/mm3 Final     Basophils, Absolute   Date Value Ref Range Status   11/04/2022 0.02 0.00 - 0.20 10*3/mm3 Final     Immature Grans, Absolute   Date Value Ref Range Status   11/04/2022 0.01 0.00 - 0.05 10*3/mm3 Final     nRBC   Date Value Ref Range Status   11/04/2022 0.0 0.0 - 0.2 /100 WBC Final        Result Review:  I have personally reviewed the results from the time of this admission to 11/4/2022 10:54 EDT and agree with these findings:  [x]  Laboratory  []  Microbiology  []  Radiology  []  EKG/Telemetry   []  Cardiology/Vascular   []  Pathology  []  Old records  []  Other:      Assessment & Plan   Assessment / Plan     Brief Patient Summary:  Milagros Ramirez is a 62 y.o. female who has bilateral shoulder pain    Active Hospital Problems:  Active Hospital Problems    Diagnosis    • **GI bleed    • Gastrointestinal hemorrhage, unspecified gastrointestinal hemorrhage type      Plan: Activity as tolerated  PT/OT  Pain control  Discharge planning: May discharge from orthopedic standpoint when medically able       DVT prophylaxis:  Medical and mechanical DVT prophylaxis orders are present.    CODE STATUS:   Code Status (Patient has no pulse and is not breathing): CPR (Attempt to Resuscitate)  Medical  Interventions (Patient has pulse or is breathing): Full Support    Disposition:  I expect patient to be discharged when medically able.    Electronically signed by Aramis Beltran MD, 11/04/22, 10:54 AM EDT.

## 2022-11-04 NOTE — PROGRESS NOTES
[facility]  Inpatient Progress Note    Reason for Follow Up:  anemia    Subjective     Interval History:   Pt reports no bowel movements over night but two bowel movements yesterday with dark blood.    Current Facility-Administered Medications:   •  !NOT ORDERED- rivaroxaban (Xarelto), , Does not apply, Daily With Dinner, Abiodun Betts MD  •  acetaminophen (TYLENOL) tablet 650 mg, 650 mg, Oral, Q4H PRN, 650 mg at 10/29/22 0336 **OR** acetaminophen (TYLENOL) 160 MG/5ML solution 650 mg, 650 mg, Oral, Q4H PRN **OR** acetaminophen (TYLENOL) suppository 650 mg, 650 mg, Rectal, Q4H PRN, Edgard Rai Jr., MD  •  albuterol sulfate HFA (PROVENTIL HFA;VENTOLIN HFA;PROAIR HFA) inhaler 2 puff, 2 puff, Inhalation, Q4H PRN, Edgard Rai Jr., MD  •  budesonide-formoterol (SYMBICORT) 160-4.5 MCG/ACT inhaler 2 puff, 2 puff, Inhalation, BID - RT, Edgard Rai Jr., MD, 2 puff at 11/04/22 0647  •  cyclobenzaprine (FLEXERIL) tablet 10 mg, 10 mg, Oral, TID PRN, Edgard Rai Jr., MD, 10 mg at 11/03/22 2032  •  ferrous sulfate tablet 325 mg, 325 mg, Oral, Daily With Breakfast, Steve Arnold MD, 325 mg at 11/03/22 0847  •  HYDROcodone-acetaminophen (NORCO) 7.5-325 MG per tablet 1 tablet, 1 tablet, Oral, Q6H PRN, Steve Arnold MD, 1 tablet at 11/03/22 2032  •  influenza vac split quad (FLUZONE,FLUARIX,AFLURIA,FLULAVAL) injection 0.5 mL, 0.5 mL, Intramuscular, During Hospitalization, Edgard Rai Jr., MD  •  magnesium oxide (MAG-OX) tablet 400 mg, 400 mg, Oral, Daily, Steve Arnold MD, 400 mg at 11/03/22 0847  •  metoprolol tartrate (LOPRESSOR) tablet 12.5 mg, 12.5 mg, Oral, Q8H PRN, Steve Arnold MD, 12.5 mg at 11/01/22 0722  •  metoprolol tartrate (LOPRESSOR) tablet 12.5 mg, 12.5 mg, Oral, Q12H, Steve Arnold MD, 12.5 mg at 11/03/22 2033  •  nitroglycerin (NITROSTAT) SL tablet 0.4 mg, 0.4 mg, Sublingual, Q5 Min PRN, Edgard Rai Jr., MD  •  pantoprazole (PROTONIX) injection 40 mg, 40  mg, Intravenous, Q12H, Edgard Rai Jr., MD, 40 mg at 11/03/22 2032  •  sodium chloride 0.9 % flush 10 mL, 10 mL, Intravenous, PRN, Edgard Rai Jr., MD  •  sodium chloride 0.9 % flush 10 mL, 10 mL, Intravenous, PRN, Edgard Rai Jr., MD, 10 mL at 10/29/22 1203  •  sodium chloride 0.9 % flush 10 mL, 10 mL, Intravenous, Q12H, Edgard Rai Jr., MD, 10 mL at 11/03/22 2033  •  sodium chloride 0.9 % infusion 40 mL, 40 mL, Intravenous, PRN, Edgard Rai Jr., MD  •  Thyroid (ARMOUR) tablet 60 mg, 60 mg, Oral, Daily, Edgard Rai Jr., MD, 60 mg at 11/03/22 0847  Review of Systems:    The following systems were reviewed and negative;  constitution, respiratory and cardiovascular    Objective     Vital Signs  Temp:  [97.6 °F (36.4 °C)-98.5 °F (36.9 °C)] 98.2 °F (36.8 °C)  Heart Rate:  [] 102  Resp:  [16-18] 18  BP: (120-139)/(64-81) 135/74  Body mass index is 41.15 kg/m².    Intake/Output Summary (Last 24 hours) at 11/4/2022 0844  Last data filed at 11/3/2022 1741  Gross per 24 hour   Intake 610 ml   Output --   Net 610 ml     No intake/output data recorded.     Physical Exam:   General: awake, alert and in no acute distress   Eyes: eyes move symmetrical in all directions, no scleral icterus   Neck: supple, trachea is midline   Skin: warm and dry, not jaundiced   Cardiovascular: no chest tenderness   Pulm: breathing unlabored   Abdomen: soft, nontender, nondistended   Rectal: deferred   Extremities: no rash or edema   Psychiatric: mental status within normal limits, alert and oriented      Results Review:     I reviewed the patient's new clinical results.    Results from last 7 days   Lab Units 11/04/22  0432 11/03/22  0425 11/02/22  0410   WBC 10*3/mm3 5.91 5.97 4.43   HEMOGLOBIN g/dL 7.0* 7.4* 7.9*   HEMATOCRIT % 24.3* 25.0* 25.9*   PLATELETS 10*3/mm3 315 220 263     Results from last 7 days   Lab Units 11/04/22  0432 11/03/22  0425 11/02/22  0410   SODIUM mmol/L 135* 135* 138   POTASSIUM mmol/L 4.4  4.1 4.0   CHLORIDE mmol/L 102 98 100   CO2 mmol/L 28.7 29.3* 29.6*   BUN mg/dL 15 12 9   CREATININE mg/dL 0.90 0.76 0.69   CALCIUM mg/dL 8.6 8.6 8.8   BILIRUBIN mg/dL 0.2 0.3 0.3   ALK PHOS U/L 114 124* 123*   ALT (SGPT) U/L 5 6 5   AST (SGOT) U/L 9 11 12   GLUCOSE mg/dL 143* 114* 156*     Results from last 7 days   Lab Units 10/31/22  0528 10/28/22  1825   INR  1.18* 1.72*     No results found for: LIPASE    Radiology:  [unfilled]      Assessment & Plan   Assessment:     Acute blood loss anemia  Melena    Plan:     - given capsule results still pending and Hgb continues to drop, discussed option of further evaluation with push enteroscopy today.  Pt agreeable.  - benefits vs risks of procedure d/w patient; risks include but are not limited to bleeding, infection, perforation, and risk of sedation  - pt understands risks and agrees to proceed    I discussed the patients findings and my recommendations with patient and primary care team.         Sara Jarrett M.D.  [facility] [place] [address]  [place]  [id number]  Office: [phone]

## 2022-11-04 NOTE — ANESTHESIA POSTPROCEDURE EVALUATION
Patient: Milagros Ramirez    Procedure Summary     Date: 11/04/22 Room / Location: [facility] ENDOSCOPY 1 / [facility] END[facility]    Anesthesia Start: 0943 Anesthesia Stop: 1009    Procedure: ENTEROSCOPY SMALL BOWEL Diagnosis:       Gastrointestinal hemorrhage, unspecified gastrointestinal hemorrhage type      (Gastrointestinal hemorrhage, unspecified gastrointestinal hemorrhage type [K92.2])    Surgeons: Sara Jarrett MD Provider: Reyes, Mirabelle, DO    Anesthesia Type: general ASA Status: 3          Anesthesia Type: general    Vitals  Vitals Value Taken Time   /69 11/04/22 1024   Temp 36.3 °C (97.3 °F) 11/04/22 1024   Pulse 92 11/04/22 1024   Resp 20 11/04/22 1024   SpO2 95 % 11/04/22 1024           Post Anesthesia Care and Evaluation    Patient location during evaluation: bedside  Patient participation: complete - patient participated  Level of consciousness: awake  Pain management: adequate    Airway patency: patent  Anesthetic complications: No anesthetic complications  PONV Status: none  Cardiovascular status: acceptable and stable  Respiratory status: acceptable  Hydration status: acceptable    Comments: An Anesthesiologist personally participated in the most demanding procedures (including induction and emergence if applicable) in the anesthesia plan, monitored the course of anesthesia administration at frequent intervals and remained physically present and available for immediate diagnosis and treatment of emergencies.

## 2022-11-05 LAB
ALBUMIN SERPL-MCNC: 3.1 G/DL (ref 3.5–5.2)
ALBUMIN/GLOB SERPL: 0.8 G/DL
ALP SERPL-CCNC: 118 U/L (ref 39–117)
ALT SERPL W P-5'-P-CCNC: 5 U/L (ref 1–33)
ANION GAP SERPL CALCULATED.3IONS-SCNC: 6.5 MMOL/L (ref 5–15)
AST SERPL-CCNC: 11 U/L (ref 1–32)
BASOPHILS # BLD AUTO: 0.02 10*3/MM3 (ref 0–0.2)
BASOPHILS NFR BLD AUTO: 0.3 % (ref 0–1.5)
BILIRUB SERPL-MCNC: 0.2 MG/DL (ref 0–1.2)
BUN SERPL-MCNC: 16 MG/DL (ref 8–23)
BUN/CREAT SERPL: 18.4 (ref 7–25)
CALCIUM SPEC-SCNC: 8.5 MG/DL (ref 8.6–10.5)
CHLORIDE SERPL-SCNC: 103 MMOL/L (ref 98–107)
CO2 SERPL-SCNC: 26.5 MMOL/L (ref 22–29)
CREAT SERPL-MCNC: 0.87 MG/DL (ref 0.57–1)
DEPRECATED RDW RBC AUTO: 65.2 FL (ref 37–54)
EGFRCR SERPLBLD CKD-EPI 2021: 75.9 ML/MIN/1.73
EOSINOPHIL # BLD AUTO: 0.02 10*3/MM3 (ref 0–0.4)
EOSINOPHIL NFR BLD AUTO: 0.3 % (ref 0.3–6.2)
ERYTHROCYTE [DISTWIDTH] IN BLOOD BY AUTOMATED COUNT: 20 % (ref 12.3–15.4)
GLOBULIN UR ELPH-MCNC: 3.7 GM/DL
GLUCOSE SERPL-MCNC: 130 MG/DL (ref 65–99)
HCT VFR BLD AUTO: 26.3 % (ref 34–46.6)
HGB BLD-MCNC: 7.8 G/DL (ref 12–15.9)
IMM GRANULOCYTES # BLD AUTO: 0.03 10*3/MM3 (ref 0–0.05)
IMM GRANULOCYTES NFR BLD AUTO: 0.5 % (ref 0–0.5)
LYMPHOCYTES # BLD AUTO: 1.06 10*3/MM3 (ref 0.7–3.1)
LYMPHOCYTES NFR BLD AUTO: 18.2 % (ref 19.6–45.3)
MAGNESIUM SERPL-MCNC: 2.3 MG/DL (ref 1.6–2.4)
MCH RBC QN AUTO: 26.4 PG (ref 26.6–33)
MCHC RBC AUTO-ENTMCNC: 29.7 G/DL (ref 31.5–35.7)
MCV RBC AUTO: 89.2 FL (ref 79–97)
MONOCYTES # BLD AUTO: 0.35 10*3/MM3 (ref 0.1–0.9)
MONOCYTES NFR BLD AUTO: 6 % (ref 5–12)
NEUTROPHILS NFR BLD AUTO: 4.33 10*3/MM3 (ref 1.7–7)
NEUTROPHILS NFR BLD AUTO: 74.7 % (ref 42.7–76)
NRBC BLD AUTO-RTO: 0 /100 WBC (ref 0–0.2)
PHOSPHATE SERPL-MCNC: 3.6 MG/DL (ref 2.5–4.5)
PLATELET # BLD AUTO: 215 10*3/MM3 (ref 140–450)
PMV BLD AUTO: 11.7 FL (ref 6–12)
POTASSIUM SERPL-SCNC: 4.4 MMOL/L (ref 3.5–5.2)
PROT SERPL-MCNC: 6.8 G/DL (ref 6–8.5)
RBC # BLD AUTO: 2.95 10*6/MM3 (ref 3.77–5.28)
SODIUM SERPL-SCNC: 136 MMOL/L (ref 136–145)
WBC NRBC COR # BLD: 5.81 10*3/MM3 (ref 3.4–10.8)

## 2022-11-05 PROCEDURE — 94799 UNLISTED PULMONARY SVC/PX: CPT

## 2022-11-05 PROCEDURE — 80053 COMPREHEN METABOLIC PANEL: CPT | Performed by: INTERNAL MEDICINE

## 2022-11-05 PROCEDURE — 83735 ASSAY OF MAGNESIUM: CPT | Performed by: INTERNAL MEDICINE

## 2022-11-05 PROCEDURE — 99233 SBSQ HOSP IP/OBS HIGH 50: CPT | Performed by: INTERNAL MEDICINE

## 2022-11-05 PROCEDURE — 99231 SBSQ HOSP IP/OBS SF/LOW 25: CPT | Performed by: INTERNAL MEDICINE

## 2022-11-05 PROCEDURE — 84100 ASSAY OF PHOSPHORUS: CPT | Performed by: INTERNAL MEDICINE

## 2022-11-05 PROCEDURE — 85025 COMPLETE CBC W/AUTO DIFF WBC: CPT | Performed by: INTERNAL MEDICINE

## 2022-11-05 RX ADMIN — BUDESONIDE AND FORMOTEROL FUMARATE DIHYDRATE 2 PUFF: 160; 4.5 AEROSOL RESPIRATORY (INHALATION) at 18:38

## 2022-11-05 RX ADMIN — Medication 10 ML: at 08:27

## 2022-11-05 RX ADMIN — CYCLOBENZAPRINE 10 MG: 10 TABLET, FILM COATED ORAL at 08:27

## 2022-11-05 RX ADMIN — PANTOPRAZOLE SODIUM 40 MG: 40 INJECTION, POWDER, FOR SOLUTION INTRAVENOUS at 20:04

## 2022-11-05 RX ADMIN — FERROUS SULFATE TAB 325 MG (65 MG ELEMENTAL FE) 325 MG: 325 (65 FE) TAB at 08:27

## 2022-11-05 RX ADMIN — MAGNESIUM OXIDE TAB 400 MG (241.3 MG ELEMENTAL MG) 400 MG: 400 (241.3 MG) TAB at 08:27

## 2022-11-05 RX ADMIN — METOPROLOL TARTRATE 12.5 MG: 25 TABLET, FILM COATED ORAL at 08:27

## 2022-11-05 RX ADMIN — PANTOPRAZOLE SODIUM 40 MG: 40 INJECTION, POWDER, FOR SOLUTION INTRAVENOUS at 08:26

## 2022-11-05 RX ADMIN — BUDESONIDE AND FORMOTEROL FUMARATE DIHYDRATE 2 PUFF: 160; 4.5 AEROSOL RESPIRATORY (INHALATION) at 06:55

## 2022-11-05 RX ADMIN — CYCLOBENZAPRINE 10 MG: 10 TABLET, FILM COATED ORAL at 23:01

## 2022-11-05 RX ADMIN — LEVOTHYROXINE, LIOTHYRONINE 60 MG: 38; 9 TABLET ORAL at 08:27

## 2022-11-05 RX ADMIN — HYDROCODONE BITARTRATE AND ACETAMINOPHEN 1 TABLET: 7.5; 325 TABLET ORAL at 23:00

## 2022-11-05 RX ADMIN — Medication 10 ML: at 20:04

## 2022-11-05 RX ADMIN — METOPROLOL TARTRATE 12.5 MG: 25 TABLET, FILM COATED ORAL at 20:03

## 2022-11-05 NOTE — PROGRESS NOTES
[facility]   Hospitalist Progress Note    Date of admission: 10/28/2022  Patient Name: Milagros Ramirez  1960  Date: 11/5/2022      Subjective     Chief Complaint   Patient presents with   • Black or Bloody Stool     Pt states she was sent to this ED by her PCP for decreased HGB of 5.8 two days ago. She states she was seen here last week with an HGB of 3.8 and received 5 units of blood. Pt says she is still having blood in her stools.        Summary: 62 y.o. female with recent GI bleeding earlier this month underwent EGD and colonoscopy at that time and required clipping of a lesion in her lower intestines who presents after outpatient labs showed anemia and patient developed melena.  Has had 4 units of blood thus far.  Dr. Young/GI assisting in patient pending Endoscopy on 11/1.  Patient Also Having Severe Bilateral Shoulder Pain and Orthopedic Surgery/Dr Beltran consulted for additional assistance.  PT and OT also helping and patient may likely require rehab for discharge.    Interval Followup: no acute events overnight, patient resting comfortably in bed.  Denies any obvious blood loss from stool    Review of Systems  No reported chest pain or palpitations  No reported nausea no vomiting  No reported fever no chills    Objective     Vitals:   Temp:  [97.3 °F (36.3 °C)-98.5 °F (36.9 °C)] 97.7 °F (36.5 °C)  Heart Rate:  [] 100  Resp:  [13-20] 18  BP: (115-141)/(56-94) 141/76    Physical Exam  General appearance: NAD, resting comfortably, drowsy but arousable  Eyes: anicteric sclerae, moist conjunctivae; no lid-lag; PERRLA  HENT: Atraumatic; oropharynx clear with moist mucous membranes and no mucosal ulcerations; normal hard and soft palate  Neck: Trachea midline; FROM, supple, no thyromegaly or lymphadenopathy  Lungs: CTA, with normal respiratory effort and no intercostal retractions  CV: Regular Rate and Rhythm, no Murmurs, Rubs, or Gallops   Abdomen: Soft, non-tender; no masses or  Heptosplenomegally  Extremities: No peripheral edema or extremity lymphadenopathy  Skin: Normal temperature, turgor and texture; no rash, ulcers or subcutaneous nodules  Psych: Appropriate affect, alert and oriented to person, place and time  Neuro: CN II - XII intact no motor deficits, no sensory defecits    Result Review:  Vital signs, labs and recent relevant imaging reviewed.      !NOT ORDERED- rivaroxaban (Xarelto), , Does not apply, Daily With Dinner  budesonide-formoterol, 2 puff, Inhalation, BID - RT  ferrous sulfate, 325 mg, Oral, Daily With Breakfast  magnesium oxide, 400 mg, Oral, Daily  metoprolol tartrate, 12.5 mg, Oral, Q12H  pantoprazole, 40 mg, Intravenous, Q12H  sodium chloride, 10 mL, Intravenous, Q12H  Thyroid, 60 mg, Oral, Daily      •  acetaminophen **OR** acetaminophen **OR** acetaminophen  •  albuterol sulfate HFA  •  cyclobenzaprine  •  HYDROcodone-acetaminophen  •  influenza vaccine  •  metoprolol tartrate  •  nitroglycerin  •  sodium chloride  •  sodium chloride  •  sodium chloride    10/2020 stress test normal at that time EF reportedly 70%    Assessment / Plan     Assessment/Plan:  Acute GI bleed  Severe blood loss anemia requiring transfusion  Status post colonoscopy with clipping on 10/13/2022  History of iron deficiency anemia requiring IV iron earlier this month  PHOEBE grimaldo, with RVR, on Xarelto as outpatient  Morbid obesity  Hypokalemia  Possible congestive hepatopathy versus venoocclusive disease  Diastolic CHF with borderline reduced EF 46% per 10/12/2022 echo  Moderate MV regurgitation and tricuspid valve regurgitation  Mild to moderate cardiomegaly and small pericardial effusion  Anasarca  Hypothyroidism and history of partial thyroidectomy  History of hypertension  AMOR  SLE and rheumatoid arthritis history on hydroxychloroquine    Telemetry: Reviewed by PHOEBE daniels    -Patient status post 1 unit PRBCs yesterday, hemoglobin 7.8 this a.m., no obvious blood loss  - Monitor labs for  contraction alkalosis  - Push enteroscopy yesterday without any obvious sources of bleeding, awaiting capsule results  - Patient underwent bilateral shoulder subacromial steroid injections by Dr. Beltran on 11/2/2022  - CPAP as able but suspect will just need to use nasal cannula and follow-up further outpatient for continued monitoring  - Continue PT and OT, suspect will need rehab placement   - Potassium and magnesium stable, continue to monitor  - GI following  --Capsule endoscopy still pending results  - Glucose in acceptable range, continue to monitor  --Question if patient would benefit from octreotide, suspect she may have blood loss from possible AVM in the small bowel although I have no documented proof of this.  - Has received IV iron, continuing on oral iron     Continue inpatient monitoring and treatment as above.  Home versus rehab once medically stable.     DVT prophylaxis:  Medical and mechanical DVT prophylaxis orders are present.    Code Status (Patient has no pulse and is not breathing): CPR (Attempt to Resuscitate)  Medical Interventions (Patient has pulse or is breathing): Full Support        CBC    CBC 11/3/22 11/4/22 11/4/22 11/5/22     0432 1934    WBC 5.97 5.91  5.81   RBC 2.83 (A) 2.72 (A)  2.95 (A)   Hemoglobin 7.4 (A) 7.0 (A) 8.6 (A) 7.8 (A)   Hematocrit 25.0 (A) 24.3 (A) 28.6 (A) 26.3 (A)   MCV 88.3 89.3  89.2   MCH 26.1 (A) 25.7 (A)  26.4 (A)   MCHC 29.6 (A) 28.8 (A)  29.7 (A)   RDW 21.1 (A) 21.2 (A)  20.0 (A)   Platelets 220 315  215   (A) Abnormal value              CMP    CMP 11/3/22 11/4/22 11/5/22   Glucose 114 (A) 143 (A) 130 (A)   BUN 12 15 16   Creatinine 0.76 0.90 0.87   Sodium 135 (A) 135 (A) 136   Potassium 4.1 4.4 4.4   Chloride 98 102 103   Calcium 8.6 8.6 8.5 (A)   Albumin 3.20 (A) 3.20 (A) 3.10 (A)   Total Bilirubin 0.3 0.2 0.2   Alkaline Phosphatase 124 (A) 114 118 (A)   AST (SGOT) 11 9 11   ALT (SGPT) 6 5 5   (A) Abnormal value              Electronically signed by  Abiodun Betts MD, 11/05/22, 10:08 AM EDT.

## 2022-11-05 NOTE — PROGRESS NOTES
[facility]  Inpatient Progress Note    Reason for Follow Up:  anemia    Subjective     Interval History:   No bowel movement over night.  No abd pain, nausea, vomiting.    Current Facility-Administered Medications:   •  !NOT ORDERED- rivaroxaban (Xarelto), , Does not apply, Daily With Dinner, Sara Jarrett MD  •  acetaminophen (TYLENOL) tablet 650 mg, 650 mg, Oral, Q4H PRN, 650 mg at 10/29/22 0336 **OR** acetaminophen (TYLENOL) 160 MG/5ML solution 650 mg, 650 mg, Oral, Q4H PRN **OR** acetaminophen (TYLENOL) suppository 650 mg, 650 mg, Rectal, Q4H PRN, Sara Jarrett MD  •  albuterol sulfate HFA (PROVENTIL HFA;VENTOLIN HFA;PROAIR HFA) inhaler 2 puff, 2 puff, Inhalation, Q4H PRN, Sara Jarrett MD  •  budesonide-formoterol (SYMBICORT) 160-4.5 MCG/ACT inhaler 2 puff, 2 puff, Inhalation, BID - RT, Sara Jarrett MD, 2 puff at 11/04/22 1945  •  cyclobenzaprine (FLEXERIL) tablet 10 mg, 10 mg, Oral, TID PRN, Sara Jarrett MD, 10 mg at 11/04/22 2116  •  ferrous sulfate tablet 325 mg, 325 mg, Oral, Daily With Breakfast, Sara Jarrett MD, 325 mg at 11/03/22 0847  •  HYDROcodone-acetaminophen (NORCO) 7.5-325 MG per tablet 1 tablet, 1 tablet, Oral, Q6H PRN, Sara Jarrett MD, 1 tablet at 11/04/22 2120  •  influenza vac split quad (FLUZONE,FLUARIX,AFLURIA,FLULAVAL) injection 0.5 mL, 0.5 mL, Intramuscular, During Hospitalization, Sara Jarrett MD  •  magnesium oxide (MAG-OX) tablet 400 mg, 400 mg, Oral, Daily, Sara Jarrett MD, 400 mg at 11/03/22 0847  •  metoprolol tartrate (LOPRESSOR) tablet 12.5 mg, 12.5 mg, Oral, Q8H PRN, Sara Jarrett MD, 12.5 mg at 11/01/22 0722  •  metoprolol tartrate (LOPRESSOR) tablet 12.5 mg, 12.5 mg, Oral, Q12H, Sara Jarrett MD, 12.5 mg at 11/04/22 2116  •  nitroglycerin (NITROSTAT) SL tablet 0.4 mg, 0.4 mg, Sublingual, Q5 Min PRN, Sara Jarrett MD  •   pantoprazole (PROTONIX) injection 40 mg, 40 mg, Intravenous, Q12H, Sara Jarrett MD, 40 mg at 11/04/22 2116  •  sodium chloride 0.9 % flush 10 mL, 10 mL, Intravenous, PRN, Sara Jarrett MD  •  sodium chloride 0.9 % flush 10 mL, 10 mL, Intravenous, PRN, Sara Jarrett MD, 10 mL at 10/29/22 1203  •  sodium chloride 0.9 % flush 10 mL, 10 mL, Intravenous, Q12H, Sara Jarrett MD, 10 mL at 11/04/22 2120  •  sodium chloride 0.9 % infusion 40 mL, 40 mL, Intravenous, PRN, Sara Jarrett MD  •  Thyroid (ARMOUR) tablet 60 mg, 60 mg, Oral, Daily, Sara Jarrett MD, 60 mg at 11/03/22 0847  Review of Systems:    The following systems were reviewed and negative;  constitution, respiratory and cardiovascular    Objective     Vital Signs  Temp:  [97.3 °F (36.3 °C)-98.5 °F (36.9 °C)] 97.6 °F (36.4 °C)  Heart Rate:  [] 102  Resp:  [13-20] 16  BP: (115-142)/(56-94) 131/74  Body mass index is 41.15 kg/m².    Intake/Output Summary (Last 24 hours) at 11/5/2022 0652  Last data filed at 11/4/2022 1830  Gross per 24 hour   Intake 776.25 ml   Output --   Net 776.25 ml     No intake/output data recorded.     Physical Exam:   General: awake, alert and in no acute distress   Eyes: eyes move symmetrical in all directions, no scleral icterus   Neck: supple, trachea is midline   Skin: warm and dry, not jaundiced   Cardiovascular: no chest tenderness   Pulm: breathing unlabored   Abdomen: soft, nontender, nondistended   Rectal: deferred   Extremities: no rash or edema   Psychiatric: mental status within normal limits, alert and oriented      Results Review:     I reviewed the patient's new clinical results.    Results from last 7 days   Lab Units 11/05/22  0431 11/04/22  1934 11/04/22  0432 11/03/22  0425   WBC 10*3/mm3 5.81  --  5.91 5.97   HEMOGLOBIN g/dL 7.8* 8.6* 7.0* 7.4*   HEMATOCRIT % 26.3* 28.6* 24.3* 25.0*   PLATELETS 10*3/mm3 215  --  315 220     Results from last 7 days   Lab  Units 11/05/22  0431 11/04/22  0432 11/03/22  0425   SODIUM mmol/L 136 135* 135*   POTASSIUM mmol/L 4.4 4.4 4.1   CHLORIDE mmol/L 103 102 98   CO2 mmol/L 26.5 28.7 29.3*   BUN mg/dL 16 15 12   CREATININE mg/dL 0.87 0.90 0.76   CALCIUM mg/dL 8.5* 8.6 8.6   BILIRUBIN mg/dL 0.2 0.2 0.3   ALK PHOS U/L 118* 114 124*   ALT (SGPT) U/L 5 5 6   AST (SGOT) U/L 11 9 11   GLUCOSE mg/dL 130* 143* 114*     Results from last 7 days   Lab Units 10/31/22  0528   INR  1.18*     No results found for: LIPASE    Radiology:  [unfilled]      Assessment & Plan   Assessment:     Acute blood loss anemia    Plan:     - enteroscopy negative for etiology  - capsule endoscopy pending  - continue to monitor Hgb and transfuse as needed    I discussed the patients findings and my recommendations with patient.         Sara Jarrett M.D.  [place] [address]  [id number]  Office: [phone]

## 2022-11-05 NOTE — PLAN OF CARE
Goal Outcome Evaluation:           Progress: improving  Outcome Evaluation: Pt awaiting results of capsule study. Pt hemoglobin trending. Pt has minimal complaints of pain/nausea.    Ira Carlton RN

## 2022-11-06 LAB
ALBUMIN SERPL-MCNC: 3.5 G/DL (ref 3.5–5.2)
ALBUMIN/GLOB SERPL: 1.1 G/DL
ALP SERPL-CCNC: 118 U/L (ref 39–117)
ALT SERPL W P-5'-P-CCNC: 7 U/L (ref 1–33)
ANION GAP SERPL CALCULATED.3IONS-SCNC: 4.9 MMOL/L (ref 5–15)
AST SERPL-CCNC: 10 U/L (ref 1–32)
BASOPHILS # BLD AUTO: 0.02 10*3/MM3 (ref 0–0.2)
BASOPHILS NFR BLD AUTO: 0.3 % (ref 0–1.5)
BH BB BLOOD EXPIRATION DATE: NORMAL
BH BB BLOOD TYPE BARCODE: 5100
BH BB DISPENSE STATUS: NORMAL
BH BB PRODUCT CODE: NORMAL
BH BB UNIT NUMBER: NORMAL
BILIRUB SERPL-MCNC: 0.3 MG/DL (ref 0–1.2)
BUN SERPL-MCNC: 17 MG/DL (ref 8–23)
BUN/CREAT SERPL: 20 (ref 7–25)
CALCIUM SPEC-SCNC: 8.6 MG/DL (ref 8.6–10.5)
CHLORIDE SERPL-SCNC: 104 MMOL/L (ref 98–107)
CO2 SERPL-SCNC: 29.1 MMOL/L (ref 22–29)
CREAT SERPL-MCNC: 0.85 MG/DL (ref 0.57–1)
CROSSMATCH INTERPRETATION: NORMAL
DEPRECATED RDW RBC AUTO: 65.1 FL (ref 37–54)
EGFRCR SERPLBLD CKD-EPI 2021: 78.1 ML/MIN/1.73
EOSINOPHIL # BLD AUTO: 0.03 10*3/MM3 (ref 0–0.4)
EOSINOPHIL NFR BLD AUTO: 0.5 % (ref 0.3–6.2)
ERYTHROCYTE [DISTWIDTH] IN BLOOD BY AUTOMATED COUNT: 19.7 % (ref 12.3–15.4)
GLOBULIN UR ELPH-MCNC: 3.2 GM/DL
GLUCOSE SERPL-MCNC: 135 MG/DL (ref 65–99)
HCT VFR BLD AUTO: 26.3 % (ref 34–46.6)
HGB BLD-MCNC: 7.9 G/DL (ref 12–15.9)
IMM GRANULOCYTES # BLD AUTO: 0.02 10*3/MM3 (ref 0–0.05)
IMM GRANULOCYTES NFR BLD AUTO: 0.3 % (ref 0–0.5)
LYMPHOCYTES # BLD AUTO: 1.27 10*3/MM3 (ref 0.7–3.1)
LYMPHOCYTES NFR BLD AUTO: 21.1 % (ref 19.6–45.3)
MAGNESIUM SERPL-MCNC: 2.1 MG/DL (ref 1.6–2.4)
MCH RBC QN AUTO: 27 PG (ref 26.6–33)
MCHC RBC AUTO-ENTMCNC: 30 G/DL (ref 31.5–35.7)
MCV RBC AUTO: 89.8 FL (ref 79–97)
MONOCYTES # BLD AUTO: 0.48 10*3/MM3 (ref 0.1–0.9)
MONOCYTES NFR BLD AUTO: 8 % (ref 5–12)
NEUTROPHILS NFR BLD AUTO: 4.2 10*3/MM3 (ref 1.7–7)
NEUTROPHILS NFR BLD AUTO: 69.8 % (ref 42.7–76)
NRBC BLD AUTO-RTO: 0 /100 WBC (ref 0–0.2)
PHOSPHATE SERPL-MCNC: 3.7 MG/DL (ref 2.5–4.5)
PLATELET # BLD AUTO: 304 10*3/MM3 (ref 140–450)
PMV BLD AUTO: 8.7 FL (ref 6–12)
POTASSIUM SERPL-SCNC: 4.6 MMOL/L (ref 3.5–5.2)
PROT SERPL-MCNC: 6.7 G/DL (ref 6–8.5)
RBC # BLD AUTO: 2.93 10*6/MM3 (ref 3.77–5.28)
SODIUM SERPL-SCNC: 138 MMOL/L (ref 136–145)
UNIT  ABO: NORMAL
UNIT  RH: NORMAL
WBC NRBC COR # BLD: 6.02 10*3/MM3 (ref 3.4–10.8)

## 2022-11-06 PROCEDURE — 94799 UNLISTED PULMONARY SVC/PX: CPT

## 2022-11-06 PROCEDURE — 83735 ASSAY OF MAGNESIUM: CPT | Performed by: INTERNAL MEDICINE

## 2022-11-06 PROCEDURE — 84100 ASSAY OF PHOSPHORUS: CPT | Performed by: INTERNAL MEDICINE

## 2022-11-06 PROCEDURE — 85025 COMPLETE CBC W/AUTO DIFF WBC: CPT | Performed by: INTERNAL MEDICINE

## 2022-11-06 PROCEDURE — 99233 SBSQ HOSP IP/OBS HIGH 50: CPT | Performed by: INTERNAL MEDICINE

## 2022-11-06 PROCEDURE — 80053 COMPREHEN METABOLIC PANEL: CPT | Performed by: INTERNAL MEDICINE

## 2022-11-06 RX ADMIN — CYCLOBENZAPRINE 10 MG: 10 TABLET, FILM COATED ORAL at 21:52

## 2022-11-06 RX ADMIN — BUDESONIDE AND FORMOTEROL FUMARATE DIHYDRATE 2 PUFF: 160; 4.5 AEROSOL RESPIRATORY (INHALATION) at 19:08

## 2022-11-06 RX ADMIN — Medication 10 ML: at 09:11

## 2022-11-06 RX ADMIN — BUDESONIDE AND FORMOTEROL FUMARATE DIHYDRATE 2 PUFF: 160; 4.5 AEROSOL RESPIRATORY (INHALATION) at 09:17

## 2022-11-06 RX ADMIN — PANTOPRAZOLE SODIUM 40 MG: 40 INJECTION, POWDER, FOR SOLUTION INTRAVENOUS at 21:27

## 2022-11-06 RX ADMIN — MAGNESIUM OXIDE TAB 400 MG (241.3 MG ELEMENTAL MG) 400 MG: 400 (241.3 MG) TAB at 09:10

## 2022-11-06 RX ADMIN — PANTOPRAZOLE SODIUM 40 MG: 40 INJECTION, POWDER, FOR SOLUTION INTRAVENOUS at 09:10

## 2022-11-06 RX ADMIN — HYDROCODONE BITARTRATE AND ACETAMINOPHEN 1 TABLET: 7.5; 325 TABLET ORAL at 21:52

## 2022-11-06 RX ADMIN — Medication 10 ML: at 21:45

## 2022-11-06 RX ADMIN — FERROUS SULFATE TAB 325 MG (65 MG ELEMENTAL FE) 325 MG: 325 (65 FE) TAB at 09:10

## 2022-11-06 RX ADMIN — LEVOTHYROXINE, LIOTHYRONINE 60 MG: 38; 9 TABLET ORAL at 09:10

## 2022-11-06 RX ADMIN — METOPROLOL TARTRATE 12.5 MG: 25 TABLET, FILM COATED ORAL at 21:24

## 2022-11-06 RX ADMIN — METOPROLOL TARTRATE 12.5 MG: 25 TABLET, FILM COATED ORAL at 09:10

## 2022-11-06 NOTE — PROGRESS NOTES
[facility]   Hospitalist Progress Note    Date of admission: 10/28/2022  Patient Name: Milagros Ramirez  1960  Date: 11/6/2022      Subjective     Chief Complaint   Patient presents with   • Black or Bloody Stool     Pt states she was sent to this ED by her PCP for decreased HGB of 5.8 two days ago. She states she was seen here last week with an HGB of 3.8 and received 5 units of blood. Pt says she is still having blood in her stools.        Summary: 62 y.o. female with recent GI bleeding earlier this month underwent EGD and colonoscopy at that time and required clipping of a lesion in her lower intestines who presents after outpatient labs showed anemia and patient developed melena.  Has had 4 units of blood thus far.  Dr. Young/GI assisting in patient pending Endoscopy on 11/1.  Patient Also Having Severe Bilateral Shoulder Pain and Orthopedic Surgery/Dr Beltran consulted for additional assistance.  PT and OT also helping and patient may likely require rehab for discharge.    Interval Followup: No acute events overnight, did complain of some red bowel movements, hemoglobin stable    Review of Systems  No reported chest pain or palpitations  No reported nausea no vomiting  No reported fever no chills    Objective     Vitals:   Temp:  [97.2 °F (36.2 °C)-98.4 °F (36.9 °C)] (P) 97.2 °F (36.2 °C)  Heart Rate:  [] 97  Resp:  [16-18] 18  BP: (126-149)/(61-84) (P) 149/77    Physical Exam  General appearance: NAD, resting comfortably, conversant  Eyes: anicteric sclerae, moist conjunctivae; no lid-lag; PERRLA  HENT: Atraumatic; oropharynx clear with moist mucous membranes and no mucosal ulcerations; normal hard and soft palate  Neck: Trachea midline; FROM, supple, no thyromegaly or lymphadenopathy  Lungs: CTA, with normal respiratory effort and no intercostal retractions  CV: Regular Rate and Rhythm, no Murmurs, Rubs, or Gallops   Abdomen: Soft, non-tender; no masses or Heptosplenomegally  Extremities:  No peripheral edema or extremity lymphadenopathy  Skin: Normal temperature, turgor and texture; no rash, ulcers or subcutaneous nodules  Psych: Appropriate affect, alert and oriented to person, place and time  Neuro: CN II - XII intact no motor deficits, no sensory defecits    Result Review:  Vital signs, labs and recent relevant imaging reviewed.      !NOT ORDERED- rivaroxaban (Xarelto), , Does not apply, Daily With Dinner  budesonide-formoterol, 2 puff, Inhalation, BID - RT  ferrous sulfate, 325 mg, Oral, Daily With Breakfast  magnesium oxide, 400 mg, Oral, Daily  metoprolol tartrate, 12.5 mg, Oral, Q12H  pantoprazole, 40 mg, Intravenous, Q12H  sodium chloride, 10 mL, Intravenous, Q12H  Thyroid, 60 mg, Oral, Daily      •  acetaminophen **OR** acetaminophen **OR** acetaminophen  •  albuterol sulfate HFA  •  cyclobenzaprine  •  HYDROcodone-acetaminophen  •  influenza vaccine  •  metoprolol tartrate  •  nitroglycerin  •  sodium chloride  •  sodium chloride  •  sodium chloride    10/2020 stress test normal at that time EF reportedly 70%    Assessment / Plan     Assessment/Plan:  Acute GI bleed  Severe blood loss anemia requiring transfusion  Status post colonoscopy with clipping on 10/13/2022  History of iron deficiency anemia requiring IV iron earlier this month  A. fib, with RVR, on Xarelto as outpatient  Morbid obesity  Hypokalemia  Possible congestive hepatopathy versus venoocclusive disease  Diastolic CHF with borderline reduced EF 46% per 10/12/2022 echo  Moderate MV regurgitation and tricuspid valve regurgitation  Mild to moderate cardiomegaly and small pericardial effusion  Anasarca  Hypothyroidism and history of partial thyroidectomy  History of hypertension  AMOR  SLE and rheumatoid arthritis history on hydroxychloroquine    Telemetry: Reviewed by me, sinus    -Patient status post 1 unit PRBCs 11/4/2022, hemoglobin 7.9 this a.m., stable, patient did complain of some blood in her bowel movements  - Push  enteroscopy without any obvious sources of bleeding, awaiting capsule results  - Patient underwent bilateral shoulder subacromial steroid injections by Dr. Beltran on 11/2/2022  - CPAP as able but suspect will just need to use nasal cannula and follow-up further outpatient for continued monitoring  - Continue PT and OT, suspect will need rehab placement   - Potassium and magnesium stable, continue to monitor  - GI following, appreciate their recommendations  --Capsule endoscopy still pending results  - Glucose in acceptable range, continue to monitor  --Question if patient would benefit from octreotide, suspect she may have blood loss from possible AVM in the small bowel although I have no documented proof of this.  - Has received IV iron, continuing on oral iron     Continue inpatient monitoring and treatment as above.  Home versus rehab once medically stable.     DVT prophylaxis:  Medical and mechanical DVT prophylaxis orders are present.    Code Status (Patient has no pulse and is not breathing): CPR (Attempt to Resuscitate)  Medical Interventions (Patient has pulse or is breathing): Full Support        CBC    CBC 11/4/22 11/4/22 11/5/22 11/6/22    0432 1934     WBC 5.91  5.81 6.02   RBC 2.72 (A)  2.95 (A) 2.93 (A)   Hemoglobin 7.0 (A) 8.6 (A) 7.8 (A) 7.9 (A)   Hematocrit 24.3 (A) 28.6 (A) 26.3 (A) 26.3 (A)   MCV 89.3  89.2 89.8   MCH 25.7 (A)  26.4 (A) 27.0   MCHC 28.8 (A)  29.7 (A) 30.0 (A)   RDW 21.2 (A)  20.0 (A) 19.7 (A)   Platelets 315  215 304   (A) Abnormal value              CMP    CMP 11/4/22 11/5/22 11/6/22   Glucose 143 (A) 130 (A) 135 (A)   BUN 15 16 17   Creatinine 0.90 0.87 0.85   Sodium 135 (A) 136 138   Potassium 4.4 4.4 4.6   Chloride 102 103 104   Calcium 8.6 8.5 (A) 8.6   Albumin 3.20 (A) 3.10 (A) 3.50   Total Bilirubin 0.2 0.2 0.3   Alkaline Phosphatase 114 118 (A) 118 (A)   AST (SGOT) 9 11 10   ALT (SGPT) 5 5 7   (A) Abnormal value              Electronically signed by Abiodun Betts,  MD, 11/06/22, 10:47 AM EST.

## 2022-11-06 NOTE — PLAN OF CARE
Goal Outcome Evaluation:           Progress: improving  Outcome Evaluation: Pt awaiting capsule results. Labs trending for hemoglobin. Pt having minimal complaints of pain/nausea.    Ira Carlton RN

## 2022-11-07 LAB
ALBUMIN SERPL-MCNC: 2.9 G/DL (ref 3.5–5.2)
ALBUMIN/GLOB SERPL: 0.8 G/DL
ALP SERPL-CCNC: 122 U/L (ref 39–117)
ALT SERPL W P-5'-P-CCNC: 7 U/L (ref 1–33)
ANION GAP SERPL CALCULATED.3IONS-SCNC: 7.7 MMOL/L (ref 5–15)
AST SERPL-CCNC: 10 U/L (ref 1–32)
BASOPHILS # BLD AUTO: 0.01 10*3/MM3 (ref 0–0.2)
BASOPHILS NFR BLD AUTO: 0.2 % (ref 0–1.5)
BILIRUB SERPL-MCNC: 0.2 MG/DL (ref 0–1.2)
BUN SERPL-MCNC: 17 MG/DL (ref 8–23)
BUN/CREAT SERPL: 17.3 (ref 7–25)
CALCIUM SPEC-SCNC: 8.3 MG/DL (ref 8.6–10.5)
CHLORIDE SERPL-SCNC: 105 MMOL/L (ref 98–107)
CO2 SERPL-SCNC: 25.3 MMOL/L (ref 22–29)
CREAT SERPL-MCNC: 0.98 MG/DL (ref 0.57–1)
DEPRECATED RDW RBC AUTO: 66 FL (ref 37–54)
EGFRCR SERPLBLD CKD-EPI 2021: 65.8 ML/MIN/1.73
EOSINOPHIL # BLD AUTO: 0.07 10*3/MM3 (ref 0–0.4)
EOSINOPHIL NFR BLD AUTO: 1.1 % (ref 0.3–6.2)
ERYTHROCYTE [DISTWIDTH] IN BLOOD BY AUTOMATED COUNT: 19.8 % (ref 12.3–15.4)
GLOBULIN UR ELPH-MCNC: 3.5 GM/DL
GLUCOSE SERPL-MCNC: 112 MG/DL (ref 65–99)
HCT VFR BLD AUTO: 26.1 % (ref 34–46.6)
HGB BLD-MCNC: 7.7 G/DL (ref 12–15.9)
IMM GRANULOCYTES # BLD AUTO: 0.02 10*3/MM3 (ref 0–0.05)
IMM GRANULOCYTES NFR BLD AUTO: 0.3 % (ref 0–0.5)
LYMPHOCYTES # BLD AUTO: 1.21 10*3/MM3 (ref 0.7–3.1)
LYMPHOCYTES NFR BLD AUTO: 18.7 % (ref 19.6–45.3)
MAGNESIUM SERPL-MCNC: 2.2 MG/DL (ref 1.6–2.4)
MCH RBC QN AUTO: 26.7 PG (ref 26.6–33)
MCHC RBC AUTO-ENTMCNC: 29.5 G/DL (ref 31.5–35.7)
MCV RBC AUTO: 90.6 FL (ref 79–97)
MONOCYTES # BLD AUTO: 0.58 10*3/MM3 (ref 0.1–0.9)
MONOCYTES NFR BLD AUTO: 9 % (ref 5–12)
NEUTROPHILS NFR BLD AUTO: 4.57 10*3/MM3 (ref 1.7–7)
NEUTROPHILS NFR BLD AUTO: 70.7 % (ref 42.7–76)
NRBC BLD AUTO-RTO: 0 /100 WBC (ref 0–0.2)
PHOSPHATE SERPL-MCNC: 3.7 MG/DL (ref 2.5–4.5)
PLATELET # BLD AUTO: 256 10*3/MM3 (ref 140–450)
PMV BLD AUTO: 11.5 FL (ref 6–12)
POTASSIUM SERPL-SCNC: 4.4 MMOL/L (ref 3.5–5.2)
PROT SERPL-MCNC: 6.4 G/DL (ref 6–8.5)
RBC # BLD AUTO: 2.88 10*6/MM3 (ref 3.77–5.28)
SODIUM SERPL-SCNC: 138 MMOL/L (ref 136–145)
WBC NRBC COR # BLD: 6.46 10*3/MM3 (ref 3.4–10.8)

## 2022-11-07 PROCEDURE — 84100 ASSAY OF PHOSPHORUS: CPT | Performed by: INTERNAL MEDICINE

## 2022-11-07 PROCEDURE — 80053 COMPREHEN METABOLIC PANEL: CPT | Performed by: INTERNAL MEDICINE

## 2022-11-07 PROCEDURE — 99233 SBSQ HOSP IP/OBS HIGH 50: CPT | Performed by: INTERNAL MEDICINE

## 2022-11-07 PROCEDURE — 83735 ASSAY OF MAGNESIUM: CPT | Performed by: INTERNAL MEDICINE

## 2022-11-07 PROCEDURE — 85025 COMPLETE CBC W/AUTO DIFF WBC: CPT | Performed by: INTERNAL MEDICINE

## 2022-11-07 PROCEDURE — 94799 UNLISTED PULMONARY SVC/PX: CPT

## 2022-11-07 PROCEDURE — 94664 DEMO&/EVAL PT USE INHALER: CPT

## 2022-11-07 PROCEDURE — 99231 SBSQ HOSP IP/OBS SF/LOW 25: CPT | Performed by: INTERNAL MEDICINE

## 2022-11-07 RX ADMIN — LEVOTHYROXINE, LIOTHYRONINE 60 MG: 38; 9 TABLET ORAL at 08:27

## 2022-11-07 RX ADMIN — PANTOPRAZOLE SODIUM 40 MG: 40 INJECTION, POWDER, FOR SOLUTION INTRAVENOUS at 20:17

## 2022-11-07 RX ADMIN — HYDROCODONE BITARTRATE AND ACETAMINOPHEN 1 TABLET: 7.5; 325 TABLET ORAL at 22:53

## 2022-11-07 RX ADMIN — Medication 10 ML: at 20:42

## 2022-11-07 RX ADMIN — FERROUS SULFATE TAB 325 MG (65 MG ELEMENTAL FE) 325 MG: 325 (65 FE) TAB at 08:24

## 2022-11-07 RX ADMIN — MAGNESIUM OXIDE TAB 400 MG (241.3 MG ELEMENTAL MG) 400 MG: 400 (241.3 MG) TAB at 08:25

## 2022-11-07 RX ADMIN — Medication 10 ML: at 08:25

## 2022-11-07 RX ADMIN — BUDESONIDE AND FORMOTEROL FUMARATE DIHYDRATE 2 PUFF: 160; 4.5 AEROSOL RESPIRATORY (INHALATION) at 06:39

## 2022-11-07 RX ADMIN — METOPROLOL TARTRATE 12.5 MG: 25 TABLET, FILM COATED ORAL at 20:17

## 2022-11-07 RX ADMIN — METOPROLOL TARTRATE 12.5 MG: 25 TABLET, FILM COATED ORAL at 08:25

## 2022-11-07 RX ADMIN — PANTOPRAZOLE SODIUM 40 MG: 40 INJECTION, POWDER, FOR SOLUTION INTRAVENOUS at 08:25

## 2022-11-07 RX ADMIN — CYCLOBENZAPRINE 10 MG: 10 TABLET, FILM COATED ORAL at 22:53

## 2022-11-07 RX ADMIN — BUDESONIDE AND FORMOTEROL FUMARATE DIHYDRATE 2 PUFF: 160; 4.5 AEROSOL RESPIRATORY (INHALATION) at 20:09

## 2022-11-07 NOTE — PLAN OF CARE
Goal Outcome Evaluation:           Progress: no change  Outcome Evaluation: CALLIE this shift, continue to trend labs for hgb. Denies pain/nausea/vomiting at this time

## 2022-11-07 NOTE — PROGRESS NOTES
[facility]   Hospitalist Progress Note    Date of admission: 10/28/2022  Patient Name: Milagros Ramirez  1960  Date: 11/7/2022      Subjective     Chief Complaint   Patient presents with   • Black or Bloody Stool     Pt states she was sent to this ED by her PCP for decreased HGB of 5.8 two days ago. She states she was seen here last week with an HGB of 3.8 and received 5 units of blood. Pt says she is still having blood in her stools.        Summary: 62 y.o. female with recent GI bleeding earlier this month underwent EGD and colonoscopy at that time and required clipping of a lesion in her lower intestines who presents after outpatient labs showed anemia and patient developed melena.  Has had 4 units of blood thus far.  Dr. Young/GI assisting in patient pending Endoscopy on 11/1.  Patient Also Having Severe Bilateral Shoulder Pain and Orthopedic Surgery/Dr Beltran consulted for additional assistance.  PT and OT also helping and patient may likely require rehab for discharge.    Patient underwent push enteroscopy that was negative, patient underwent colonoscopy, patient underwent capsule endoscopy that was negative.  Patient has had continued blood loss, has required frequent transfusions.    Interval Followup: No acute events overnight, did have bloody stool as reported by patient, endoscopic evaluation has been negative thus far, patient denies any nausea or vomiting, no abdominal pain    Review of Systems  No reported chest pain or palpitations  No reported nausea no vomiting  No reported fever no chills    Objective     Vitals:   Temp:  [97.3 °F (36.3 °C)-98.7 °F (37.1 °C)] 97.5 °F (36.4 °C)  Heart Rate:  [] 76  Resp:  [16-18] 16  BP: (102-151)/(46-90) 151/90    Physical Exam  General appearance: NAD, resting comfortably, conversant  Eyes: anicteric sclerae, moist conjunctivae; no lid-lag; PERRLA  HENT: Atraumatic; oropharynx clear with moist mucous membranes and no mucosal ulcerations; normal  hard and soft palate  Neck: Trachea midline; FROM, supple, no thyromegaly or lymphadenopathy  Lungs: CTA, with normal respiratory effort and no intercostal retractions  CV: Regular Rate and Rhythm, no Murmurs, Rubs, or Gallops   Abdomen: Soft, non-tender; no masses or Heptosplenomegally  Extremities: No peripheral edema or extremity lymphadenopathy  Skin: Normal temperature, turgor and texture; no rash, ulcers or subcutaneous nodules  Psych: Appropriate affect, alert and oriented to person, place and time  Neuro: CN II - XII intact no motor deficits, no sensory defecits    Result Review:  Vital signs, labs and recent relevant imaging reviewed.      !NOT ORDERED- rivaroxaban (Xarelto), , Does not apply, Daily With Dinner  budesonide-formoterol, 2 puff, Inhalation, BID - RT  ferrous sulfate, 325 mg, Oral, Daily With Breakfast  magnesium oxide, 400 mg, Oral, Daily  metoprolol tartrate, 12.5 mg, Oral, Q12H  pantoprazole, 40 mg, Intravenous, Q12H  sodium chloride, 10 mL, Intravenous, Q12H  Thyroid, 60 mg, Oral, Daily      •  acetaminophen **OR** acetaminophen **OR** acetaminophen  •  albuterol sulfate HFA  •  cyclobenzaprine  •  HYDROcodone-acetaminophen  •  influenza vaccine  •  metoprolol tartrate  •  nitroglycerin  •  sodium chloride  •  sodium chloride  •  sodium chloride    10/2020 stress test normal at that time EF reportedly 70%    Assessment / Plan     Assessment/Plan:  Acute GI bleed  Severe blood loss anemia requiring transfusion  Status post colonoscopy with clipping on 10/13/2022  History of iron deficiency anemia requiring IV iron earlier this month  A. fib, with RVR, on Xarelto as outpatient  Morbid obesity  Hypokalemia  Possible congestive hepatopathy versus venoocclusive disease  Diastolic CHF with borderline reduced EF 46% per 10/12/2022 echo  Moderate MV regurgitation and tricuspid valve regurgitation  Mild to moderate cardiomegaly and small pericardial effusion  Anasarca  Hypothyroidism and history of  partial thyroidectomy  History of hypertension  AMOR  SLE and rheumatoid arthritis history on hydroxychloroquine    Telemetry: Reviewed by me, sinus with episodes of tachycardia    -Patient status post 1 unit PRBCs 11/4/2022, hemoglobin 7.7 this a.m., stable, patient did complain of some blood in her bowel movements  - Push enteroscopy without any obvious sources of bleeding, capsule without any sources of bleeding  - Patient underwent bilateral shoulder subacromial steroid injections by Dr. Beltran on 11/2/2022  - CPAP as able but suspect will just need to use nasal cannula and follow-up further outpatient for continued monitoring  - Continue PT and OT, suspect will need rehab placement   - Potassium and magnesium stable, continue to monitor  - GI following, appreciate their recommendations, further recommendations per Dr. Jarrett  --Continue to hold anticoagulation  - Glucose in acceptable range, continue to monitor  - Has received IV iron, continuing on oral iron     Dispo: If hemoglobin remained stable no further testing recommended by GI patient may be able to return home off anticoagulation with close follow-up with GI and her PCP    Continue inpatient monitoring and treatment as above.  Home versus rehab once medically stable.     DVT prophylaxis:  Medical and mechanical DVT prophylaxis orders are present.    Code Status (Patient has no pulse and is not breathing): CPR (Attempt to Resuscitate)  Medical Interventions (Patient has pulse or is breathing): Full Support        CBC    CBC 11/5/22 11/6/22 11/7/22   WBC 5.81 6.02 6.46   RBC 2.95 (A) 2.93 (A) 2.88 (A)   Hemoglobin 7.8 (A) 7.9 (A) 7.7 (A)   Hematocrit 26.3 (A) 26.3 (A) 26.1 (A)   MCV 89.2 89.8 90.6   MCH 26.4 (A) 27.0 26.7   MCHC 29.7 (A) 30.0 (A) 29.5 (A)   RDW 20.0 (A) 19.7 (A) 19.8 (A)   Platelets 215 304 256   (A) Abnormal value              CMP    CMP 11/5/22 11/6/22 11/7/22   Glucose 130 (A) 135 (A) 112 (A)   BUN 16 17 17   Creatinine 0.87  0.85 0.98   Sodium 136 138 138   Potassium 4.4 4.6 4.4   Chloride 103 104 105   Calcium 8.5 (A) 8.6 8.3 (A)   Albumin 3.10 (A) 3.50 2.90 (A)   Total Bilirubin 0.2 0.3 0.2   Alkaline Phosphatase 118 (A) 118 (A) 122 (A)   AST (SGOT) 11 10 10   ALT (SGPT) 5 7 7   (A) Abnormal value              Electronically signed by Abiodun Betts MD, 11/07/22, 11:07 AM EST.

## 2022-11-07 NOTE — PLAN OF CARE
Goal Outcome Evaluation:  POC Reviewed with: Patient  Status: Progressing    Patient reported pain x1 this shift, relieved with ordered pain medications. No complaints of nausea. Tolerating diet well. Continues with telemetry, Afib. Noted with occasional tachycardia with activity/ambulation.

## 2022-11-07 NOTE — PROGRESS NOTES
[facility]  Inpatient Progress Note    Reason for Follow Up:  anemia    Subjective     Interval History:   Pt reports to continue to have dark stools.    Current Facility-Administered Medications:   •  !NOT ORDERED- rivaroxaban (Xarelto), , Does not apply, Daily With Dinner, Sara Jarrett MD  •  acetaminophen (TYLENOL) tablet 650 mg, 650 mg, Oral, Q4H PRN, 650 mg at 10/29/22 0336 **OR** acetaminophen (TYLENOL) 160 MG/5ML solution 650 mg, 650 mg, Oral, Q4H PRN **OR** acetaminophen (TYLENOL) suppository 650 mg, 650 mg, Rectal, Q4H PRN, Sara Jarrett MD  •  albuterol sulfate HFA (PROVENTIL HFA;VENTOLIN HFA;PROAIR HFA) inhaler 2 puff, 2 puff, Inhalation, Q4H PRN, Sara Jarrett MD  •  budesonide-formoterol (SYMBICORT) 160-4.5 MCG/ACT inhaler 2 puff, 2 puff, Inhalation, BID - RT, Sara Jarrett MD, 2 puff at 11/07/22 0639  •  cyclobenzaprine (FLEXERIL) tablet 10 mg, 10 mg, Oral, TID PRN, Sara Jarrett MD, 10 mg at 11/06/22 2152  •  ferrous sulfate tablet 325 mg, 325 mg, Oral, Daily With Breakfast, Sara Jarrett MD, 325 mg at 11/07/22 0824  •  HYDROcodone-acetaminophen (NORCO) 7.5-325 MG per tablet 1 tablet, 1 tablet, Oral, Q6H PRN, Sara Jarrett MD, 1 tablet at 11/06/22 2152  •  influenza vac split quad (FLUZONE,FLUARIX,AFLURIA,FLULAVAL) injection 0.5 mL, 0.5 mL, Intramuscular, During Hospitalization, Sara Jarrett MD  •  magnesium oxide (MAG-OX) tablet 400 mg, 400 mg, Oral, Daily, Sara Jarrett MD, 400 mg at 11/07/22 0825  •  metoprolol tartrate (LOPRESSOR) tablet 12.5 mg, 12.5 mg, Oral, Q8H PRN, Sara Jarrett MD, 12.5 mg at 11/01/22 0722  •  metoprolol tartrate (LOPRESSOR) tablet 12.5 mg, 12.5 mg, Oral, Q12H, Sara Jarrett MD, 12.5 mg at 11/07/22 0825  •  nitroglycerin (NITROSTAT) SL tablet 0.4 mg, 0.4 mg, Sublingual, Q5 Min PRN, Sara Jarrett MD  •  pantoprazole (PROTONIX)  injection 40 mg, 40 mg, Intravenous, Q12H, Sara Jarrett MD, 40 mg at 11/07/22 0825  •  sodium chloride 0.9 % flush 10 mL, 10 mL, Intravenous, PRN, Sara Jarrett MD  •  sodium chloride 0.9 % flush 10 mL, 10 mL, Intravenous, PRN, Sara Jarrett MD, 10 mL at 10/29/22 1203  •  sodium chloride 0.9 % flush 10 mL, 10 mL, Intravenous, Q12H, Sara Jarrett MD, 10 mL at 11/07/22 0825  •  sodium chloride 0.9 % infusion 40 mL, 40 mL, Intravenous, PRN, Sara Jarrett MD  •  Thyroid (ARMOUR) tablet 60 mg, 60 mg, Oral, Daily, Sara Jarrett MD, 60 mg at 11/07/22 0827  Review of Systems:    The following systems were reviewed and negative;  constitution, respiratory and cardiovascular    Objective     Vital Signs  Temp:  [97.5 °F (36.4 °C)-99.1 °F (37.3 °C)] 97.9 °F (36.6 °C)  Heart Rate:  [] 95  Resp:  [16-18] 16  BP: (102-151)/(46-90) 127/63  Body mass index is 41.15 kg/m².    Intake/Output Summary (Last 24 hours) at 11/7/2022 1747  Last data filed at 11/7/2022 1307  Gross per 24 hour   Intake 900 ml   Output --   Net 900 ml     I/O this shift:  In: 360 [P.O.:360]  Out: -      Physical Exam:   General: awake, alert and in no acute distress   Eyes: eyes move symmetrical in all directions, no scleral icterus   Neck: supple, trachea is midline   Skin: warm and dry, not jaundiced   Cardiovascular: no chest tenderness   Pulm: breathing unlabored   Abdomen: soft, nontender, nondistended   Rectal: deferred   Extremities: no rash or edema   Psychiatric: mental status within normal limits, alert and oriented      Results Review:     I reviewed the patient's new clinical results.    Results from last 7 days   Lab Units 11/07/22  0359 11/06/22  0520 11/05/22  0431   WBC 10*3/mm3 6.46 6.02 5.81   HEMOGLOBIN g/dL 7.7* 7.9* 7.8*   HEMATOCRIT % 26.1* 26.3* 26.3*   PLATELETS 10*3/mm3 256 304 215     Results from last 7 days   Lab Units 11/07/22  0359 11/06/22  0520 11/05/22  0431    SODIUM mmol/L 138 138 136   POTASSIUM mmol/L 4.4 4.6 4.4   CHLORIDE mmol/L 105 104 103   CO2 mmol/L 25.3 29.1* 26.5   BUN mg/dL 17 17 16   CREATININE mg/dL 0.98 0.85 0.87   CALCIUM mg/dL 8.3* 8.6 8.5*   BILIRUBIN mg/dL 0.2 0.3 0.2   ALK PHOS U/L 122* 118* 118*   ALT (SGPT) U/L 7 7 5   AST (SGOT) U/L 10 10 11   GLUCOSE mg/dL 112* 135* 130*         No results found for: LIPASE    Radiology:  [unfilled]      Assessment & Plan   Assessment:     Acute blood loss anemia    Plan:     - EGD/colonoscopy, enteroscopy, and capsule endoscopy negative for etiology.  - discussed referral to [facility] as outpatient for eval.  Pt agreeable.  My office to arrange.    I discussed the patients findings and my recommendations with patient.         Sara Jarrett M.D.  [place] [address]  [address]  Office: [phone]

## 2022-11-08 ENCOUNTER — TELEPHONE (OUTPATIENT)
Dept: GASTROENTEROLOGY | Facility: CLINIC | Age: 62
End: 2022-11-08

## 2022-11-08 ENCOUNTER — READMISSION MANAGEMENT (OUTPATIENT)
Dept: CALL CENTER | Facility: HOSPITAL | Age: 62
End: 2022-11-08

## 2022-11-08 VITALS
SYSTOLIC BLOOD PRESSURE: 139 MMHG | DIASTOLIC BLOOD PRESSURE: 77 MMHG | WEIGHT: 286.82 LBS | HEART RATE: 104 BPM | HEIGHT: 70 IN | BODY MASS INDEX: 41.06 KG/M2 | OXYGEN SATURATION: 97 % | TEMPERATURE: 98.1 F | RESPIRATION RATE: 16 BRPM

## 2022-11-08 DIAGNOSIS — R19.5 OCCULT GI BLEEDING: ICD-10-CM

## 2022-11-08 DIAGNOSIS — K62.5 RECTAL BLEEDING: Primary | ICD-10-CM

## 2022-11-08 DIAGNOSIS — D64.9 ANEMIA, UNSPECIFIED TYPE: ICD-10-CM

## 2022-11-08 LAB
ALBUMIN SERPL-MCNC: 3.5 G/DL (ref 3.5–5.2)
ALBUMIN/GLOB SERPL: 1 G/DL
ALP SERPL-CCNC: 131 U/L (ref 39–117)
ALT SERPL W P-5'-P-CCNC: 8 U/L (ref 1–33)
ANION GAP SERPL CALCULATED.3IONS-SCNC: 7.4 MMOL/L (ref 5–15)
AST SERPL-CCNC: 10 U/L (ref 1–32)
BASOPHILS # BLD AUTO: 0.02 10*3/MM3 (ref 0–0.2)
BASOPHILS NFR BLD AUTO: 0.3 % (ref 0–1.5)
BILIRUB SERPL-MCNC: 0.3 MG/DL (ref 0–1.2)
BUN SERPL-MCNC: 17 MG/DL (ref 8–23)
BUN/CREAT SERPL: 20.7 (ref 7–25)
CALCIUM SPEC-SCNC: 8.9 MG/DL (ref 8.6–10.5)
CHLORIDE SERPL-SCNC: 103 MMOL/L (ref 98–107)
CO2 SERPL-SCNC: 27.6 MMOL/L (ref 22–29)
CREAT SERPL-MCNC: 0.82 MG/DL (ref 0.57–1)
DEPRECATED RDW RBC AUTO: 66.3 FL (ref 37–54)
EGFRCR SERPLBLD CKD-EPI 2021: 81.5 ML/MIN/1.73
EOSINOPHIL # BLD AUTO: 0.08 10*3/MM3 (ref 0–0.4)
EOSINOPHIL NFR BLD AUTO: 1.2 % (ref 0.3–6.2)
ERYTHROCYTE [DISTWIDTH] IN BLOOD BY AUTOMATED COUNT: 19.9 % (ref 12.3–15.4)
GLOBULIN UR ELPH-MCNC: 3.5 GM/DL
GLUCOSE SERPL-MCNC: 136 MG/DL (ref 65–99)
HCT VFR BLD AUTO: 27.4 % (ref 34–46.6)
HGB BLD-MCNC: 8.1 G/DL (ref 12–15.9)
IMM GRANULOCYTES # BLD AUTO: 0.02 10*3/MM3 (ref 0–0.05)
IMM GRANULOCYTES NFR BLD AUTO: 0.3 % (ref 0–0.5)
LYMPHOCYTES # BLD AUTO: 1.31 10*3/MM3 (ref 0.7–3.1)
LYMPHOCYTES NFR BLD AUTO: 19.8 % (ref 19.6–45.3)
MAGNESIUM SERPL-MCNC: 2.1 MG/DL (ref 1.6–2.4)
MCH RBC QN AUTO: 26.6 PG (ref 26.6–33)
MCHC RBC AUTO-ENTMCNC: 29.6 G/DL (ref 31.5–35.7)
MCV RBC AUTO: 90.1 FL (ref 79–97)
MONOCYTES # BLD AUTO: 0.44 10*3/MM3 (ref 0.1–0.9)
MONOCYTES NFR BLD AUTO: 6.7 % (ref 5–12)
NEUTROPHILS NFR BLD AUTO: 4.73 10*3/MM3 (ref 1.7–7)
NEUTROPHILS NFR BLD AUTO: 71.7 % (ref 42.7–76)
NRBC BLD AUTO-RTO: 0 /100 WBC (ref 0–0.2)
PHOSPHATE SERPL-MCNC: 3.7 MG/DL (ref 2.5–4.5)
PLATELET # BLD AUTO: 286 10*3/MM3 (ref 140–450)
PMV BLD AUTO: 10.4 FL (ref 6–12)
POTASSIUM SERPL-SCNC: 4.4 MMOL/L (ref 3.5–5.2)
PROT SERPL-MCNC: 7 G/DL (ref 6–8.5)
RBC # BLD AUTO: 3.04 10*6/MM3 (ref 3.77–5.28)
SODIUM SERPL-SCNC: 138 MMOL/L (ref 136–145)
WBC NRBC COR # BLD: 6.6 10*3/MM3 (ref 3.4–10.8)

## 2022-11-08 PROCEDURE — 99231 SBSQ HOSP IP/OBS SF/LOW 25: CPT | Performed by: INTERNAL MEDICINE

## 2022-11-08 PROCEDURE — 99239 HOSP IP/OBS DSCHRG MGMT >30: CPT | Performed by: STUDENT IN AN ORGANIZED HEALTH CARE EDUCATION/TRAINING PROGRAM

## 2022-11-08 PROCEDURE — 80053 COMPREHEN METABOLIC PANEL: CPT | Performed by: INTERNAL MEDICINE

## 2022-11-08 PROCEDURE — 84100 ASSAY OF PHOSPHORUS: CPT | Performed by: INTERNAL MEDICINE

## 2022-11-08 PROCEDURE — 85025 COMPLETE CBC W/AUTO DIFF WBC: CPT | Performed by: INTERNAL MEDICINE

## 2022-11-08 PROCEDURE — 83735 ASSAY OF MAGNESIUM: CPT | Performed by: INTERNAL MEDICINE

## 2022-11-08 PROCEDURE — 94799 UNLISTED PULMONARY SVC/PX: CPT

## 2022-11-08 RX ORDER — FERROUS SULFATE 325(65) MG
325 TABLET ORAL
Qty: 30 TABLET | Refills: 0 | Status: SHIPPED | OUTPATIENT
Start: 2022-11-09 | End: 2022-12-09

## 2022-11-08 RX ADMIN — Medication 10 ML: at 09:01

## 2022-11-08 RX ADMIN — METOPROLOL TARTRATE 25 MG: 25 TABLET, FILM COATED ORAL at 08:49

## 2022-11-08 RX ADMIN — MAGNESIUM OXIDE TAB 400 MG (241.3 MG ELEMENTAL MG) 400 MG: 400 (241.3 MG) TAB at 08:49

## 2022-11-08 RX ADMIN — LEVOTHYROXINE, LIOTHYRONINE 60 MG: 38; 9 TABLET ORAL at 08:49

## 2022-11-08 RX ADMIN — FERROUS SULFATE TAB 325 MG (65 MG ELEMENTAL FE) 325 MG: 325 (65 FE) TAB at 08:49

## 2022-11-08 RX ADMIN — BUDESONIDE AND FORMOTEROL FUMARATE DIHYDRATE 2 PUFF: 160; 4.5 AEROSOL RESPIRATORY (INHALATION) at 06:18

## 2022-11-08 RX ADMIN — PANTOPRAZOLE SODIUM 40 MG: 40 INJECTION, POWDER, FOR SOLUTION INTRAVENOUS at 09:01

## 2022-11-08 NOTE — PLAN OF CARE
Goal Outcome Evaluation:  POC reviewed with: Patient  Status: Progressing    Patient reported one loose bloody stool this shift. Pain reported x 1 to bilateral knees, relieved with ordered pain medication. No nausea. Up in room ad halle, independenly. Remains on telemetry, Afib, with episodes of tachycardia with activity. Continue to monitor Hgb/Hct. VSS.

## 2022-11-08 NOTE — PROGRESS NOTES
[facility]  Inpatient Progress Note    Reason for Follow Up:  anemia    Subjective     Interval History:   Pt reports no bowel movements over night.    Current Facility-Administered Medications:   •  !NOT ORDERED- rivaroxaban (Xarelto), , Does not apply, Daily With Dinner, Sara Jarrett MD  •  acetaminophen (TYLENOL) tablet 650 mg, 650 mg, Oral, Q4H PRN, 650 mg at 10/29/22 0336 **OR** acetaminophen (TYLENOL) 160 MG/5ML solution 650 mg, 650 mg, Oral, Q4H PRN **OR** acetaminophen (TYLENOL) suppository 650 mg, 650 mg, Rectal, Q4H PRN, Sara Jarrett MD  •  albuterol sulfate HFA (PROVENTIL HFA;VENTOLIN HFA;PROAIR HFA) inhaler 2 puff, 2 puff, Inhalation, Q4H PRN, Sara Jarrett MD  •  budesonide-formoterol (SYMBICORT) 160-4.5 MCG/ACT inhaler 2 puff, 2 puff, Inhalation, BID - RT, Sara Jarrett MD, 2 puff at 11/08/22 0618  •  cyclobenzaprine (FLEXERIL) tablet 10 mg, 10 mg, Oral, TID PRN, Sara Jarrett MD, 10 mg at 11/07/22 2253  •  ferrous sulfate tablet 325 mg, 325 mg, Oral, Daily With Breakfast, Sara Jarrett MD, 325 mg at 11/08/22 0849  •  HYDROcodone-acetaminophen (NORCO) 7.5-325 MG per tablet 1 tablet, 1 tablet, Oral, Q6H PRN, Sara Jarrett MD, 1 tablet at 11/07/22 2253  •  influenza vac split quad (FLUZONE,FLUARIX,AFLURIA,FLULAVAL) injection 0.5 mL, 0.5 mL, Intramuscular, During Hospitalization, Sara Jarrett MD  •  magnesium oxide (MAG-OX) tablet 400 mg, 400 mg, Oral, Daily, Sara Jarrett MD, 400 mg at 11/08/22 0849  •  metoprolol tartrate (LOPRESSOR) tablet 12.5 mg, 12.5 mg, Oral, Q8H PRN, Sara Jarrett MD, 12.5 mg at 11/01/22 0722  •  metoprolol tartrate (LOPRESSOR) tablet 25 mg, 25 mg, Oral, Q12H, Jake James DO, 25 mg at 11/08/22 0849  •  nitroglycerin (NITROSTAT) SL tablet 0.4 mg, 0.4 mg, Sublingual, Q5 Min PRN, Sara Jarrett MD  •  pantoprazole (PROTONIX) injection 40 mg, 40  mg, Intravenous, Q12H, Sara Jarrett MD, 40 mg at 11/08/22 0901  •  sodium chloride 0.9 % flush 10 mL, 10 mL, Intravenous, PRN, Sara Jarrett MD, 10 mL at 11/08/22 0901  •  sodium chloride 0.9 % flush 10 mL, 10 mL, Intravenous, PRN, Sara Jarrett MD, 10 mL at 10/29/22 1203  •  sodium chloride 0.9 % flush 10 mL, 10 mL, Intravenous, Q12H, Sara Jarrett MD, 10 mL at 11/08/22 0901  •  sodium chloride 0.9 % infusion 40 mL, 40 mL, Intravenous, PRN, Sara Jarrett MD  •  Thyroid (ARMOUR) tablet 60 mg, 60 mg, Oral, Daily, Sara Jarrett MD, 60 mg at 11/08/22 0849  Review of Systems:    The following systems were reviewed and negative;  constitution, respiratory, and cardiovascular    Objective     Vital Signs  Temp:  [97.3 °F (36.3 °C)-98.4 °F (36.9 °C)] 97.3 °F (36.3 °C)  Heart Rate:  [] 97  Resp:  [16] 16  BP: (111-134)/(50-73) 128/61  Body mass index is 41.15 kg/m².    Intake/Output Summary (Last 24 hours) at 11/8/2022 1122  Last data filed at 11/7/2022 1307  Gross per 24 hour   Intake 360 ml   Output --   Net 360 ml     No intake/output data recorded.     Physical Exam:   General: awake, alert and in no acute distress   Eyes: eyes move symmetrical in all directions, no scleral icterus   Neck: supple, trachea is midline   Skin: warm and dry, not jaundiced   Cardiovascular: no chest tenderness   Pulm: breathing unlabored   Abdomen: soft, nontender, nondistended   Rectal: deferred   Extremities: no rash or edema   Psychiatric: mental status within normal limits, alert and oriented      Results Review:     I reviewed the patient's new clinical results.    Results from last 7 days   Lab Units 11/08/22  0615 11/07/22  0359 11/06/22  0520   WBC 10*3/mm3 6.60 6.46 6.02   HEMOGLOBIN g/dL 8.1* 7.7* 7.9*   HEMATOCRIT % 27.4* 26.1* 26.3*   PLATELETS 10*3/mm3 286 256 304     Results from last 7 days   Lab Units 11/08/22  0615 11/07/22  0359 11/06/22  0520   SODIUM  mmol/L 138 138 138   POTASSIUM mmol/L 4.4 4.4 4.6   CHLORIDE mmol/L 103 105 104   CO2 mmol/L 27.6 25.3 29.1*   BUN mg/dL 17 17 17   CREATININE mg/dL 0.82 0.98 0.85   CALCIUM mg/dL 8.9 8.3* 8.6   BILIRUBIN mg/dL 0.3 0.2 0.3   ALK PHOS U/L 131* 122* 118*   ALT (SGPT) U/L 8 7 7   AST (SGOT) U/L 10 10 10   GLUCOSE mg/dL 136* 112* 135*         No results found for: LIPASE    Radiology:  [unfilled]      Assessment & Plan   Assessment:     Anemia    Plan:     - Hgb improved  - pt agreeable to second opinion as outpatient with [facility]; my office to arrange.  - my office to arrange repeat CBC in 1 week as outpatient    I discussed the patients findings and my recommendations with patient.         Sara Jarrett M.D.  [place] [address]  [place]  [address]  Office: [phone]

## 2022-11-08 NOTE — OUTREACH NOTE
Prep Survey    Flowsheet Row Responses   [facility] patient discharged from? [facility]   Is LACE score < 7 ? No   Emergency Room discharge w/ pulse ox? No   Eligibility [facility]   Date of Admission 10/28/22   Date of Discharge 11/08/22   Discharge Disposition Home or Self Care   Discharge diagnosis Gastrointestinal hemorrhage, unspecified gastrointestinal hemorrhage   Does the patient have one of the following disease processes/diagnoses(primary or secondary)? Other   Does the patient have Home health ordered? No  [Otpt therapy for PT]   Is there a DME ordered? No   Comments regarding appointments new PCP appt   Medication alerts for this patient Fe, Lopressor   Prep survey completed? Yes          TRACY ARCINIEGA - Registered Nurse

## 2022-11-08 NOTE — DISCHARGE SUMMARY
[facility]         HOSPITALIST  DISCHARGE SUMMARY    Patient Name: Milagros Ramirez  : 1960  MRN: [id number]    Date of Admission: 10/28/2022  Date of Discharge: 2022  Primary Care Physician: Beverley De Luna APRN    Consults     Date and Time Order Name Status Description    10/31/2022 12:26 PM Inpatient Orthopedic Surgery Consult Completed     10/29/2022 12:09 AM Inpatient Gastroenterology Consult Completed     10/28/2022 10:05 PM Gastroenterology (on-call MD unless specified)      10/28/2022 10:05 PM Hospitalist (on-call MD unless specified)      10/11/2022  5:34 PM Inpatient Gastroenterology Consult Completed     10/11/2022  5:12 PM Gastroenterology (on-call MD unless specified) Completed           Active and Resolved Hospital Problems:  Active Hospital Problems    Diagnosis POA   • **GI bleed [K92.2] Yes   • Gastrointestinal hemorrhage, unspecified gastrointestinal hemorrhage type [K92.2] Unknown      Resolved Hospital Problems   No resolved problems to display.       Hospital Course     Hospital Course:  Milagros Ramirez is a 62 y.o. female who originally presented to the ER after her PCP found her to have a hemoglobin of 5.8.  Prior to that patient had hemoglobin of 3.8 and received 5 units of blood.  Patient had complained of having blood in her stools.  Patient had had EGD and colonoscopy prior to initial presentation and at that time she had a lesion in her lower intestines that required clipping.  On this admission patient received a total of 4 units PRBCs.  Gastrology was consulted and patient had endoscopy on 22.  Patient had a push enteroscopy, colonoscopy, and capsule endoscopy that were all subsequently negative.  Gastroenterology discussed with patient it was recommended she follow-up with [facility] for possible double-balloon enteroscopy and second opinion.  Patient ultimately agreeable to this and will be discharged to continue her therapy plan with follow-up in  [place].  Patient hemoglobin checked prior to arrival and was stable at the time of discharge.  She denies any chest pains, chest pressure, palpitations, fever, or chills.  Patient will continue her other home medications as before.  She will hold her Xarelto until follow-up with gastroenterology due to risk of continued bleeding.  She will continue her other home medications as before.  She will follow-up with her PCP in 1 week.    Discharge problem list:  Acute GI bleed  Severe blood loss anemia requiring transfusion  Status post colonoscopy with clipping on 10/13/2022  History of iron deficiency anemia requiring IV iron earlier this month  A. fib, with RVR, on Xarelto as outpatient  Morbid obesity  Hypokalemia  Possible congestive hepatopathy versus venoocclusive disease  Diastolic CHF with borderline reduced EF 46% per 10/12/2022 echo  Moderate MV regurgitation and tricuspid valve regurgitation  Mild to moderate cardiomegaly and small pericardial effusion  Anasarca  Hypothyroidism and history of partial thyroidectomy  History of hypertension  AMOR  SLE and rheumatoid arthritis history on hydroxychloroquine      DISCHARGE Follow Up Recommendations for labs and diagnostics: PCP 1 week.      Day of Discharge     Vital Signs:  Temp:  [97.3 °F (36.3 °C)-99.1 °F (37.3 °C)] 97.3 °F (36.3 °C)  Heart Rate:  [] 97  Resp:  [16] 16  BP: (111-150)/(50-87) 128/61  Physical Exam:   Constitutional: Alert, awake, no acute distress  HEENT:  PERRLA, EOMI; No Scleral icterus  Neck:  Supple; No Mass, No Lymphadenopathy  Cardiovascular:  No Rubs, No Edema, No JVD  Respiratory: No respiratory distress, unlabored breathing, saturating well on room air  Abdomen:  Normal BS all 4 Quadrants; No Guarding, No Tenderness  Musculoskeletal:  Pulses Positive all 4 Ext; No Cyanosis, No Edema  Neurological:  Alert+Ox3, Mental status WNL; No Dysarthria  Skin:  No Rash, No Cellulitis, No Erythema      Discharge Details        Discharge  Medications      New Medications      Instructions Start Date   ferrous sulfate 325 (65 FE) MG tablet   325 mg, Oral, Daily With Breakfast   Start Date: November 9, 2022     metoprolol tartrate 25 MG tablet  Commonly known as: LOPRESSOR   25 mg, Oral, Every 12 Hours Scheduled         Continue These Medications      Instructions Start Date   albuterol sulfate  (90 Base) MCG/ACT inhaler  Commonly known as: PROVENTIL HFA;VENTOLIN HFA;PROAIR HFA   2 puffs, Inhalation, Every 4 Hours PRN      budesonide-formoterol 160-4.5 MCG/ACT inhaler  Commonly known as: SYMBICORT   2 puffs, Inhalation, 2 Times Daily - RT      cyclobenzaprine 10 MG tablet  Commonly known as: FLEXERIL   10 mg, Oral, As Needed      GERITOL PO   Oral, Daily      GREEN TEA PO   500 mg, Oral, Daily      HYDROcodone-acetaminophen 7.5-325 MG per tablet  Commonly known as: NORCO   Take 1 tablet by mouth Every 12 (Twelve) Hours.      hydroxychloroquine 200 MG tablet  Commonly known as: PLAQUENIL   200 mg, Oral, Every Other Day      MAGNESIUM PO   Oral, Daily      montelukast 10 MG tablet  Commonly known as: SINGULAIR   10 mg, Oral, Nightly      nitroglycerin 0.4 MG SL tablet  Commonly known as: NITROSTAT   TAKE 1 TABLET SUBLINGUALLY EVERY 5 MINS AS NEEDED FOR CHEST PAIN. AFTER THIRD TAB, [id number]      rosuvastatin 10 MG tablet  Commonly known as: CRESTOR   10 mg, Oral, Daily      Thyroid 60 MG tablet  Commonly known as: ARMOUR   60 mg, Oral, Daily      torsemide 20 MG tablet  Commonly known as: DEMADEX   TAKE 1 TABLET BY MOUTH EVERY DAY      TURMERIC CURCUMIN PO   Oral, Daily         Stop These Medications    carvedilol 25 MG tablet  Commonly known as: COREG     Xarelto 20 MG tablet  Generic drug: rivaroxaban            Allergies   Allergen Reactions   • Other Other (See Comments)     RHEUMATOID ARTHRITIS INFUSION REACTION   • Other Irritability     INFUSION FOR RHEUMATOID ARTHRITIS   • Rituximab Unknown - High Severity       Discharge Disposition:  Home  or Self Care    Diet:  Hospital:  Diet Order   Procedures   • Diet Regular; Cardiac       Discharge Activity:       CODE STATUS:  Code Status and Medical Interventions:   Ordered at: 10/28/22 2221     Code Status (Patient has no pulse and is not breathing):    CPR (Attempt to Resuscitate)     Medical Interventions (Patient has pulse or is breathing):    Full Support         Future Appointments   Date Time Provider Department Center   11/11/2022 10:30 AM Nelly Romero MD [facility]   11/29/2022  2:00 PM Quang Quick III, MD [facility]       Additional Instructions for the Follow-ups that You Need to Schedule     Discharge Follow-up with PCP   As directed       Currently Documented PCP:    Beverley De Luna APRN    PCP Phone Number:    [phone]     Follow Up Details: 1 wk         Discharge Follow-up with Specified Provider: Luiza OVERTON, 1-2 wks   As directed      To: Luiza OVERTON, 1-2 wks         CBC (No Diff)    Nov 10, 2022 (Approximate)      Release to patient: Routine Release               Pertinent  and/or Most Recent Results     PROCEDURES:   Adult Transthoracic Echo Complete W/ Cont if Necessary Per Protocol    Result Date: 10/12/2022  Narrative: •  Calculated left ventricular EF = 46% Estimated left ventricular EF was in agreement with the calculated left ventricular EF. •  Left ventricular wall thickness is consistent with concentric hypertrophy. •  Left ventricular diastolic function was not assessed. •  Mild aortic valve stenosis is present. •  Moderate mitral valve regurgitation is present. •  Moderate tricuspid valve regurgitation is present. •  Estimated right ventricular systolic pressure from tricuspid regurgitation is markedly elevated (>55 mmHg).     XR Shoulder 2+ View Left    Result Date: 10/30/2022  Narrative: PROCEDURE: XR SHOULDER 2+ VW LEFT  COMPARISON: None  INDICATIONS: lupus, bilateral shoulder pain, limited range of motion  FINDINGS:  Limited rotation.  No  fracture or acute bony abnormality.  Glenohumeral joint and humeral head have a normal appearance.  Mild irregularity of the greater tuberosity.  AC joint within normal limits      Impression:  No acute abnormality.  Appearance of the greater tuberosity may indicate chronic shoulder impingement      ANITA GÓMEZ MD       Electronically Signed and Approved By: ANITA GÓMEZ MD on 10/30/2022 at 15:06             XR Shoulder 2+ View Right    Result Date: 10/30/2022  Narrative: PROCEDURE: XR SHOULDER 2+ VW RIGHT  COMPARISON: None  INDICATIONS: lupus, bilateral shoulder pain and inability to elevate arm greater than ~35 degrees, strain injury at home  FINDINGS:  No fracture or acute bony abnormality.  Glenohumeral joint and humeral head have a normal appearance.  AC joint and subacromial space within normal limits      Impression:  Negative      ANITA GÓMEZ MD       Electronically Signed and Approved By: ANITA GÓMEZ MD on 10/30/2022 at 15:06             CT Abdomen Pelvis With Contrast    Result Date: 10/28/2022  Narrative: PROCEDURE: CT ABDOMEN PELVIS W CONTRAST  COMPARISON: None.  INDICATIONS: 62-YEAR-OLD FEMALE W/ MID ABDOMEN PAIN TODAY.  TECHNIQUE: After obtaining the patient's consent, 683 CT images were created with non-ionic intravenous contrast material.  No oral contrast agent was administered for the study.  Overall, the contrast bolus is very limited.  PROTOCOL:   Standard CT imaging protocol performed.    RADIATION:   Total DLP: 1,736 mGy*cm.   Automated exposure control was utilized to minimize radiation dose. CONTRAST: 100 mL Isovue 370 I.V.  FINDINGS: There is diffuse hepatic steatosis with hepatomegaly.  The maximum craniocaudal dimension of the liver is about 24 cm.  There is diffuse heterogeneity of the hepatic enhancement pattern, which is nonspecific.  It may be related to a perfusion abnormality of the liver.  For instance, hepatic venous congestion may be present such as with hepatic  veno-occlusive disease, Budd-Chiari syndrome, and/or congestive hepatopathy (as from congestive heart failure or constrictive pericarditis or pulmonary hypertension).  There is mild-to-moderate cardiomegaly with a small to moderate sized pericardial effusion.  The pericardial effusion measures about 1.8 cm in thickness with a CT number of 4 Hounsfield units or less.  There is mosaic attenuation within the imaged lung bases, which is nonspecific, as well, and may represent pulmonary edema or small airways disease or even small vessel disease.  No focal lobar infiltrate is seen.  There is chronic calcified granulomatous disease of the chest and abdomen.  Coronary artery calcifications are present.  No pleural effusion is suggested.  No splenomegaly is suspected.  The maximum craniocaudal dimension of the spleen is 11.4 cm.  The maximum transverse diameter of the heart is approximately 17.6 cm.  There are probably benign left-sided renal cysts, which measure about 2 cm or less in size.  No hydronephrosis or obstructive uropathy due to a ureteral calculus by enhanced CT.  Probably no nonobstructing nephrolithiasis is seen.  Renal arterial calcifications are suspected.  There is probable renal cortical scarring bilaterally.  There may be a tiny fat-containing umbilical hernia.  Anasarca is possible.  Pelvic phleboliths are seen.  Probably no suspicious uterine or adnexal mass.  No urinary bladder calculi are seen.  The appendix is thought to be identified and is without acute abnormality.  There are colonic diverticula without acute diverticulitis.  No mechanical bowel obstruction.  No pneumoperitoneum or pneumatosis.  No adrenal mass.  No gallstones.  The gallbladder appears contracted.  No definite acute cholecystitis or pancreatitis.  There is slight motion artifact on the exam.  Degenerative changes are seen throughout the imaged spine.  There may be diffuse idiopathic skeletal hyperostosis (DISH).  Degenerative  changes involve the hip joints and the bilateral sacroiliac joints.  No acute fracture.  No aggressive osseous lesion is suggested.       Impression:  1. There is a nonspecific abnormal appearance of the liver, which may be related to hepatic veno-occlusive disease or congestive hepatopathy, as described above.  Budd-Chiari syndrome is thought to be less likely.  2. Mild-to-moderate cardiomegaly is seen.  There is a small pericardial effusion.   3. Anasarca is present.   4. There are coronary artery calcifications.   5. Otherwise, no definite acute findings are seen.   6. Please see above comments for further detail.      Please note that portions of this note were completed with a voice recognition program.  BOBBI CANALES JR, MD       Electronically Signed and Approved By: BOBBI CANALES JR, MD on 10/28/2022 at 21:00              XR Chest 1 View    Result Date: 10/11/2022  Narrative: PROCEDURE: XR CHEST 1 VW  COMPARISON: [facility], , CHEST AP/PA 1 VIEW, 6/02/2019, 16:49.  INDICATIONS: SOA Triage Protocol  FINDINGS:  No new consolidations or pleural effusions are observed. The cardiac silhouette and mediastinum are unchanged.  Stable cardiomegaly is noted.  No definitive acute osseous abnormalities are seen on this single view.      Impression:   1. No change from the previous study with no evidence for acute cardiopulmonary process.         EDDIE SANTIAGO MD       Electronically Signed and Approved By: EDDIE SANTIAGO MD on 10/11/2022 at 16:41                 LAB RESULTS:      Lab 11/08/22  0615 11/07/22  0359 11/06/22  0520 11/05/22  0431 11/04/22  1934 11/04/22  0432   WBC 6.60 6.46 6.02 5.81  --  5.91   HEMOGLOBIN 8.1* 7.7* 7.9* 7.8* 8.6* 7.0*   HEMATOCRIT 27.4* 26.1* 26.3* 26.3* 28.6* 24.3*   PLATELETS 286 256 304 215  --  315   NEUTROS ABS 4.73 4.57 4.20 4.33  --  4.61   IMMATURE GRANS (ABS) 0.02 0.02 0.02 0.03  --  0.01   LYMPHS ABS 1.31 1.21 1.27 1.06  --  0.90   MONOS ABS 0.44 0.58 0.48  0.35  --  0.36   EOS ABS 0.08 0.07 0.03 0.02  --  0.01   MCV 90.1 90.6 89.8 89.2  --  89.3         Lab 11/08/22 0615 11/07/22 0359 11/06/22 0520 11/05/22 0431 11/04/22 0432   SODIUM 138 138 138 136 135*   POTASSIUM 4.4 4.4 4.6 4.4 4.4   CHLORIDE 103 105 104 103 102   CO2 27.6 25.3 29.1* 26.5 28.7   ANION GAP 7.4 7.7 4.9* 6.5 4.3*   BUN 17 17 17 16 15   CREATININE 0.82 0.98 0.85 0.87 0.90   EGFR 81.5 65.8 78.1 75.9 72.9   GLUCOSE 136* 112* 135* 130* 143*   CALCIUM 8.9 8.3* 8.6 8.5* 8.6   MAGNESIUM 2.1 2.2 2.1 2.3 2.2   PHOSPHORUS 3.7 3.7 3.7 3.6 3.8         Lab 11/08/22 0615 11/07/22 0359 11/06/22 0520 11/05/22 0431 11/04/22 0432   TOTAL PROTEIN 7.0 6.4 6.7 6.8 6.7   ALBUMIN 3.50 2.90* 3.50 3.10* 3.20*   GLOBULIN 3.5 3.5 3.2 3.7 3.5   ALT (SGPT) 8 7 7 5 5   AST (SGOT) 10 10 10 11 9   BILIRUBIN 0.3 0.2 0.3 0.2 0.2   ALK PHOS 131* 122* 118* 118* 114                 Lab 11/04/22  1144   ABO TYPING O   RH TYPING Positive   ANTIBODY SCREEN Negative         Brief Urine Lab Results     None        Microbiology Results (last 10 days)     ** No results found for the last 240 hours. **          XR Shoulder 2+ View Left    Result Date: 10/30/2022  Impression:  No acute abnormality.  Appearance of the greater tuberosity may indicate chronic shoulder impingement      ANITA GÓMEZ MD       Electronically Signed and Approved By: ANITA GÓMEZ MD on 10/30/2022 at 15:06             XR Shoulder 2+ View Right    Result Date: 10/30/2022  Impression:  Negative      ANITA GÓMEZ MD       Electronically Signed and Approved By: ANITA GÓMEZ MD on 10/30/2022 at 15:06             CT Abdomen Pelvis With Contrast    Result Date: 10/28/2022  Impression:  1. There is a nonspecific abnormal appearance of the liver, which may be related to hepatic veno-occlusive disease or congestive hepatopathy, as described above.  Budd-Chiari syndrome is thought to be less likely.  2. Mild-to-moderate cardiomegaly is seen.  There is a small  pericardial effusion.   3. Anasarca is present.   4. There are coronary artery calcifications.   5. Otherwise, no definite acute findings are seen.   6. Please see above comments for further detail.      Please note that portions of this note were completed with a voice recognition program.  BOBBI CANALES JR, MD       Electronically Signed and Approved By: BOBBI CANALES JR, MD on 10/28/2022 at 21:00              XR Chest 1 View    Result Date: 10/11/2022  Impression:   1. No change from the previous study with no evidence for acute cardiopulmonary process.         EDDIE SANTIAGO MD       Electronically Signed and Approved By: EDDIE SANTIAGO MD on 10/11/2022 at 16:41                       Results for orders placed during the hospital encounter of 10/11/22    Adult Transthoracic Echo Complete W/ Cont if Necessary Per Protocol    Interpretation Summary  •  Calculated left ventricular EF = 46% Estimated left ventricular EF was in agreement with the calculated left ventricular EF.  •  Left ventricular wall thickness is consistent with concentric hypertrophy.  •  Left ventricular diastolic function was not assessed.  •  Mild aortic valve stenosis is present.  •  Moderate mitral valve regurgitation is present.  •  Moderate tricuspid valve regurgitation is present.  •  Estimated right ventricular systolic pressure from tricuspid regurgitation is markedly elevated (>55 mmHg).      Labs Pending at Discharge:        Time spent on Discharge including face to face service:  32 minutes    Electronically signed by Jake James DO, 11/08/22, 10:28 AM EST.

## 2022-11-09 ENCOUNTER — TRANSITIONAL CARE MANAGEMENT TELEPHONE ENCOUNTER (OUTPATIENT)
Dept: CALL CENTER | Facility: HOSPITAL | Age: 62
End: 2022-11-09

## 2022-11-09 NOTE — OUTREACH NOTE
Call Center TCM Note    Flowsheet Row Responses   [facility] [place] patient discharged from? [facility]   Does the patient have one of the following disease processes/diagnoses(primary or secondary)? Other   TCM attempt successful? Yes   Call start time 1229   Call end time 1231   Discharge diagnosis Gastrointestinal hemorrhage, unspecified gastrointestinal hemorrhage   Meds reviewed with patient/caregiver? Yes   Is the patient having any side effects they believe may be caused by any medication additions or changes? No   Does the patient have all medications ordered at discharge? Yes   Is the patient taking all medications as directed (includes completed medication regime)? Yes   Does the patient have an appointment with their PCP within 7 days of discharge? Yes   Has home health visited the patient within 72 hours of discharge? N/A   Psychosocial issues? No   Did the patient receive a copy of their discharge instructions? Yes   Nursing interventions Reviewed instructions with patient   What is the patient's perception of their health status since discharge? Improving   Is the patient/caregiver able to teach back signs and symptoms related to disease process for when to call PCP? Yes   Is the patient/caregiver able to teach back signs and symptoms related to disease process for when to call 911? Yes   Is the patient/caregiver able to teach back the hierarchy of who to call/visit for symptoms/problems? PCP, Specialist, Home health nurse, Urgent Care, ED, 911 Yes   If the patient is a current smoker, are they able to teach back resources for cessation? Not a smoker   TCM call completed? Yes   Call end time 1231   Would this patient benefit from a Referral to Amb Social Work? No   Is the patient interested in additional calls from an ambulatory ?  NOTE:  applies to high risk patients requiring additional follow-up. No          Vy Grady LPN    11/9/2022, 12:36 EST

## 2022-11-09 NOTE — OUTREACH NOTE
Call Center TCM Note    Flowsheet Row Responses   [facility] facility patient discharged from? [facility]   Does the patient have one of the following disease processes/diagnoses(primary or secondary)? Other   TCM attempt successful? No   Unsuccessful attempts Attempt 1          Vy Grady LPN    11/9/2022, 12:01 EST

## 2022-11-11 ENCOUNTER — OFFICE VISIT (OUTPATIENT)
Dept: INTERNAL MEDICINE | Facility: CLINIC | Age: 62
End: 2022-11-11

## 2022-11-11 VITALS
HEIGHT: 70 IN | HEART RATE: 89 BPM | OXYGEN SATURATION: 98 % | WEIGHT: 272.6 LBS | SYSTOLIC BLOOD PRESSURE: 110 MMHG | BODY MASS INDEX: 39.03 KG/M2 | DIASTOLIC BLOOD PRESSURE: 62 MMHG | TEMPERATURE: 98.2 F

## 2022-11-11 DIAGNOSIS — I50.32 CHRONIC DIASTOLIC CONGESTIVE HEART FAILURE: ICD-10-CM

## 2022-11-11 DIAGNOSIS — G47.33 OSA ON CPAP: ICD-10-CM

## 2022-11-11 DIAGNOSIS — N18.31 STAGE 3A CHRONIC KIDNEY DISEASE: ICD-10-CM

## 2022-11-11 DIAGNOSIS — E11.9 TYPE 2 DIABETES MELLITUS WITHOUT COMPLICATION, UNSPECIFIED WHETHER LONG TERM INSULIN USE: Primary | ICD-10-CM

## 2022-11-11 DIAGNOSIS — E03.9 HYPOTHYROIDISM, UNSPECIFIED TYPE: ICD-10-CM

## 2022-11-11 DIAGNOSIS — I10 ESSENTIAL (PRIMARY) HYPERTENSION: ICD-10-CM

## 2022-11-11 DIAGNOSIS — M17.0 OSTEOARTHRITIS OF BOTH KNEES, UNSPECIFIED OSTEOARTHRITIS TYPE: ICD-10-CM

## 2022-11-11 DIAGNOSIS — Z99.89 OSA ON CPAP: ICD-10-CM

## 2022-11-11 PROCEDURE — 99214 OFFICE O/P EST MOD 30 MIN: CPT | Performed by: INTERNAL MEDICINE

## 2022-11-11 RX ORDER — CYCLOBENZAPRINE HCL 10 MG
10 TABLET ORAL AS NEEDED
Qty: 90 TABLET | Refills: 1 | Status: SHIPPED | OUTPATIENT
Start: 2022-11-11 | End: 2023-03-20

## 2022-11-11 RX ORDER — MULTIVIT WITH MINERALS/LUTEIN
1000 TABLET ORAL DAILY
COMMUNITY

## 2022-11-11 RX ORDER — LEVOTHYROXINE AND LIOTHYRONINE 38; 9 UG/1; UG/1
60 TABLET ORAL DAILY
Qty: 90 TABLET | Refills: 1 | Status: SHIPPED | OUTPATIENT
Start: 2022-11-11

## 2022-11-11 NOTE — PROGRESS NOTES
"Chief Complaint  GI Problem, Congestive Heart Failure, Arthritis, Sleep Apnea, and Knee Pain (Bilateral)    Subjective       Milagros Ramirez presents to [facility]    HPI   Was previously seeing MARILYNN Moon    Has been admitted and seeign Dr. Jarrett for GI bleeding. Has had muliptle studies but source of bleeding has not been identified. Being referred to [facility] for GI bleeding.   Going Monday to have Hgb rechecked.    Seeing cardiology for CHF, switching to Dr. Quick at [facility] since previous cardiologist is retiring.     Hypothyroidism: complains of vaginal dryness since swithing to [place] thyroid from NP thyroid.     Objective     Vitals:    11/11/22 1138   BP: 110/62   BP Location: Left arm   Patient Position: Sitting   Cuff Size: Large Adult   Pulse: 89   Temp: 98.2 °F (36.8 °C)   TempSrc: Temporal   SpO2: 98%   Weight: 124 kg (272 lb 9.6 oz)   Height: 177.8 cm (70\")      Wt Readings from Last 3 Encounters:   11/11/22 124 kg (272 lb 9.6 oz)   10/29/22 130 kg (286 lb 13.1 oz)   10/11/22 134 kg (295 lb 13.7 oz)      BP Readings from Last 3 Encounters:   11/11/22 110/62   11/08/22 139/77   10/16/22 116/53        Body mass index is 39.11 kg/m².    Class 2 Severe Obesity (BMI >=35 and <=39.9). Obesity-related health conditions include the following: obstructive sleep apnea, hypertension and diabetes mellitus. Obesity is unchanged. BMI is is above average; BMI management plan is completed. We discussed portion control and increasing exercise.       Physical Exam  Vitals reviewed.   Constitutional:       Appearance: Normal appearance. She is well-developed.   HENT:      Head: Normocephalic and atraumatic.      Mouth/Throat:      Pharynx: No oropharyngeal exudate.   Eyes:      Conjunctiva/sclera: Conjunctivae normal.      Pupils: Pupils are equal, round, and reactive to light.   Cardiovascular:      Rate and Rhythm: Normal rate and regular rhythm.      Heart " sounds: No murmur heard.    No friction rub. No gallop.   Pulmonary:      Effort: Pulmonary effort is normal.      Breath sounds: Normal breath sounds. No wheezing or rhonchi.   Skin:     General: Skin is warm and dry.   Neurological:      Mental Status: She is alert and oriented to person, place, and time.   Psychiatric:         Mood and Affect: Affect normal.          Result Review :   The following data was reviewed by: Nelly Romero MD on 11/11/2022:  CMP    CMP 11/6/22 11/7/22 11/8/22   Glucose 135 (A) 112 (A) 136 (A)   BUN 17 17 17   Creatinine 0.85 0.98 0.82   Sodium 138 138 138   Potassium 4.6 4.4 4.4   Chloride 104 105 103   Calcium 8.6 8.3 (A) 8.9   Albumin 3.50 2.90 (A) 3.50   Total Bilirubin 0.3 0.2 0.3   Alkaline Phosphatase 118 (A) 122 (A) 131 (A)   AST (SGOT) 10 10 10   ALT (SGPT) 7 7 8   (A) Abnormal value            CBC    CBC 11/6/22 11/7/22 11/8/22   WBC 6.02 6.46 6.60   RBC 2.93 (A) 2.88 (A) 3.04 (A)   Hemoglobin 7.9 (A) 7.7 (A) 8.1 (A)   Hematocrit 26.3 (A) 26.1 (A) 27.4 (A)   MCV 89.8 90.6 90.1   MCH 27.0 26.7 26.6   MCHC 30.0 (A) 29.5 (A) 29.6 (A)   RDW 19.7 (A) 19.8 (A) 19.9 (A)   Platelets 304 256 286   (A) Abnormal value                Procedures    Assessment and Plan   Diagnoses and all orders for this visit:    1. Type 2 diabetes mellitus without complication, unspecified whether long term insulin use (HCC) (Primary)  Assessment & Plan:  Follow up labs ordered    Orders:  -     Hemoglobin A1c; Future  -     Lipid Panel; Future    2. Stage 3a chronic kidney disease (HCC)  Assessment & Plan:  Seeing nephro as needed      3. Chronic diastolic congestive heart failure (HCC)  Assessment & Plan:  Followed by cardiology      4. Essential (primary) hypertension  Assessment & Plan:  Well controlled in clinic today  Continue current management      5. Osteoarthritis of both knees, unspecified osteoarthritis type    6. AMOR on CPAP    7. Hypothyroidism, unspecified type  Assessment &  Plan:  Checking follow up labs    Orders:  -     TSH; Future    Other orders  -     cyclobenzaprine (FLEXERIL) 10 MG tablet; Take 1 tablet by mouth As Needed for Muscle Spasms.  Dispense: 90 tablet; Refill: 1  -     Thyroid 60 MG PO tablet; Take 1 tablet by mouth Daily.  Dispense: 90 tablet; Refill: 1        Follow Up   Return in about 3 months (around 2/11/2023) for Next scheduled follow up.  Patient was given instructions and counseling regarding her condition or for health maintenance advice. Please see specific information pulled into the AVS if appropriate.

## 2022-11-14 ENCOUNTER — LAB (OUTPATIENT)
Dept: LAB | Facility: HOSPITAL | Age: 62
End: 2022-11-14

## 2022-11-14 DIAGNOSIS — E03.9 HYPOTHYROIDISM, UNSPECIFIED TYPE: ICD-10-CM

## 2022-11-14 DIAGNOSIS — E11.9 TYPE 2 DIABETES MELLITUS WITHOUT COMPLICATION, UNSPECIFIED WHETHER LONG TERM INSULIN USE: ICD-10-CM

## 2022-11-14 DIAGNOSIS — K92.1 GASTROINTESTINAL HEMORRHAGE WITH MELENA: ICD-10-CM

## 2022-11-14 LAB
BASOPHILS # BLD AUTO: 0.03 10*3/MM3 (ref 0–0.2)
BASOPHILS NFR BLD AUTO: 0.5 % (ref 0–1.5)
CHOLEST SERPL-MCNC: 137 MG/DL (ref 0–200)
DEPRECATED RDW RBC AUTO: 54.2 FL (ref 37–54)
EOSINOPHIL # BLD AUTO: 0.09 10*3/MM3 (ref 0–0.4)
EOSINOPHIL NFR BLD AUTO: 1.5 % (ref 0.3–6.2)
ERYTHROCYTE [DISTWIDTH] IN BLOOD BY AUTOMATED COUNT: 17.5 % (ref 12.3–15.4)
HBA1C MFR BLD: 5 % (ref 4.8–5.6)
HCT VFR BLD AUTO: 25.9 % (ref 34–46.6)
HDLC SERPL-MCNC: 73 MG/DL (ref 40–60)
HGB BLD-MCNC: 8 G/DL (ref 12–15.9)
IMM GRANULOCYTES # BLD AUTO: 0.01 10*3/MM3 (ref 0–0.05)
IMM GRANULOCYTES NFR BLD AUTO: 0.2 % (ref 0–0.5)
LDLC SERPL CALC-MCNC: 51 MG/DL (ref 0–100)
LDLC/HDLC SERPL: 0.71 {RATIO}
LYMPHOCYTES # BLD AUTO: 1.29 10*3/MM3 (ref 0.7–3.1)
LYMPHOCYTES NFR BLD AUTO: 21.3 % (ref 19.6–45.3)
MCH RBC QN AUTO: 26.8 PG (ref 26.6–33)
MCHC RBC AUTO-ENTMCNC: 30.9 G/DL (ref 31.5–35.7)
MCV RBC AUTO: 86.6 FL (ref 79–97)
MONOCYTES # BLD AUTO: 0.39 10*3/MM3 (ref 0.1–0.9)
MONOCYTES NFR BLD AUTO: 6.4 % (ref 5–12)
NEUTROPHILS NFR BLD AUTO: 4.24 10*3/MM3 (ref 1.7–7)
NEUTROPHILS NFR BLD AUTO: 70.1 % (ref 42.7–76)
NRBC BLD AUTO-RTO: 0 /100 WBC (ref 0–0.2)
PLATELET # BLD AUTO: 213 10*3/MM3 (ref 140–450)
PMV BLD AUTO: 11 FL (ref 6–12)
RBC # BLD AUTO: 2.99 10*6/MM3 (ref 3.77–5.28)
TRIGL SERPL-MCNC: 60 MG/DL (ref 0–150)
TSH SERPL DL<=0.05 MIU/L-ACNC: 0.8 UIU/ML (ref 0.27–4.2)
VLDLC SERPL-MCNC: 13 MG/DL (ref 5–40)
WBC NRBC COR # BLD: 6.05 10*3/MM3 (ref 3.4–10.8)

## 2022-11-14 PROCEDURE — 83036 HEMOGLOBIN GLYCOSYLATED A1C: CPT

## 2022-11-14 PROCEDURE — 80061 LIPID PANEL: CPT

## 2022-11-14 PROCEDURE — 36415 COLL VENOUS BLD VENIPUNCTURE: CPT

## 2022-11-14 PROCEDURE — 84443 ASSAY THYROID STIM HORMONE: CPT

## 2022-11-14 PROCEDURE — 85025 COMPLETE CBC W/AUTO DIFF WBC: CPT | Performed by: INTERNAL MEDICINE

## 2022-11-15 ENCOUNTER — TELEPHONE (OUTPATIENT)
Dept: GASTROENTEROLOGY | Facility: CLINIC | Age: 62
End: 2022-11-15

## 2022-11-15 DIAGNOSIS — K62.5 RECTAL BLEEDING: Primary | ICD-10-CM

## 2022-11-15 NOTE — TELEPHONE ENCOUNTER
Sara Jarrett MD  to Me    LB    11:18 AM  Hgb stable.  Lab order reviewed.  Recommend repeat CBC in 2 weeks.  F/u with NP.  thanks    Pt aware of results and recommendations.   Follow up arranged and pt added to cx list.   CBC order placed for second sign.

## 2022-11-22 ENCOUNTER — TELEPHONE (OUTPATIENT)
Dept: PHYSICAL THERAPY | Facility: OTHER | Age: 62
End: 2022-11-22

## 2022-11-22 ENCOUNTER — LAB (OUTPATIENT)
Dept: LAB | Facility: HOSPITAL | Age: 62
End: 2022-11-22

## 2022-11-22 DIAGNOSIS — K62.5 RECTAL BLEEDING: ICD-10-CM

## 2022-11-22 LAB
BASOPHILS # BLD AUTO: 0.02 10*3/MM3 (ref 0–0.2)
BASOPHILS NFR BLD AUTO: 0.3 % (ref 0–1.5)
DEPRECATED RDW RBC AUTO: 51.8 FL (ref 37–54)
EOSINOPHIL # BLD AUTO: 0.09 10*3/MM3 (ref 0–0.4)
EOSINOPHIL NFR BLD AUTO: 1.5 % (ref 0.3–6.2)
ERYTHROCYTE [DISTWIDTH] IN BLOOD BY AUTOMATED COUNT: 16.3 % (ref 12.3–15.4)
HCT VFR BLD AUTO: 26.3 % (ref 34–46.6)
HGB BLD-MCNC: 7.9 G/DL (ref 12–15.9)
IMM GRANULOCYTES # BLD AUTO: 0.01 10*3/MM3 (ref 0–0.05)
IMM GRANULOCYTES NFR BLD AUTO: 0.2 % (ref 0–0.5)
LYMPHOCYTES # BLD AUTO: 0.96 10*3/MM3 (ref 0.7–3.1)
LYMPHOCYTES NFR BLD AUTO: 15.8 % (ref 19.6–45.3)
MCH RBC QN AUTO: 26.5 PG (ref 26.6–33)
MCHC RBC AUTO-ENTMCNC: 30 G/DL (ref 31.5–35.7)
MCV RBC AUTO: 88.3 FL (ref 79–97)
MONOCYTES # BLD AUTO: 0.38 10*3/MM3 (ref 0.1–0.9)
MONOCYTES NFR BLD AUTO: 6.3 % (ref 5–12)
NEUTROPHILS NFR BLD AUTO: 4.62 10*3/MM3 (ref 1.7–7)
NEUTROPHILS NFR BLD AUTO: 75.9 % (ref 42.7–76)
NRBC BLD AUTO-RTO: 0 /100 WBC (ref 0–0.2)
PLATELET # BLD AUTO: 195 10*3/MM3 (ref 140–450)
PMV BLD AUTO: 11.9 FL (ref 6–12)
RBC # BLD AUTO: 2.98 10*6/MM3 (ref 3.77–5.28)
WBC NRBC COR # BLD: 6.08 10*3/MM3 (ref 3.4–10.8)

## 2022-11-22 PROCEDURE — 36415 COLL VENOUS BLD VENIPUNCTURE: CPT

## 2022-11-22 PROCEDURE — 85025 COMPLETE CBC W/AUTO DIFF WBC: CPT

## 2022-11-22 NOTE — TELEPHONE ENCOUNTER
Pt called to f/u with referral to Dr Hall.   Pt has not yet been scheduled for scheduling team. Referral has been received.     01/11/23 at 200 pm with Dr Hall  [address] [place] [address].   Bring insurance card, ID and updated med list.     Appt information given and sent via [employer]     Patient states that last couple of days she has become dizzy, mostly of a morning and sometimes last throughout the day but improves as the day goes on. Patient states that stool is dark but she does not see fresh blood present any longer. Patient also on iron supplements. Please advise.

## 2022-11-23 ENCOUNTER — TELEPHONE (OUTPATIENT)
Dept: GASTROENTEROLOGY | Facility: CLINIC | Age: 62
End: 2022-11-23

## 2022-11-23 DIAGNOSIS — D64.9 ANEMIA, UNSPECIFIED TYPE: Primary | ICD-10-CM

## 2022-11-23 NOTE — TELEPHONE ENCOUNTER
Attempted to contact pt. Left a vm requesting a return call.   Patient notified of results. Patient is asking if cbc needs to be repeated again in the future? Please advise.

## 2022-11-23 NOTE — TELEPHONE ENCOUNTER
----- Message from Sara Jarrett MD sent at 11/23/2022  6:26 AM EST -----  Hgb stable from 11/14/22.

## 2022-11-29 ENCOUNTER — OFFICE VISIT (OUTPATIENT)
Dept: CARDIOLOGY | Facility: CLINIC | Age: 62
End: 2022-11-29

## 2022-11-29 VITALS
SYSTOLIC BLOOD PRESSURE: 136 MMHG | HEIGHT: 70 IN | OXYGEN SATURATION: 97 % | WEIGHT: 255 LBS | DIASTOLIC BLOOD PRESSURE: 74 MMHG | HEART RATE: 101 BPM | BODY MASS INDEX: 36.51 KG/M2

## 2022-11-29 DIAGNOSIS — R07.2 PRECORDIAL PAIN: Primary | ICD-10-CM

## 2022-11-29 DIAGNOSIS — I48.11 LONGSTANDING PERSISTENT ATRIAL FIBRILLATION: ICD-10-CM

## 2022-11-29 DIAGNOSIS — R06.02 SOB (SHORTNESS OF BREATH): ICD-10-CM

## 2022-11-29 DIAGNOSIS — I50.32 CHRONIC DIASTOLIC CONGESTIVE HEART FAILURE: ICD-10-CM

## 2022-11-29 DIAGNOSIS — E66.2 EXTREME OBESITY WITH ALVEOLAR HYPOVENTILATION: ICD-10-CM

## 2022-11-29 DIAGNOSIS — I50.812 CHRONIC RIGHT-SIDED HEART FAILURE: ICD-10-CM

## 2022-11-29 DIAGNOSIS — I10 ESSENTIAL (PRIMARY) HYPERTENSION: ICD-10-CM

## 2022-11-29 PROCEDURE — 99214 OFFICE O/P EST MOD 30 MIN: CPT | Performed by: INTERNAL MEDICINE

## 2022-11-29 PROCEDURE — 93000 ELECTROCARDIOGRAM COMPLETE: CPT | Performed by: INTERNAL MEDICINE

## 2022-11-29 NOTE — PROGRESS NOTES
Subjective:     Encounter Date:11/29/22      Patient ID: Milagros Ramirez is a 62 y.o. female.    Chief Complaint:  History of Present Illness    I had the pleasure of seeing this patient in the office today for follow-up.    Today is the first time that I have had a visit with her.    She has had several issues.  She has been hospitalized with recurrent GI bleed.  She has had upper and lower endoscopy along with pill endoscopy but so far bleeding source has not been found.  Hemoglobin thomas was 3.8.  She still severely anemic.  She is scheduled to be seen by [facility] next month.  She is also been having problems with dizziness, palpitations, orthostatic symptoms, and left inframammary chest discomfort.  She simply been feeling worse.    She was told that she could not take any anticoagulation by GI at this point.    Below is a summary of pertinent cardiology findings:  • Acute heart failure and new onset atrial fibrillation - 6/17/2019 anticoagulated with apixaban.  Echocardiogram showed EF 55% but a technically difficult study.  Myocardial perfusion stress study showed no ischemia.  • 6/29/2019 echocardiogram showed EF 62%, severe biatrial enlargement, severely dilated right ventricle with moderate reduction in function, mild to moderate tricuspid insufficiency, and RVSP elevated at 67 mmHg.  CT chest without contrast showed enlarged pulmonary arteries suggestive of pulmonary hypertension.  • 8/7/2020 echocardiogram was a technically difficult [facility]  It showed normal mitral, aortic, tricuspid valves.  EF was 55% with no focal wall abnormalities, moderate left atrial enlargement.  • 11/11/2020 myocardial perfusion stress study showed no evidence of ischemia and EF 70%.     Medical History       The following portions of the patient's history were reviewed and updated as appropriate: allergies, current medications, past family history, past medical history, past social  "history, past surgical history and problem list.    Past Medical History:   Diagnosis Date   • Atrial fibrillation (HCC)     Diagnosed 5/2019   • CKD (chronic kidney disease)    • Diabetes (HCC)     Diagnosed 5/2019   • Diastolic CHF (HCC)    • Hyperlipidemia    • Hypertension    • Hypothyroidism    • Morbid obesity (HCC)    • AMOR (obstructive sleep apnea)    • Rheumatoid arthritis (HCC)    • SLE (systemic lupus erythematosus) (HCC)        Past Surgical History:   Procedure Laterality Date   • APPENDECTOMY     • CARPAL TUNNEL RELEASE     • COLONOSCOPY N/A 10/13/2022    Procedure: COLONOSCOPY;  Surgeon: Sara Jarrett MD;  Location: formerly [facility] ENDOSCOPY;  Service: Gastroenterology;  Laterality: N/A;  DIVERTICULOSIS   • ENDOMETRIAL ABLATION     • ENDOSCOPY N/A 10/12/2022    Procedure: ESOPHAGOGASTRODUODENOSCOPY;  Surgeon: Sara Jarrett MD;  Location: formerly [facility] ENDOSCOPY;  Service: Gastroenterology;  Laterality: N/A;  GASTRITIS   • ENTEROSCOPY SMALL BOWEL N/A 11/4/2022    Procedure: ENTEROSCOPY SMALL BOWEL;  Surgeon: Sara Jarrett MD;  Location: formerly [facility] ENDOSCOPY;  Service: Gastroenterology;  Laterality: N/A;  normal   • HEEL SPUR EXCISION     • HERNIA REPAIR     • THYROIDECTOMY, PARTIAL             ECG 12 Lead    Date/Time: 11/29/2022 6:01 PM  Performed by: Quang Quick III, MD  Authorized by: Quang Quick III, MD   Comparison: compared with previous ECG   Similar to previous ECG  Rhythm: atrial fibrillation  Rate: tachycardic  Conduction: conduction normal  ST Segments: ST segments normal  T Waves: T waves normal  QRS axis: normal  Other: no other findings    Clinical impression: abnormal EKG               Objective:     Vitals:    11/29/22 1413   BP: 136/74   Pulse: 101   SpO2: 97%   Weight: 116 kg (255 lb)   Height: 177.8 cm (70\")         Constitutional:       General: Not in acute distress.     Appearance: Well-developed. Not diaphoretic.   Eyes:      General:         Right eye: No " discharge.         Left eye: No discharge.      Conjunctiva/sclera: Conjunctivae normal.      Pupils: Pupils are equal, round, and reactive to light.   HENT:      Head: Normocephalic and atraumatic.      Nose: Nose normal.   Neck:      Thyroid: No thyromegaly.      Trachea: No tracheal deviation.      Lymphadenopathy: No cervical adenopathy.   Pulmonary:      Effort: Pulmonary effort is normal. No respiratory distress.      Breath sounds: Normal breath sounds. No stridor.   Chest:      Chest wall: Not tender to palpatation.   Cardiovascular:      Normal rate. Irregularly irregular rhythm.      . No S3 gallop.   Abdominal:      General: Bowel sounds are normal. There is no distension.      Palpations: Abdomen is soft. There is no abdominal mass.      Tenderness: There is no abdominal tenderness. There is no guarding or rebound.   Musculoskeletal: Normal range of motion.         General: No tenderness or deformity.      Cervical back: Normal range of motion and neck supple. Skin:     General: Skin is warm and dry.      Findings: No erythema or rash.   Neurological:      Mental Status: Alert and oriented to person, place, and time.      Deep Tendon Reflexes: Reflexes are normal and symmetric.   Psychiatric:         Thought Content: Thought content normal.         Lab Review:   Lab Results   Component Value Date    GLUCOSE 136 (H) 11/08/2022    BUN 17 11/08/2022    CREATININE 0.82 11/08/2022    EGFRIFNONA 38 (L) 02/11/2022    BCR 20.7 11/08/2022    K 4.4 11/08/2022    CO2 27.6 11/08/2022    CALCIUM 8.9 11/08/2022    ALBUMIN 3.50 11/08/2022    LABIL2 0.8 (L) 08/26/2019    AST 10 11/08/2022    ALT 8 11/08/2022     CBC    CBC 11/8/22 11/14/22 11/22/22   WBC 6.60 6.05 6.08   RBC 3.04 (A) 2.99 (A) 2.98 (A)   Hemoglobin 8.1 (A) 8.0 (A) 7.9 (A)   Hematocrit 27.4 (A) 25.9 (A) 26.3 (A)   MCV 90.1 86.6 88.3   MCH 26.6 26.8 26.5 (A)   MCHC 29.6 (A) 30.9 (A) 30.0 (A)   RDW 19.9 (A) 17.5 (A) 16.3 (A)   Platelets 286 213 195   (A)  Abnormal value            Lab Results   Component Value Date    PROBNP 1,681.0 (H) 10/29/2022    PROBNP 4,413.0 (H) 10/11/2022    PROBNP 2,246.0 (H) 06/27/2019     No components found for: TROP  No results found for: DDIMERQUANT      CHADS-VASc Risk Assessment            4 Total Score    1 CHF    1 Hypertension    1 DM    1 Sex: Female        Criteria that do not apply:    Age >/= 75    PRIOR STROKE/TIA/THROMBO    Vascular Disease    Age 65-74                Assessment:          Diagnosis Plan   1. Precordial pain  Holter Monitor - 24 Hour    Stress Test With Myocardial Perfusion One Day    ECG 12 Lead      2. Longstanding persistent atrial fibrillation (HCC)  Holter Monitor - 24 Hour    Stress Test With Myocardial Perfusion One Day    ECG 12 Lead      3. SOB (shortness of breath)  Holter Monitor - 24 Hour    Stress Test With Myocardial Perfusion One Day    ECG 12 Lead      4. Chronic diastolic congestive heart failure (HCC)  Holter Monitor - 24 Hour    Stress Test With Myocardial Perfusion One Day    ECG 12 Lead      5. Chronic right-sided heart failure (HCC)  Holter Monitor - 24 Hour    Stress Test With Myocardial Perfusion One Day    ECG 12 Lead      6. Essential (primary) hypertension  Holter Monitor - 24 Hour    Stress Test With Myocardial Perfusion One Day    ECG 12 Lead      7. Extreme obesity with alveolar hypoventilation (HCC)  Holter Monitor - 24 Hour    Stress Test With Myocardial Perfusion One Day    ECG 12 Lead             Plan:       1.  Precordial chest discomfort, I reviewed her prior chest CT that demonstrates coronary artery calcification, we will arrange for nuclear perfusion study to be performed  2.  Severe GI bleed-evaluation still underway, anticoagulation on hold, we will reassess after GI.  If unable to anticipate resumption of anticoagulation on a chronic basis may need to consider left atrial appendage occlusion  3.  Dizziness- orthostatic symptoms but also tachycardic, will place a Holter  monitor to assess heart rate control.  4.  Stage III chronic renal failure  5.  Diabetes mellitus  6.  Extreme obesity, complicating all aspects of care  7.  Persistent atrial fibrillation- rate appears controlled, we will place a Holter monitor to see if this is persistent or paroxysmal.  8.  Obstructive sleep apnea on CPAP.    Thank you very much for allowing us to participate in the care of this pleasant patient.  Please don't hesitate to call if I can be of assistance in any way.      Current Outpatient Medications:   •  albuterol sulfate  (90 Base) MCG/ACT inhaler, Inhale 2 puffs Every 4 (Four) Hours As Needed for Wheezing., Disp: , Rfl:   •  budesonide-formoterol (SYMBICORT) 160-4.5 MCG/ACT inhaler, Inhale 2 puffs 2 (Two) Times a Day., Disp: , Rfl:   •  cyclobenzaprine (FLEXERIL) 10 MG tablet, Take 1 tablet by mouth As Needed for Muscle Spasms., Disp: 90 tablet, Rfl: 1  •  ferrous sulfate 325 (65 FE) MG tablet, Take 1 tablet by mouth Daily With Breakfast for 30 days., Disp: 30 tablet, Rfl: 0  •  Green Tea, Sabrina sinensis, (GREEN TEA PO), Take 500 mg by mouth Daily., Disp: , Rfl:   •  HYDROcodone-acetaminophen (NORCO) 7.5-325 MG per tablet, Take 1 tablet by mouth Every 12 (Twelve) Hours., Disp: , Rfl:   •  hydroxychloroquine (PLAQUENIL) 200 MG tablet, Take 1 tablet by mouth Every Other Day., Disp: , Rfl:   •  metoprolol tartrate (LOPRESSOR) 25 MG tablet, Take 1 tablet by mouth Every 12 (Twelve) Hours for 30 days., Disp: 60 tablet, Rfl: 0  •  montelukast (SINGULAIR) 10 MG tablet, Take 10 mg by mouth Every Night., Disp: , Rfl:   •  nitroglycerin (NITROSTAT) 0.4 MG SL tablet, TAKE 1 TABLET SUBLINGUALLY EVERY 5 MINS AS NEEDED FOR CHEST PAIN. AFTER THIRD TAB, [id number], Disp: , Rfl:   •  rosuvastatin (CRESTOR) 10 MG tablet, Take 10 mg by mouth Daily., Disp: , Rfl:   •  Thyroid 60 MG PO tablet, Take 1 tablet by mouth Daily., Disp: 90 tablet, Rfl: 1  •  torsemide (DEMADEX) 20 MG tablet, TAKE 1 TABLET BY MOUTH  EVERY DAY, Disp: 90 tablet, Rfl: 2  •  TURMERIC CURCUMIN PO, Take  by mouth Daily., Disp: , Rfl:   •  vitamin E 1000 UNIT capsule, Take 1 capsule by mouth Daily., Disp: , Rfl:          No follow-ups on file.

## 2022-11-30 NOTE — TELEPHONE ENCOUNTER
Abdomen , soft, nontender, nondistended , no guarding or rigidity , no masses palpable , normal bowel sounds , Liver and Spleen , no hepatomegaly present , no hepatosplenomegaly , liver nontender , spleen not palpable Pt was in office today requested refill on Carvedilol.  Michael THAPA

## 2022-12-06 ENCOUNTER — HOSPITAL ENCOUNTER (OUTPATIENT)
Dept: CARDIOLOGY | Facility: HOSPITAL | Age: 62
Discharge: HOME | End: 2022-12-06
Admitting: INTERNAL MEDICINE

## 2022-12-06 ENCOUNTER — TELEPHONE (OUTPATIENT)
Dept: INTERNAL MEDICINE | Facility: CLINIC | Age: 62
End: 2022-12-06

## 2022-12-06 VITALS — BODY MASS INDEX: 36.61 KG/M2 | WEIGHT: 255.73 LBS | HEIGHT: 70 IN

## 2022-12-06 DIAGNOSIS — I50.32 CHRONIC DIASTOLIC CONGESTIVE HEART FAILURE: ICD-10-CM

## 2022-12-06 DIAGNOSIS — R06.02 SOB (SHORTNESS OF BREATH): ICD-10-CM

## 2022-12-06 DIAGNOSIS — I10 ESSENTIAL (PRIMARY) HYPERTENSION: ICD-10-CM

## 2022-12-06 DIAGNOSIS — I50.812 CHRONIC RIGHT-SIDED HEART FAILURE: ICD-10-CM

## 2022-12-06 DIAGNOSIS — I48.11 LONGSTANDING PERSISTENT ATRIAL FIBRILLATION: ICD-10-CM

## 2022-12-06 DIAGNOSIS — E66.2 EXTREME OBESITY WITH ALVEOLAR HYPOVENTILATION: ICD-10-CM

## 2022-12-06 DIAGNOSIS — R07.2 PRECORDIAL PAIN: ICD-10-CM

## 2022-12-06 LAB
BH CV NUCLEAR PRIOR STUDY: 2
BH CV REST NUCLEAR ISOTOPE DOSE: 11.2 MCI
BH CV STRESS BP STAGE 1: NORMAL
BH CV STRESS COMMENTS STAGE 1: NORMAL
BH CV STRESS DOSE REGADENOSON STAGE 1: 0.4
BH CV STRESS DURATION MIN STAGE 1: 0
BH CV STRESS DURATION SEC STAGE 1: 10
BH CV STRESS HR STAGE 1: 112
BH CV STRESS NUCLEAR ISOTOPE DOSE: 35.6 MCI
BH CV STRESS PROTOCOL 1: NORMAL
BH CV STRESS RECOVERY BP: NORMAL MMHG
BH CV STRESS RECOVERY HR: 97 BPM
BH CV STRESS STAGE 1: 1
LV EF NUC BP: 44 %
MAXIMAL PREDICTED HEART RATE: 159 BPM
PERCENT MAX PREDICTED HR: 70.44 %
STRESS BASELINE BP: NORMAL MMHG
STRESS BASELINE HR: 90 BPM
STRESS PERCENT HR: 83 %
STRESS POST EXERCISE DUR SEC: 10 SEC
STRESS POST PEAK BP: NORMAL MMHG
STRESS POST PEAK HR: 112 BPM
STRESS TARGET HR: 135 BPM

## 2022-12-06 PROCEDURE — 93017 CV STRESS TEST TRACING ONLY: CPT

## 2022-12-06 PROCEDURE — A9502 TC99M TETROFOSMIN: HCPCS | Performed by: INTERNAL MEDICINE

## 2022-12-06 PROCEDURE — 78452 HT MUSCLE IMAGE SPECT MULT: CPT | Performed by: INTERNAL MEDICINE

## 2022-12-06 PROCEDURE — 78452 HT MUSCLE IMAGE SPECT MULT: CPT

## 2022-12-06 PROCEDURE — 0 TECHNETIUM TETROFOSMIN KIT: Performed by: INTERNAL MEDICINE

## 2022-12-06 PROCEDURE — 93018 CV STRESS TEST I&R ONLY: CPT | Performed by: INTERNAL MEDICINE

## 2022-12-06 PROCEDURE — 93016 CV STRESS TEST SUPVJ ONLY: CPT | Performed by: INTERNAL MEDICINE

## 2022-12-06 PROCEDURE — 25010000002 REGADENOSON 0.4 MG/5ML SOLUTION: Performed by: INTERNAL MEDICINE

## 2022-12-06 RX ADMIN — REGADENOSON 0.4 MG: 0.08 INJECTION, SOLUTION INTRAVENOUS at 12:57

## 2022-12-06 RX ADMIN — TETROFOSMIN 1 DOSE: 1.38 INJECTION, POWDER, LYOPHILIZED, FOR SOLUTION INTRAVENOUS at 12:57

## 2022-12-06 RX ADMIN — TETROFOSMIN 1 DOSE: 1.38 INJECTION, POWDER, LYOPHILIZED, FOR SOLUTION INTRAVENOUS at 12:12

## 2022-12-06 NOTE — TELEPHONE ENCOUNTER
Caller: Milagros Ramirez    Relationship: Self    Best call back number: [phone]    Who are you requesting to speak with (clinical staff, provider,  specific staff member): CLINICAL    What was the call regarding: PATIENT STATES SHE WAS SUPPOSED TO RECEIVE A CALL ABOUT HER LAB RESULTS BUT HASN'T.     Do you require a callback: YES

## 2022-12-07 ENCOUNTER — TELEPHONE (OUTPATIENT)
Dept: CARDIOLOGY | Facility: CLINIC | Age: 62
End: 2022-12-07

## 2022-12-07 NOTE — TELEPHONE ENCOUNTER
----- Message from Quang Quick III, MD sent at 12/6/2022  6:06 PM EST -----  Please call-stress test shows no ischemia, we will be back in touch after we have the results on her Holter monitor

## 2022-12-07 NOTE — TELEPHONE ENCOUNTER
I tried to call Milagros Ramirez but there was no answer.  Left a voicemail asking patient to call back.  Of note, pt's home number is not in service.  Will continue to try to reach pt.    Thank you,    Maria Elena Claire, ITZEL  Triage [facility] [place]  12/07/22 08:56 EST

## 2022-12-07 NOTE — TELEPHONE ENCOUNTER
"Left patient detailed message that per provider \"A1c in normal range, Thyroid function normal, Cholesterol panel normal.\"  Advised patient to return call to office is further questions.   "

## 2022-12-07 NOTE — TELEPHONE ENCOUNTER
I spoke with Milagros Ramirez and updated pt on results from provider.  Pt verbalized understanding and has no further questions at this time.    Thank you,    Maria Elena Claire, RN  Triage [facility] [place]  12/07/22 09:01 EST

## 2022-12-13 RX ORDER — TORSEMIDE 20 MG/1
TABLET ORAL
Qty: 90 TABLET | Refills: 2 | Status: SHIPPED | OUTPATIENT
Start: 2022-12-13

## 2022-12-15 ENCOUNTER — TELEPHONE (OUTPATIENT)
Dept: CARDIOLOGY | Facility: CLINIC | Age: 62
End: 2022-12-15

## 2022-12-15 ENCOUNTER — TELEPHONE (OUTPATIENT)
Dept: CARDIOLOGY | Facility: HOSPITAL | Age: 62
End: 2022-12-15

## 2022-12-15 RX ORDER — METOPROLOL TARTRATE 50 MG/1
50 TABLET, FILM COATED ORAL 2 TIMES DAILY
Qty: 60 TABLET | Refills: 11 | Status: SHIPPED | OUTPATIENT
Start: 2022-12-15 | End: 2023-01-04 | Stop reason: DRUGHIGH

## 2022-12-15 NOTE — TELEPHONE ENCOUNTER
I tried to call Milagros Ramirez but there was no answer.  Left a voicemail asking patient to call back.  Will continue to try to reach pt.    Thank you,    Maria Elena Claire RN  Triage [facility] [place]  12/15/22 10:37 EST

## 2022-12-15 NOTE — TELEPHONE ENCOUNTER
LM on pt vm to call back to make an appointment to go over test results.    Ok for hub to schedule

## 2022-12-15 NOTE — TELEPHONE ENCOUNTER
----- Message from MARILYNN Ramirez sent at 12/14/2022  3:03 PM EST -----  I am covering Dr. Noyola's in basket today because he is out of the office.      Patient persistent atrial fibrillation.  Heart rate not well controlled.  Increase metoprolol to 50 mg twice per day and send prescription to pharmacy of choice.  Please let her know.    I will defer to Dr. Quick if he wants to start anticoagulation or refer her for left atrial appendage closure.

## 2022-12-15 NOTE — TELEPHONE ENCOUNTER
----- Message from Quang Quick III, MD sent at 12/14/2022  7:53 PM EST -----  Let's make her an appt to be seen to review studies, adjust her meds  ----- Message -----  From: Quang Quick III, MD  Sent: 12/9/2022   4:39 PM EST  To: Quang Quick III, MD

## 2022-12-15 NOTE — TELEPHONE ENCOUNTER
Notified patient of results/recommendations. Patient verbalized understanding. She use the [facility] pharmacy in [place].     Yadira Borges RN  Triage [facility] [place]

## 2022-12-16 RX ORDER — MONTELUKAST SODIUM 10 MG/1
10 TABLET ORAL NIGHTLY
Qty: 90 TABLET | Refills: 1 | Status: SHIPPED | OUTPATIENT
Start: 2022-12-16

## 2022-12-16 RX ORDER — ROSUVASTATIN CALCIUM 10 MG/1
10 TABLET, COATED ORAL DAILY
Qty: 90 TABLET | Refills: 1 | Status: SHIPPED | OUTPATIENT
Start: 2022-12-16

## 2022-12-16 NOTE — TELEPHONE ENCOUNTER
Patient called in stating that she needed the Singulair refilled. Never been prescribed by you. Are you okay with sending this in for her?

## 2022-12-19 NOTE — TELEPHONE ENCOUNTER
LM on pt vm to call back to make an appointment to go over test results.     Ok for hub to schedule

## 2022-12-22 ENCOUNTER — PREP FOR SURGERY (OUTPATIENT)
Dept: OTHER | Facility: HOSPITAL | Age: 62
End: 2022-12-22

## 2022-12-22 ENCOUNTER — HOSPITAL ENCOUNTER (OUTPATIENT)
Facility: HOSPITAL | Age: 62
Setting detail: OBSERVATION
Discharge: HOME | End: 2022-12-24
Attending: INTERNAL MEDICINE

## 2022-12-22 ENCOUNTER — LAB (OUTPATIENT)
Dept: LAB | Facility: HOSPITAL | Age: 62
End: 2022-12-22

## 2022-12-22 DIAGNOSIS — R26.2 DIFFICULTY WALKING: ICD-10-CM

## 2022-12-22 DIAGNOSIS — Z78.9 DECREASED ACTIVITIES OF DAILY LIVING (ADL): Primary | ICD-10-CM

## 2022-12-22 LAB
ABO GROUP BLD: NORMAL
ANION GAP SERPL CALCULATED.3IONS-SCNC: 10 MMOL/L (ref 5–15)
BASOPHILS # BLD AUTO: 0.02 10*3/MM3 (ref 0–0.2)
BASOPHILS # BLD AUTO: 0.02 10*3/MM3 (ref 0–0.2)
BASOPHILS NFR BLD AUTO: 0.4 % (ref 0–1.5)
BASOPHILS NFR BLD AUTO: 0.4 % (ref 0–1.5)
BLD GP AB SCN SERPL QL: NEGATIVE
BUN SERPL-MCNC: 19 MG/DL (ref 8–23)
BUN/CREAT SERPL: 22.1 (ref 7–25)
CALCIUM SPEC-SCNC: 8.5 MG/DL (ref 8.6–10.5)
CHLORIDE SERPL-SCNC: 100 MMOL/L (ref 98–107)
CO2 SERPL-SCNC: 27 MMOL/L (ref 22–29)
CREAT SERPL-MCNC: 0.86 MG/DL (ref 0.57–1)
DEPRECATED RDW RBC AUTO: 47.1 FL (ref 37–54)
DEPRECATED RDW RBC AUTO: 48.1 FL (ref 37–54)
EGFRCR SERPLBLD CKD-EPI 2021: 77 ML/MIN/1.73
EOSINOPHIL # BLD AUTO: 0.09 10*3/MM3 (ref 0–0.4)
EOSINOPHIL # BLD AUTO: 0.14 10*3/MM3 (ref 0–0.4)
EOSINOPHIL NFR BLD AUTO: 1.7 % (ref 0.3–6.2)
EOSINOPHIL NFR BLD AUTO: 2.5 % (ref 0.3–6.2)
ERYTHROCYTE [DISTWIDTH] IN BLOOD BY AUTOMATED COUNT: 16 % (ref 12.3–15.4)
ERYTHROCYTE [DISTWIDTH] IN BLOOD BY AUTOMATED COUNT: 16 % (ref 12.3–15.4)
GLUCOSE SERPL-MCNC: 84 MG/DL (ref 65–99)
HCT VFR BLD AUTO: 17.5 % (ref 34–46.6)
HCT VFR BLD AUTO: 17.7 % (ref 34–46.6)
HGB BLD-MCNC: 5 G/DL (ref 12–15.9)
HGB BLD-MCNC: 5.1 G/DL (ref 12–15.9)
IMM GRANULOCYTES # BLD AUTO: 0.01 10*3/MM3 (ref 0–0.05)
IMM GRANULOCYTES # BLD AUTO: 0.01 10*3/MM3 (ref 0–0.05)
IMM GRANULOCYTES NFR BLD AUTO: 0.2 % (ref 0–0.5)
IMM GRANULOCYTES NFR BLD AUTO: 0.2 % (ref 0–0.5)
INR PPP: 1.18 (ref 0.86–1.15)
LYMPHOCYTES # BLD AUTO: 1.08 10*3/MM3 (ref 0.7–3.1)
LYMPHOCYTES # BLD AUTO: 1.26 10*3/MM3 (ref 0.7–3.1)
LYMPHOCYTES NFR BLD AUTO: 20.7 % (ref 19.6–45.3)
LYMPHOCYTES NFR BLD AUTO: 22.5 % (ref 19.6–45.3)
MAGNESIUM SERPL-MCNC: 2.2 MG/DL (ref 1.6–2.4)
MCH RBC QN AUTO: 23 PG (ref 26.6–33)
MCH RBC QN AUTO: 23.9 PG (ref 26.6–33)
MCHC RBC AUTO-ENTMCNC: 28.6 G/DL (ref 31.5–35.7)
MCHC RBC AUTO-ENTMCNC: 28.8 G/DL (ref 31.5–35.7)
MCV RBC AUTO: 80.6 FL (ref 79–97)
MCV RBC AUTO: 83.1 FL (ref 79–97)
MONOCYTES # BLD AUTO: 0.42 10*3/MM3 (ref 0.1–0.9)
MONOCYTES # BLD AUTO: 0.45 10*3/MM3 (ref 0.1–0.9)
MONOCYTES NFR BLD AUTO: 8 % (ref 5–12)
MONOCYTES NFR BLD AUTO: 8.1 % (ref 5–12)
NEUTROPHILS NFR BLD AUTO: 3.59 10*3/MM3 (ref 1.7–7)
NEUTROPHILS NFR BLD AUTO: 3.72 10*3/MM3 (ref 1.7–7)
NEUTROPHILS NFR BLD AUTO: 66.4 % (ref 42.7–76)
NEUTROPHILS NFR BLD AUTO: 68.9 % (ref 42.7–76)
NRBC BLD AUTO-RTO: 0 /100 WBC (ref 0–0.2)
NRBC BLD AUTO-RTO: 0 /100 WBC (ref 0–0.2)
PHOSPHATE SERPL-MCNC: 3.6 MG/DL (ref 2.5–4.5)
PLATELET # BLD AUTO: 274 10*3/MM3 (ref 140–450)
PLATELET # BLD AUTO: 360 10*3/MM3 (ref 140–450)
PMV BLD AUTO: 10.5 FL (ref 6–12)
PMV BLD AUTO: 11.8 FL (ref 6–12)
POTASSIUM SERPL-SCNC: 3.9 MMOL/L (ref 3.5–5.2)
PROTHROMBIN TIME: 15.2 SECONDS (ref 11.8–14.9)
RBC # BLD AUTO: 2.13 10*6/MM3 (ref 3.77–5.28)
RBC # BLD AUTO: 2.17 10*6/MM3 (ref 3.77–5.28)
RH BLD: POSITIVE
SODIUM SERPL-SCNC: 137 MMOL/L (ref 136–145)
T&S EXPIRATION DATE: NORMAL
WBC NRBC COR # BLD: 5.21 10*3/MM3 (ref 3.4–10.8)
WBC NRBC COR # BLD: 5.6 10*3/MM3 (ref 3.4–10.8)

## 2022-12-22 PROCEDURE — G0378 HOSPITAL OBSERVATION PER HR: HCPCS

## 2022-12-22 PROCEDURE — 36415 COLL VENOUS BLD VENIPUNCTURE: CPT

## 2022-12-22 PROCEDURE — P9016 RBC LEUKOCYTES REDUCED: HCPCS

## 2022-12-22 PROCEDURE — 85025 COMPLETE CBC W/AUTO DIFF WBC: CPT | Performed by: HOSPITALIST

## 2022-12-22 PROCEDURE — 83735 ASSAY OF MAGNESIUM: CPT | Performed by: HOSPITALIST

## 2022-12-22 PROCEDURE — 94799 UNLISTED PULMONARY SVC/PX: CPT

## 2022-12-22 PROCEDURE — 85025 COMPLETE CBC W/AUTO DIFF WBC: CPT | Performed by: INTERNAL MEDICINE

## 2022-12-22 PROCEDURE — 86900 BLOOD TYPING SEROLOGIC ABO: CPT

## 2022-12-22 PROCEDURE — 86923 COMPATIBILITY TEST ELECTRIC: CPT

## 2022-12-22 PROCEDURE — 93005 ELECTROCARDIOGRAM TRACING: CPT | Performed by: HOSPITALIST

## 2022-12-22 PROCEDURE — 86850 RBC ANTIBODY SCREEN: CPT | Performed by: HOSPITALIST

## 2022-12-22 PROCEDURE — 80048 BASIC METABOLIC PNL TOTAL CA: CPT | Performed by: HOSPITALIST

## 2022-12-22 PROCEDURE — 93010 ELECTROCARDIOGRAM REPORT: CPT | Performed by: INTERNAL MEDICINE

## 2022-12-22 PROCEDURE — 99220 PR INITIAL OBSERVATION CARE/DAY 70 MINUTES: CPT | Performed by: HOSPITALIST

## 2022-12-22 PROCEDURE — 86900 BLOOD TYPING SEROLOGIC ABO: CPT | Performed by: HOSPITALIST

## 2022-12-22 PROCEDURE — 85610 PROTHROMBIN TIME: CPT | Performed by: HOSPITALIST

## 2022-12-22 PROCEDURE — 36430 TRANSFUSION BLD/BLD COMPNT: CPT

## 2022-12-22 PROCEDURE — 84100 ASSAY OF PHOSPHORUS: CPT | Performed by: HOSPITALIST

## 2022-12-22 PROCEDURE — 86901 BLOOD TYPING SEROLOGIC RH(D): CPT | Performed by: HOSPITALIST

## 2022-12-22 RX ORDER — MORPHINE SULFATE 2 MG/ML
1 INJECTION, SOLUTION INTRAMUSCULAR; INTRAVENOUS EVERY 4 HOURS PRN
Status: DISCONTINUED | OUTPATIENT
Start: 2022-12-22 | End: 2022-12-24 | Stop reason: HOSPADM

## 2022-12-22 RX ORDER — NALOXONE HCL 0.4 MG/ML
0.4 VIAL (ML) INJECTION
Status: DISCONTINUED | OUTPATIENT
Start: 2022-12-22 | End: 2022-12-24 | Stop reason: HOSPADM

## 2022-12-22 RX ORDER — ALBUTEROL SULFATE 2.5 MG/3ML
2.5 SOLUTION RESPIRATORY (INHALATION) EVERY 6 HOURS PRN
Status: DISCONTINUED | OUTPATIENT
Start: 2022-12-22 | End: 2022-12-24 | Stop reason: HOSPADM

## 2022-12-22 RX ORDER — ACETAMINOPHEN 160 MG/5ML
650 SOLUTION ORAL EVERY 4 HOURS PRN
Status: DISCONTINUED | OUTPATIENT
Start: 2022-12-22 | End: 2022-12-24 | Stop reason: HOSPADM

## 2022-12-22 RX ORDER — ONDANSETRON 2 MG/ML
4 INJECTION INTRAMUSCULAR; INTRAVENOUS EVERY 6 HOURS PRN
Status: DISCONTINUED | OUTPATIENT
Start: 2022-12-22 | End: 2022-12-24 | Stop reason: HOSPADM

## 2022-12-22 RX ORDER — HYDROCODONE BITARTRATE AND ACETAMINOPHEN 5; 325 MG/1; MG/1
1 TABLET ORAL EVERY 4 HOURS PRN
Status: DISCONTINUED | OUTPATIENT
Start: 2022-12-22 | End: 2022-12-24 | Stop reason: HOSPADM

## 2022-12-22 RX ORDER — POLYETHYLENE GLYCOL 3350 17 G/17G
17 POWDER, FOR SOLUTION ORAL DAILY PRN
Status: DISCONTINUED | OUTPATIENT
Start: 2022-12-22 | End: 2022-12-24 | Stop reason: HOSPADM

## 2022-12-22 RX ORDER — HYDROXYCHLOROQUINE SULFATE 200 MG/1
200 TABLET, FILM COATED ORAL EVERY OTHER DAY
Status: DISCONTINUED | OUTPATIENT
Start: 2022-12-22 | End: 2022-12-24 | Stop reason: HOSPADM

## 2022-12-22 RX ORDER — ACETAMINOPHEN 325 MG/1
650 TABLET ORAL EVERY 4 HOURS PRN
Status: DISCONTINUED | OUTPATIENT
Start: 2022-12-22 | End: 2022-12-24 | Stop reason: HOSPADM

## 2022-12-22 RX ORDER — TORSEMIDE 20 MG/1
20 TABLET ORAL DAILY
Status: DISCONTINUED | OUTPATIENT
Start: 2022-12-22 | End: 2022-12-24 | Stop reason: HOSPADM

## 2022-12-22 RX ORDER — MONTELUKAST SODIUM 10 MG/1
10 TABLET ORAL NIGHTLY
Status: DISCONTINUED | OUTPATIENT
Start: 2022-12-22 | End: 2022-12-24 | Stop reason: HOSPADM

## 2022-12-22 RX ORDER — CHOLECALCIFEROL (VITAMIN D3) 125 MCG
5 CAPSULE ORAL NIGHTLY PRN
Status: DISCONTINUED | OUTPATIENT
Start: 2022-12-22 | End: 2022-12-24 | Stop reason: HOSPADM

## 2022-12-22 RX ORDER — SODIUM CHLORIDE 9 MG/ML
40 INJECTION, SOLUTION INTRAVENOUS AS NEEDED
Status: DISCONTINUED | OUTPATIENT
Start: 2022-12-22 | End: 2022-12-24 | Stop reason: HOSPADM

## 2022-12-22 RX ORDER — AMOXICILLIN 250 MG
2 CAPSULE ORAL 2 TIMES DAILY
Status: DISCONTINUED | OUTPATIENT
Start: 2022-12-22 | End: 2022-12-24 | Stop reason: HOSPADM

## 2022-12-22 RX ORDER — ONDANSETRON 4 MG/1
4 TABLET, FILM COATED ORAL EVERY 6 HOURS PRN
Status: DISCONTINUED | OUTPATIENT
Start: 2022-12-22 | End: 2022-12-24 | Stop reason: HOSPADM

## 2022-12-22 RX ORDER — BUDESONIDE AND FORMOTEROL FUMARATE DIHYDRATE 160; 4.5 UG/1; UG/1
2 AEROSOL RESPIRATORY (INHALATION)
Status: DISCONTINUED | OUTPATIENT
Start: 2022-12-22 | End: 2022-12-24 | Stop reason: HOSPADM

## 2022-12-22 RX ORDER — LEVOTHYROXINE AND LIOTHYRONINE 38; 9 UG/1; UG/1
60 TABLET ORAL DAILY
Status: DISCONTINUED | OUTPATIENT
Start: 2022-12-22 | End: 2022-12-24 | Stop reason: HOSPADM

## 2022-12-22 RX ORDER — BISACODYL 5 MG/1
5 TABLET, DELAYED RELEASE ORAL DAILY PRN
Status: DISCONTINUED | OUTPATIENT
Start: 2022-12-22 | End: 2022-12-24 | Stop reason: HOSPADM

## 2022-12-22 RX ORDER — NITROGLYCERIN 0.4 MG/1
0.4 TABLET SUBLINGUAL
Status: DISCONTINUED | OUTPATIENT
Start: 2022-12-22 | End: 2022-12-24 | Stop reason: HOSPADM

## 2022-12-22 RX ORDER — ROSUVASTATIN CALCIUM 5 MG/1
10 TABLET, COATED ORAL DAILY
Status: DISCONTINUED | OUTPATIENT
Start: 2022-12-22 | End: 2022-12-24 | Stop reason: HOSPADM

## 2022-12-22 RX ORDER — SODIUM CHLORIDE 0.9 % (FLUSH) 0.9 %
10 SYRINGE (ML) INJECTION EVERY 12 HOURS SCHEDULED
Status: DISCONTINUED | OUTPATIENT
Start: 2022-12-22 | End: 2022-12-24 | Stop reason: HOSPADM

## 2022-12-22 RX ORDER — ACETAMINOPHEN 650 MG/1
650 SUPPOSITORY RECTAL EVERY 4 HOURS PRN
Status: DISCONTINUED | OUTPATIENT
Start: 2022-12-22 | End: 2022-12-24 | Stop reason: HOSPADM

## 2022-12-22 RX ORDER — SODIUM CHLORIDE 0.9 % (FLUSH) 0.9 %
10 SYRINGE (ML) INJECTION AS NEEDED
Status: DISCONTINUED | OUTPATIENT
Start: 2022-12-22 | End: 2022-12-24 | Stop reason: HOSPADM

## 2022-12-22 RX ORDER — HYDROCODONE BITARTRATE AND ACETAMINOPHEN 7.5; 325 MG/1; MG/1
2 TABLET ORAL EVERY 4 HOURS PRN
Status: DISCONTINUED | OUTPATIENT
Start: 2022-12-22 | End: 2022-12-24 | Stop reason: HOSPADM

## 2022-12-22 RX ORDER — BISACODYL 10 MG
10 SUPPOSITORY, RECTAL RECTAL DAILY PRN
Status: DISCONTINUED | OUTPATIENT
Start: 2022-12-22 | End: 2022-12-24 | Stop reason: HOSPADM

## 2022-12-22 RX ORDER — NITROGLYCERIN 0.4 MG/1
0.4 TABLET SUBLINGUAL
Status: DISCONTINUED | OUTPATIENT
Start: 2022-12-22 | End: 2022-12-22

## 2022-12-22 RX ORDER — CYCLOBENZAPRINE HCL 10 MG
10 TABLET ORAL AS NEEDED
Status: DISCONTINUED | OUTPATIENT
Start: 2022-12-22 | End: 2022-12-22

## 2022-12-22 RX ORDER — HYDROCODONE BITARTRATE AND ACETAMINOPHEN 7.5; 325 MG/1; MG/1
1 TABLET ORAL EVERY 12 HOURS
Status: DISCONTINUED | OUTPATIENT
Start: 2022-12-22 | End: 2022-12-24 | Stop reason: HOSPADM

## 2022-12-22 RX ADMIN — Medication 10 ML: at 21:55

## 2022-12-22 RX ADMIN — ROSUVASTATIN CALCIUM 10 MG: 5 TABLET, FILM COATED ORAL at 21:52

## 2022-12-22 RX ADMIN — MONTELUKAST 10 MG: 10 TABLET, FILM COATED ORAL at 21:53

## 2022-12-22 RX ADMIN — HYDROXYCHLOROQUINE SULFATE 200 MG: 200 TABLET ORAL at 21:52

## 2022-12-22 RX ADMIN — BUDESONIDE AND FORMOTEROL FUMARATE DIHYDRATE 2 PUFF: 160; 4.5 AEROSOL RESPIRATORY (INHALATION) at 20:24

## 2022-12-22 RX ADMIN — METOPROLOL TARTRATE 50 MG: 25 TABLET, FILM COATED ORAL at 21:53

## 2022-12-22 RX ADMIN — SENNOSIDES AND DOCUSATE SODIUM 2 TABLET: 8.6; 5 TABLET ORAL at 21:51

## 2022-12-22 RX ADMIN — HYDROCODONE BITARTRATE AND ACETAMINOPHEN 1 TABLET: 7.5; 325 TABLET ORAL at 21:50

## 2022-12-22 NOTE — PROGRESS NOTES
Patient is a direct admit from home.  Today her lab work showed hemoglobin of 5.  Dr. Jacobo gastroenterology on-call was informed about it.  Patient has history of anemia and requires blood transfusion frequently.  Patient has had extensive work-up for anemia including EGD, C-scope and capsule enteroscopy.  Dr. Jacobo call hospitalist service for observation overnight and transfusion of 2 units of packed RBC and discharge home in a.m. if remains stable.  Patient is stable other than mild dizziness.

## 2022-12-23 LAB
ANION GAP SERPL CALCULATED.3IONS-SCNC: 6.6 MMOL/L (ref 5–15)
BASOPHILS # BLD AUTO: 0.03 10*3/MM3 (ref 0–0.2)
BASOPHILS NFR BLD AUTO: 0.6 % (ref 0–1.5)
BUN SERPL-MCNC: 15 MG/DL (ref 8–23)
BUN/CREAT SERPL: 20.3 (ref 7–25)
CALCIUM SPEC-SCNC: 8.3 MG/DL (ref 8.6–10.5)
CHLORIDE SERPL-SCNC: 105 MMOL/L (ref 98–107)
CO2 SERPL-SCNC: 27.4 MMOL/L (ref 22–29)
CREAT SERPL-MCNC: 0.74 MG/DL (ref 0.57–1)
DEPRECATED RDW RBC AUTO: 46.7 FL (ref 37–54)
EGFRCR SERPLBLD CKD-EPI 2021: 92.2 ML/MIN/1.73
EOSINOPHIL # BLD AUTO: 0.1 10*3/MM3 (ref 0–0.4)
EOSINOPHIL NFR BLD AUTO: 2.1 % (ref 0.3–6.2)
ERYTHROCYTE [DISTWIDTH] IN BLOOD BY AUTOMATED COUNT: 15.3 % (ref 12.3–15.4)
GLUCOSE SERPL-MCNC: 116 MG/DL (ref 65–99)
HCT VFR BLD AUTO: 20.2 % (ref 34–46.6)
HCT VFR BLD AUTO: 27.3 % (ref 34–46.6)
HGB BLD-MCNC: 6.1 G/DL (ref 12–15.9)
HGB BLD-MCNC: 8.4 G/DL (ref 12–15.9)
IMM GRANULOCYTES # BLD AUTO: 0.02 10*3/MM3 (ref 0–0.05)
IMM GRANULOCYTES NFR BLD AUTO: 0.4 % (ref 0–0.5)
INR PPP: 1.32 (ref 0.86–1.15)
LYMPHOCYTES # BLD AUTO: 0.95 10*3/MM3 (ref 0.7–3.1)
LYMPHOCYTES NFR BLD AUTO: 20.1 % (ref 19.6–45.3)
MAGNESIUM SERPL-MCNC: 2.1 MG/DL (ref 1.6–2.4)
MCH RBC QN AUTO: 25.3 PG (ref 26.6–33)
MCHC RBC AUTO-ENTMCNC: 30.2 G/DL (ref 31.5–35.7)
MCV RBC AUTO: 83.8 FL (ref 79–97)
MONOCYTES # BLD AUTO: 0.41 10*3/MM3 (ref 0.1–0.9)
MONOCYTES NFR BLD AUTO: 8.7 % (ref 5–12)
NEUTROPHILS NFR BLD AUTO: 3.21 10*3/MM3 (ref 1.7–7)
NEUTROPHILS NFR BLD AUTO: 68.1 % (ref 42.7–76)
NRBC BLD AUTO-RTO: 0 /100 WBC (ref 0–0.2)
PHOSPHATE SERPL-MCNC: 3.7 MG/DL (ref 2.5–4.5)
PLATELET # BLD AUTO: 311 10*3/MM3 (ref 140–450)
PMV BLD AUTO: 9.7 FL (ref 6–12)
POTASSIUM SERPL-SCNC: 3.7 MMOL/L (ref 3.5–5.2)
PROTHROMBIN TIME: 16.6 SECONDS (ref 11.8–14.9)
RBC # BLD AUTO: 2.41 10*6/MM3 (ref 3.77–5.28)
SODIUM SERPL-SCNC: 139 MMOL/L (ref 136–145)
WBC NRBC COR # BLD: 4.72 10*3/MM3 (ref 3.4–10.8)

## 2022-12-23 PROCEDURE — 83735 ASSAY OF MAGNESIUM: CPT | Performed by: HOSPITALIST

## 2022-12-23 PROCEDURE — 86900 BLOOD TYPING SEROLOGIC ABO: CPT

## 2022-12-23 PROCEDURE — 86923 COMPATIBILITY TEST ELECTRIC: CPT

## 2022-12-23 PROCEDURE — 85018 HEMOGLOBIN: CPT | Performed by: STUDENT IN AN ORGANIZED HEALTH CARE EDUCATION/TRAINING PROGRAM

## 2022-12-23 PROCEDURE — 97165 OT EVAL LOW COMPLEX 30 MIN: CPT

## 2022-12-23 PROCEDURE — P9016 RBC LEUKOCYTES REDUCED: HCPCS

## 2022-12-23 PROCEDURE — G0378 HOSPITAL OBSERVATION PER HR: HCPCS

## 2022-12-23 PROCEDURE — 85014 HEMATOCRIT: CPT | Performed by: STUDENT IN AN ORGANIZED HEALTH CARE EDUCATION/TRAINING PROGRAM

## 2022-12-23 PROCEDURE — 94799 UNLISTED PULMONARY SVC/PX: CPT

## 2022-12-23 PROCEDURE — 85025 COMPLETE CBC W/AUTO DIFF WBC: CPT | Performed by: HOSPITALIST

## 2022-12-23 PROCEDURE — 99214 OFFICE O/P EST MOD 30 MIN: CPT | Performed by: INTERNAL MEDICINE

## 2022-12-23 PROCEDURE — 80048 BASIC METABOLIC PNL TOTAL CA: CPT | Performed by: HOSPITALIST

## 2022-12-23 PROCEDURE — 99226 PR SBSQ OBSERVATION CARE/DAY 35 MINUTES: CPT | Performed by: STUDENT IN AN ORGANIZED HEALTH CARE EDUCATION/TRAINING PROGRAM

## 2022-12-23 PROCEDURE — 84100 ASSAY OF PHOSPHORUS: CPT | Performed by: HOSPITALIST

## 2022-12-23 PROCEDURE — 85610 PROTHROMBIN TIME: CPT | Performed by: HOSPITALIST

## 2022-12-23 PROCEDURE — 36430 TRANSFUSION BLD/BLD COMPNT: CPT

## 2022-12-23 RX ADMIN — BUDESONIDE AND FORMOTEROL FUMARATE DIHYDRATE 2 PUFF: 160; 4.5 AEROSOL RESPIRATORY (INHALATION) at 20:10

## 2022-12-23 RX ADMIN — ROSUVASTATIN CALCIUM 10 MG: 5 TABLET, FILM COATED ORAL at 09:36

## 2022-12-23 RX ADMIN — Medication 10 ML: at 09:37

## 2022-12-23 RX ADMIN — BUDESONIDE AND FORMOTEROL FUMARATE DIHYDRATE 2 PUFF: 160; 4.5 AEROSOL RESPIRATORY (INHALATION) at 10:17

## 2022-12-23 RX ADMIN — TORSEMIDE 20 MG: 20 TABLET ORAL at 09:36

## 2022-12-23 RX ADMIN — HYDROCODONE BITARTRATE AND ACETAMINOPHEN 1 TABLET: 7.5; 325 TABLET ORAL at 20:11

## 2022-12-23 RX ADMIN — METOPROLOL TARTRATE 50 MG: 25 TABLET, FILM COATED ORAL at 20:10

## 2022-12-23 RX ADMIN — METOPROLOL TARTRATE 50 MG: 25 TABLET, FILM COATED ORAL at 09:37

## 2022-12-23 RX ADMIN — MONTELUKAST 10 MG: 10 TABLET, FILM COATED ORAL at 20:11

## 2022-12-23 RX ADMIN — HYDROCODONE BITARTRATE AND ACETAMINOPHEN 1 TABLET: 7.5; 325 TABLET ORAL at 09:37

## 2022-12-23 RX ADMIN — LEVOTHYROXINE, LIOTHYRONINE 60 MG: 38; 9 TABLET ORAL at 09:36

## 2022-12-23 RX ADMIN — SENNOSIDES AND DOCUSATE SODIUM 2 TABLET: 8.6; 5 TABLET ORAL at 09:37

## 2022-12-23 RX ADMIN — Medication 10 ML: at 20:11

## 2022-12-23 NOTE — CONSULTS
[facility]  Initial Inpatient Consult Note    Referring Provider: Hospitalist    Reason for Consultation: Recurrent acute on chronic blood loss anemia    Subjective     History of present illness:    62 y.o. female admitted directly after outpatient labs showed hemoglobin of 5.0.  Patient was just discharged few weeks ago for similar symptoms.  She has had EGD, colonoscopy, and recent capsule endoscopy showing possible small bowel AVMs.  She previously had been on anticoagulation for A. fib but no longer.  She is followed by Dr. Jarrett as an outpatient.  She reports being referred to [place] for further testing on January 11.  She was having dizziness upon standing as generalized fatigue when I spoke to the on the phone yesterday.  She has received 3 of 4 units packed red blood cells overnight    Past Medical History:  Past Medical History:   Diagnosis Date   • Atrial fibrillation (HCC)     Diagnosed 5/2019   • CKD (chronic kidney disease)    • Diabetes (HCC)     Diagnosed 5/2019   • Diastolic CHF (HCC)    • Hyperlipidemia    • Hypertension    • Hypothyroidism    • Morbid obesity (HCC)    • AMOR (obstructive sleep apnea)    • Rheumatoid arthritis (HCC)    • SLE (systemic lupus erythematosus) (HCC)      Past Surgical History:  Past Surgical History:   Procedure Laterality Date   • APPENDECTOMY     • CARPAL TUNNEL RELEASE     • COLONOSCOPY N/A 10/13/2022    Procedure: COLONOSCOPY;  Surgeon: Sara Jarrett MD;  Location: [facility] ENDOSCOPY;  Service: Gastroenterology;  Laterality: N/A;  DIVERTICULOSIS   • ENDOMETRIAL ABLATION     • ENDOSCOPY N/A 10/12/2022    Procedure: ESOPHAGOGASTRODUODENOSCOPY;  Surgeon: Sara Jarrett MD;  Location: [facility] ENDOSCOPY;  Service: Gastroenterology;  Laterality: N/A;  GASTRITIS   • ENTEROSCOPY SMALL BOWEL N/A 11/4/2022    Procedure: ENTEROSCOPY SMALL BOWEL;  Surgeon: Sara Jarrett MD;  Location: [facility] ENDOSCOPY;  Service:  Gastroenterology;  Laterality: N/A;  normal   • HEEL SPUR EXCISION     • HERNIA REPAIR     • THYROIDECTOMY, PARTIAL        Social History:   Social History     Tobacco Use   • Smoking status: Former   • Smokeless tobacco: Never   • Tobacco comments:     Quit around age 36   Substance Use Topics   • Alcohol use: Yes     Comment: rare      Family History:  Family History   Problem Relation Age of Onset   • Stroke Mother    • Coronary artery disease Brother         3 brothers with stents at young ages    • Diabetes Paternal Grandfather    • Heart disease Father    • Atrial fibrillation Father    • Colon cancer Father    • Coronary artery disease Brother        Home Meds:  Medications Prior to Admission   Medication Sig Dispense Refill Last Dose   • albuterol sulfate  (90 Base) MCG/ACT inhaler Inhale 2 puffs Every 4 (Four) Hours As Needed for Wheezing.   12/22/2022   • budesonide-formoterol (SYMBICORT) 160-4.5 MCG/ACT inhaler Inhale 2 puffs 2 (Two) Times a Day.      • cyclobenzaprine (FLEXERIL) 10 MG tablet Take 1 tablet by mouth As Needed for Muscle Spasms. 90 tablet 1    • Green Tea, Sabrina sinensis, (GREEN TEA PO) Take 500 mg by mouth Daily.      • HYDROcodone-acetaminophen (NORCO) 7.5-325 MG per tablet Take 1 tablet by mouth Every 12 (Twelve) Hours.      • hydroxychloroquine (PLAQUENIL) 200 MG tablet Take 1 tablet by mouth Every Other Day.      • metoprolol tartrate (LOPRESSOR) 50 MG tablet Take 1 tablet by mouth 2 (Two) Times a Day. 60 tablet 11    • montelukast (SINGULAIR) 10 MG tablet Take 1 tablet by mouth Every Night. 90 tablet 1    • nitroglycerin (NITROSTAT) 0.4 MG SL tablet TAKE 1 TABLET SUBLINGUALLY EVERY 5 MINS AS NEEDED FOR CHEST PAIN. AFTER THIRD TAB, ITSG661      • rosuvastatin (CRESTOR) 10 MG tablet Take 1 tablet by mouth Daily. 90 tablet 1    • Thyroid 60 MG PO tablet Take 1 tablet by mouth Daily. 90 tablet 1    • torsemide (DEMADEX) 20 MG tablet TAKE 1 TABLET BY MOUTH EVERY DAY 90 tablet 2     • TURMERIC CURCUMIN PO Take  by mouth Daily.      • vitamin E 1000 UNIT capsule Take 1 capsule by mouth Daily.        Current Meds:   budesonide-formoterol, 2 puff, Inhalation, BID - RT  HYDROcodone-acetaminophen, 1 tablet, Oral, Q12H  hydroxychloroquine, 200 mg, Oral, Every Other Day  metoprolol tartrate, 50 mg, Oral, BID  montelukast, 10 mg, Oral, Nightly  rosuvastatin, 10 mg, Oral, Daily  senna-docusate sodium, 2 tablet, Oral, BID  sodium chloride, 10 mL, Intravenous, Q12H  Thyroid, 60 mg, Oral, Daily  torsemide, 20 mg, Oral, Daily      Allergies:  Allergies   Allergen Reactions   • Other Other (See Comments)     RHEUMATOID ARTHRITIS INFUSION REACTION   • Other Irritability     INFUSION FOR RHEUMATOID ARTHRITIS   • Rituximab Unknown - High Severity     Review of Systems  Pertinent items are noted in HPI, all other systems reviewed and negative         Vital Signs  Temp:  [97.4 °F (36.3 °C)-98.4 °F (36.9 °C)] 97.7 °F (36.5 °C)  Heart Rate:  [] 98  Resp:  [18] 18  BP: ()/(52-76) 133/76  Physical Exam:  General Appearance:    Alert, cooperative, in no acute distress   Head:    Normocephalic, without obvious abnormality, atraumatic   Eyes:          conjunctivae and sclerae normal, no   icterus   Throat:   no thrush, oral mucosa moist   Neck:   Supple, no adenopathy   Lungs:     Clear to auscultation bilaterally    Heart:    Regular rhythm and normal rate    Chest Wall:    No abnormalities observed   Abdomen:     Soft, nondistended, nontender; normal bowel sounds   Extremities:   no edema, no redness   Skin:   No bruising or rash   Psychiatric:  normal mood and insight     Results Review:  [x]  Laboratory   [x]  Radiology  []  Pathology      I reviewed the patient's new clinical results.    Results from last 7 days   Lab Units 12/23/22  0754 12/22/22  1954 12/22/22  0950   WBC 10*3/mm3 4.72 5.21 5.60   HEMOGLOBIN g/dL 6.1* 5.1* 5.0*   HEMATOCRIT % 20.2* 17.7* 17.5*   PLATELETS 10*3/mm3 311 360 274      Results from last 7 days   Lab Units 12/23/22  0754 12/22/22 1954   SODIUM mmol/L 139 137   POTASSIUM mmol/L 3.7 3.9   CHLORIDE mmol/L 105 100   CO2 mmol/L 27.4 27.0   BUN mg/dL 15 19   CREATININE mg/dL 0.74 0.86   CALCIUM mg/dL 8.3* 8.5*   GLUCOSE mg/dL 116* 84     Results from last 7 days   Lab Units 12/23/22  0754 12/22/22 1954   INR  1.32* 1.18*     No results found for: LIPASE    Radiology:  No orders to display        Assessment & Plan     Patient Active Problem List   Diagnosis   • CHF (congestive heart failure) (HCC)   • Extreme obesity with alveolar hypoventilation (HCC)   • Paroxysmal atrial fibrillation (HCC)   • Essential (primary) hypertension   • Osteoarthritis of knee   • Rheumatoid arthritis (HCC)   • Stage 3 chronic kidney disease (HCC)   • Type 2 diabetes mellitus without complication (HCC)   • AMOR on CPAP   • Gastrointestinal hemorrhage, unspecified gastrointestinal hemorrhage type   • Anemia   • Melena   • GI bleed   • Hypothyroidism        Plan:  Will complete 4 units packed red blood cells then okay to discharge later today.  She has outpatient follow-up arranged with gastroenterology.  She needs a weekly H&H checked beginning this coming Monday.      I discussed the patients findings and my recommendations with patient.    Rene Jacobo MD

## 2022-12-23 NOTE — PLAN OF CARE
Goal Outcome Evaluation:  Plan of Care Reviewed With: patient        Progress: improving  Outcome Evaluation: VSS. Patient arrived to the unit, admission completed. Patient received 2 units PRBCs with no reaction issues. CBC to be drawn at 0600 as ordered. No complaints or concerns at this time.

## 2022-12-23 NOTE — PROGRESS NOTES
[facility]   Hospitalist Progress Note  Date: 2022  Patient Name: Milagros Ramirez  : 1960  MRN: [id number]  Date of admission: 2022      Subjective   Subjective     Chief Complaint: Dizziness    Summary: 62-year-old female with extensive prior work-up for anemia who was just previously discharged on 2022 presented with dizziness.  Patient was sent by gastroenterology.  Ultimately she was found to have hemoglobin of 5.0 and has been transfused 2 units PRBCs.  Her hemoglobin increased to 6.1.  She will remain admitted due to requiring additional transfusion.    Interval Followup: No events overnight.  Patient states that she really cannot tell a difference from being transfused.  Hemoglobin 6.1 this morning will receive 2 more units PRBCs.  Discussed with gastroenterology.  Patient denies any current chest pain or chest pressure.  She has not any fevers or chills.  She denies any hemoptysis, hematochezia, or hematuria.  She has not had any episodes of nausea or emesis.    Review of Systems  All systems reviewed and are negative except as above in HPI.    Objective   Objective     Vitals:   Temp:  [97.6 °F (36.4 °C)-98.4 °F (36.9 °C)] 97.7 °F (36.5 °C)  Heart Rate:  [] 107  Resp:  [18] 18  BP: ()/(52-66) 129/62  Physical Exam   Constitutional: Alert, awake, no acute distress  HEENT:  PERRLA, EOMI; No Scleral icterus  Neck:  Supple; No Mass, No Lymphadenopathy  Cardiovascular:  No Rubs, No Edema, No JVD  Respiratory: No respiratory distress, unlabored breathing, saturating well on room air  Abdomen:  Normal BS all 4 Quadrants; No Guarding, No Tenderness  Musculoskeletal:  Pulses Positive all 4 Ext; No Cyanosis, No Edema  Neurological:  Alert+Ox3, Mental status WNL; No Dysarthria  Skin:  No Rash, No Cellulitis, No Erythema    Result Review    Result Review:  I have personally reviewed the results from the time of this admission to 2022 08:26 EST and agree with these  findings:  [x]  Laboratory  [x]  Microbiology  [x]  Radiology  [x]  EKG/Telemetry   []  Cardiology/Vascular   []  Pathology  [x]  Old records  []  Other:    Assessment & Plan   Assessment / Plan     Assessment:  Symptomatic anemia requiring transfusion  Status post colonoscopy with clipping on 10/13/2022  History of iron deficiency anemia  Moderate MV regurgitation and tricuspid valve regurgitation  Mild to moderate cardiomegaly and small pericardial effusion  Hypothyroidism and history of partial thyroidectomy  History of hypertension  AMOR  SLE and rheumatoid arthritis history on hydroxychloroquine    Plan:  Hemoglobin still low this morning, will transfuse additional 2 units PRBCs  Check posttransfusion H&H  Gastroenterology assistance appreciated, recommending transfusion, patient has follow-up appointment as outpatient  Will order CBC for patient upon discharge for routine monitoring  She remains hemodynamically stable  Patient not requiring oxygen, currently on room air  Continue with hydroxychloroquine given history of rheumatoid arthritis  Blood pressure is stable, continue with Lopressor 80 mg twice daily  Continue with torsemide, monitor I's and O's, check daily weights  Labs reviewed, image reviewed, medications reviewed, vital signs reviewed, old records reviewed  Further recommendations pending clinical course     Discussed plan with gastroenterology, RN.    DVT prophylaxis:  Mechanical DVT prophylaxis orders are present.    CODE STATUS:   Level Of Support Discussed With: Patient  Code Status (Patient has no pulse and is not breathing): CPR (Attempt to Resuscitate)  Medical Interventions (Patient has pulse or is breathing): Full Support        Electronically signed by Jake James DO, 12/23/22, 8:26 AM EST.

## 2022-12-23 NOTE — H&P
[facility] HISTORY AND PHYSICAL  Date: 2022   Patient Name: Milagros Ramirez  : 1960  MRN: [id number]  Primary Care Physician:  Nelly Romero MD  Date of admission: 2022    Subjective Mild dizziness  Subjective     Chief Complaint: Mild dizziness    HPI: Patient is a 62-year-old female who was sent here per Dr. Jacobo's request.  Her lab work showed that she had a hemoglobin of 5.  Patient is a history of anemia and has required multiple blood transfusions in the past.  She has had an extensive anemia work-up that included EGD, C-scope, capsule enteroscopy.  We were asked admit the patient observe her overnight and transfuse her 2 units of blood.  If she remains stable she can be discharged in the morning.  Her only complaint is mild dizziness.    Patient's past medical history significant for diastolic CHF with borderline reduced EF 46% per 10/12/2022 echo.  Moderate MV regurgitation and tricuspid valve regurgitation, history of iron deficiency anemia requiring IV iron, A. fib with RVR previously on Xarelto, hypothyroidism with history of partial thyroidectomy, obstructive sleep apnea, SLE and rheumatoid arthritis on hydroxychloroquine alone, hypertension.  Personal History     Past Medical History:  Past Medical History:   Diagnosis Date   • Atrial fibrillation (HCC)     Diagnosed 2019   • CKD (chronic kidney disease)    • Diabetes (HCC)     Diagnosed 2019   • Diastolic CHF (HCC)    • Hyperlipidemia    • Hypertension    • Hypothyroidism    • Morbid obesity (HCC)    • AMOR (obstructive sleep apnea)    • Rheumatoid arthritis (HCC)    • SLE (systemic lupus erythematosus) (HCC)          Past Surgical History:  Past Surgical History:   Procedure Laterality Date   • APPENDECTOMY     • CARPAL TUNNEL RELEASE     • COLONOSCOPY N/A 10/13/2022    Procedure: COLONOSCOPY;  Surgeon: Sara Jarrett MD;  Location: [facility] ENDOSCOPY;  Service: Gastroenterology;   Laterality: N/A;  DIVERTICULOSIS   • ENDOMETRIAL ABLATION     • ENDOSCOPY N/A 10/12/2022    Procedure: ESOPHAGOGASTRODUODENOSCOPY;  Surgeon: Sara Jarrett MD;  Location: [facility] ENDOSCOPY;  Service: Gastroenterology;  Laterality: N/A;  GASTRITIS   • ENTEROSCOPY SMALL BOWEL N/A 11/4/2022    Procedure: ENTEROSCOPY SMALL BOWEL;  Surgeon: Sara Jarrett MD;  Location: [facility] ENDOSCOPY;  Service: Gastroenterology;  Laterality: N/A;  normal   • HEEL SPUR EXCISION     • HERNIA REPAIR     • THYROIDECTOMY, PARTIAL         Family History:   Breast Cancer-related family history is not on file.      Social History:   Social History     Socioeconomic History   • Marital status:    Tobacco Use   • Smoking status: Former   • Smokeless tobacco: Never   • Tobacco comments:     Quit around age 36   Vaping Use   • Vaping Use: Never used   Substance and Sexual Activity   • Alcohol use: Yes     Comment: rare   • Drug use: No   • Sexual activity: Defer       Home Medications:  Green Tea (Sabrina sinensis), HYDROcodone-acetaminophen, Thyroid, Turmeric, albuterol sulfate HFA, budesonide-formoterol, cyclobenzaprine, hydroxychloroquine, metoprolol tartrate, montelukast, nitroglycerin, rosuvastatin, torsemide, and vitamin E    Allergies:  Allergies   Allergen Reactions   • Other Other (See Comments)     RHEUMATOID ARTHRITIS INFUSION REACTION   • Other Irritability     INFUSION FOR RHEUMATOID ARTHRITIS   • Rituximab Unknown - High Severity       Review of Systems   All systems were reviewed and negative except for: Dizziness    Objective   Objective     Vitals:        Physical Exam    Constitutional: Awake, alert, no acute distress   Eyes: Pupils equal, sclerae anicteric, no conjunctival injection   HENT: NCAT, mucous membranes moist   Neck: Supple, no thyromegaly, no lymphadenopathy, trachea midline   Respiratory: Clear to auscultation bilaterally, nonlabored respirations    Cardiovascular: RRR, no murmurs, rubs, or  gallops, palpable pedal pulses bilaterally   Gastrointestinal: Positive bowel sounds, soft, nontender, nondistended   Musculoskeletal: No bilateral ankle edema, no clubbing or cyanosis to extremities   Psychiatric: Appropriate affect, cooperative   Neurologic: Oriented x 3, strength symmetric in all extremities, Cranial Nerves grossly intact to confrontation, speech clear   Skin: No rashes     Result Review    Result Review:  I have personally reviewed the results from the time of this admission to 12/22/2022 19:18 EST and agree with these findings:  [x]  Laboratory  []  Microbiology  [x]  Radiology  []  EKG/Telemetry   []  Cardiology/Vascular   []  Pathology  [x]  Old records  []  Other:      Assessment & Plan   Assessment / Plan   #1 anemia  -Patient has had an extensive GI work-up.  -Transfused 2 units of blood.  Observe overnight.  If stable can be discharged in the morning.    #2 A. Fib  -Continue metoprolol    #3 SLE and RA  -Continue hydroxychloroquine alone    #4 COPD continue albuterol, Symbicort, montelukast    #5 hyperlipidemia continue Crestor    #6 hypothyroidism continue Mayer    #7 diastolic CHF  -Continue furosemide, beta-blocker      DVT prophylaxis:  Mechanical DVT prophylaxis orders are present.    CODE STATUS:    Level Of Support Discussed With: Patient  Code Status (Patient has no pulse and is not breathing): CPR (Attempt to Resuscitate)  Medical Interventions (Patient has pulse or is breathing): Full Support      Admission Status:  I believe this patient meets observation status.    Electronically signed by Cheli Sen DO, 12/22/22, 7:18 PM EST.

## 2022-12-23 NOTE — THERAPY EVALUATION
Patient Name: Milagros Ramirez  : 1960    MRN: [id number]                              Today's Date: 2022       Admit Date: 2022    Visit Dx:     ICD-10-CM ICD-9-CM   1. Decreased activities of daily living (ADL)  Z78.9 V49.89     Patient Active Problem List   Diagnosis   • CHF (congestive heart failure) (HCC)   • Extreme obesity with alveolar hypoventilation (HCC)   • Paroxysmal atrial fibrillation (HCC)   • Essential (primary) hypertension   • Osteoarthritis of knee   • Rheumatoid arthritis (HCC)   • Stage 3 chronic kidney disease (HCC)   • Type 2 diabetes mellitus without complication (HCC)   • AMOR on CPAP   • Gastrointestinal hemorrhage, unspecified gastrointestinal hemorrhage type   • Anemia   • Melena   • GI bleed   • Hypothyroidism     Past Medical History:   Diagnosis Date   • Atrial fibrillation (HCC)     Diagnosed 2019   • CKD (chronic kidney disease)    • Diabetes (HCC)     Diagnosed 2019   • Diastolic CHF (HCC)    • Hyperlipidemia    • Hypertension    • Hypothyroidism    • Morbid obesity (HCC)    • AMOR (obstructive sleep apnea)    • Rheumatoid arthritis (HCC)    • SLE (systemic lupus erythematosus) (HCC)      Past Surgical History:   Procedure Laterality Date   • APPENDECTOMY     • CARPAL TUNNEL RELEASE     • COLONOSCOPY N/A 10/13/2022    Procedure: COLONOSCOPY;  Surgeon: Sara Jarrett MD;  Location: [facility] ENDOSCOPY;  Service: Gastroenterology;  Laterality: N/A;  DIVERTICULOSIS   • ENDOMETRIAL ABLATION     • ENDOSCOPY N/A 10/12/2022    Procedure: ESOPHAGOGASTRODUODENOSCOPY;  Surgeon: Sara Jarrett MD;  Location: [facility] ENDOSCOPY;  Service: Gastroenterology;  Laterality: N/A;  GASTRITIS   • ENTEROSCOPY SMALL BOWEL N/A 2022    Procedure: ENTEROSCOPY SMALL BOWEL;  Surgeon: Sara Jarrett MD;  Location: [facility] ENDOSCOPY;  Service: Gastroenterology;  Laterality: N/A;  normal   • HEEL SPUR EXCISION     • HERNIA REPAIR     • THYROIDECTOMY, PARTIAL         General Information     Row Name 12/23/22 0919          OT Time and Intention    Document Type evaluation  -PG     Mode of Treatment individual therapy;occupational therapy  -PG     Row Name 12/23/22 0919          General Information    Patient Profile Reviewed yes  -PG     Prior Level of Function independent:;transfer;ADL's  -PG     Existing Precautions/Restrictions no known precautions/restrictions  -PG     Barriers to Rehab none identified  -PG     Row Name 12/23/22 0919          Occupational Profile    Reason for Services/Referral (Occupational Profile) Patient is a pleasant 62-year-old female admitted for anemia and weakness.  No previous OT services reported.  Patient being evaluated by Occupational Therapy due to recent decline in ADL function  -PG     Row Name 12/23/22 0919          Living Environment    People in Home spouse;child(stanislaw), adult  -PG     Row Name 12/23/22 0919          Cognition    Orientation Status (Cognition) oriented x 4  -PG           User Key  (r) = Recorded By, (t) = Taken By, (c) = Cosigned By    Initials Name Provider Type    PG Jay Jay Wilcox, OT Occupational Therapist                 Mobility/ADL's     Row Name 12/23/22 0921          Transfers    Transfers toilet transfer  -PG     Row Name 12/23/22 0921          Toilet Transfer    Type (Toilet Transfer) stand-sit;sit-stand  -PG     [place] Level (Toilet Transfer) independent  -PG     Row Name 12/23/22 0921          Activities of Daily Living    BADL Assessment/Intervention bathing;upper body dressing;lower body dressing;grooming;toileting  -PG     Row Name 12/23/22 0921          Bathing Assessment/Intervention    [place] Level (Bathing) bathing skills;independent  -PG     Row Name 12/23/22 0921          Upper Body Dressing Assessment/Training    [place] Level (Upper Body Dressing) upper body dressing skills;independent  -PG     Row Name 12/23/22 0921          Lower Body Dressing Assessment/Training    [place]  Level (Lower Body Dressing) lower body dressing skills;independent  -PG     Row Name 12/23/22 0921          Grooming Assessment/Training    [place] Level (Grooming) grooming skills;independent  -PG     Row Name 12/23/22 0921          Toileting Assessment/Training    [place] Level (Toileting) toileting skills;independent  -PG           User Key  (r) = Recorded By, (t) = Taken By, (c) = Cosigned By    Initials Name Provider Type    PG Jay Jay Wilcox, JAYANT Occupational Therapist               Obj/Interventions     Row Name 12/23/22 0922          Sensory Assessment (Somatosensory)    Sensory Assessment (Somatosensory) sensation intact  -PG     Row Name 12/23/22 0922          Vision Assessment/Intervention    Visual Impairment/Limitations WFL  -PG     Row Name 12/23/22 0922          Range of Motion Comprehensive    General Range of Motion no range of motion deficits identified  -PG     Row Name 12/23/22 0922          Strength Comprehensive (MMT)    General Manual Muscle Testing (MMT) Assessment no strength deficits identified  -PG     Row Name 12/23/22 0922          Motor Skills    Motor Skills coordination;functional endurance  -PG     Coordination WFL  -PG     Functional Endurance Good minus  -PG           User Key  (r) = Recorded By, (t) = Taken By, (c) = Cosigned By    Initials Name Provider Type    PG Jay Jay Wilcox, JAYANT Occupational Therapist               Goals/Plan    No documentation.                Clinical Impression     Row Name 12/23/22 0922          Pain Assessment    Pretreatment Pain Rating 0/10 - no pain  -PG     Posttreatment Pain Rating 0/10 - no pain  -PG     Row Name 12/23/22 0922          Plan of Care Review    Plan of Care Reviewed With patient  -PG     Progress improving  -PG     Outcome Evaluation Patient currently independent in her room and with all functional transfers.  Patient states that she is at her baseline of function and does not require any additional therapy services at this  time.  -PG     Row Name 12/23/22 0922          Therapy Assessment/Plan (OT)    Patient/Family Therapy Goal Statement (OT) Return home for [place]  -PG     Criteria for Skilled Therapeutic Interventions Met (OT) no;no problems identified which require skilled intervention  -PG     Therapy Frequency (OT) evaluation only  -PG     Row Name 12/23/22 0922          Therapy Plan Review/Discharge Plan (OT)    Anticipated Discharge Disposition (OT) home  -PG           User Key  (r) = Recorded By, (t) = Taken By, (c) = Cosigned By    Initials Name Provider Type    PG Jay Jay Wilcox, OT Occupational Therapist               Outcome Measures     Row Name 12/23/22 0923          How much help from another is currently needed...    Putting on and taking off regular lower body clothing? 4  -PG     Bathing (including washing, rinsing, and drying) 4  -PG     Toileting (which includes using toilet bed pan or urinal) 4  -PG     Putting on and taking off regular upper body clothing 4  -PG     Taking care of personal grooming (such as brushing teeth) 4  -PG     Eating meals 4  -PG     AM-PAC 6 Clicks Score (OT) 24  -PG     Row Name 12/23/22 0923          Functional Assessment    Outcome Measure Options AM-PAC 6 Clicks Daily Activity (OT);Optimal Instrument  -PG     Row Name 12/23/22 0923          Optimal Instrument    Optimal Instrument Optimal - 3  -PG     Bending/Stooping 1  -PG     Standing 1  -PG     Reaching 1  -PG     From the list, choose the 3 activities you would most like to be able to do without any difficulty Bending/stooping;Standing;Reaching  -PG     Total Score Optimal - 3 3  -PG           User Key  (r) = Recorded By, (t) = Taken By, (c) = Cosigned By    Initials Name Provider Type    Jay Jay Good, OT Occupational Therapist                  OT Recommendation and Plan  Therapy Frequency (OT): evaluation only  Plan of Care Review  Plan of Care Reviewed With: patient  Progress: improving  Outcome Evaluation: Patient  currently independent in her room and with all functional transfers.  Patient states that she is at her baseline of function and does not require any additional therapy services at this time.     Time Calculation:    Time Calculation- OT     Row Name 12/23/22 0924             Time Calculation- OT    OT Received On 12/23/22  -PG         Untimed Charges    OT Eval/Re-eval Minutes 30  -PG         Total Minutes    Untimed Charges Total Minutes 30  -PG       Total Minutes 30  -PG            User Key  (r) = Recorded By, (t) = Taken By, (c) = Cosigned By    Initials Name Provider Type    PG Jay Jay Wilcox OT Occupational Therapist              Therapy Charges for Today     Code Description Service Date Service Provider Modifiers Qty    [id number] HC OT EVAL LOW COMPLEXITY 2 12/23/2022 Jay Jay Wilcox OT GO 1               Jay Jay Wilcox OT  12/23/2022

## 2022-12-24 ENCOUNTER — READMISSION MANAGEMENT (OUTPATIENT)
Dept: CALL CENTER | Facility: HOSPITAL | Age: 62
End: 2022-12-24

## 2022-12-24 VITALS
TEMPERATURE: 97.6 F | DIASTOLIC BLOOD PRESSURE: 66 MMHG | HEIGHT: 70 IN | RESPIRATION RATE: 16 BRPM | BODY MASS INDEX: 39.23 KG/M2 | WEIGHT: 274.03 LBS | SYSTOLIC BLOOD PRESSURE: 120 MMHG | OXYGEN SATURATION: 97 % | HEART RATE: 94 BPM

## 2022-12-24 LAB
ANION GAP SERPL CALCULATED.3IONS-SCNC: 10 MMOL/L (ref 5–15)
BASOPHILS # BLD AUTO: 0.02 10*3/MM3 (ref 0–0.2)
BASOPHILS NFR BLD AUTO: 0.3 % (ref 0–1.5)
BH BB BLOOD EXPIRATION DATE: NORMAL
BH BB BLOOD EXPIRATION DATE: NORMAL
BH BB BLOOD TYPE BARCODE: 5100
BH BB BLOOD TYPE BARCODE: 5100
BH BB DISPENSE STATUS: NORMAL
BH BB DISPENSE STATUS: NORMAL
BH BB PRODUCT CODE: NORMAL
BH BB PRODUCT CODE: NORMAL
BH BB UNIT NUMBER: NORMAL
BH BB UNIT NUMBER: NORMAL
BUN SERPL-MCNC: 15 MG/DL (ref 8–23)
BUN/CREAT SERPL: 18.1 (ref 7–25)
CALCIUM SPEC-SCNC: 8.1 MG/DL (ref 8.6–10.5)
CHLORIDE SERPL-SCNC: 101 MMOL/L (ref 98–107)
CO2 SERPL-SCNC: 26 MMOL/L (ref 22–29)
CREAT SERPL-MCNC: 0.83 MG/DL (ref 0.57–1)
CROSSMATCH INTERPRETATION: NORMAL
CROSSMATCH INTERPRETATION: NORMAL
DEPRECATED RDW RBC AUTO: 48.5 FL (ref 37–54)
DEPRECATED RDW RBC AUTO: 48.9 FL (ref 37–54)
EGFRCR SERPLBLD CKD-EPI 2021: 80.3 ML/MIN/1.73
EOSINOPHIL # BLD AUTO: 0.12 10*3/MM3 (ref 0–0.4)
EOSINOPHIL NFR BLD AUTO: 1.6 % (ref 0.3–6.2)
ERYTHROCYTE [DISTWIDTH] IN BLOOD BY AUTOMATED COUNT: 16 % (ref 12.3–15.4)
ERYTHROCYTE [DISTWIDTH] IN BLOOD BY AUTOMATED COUNT: 16 % (ref 12.3–15.4)
GLUCOSE SERPL-MCNC: 112 MG/DL (ref 65–99)
HCT VFR BLD AUTO: 24.6 % (ref 34–46.6)
HCT VFR BLD AUTO: 26.7 % (ref 34–46.6)
HGB BLD-MCNC: 7.7 G/DL (ref 12–15.9)
HGB BLD-MCNC: 8.4 G/DL (ref 12–15.9)
IMM GRANULOCYTES # BLD AUTO: 0.02 10*3/MM3 (ref 0–0.05)
IMM GRANULOCYTES NFR BLD AUTO: 0.3 % (ref 0–0.5)
INR PPP: 1.25 (ref 0.86–1.15)
IRON 24H UR-MRATE: 26 MCG/DL (ref 37–145)
IRON SATN MFR SERPL: 8 % (ref 20–50)
LYMPHOCYTES # BLD AUTO: 1 10*3/MM3 (ref 0.7–3.1)
LYMPHOCYTES NFR BLD AUTO: 13.3 % (ref 19.6–45.3)
MAGNESIUM SERPL-MCNC: 1.7 MG/DL (ref 1.6–2.4)
MCH RBC QN AUTO: 26 PG (ref 26.6–33)
MCH RBC QN AUTO: 26.3 PG (ref 26.6–33)
MCHC RBC AUTO-ENTMCNC: 31.3 G/DL (ref 31.5–35.7)
MCHC RBC AUTO-ENTMCNC: 31.5 G/DL (ref 31.5–35.7)
MCV RBC AUTO: 83.1 FL (ref 79–97)
MCV RBC AUTO: 83.7 FL (ref 79–97)
MONOCYTES # BLD AUTO: 0.5 10*3/MM3 (ref 0.1–0.9)
MONOCYTES NFR BLD AUTO: 6.7 % (ref 5–12)
NEUTROPHILS NFR BLD AUTO: 5.84 10*3/MM3 (ref 1.7–7)
NEUTROPHILS NFR BLD AUTO: 77.8 % (ref 42.7–76)
NRBC BLD AUTO-RTO: 0 /100 WBC (ref 0–0.2)
PHOSPHATE SERPL-MCNC: 3.6 MG/DL (ref 2.5–4.5)
PLATELET # BLD AUTO: 328 10*3/MM3 (ref 140–450)
PLATELET # BLD AUTO: 351 10*3/MM3 (ref 140–450)
PMV BLD AUTO: 10 FL (ref 6–12)
PMV BLD AUTO: 10.1 FL (ref 6–12)
POTASSIUM SERPL-SCNC: 3.7 MMOL/L (ref 3.5–5.2)
PROTHROMBIN TIME: 15.9 SECONDS (ref 11.8–14.9)
RBC # BLD AUTO: 2.96 10*6/MM3 (ref 3.77–5.28)
RBC # BLD AUTO: 3.19 10*6/MM3 (ref 3.77–5.28)
SODIUM SERPL-SCNC: 137 MMOL/L (ref 136–145)
TIBC SERPL-MCNC: 340 MCG/DL (ref 298–536)
TRANSFERRIN SERPL-MCNC: 228 MG/DL (ref 200–360)
UNIT  ABO: NORMAL
UNIT  ABO: NORMAL
UNIT  RH: NORMAL
UNIT  RH: NORMAL
WBC NRBC COR # BLD: 6.6 10*3/MM3 (ref 3.4–10.8)
WBC NRBC COR # BLD: 7.5 10*3/MM3 (ref 3.4–10.8)

## 2022-12-24 PROCEDURE — 83735 ASSAY OF MAGNESIUM: CPT | Performed by: HOSPITALIST

## 2022-12-24 PROCEDURE — G0378 HOSPITAL OBSERVATION PER HR: HCPCS

## 2022-12-24 PROCEDURE — 85025 COMPLETE CBC W/AUTO DIFF WBC: CPT | Performed by: STUDENT IN AN ORGANIZED HEALTH CARE EDUCATION/TRAINING PROGRAM

## 2022-12-24 PROCEDURE — 97161 PT EVAL LOW COMPLEX 20 MIN: CPT

## 2022-12-24 PROCEDURE — 85027 COMPLETE CBC AUTOMATED: CPT | Performed by: INTERNAL MEDICINE

## 2022-12-24 PROCEDURE — 84100 ASSAY OF PHOSPHORUS: CPT | Performed by: HOSPITALIST

## 2022-12-24 PROCEDURE — 94799 UNLISTED PULMONARY SVC/PX: CPT

## 2022-12-24 PROCEDURE — 84466 ASSAY OF TRANSFERRIN: CPT | Performed by: INTERNAL MEDICINE

## 2022-12-24 PROCEDURE — 83540 ASSAY OF IRON: CPT | Performed by: INTERNAL MEDICINE

## 2022-12-24 PROCEDURE — 94664 DEMO&/EVAL PT USE INHALER: CPT

## 2022-12-24 PROCEDURE — 99217 PR OBSERVATION CARE DISCHARGE MANAGEMENT: CPT | Performed by: INTERNAL MEDICINE

## 2022-12-24 PROCEDURE — 85610 PROTHROMBIN TIME: CPT | Performed by: HOSPITALIST

## 2022-12-24 PROCEDURE — 80048 BASIC METABOLIC PNL TOTAL CA: CPT | Performed by: HOSPITALIST

## 2022-12-24 RX ORDER — VIT C/B6/B5/MAGNESIUM/HERB 173 50-5-6-5MG
500 CAPSULE ORAL DAILY
COMMUNITY

## 2022-12-24 RX ADMIN — ROSUVASTATIN CALCIUM 10 MG: 5 TABLET, FILM COATED ORAL at 08:19

## 2022-12-24 RX ADMIN — METOPROLOL TARTRATE 50 MG: 25 TABLET, FILM COATED ORAL at 08:19

## 2022-12-24 RX ADMIN — LEVOTHYROXINE, LIOTHYRONINE 60 MG: 38; 9 TABLET ORAL at 08:20

## 2022-12-24 RX ADMIN — SENNOSIDES AND DOCUSATE SODIUM 2 TABLET: 8.6; 5 TABLET ORAL at 08:19

## 2022-12-24 RX ADMIN — TORSEMIDE 20 MG: 20 TABLET ORAL at 08:19

## 2022-12-24 RX ADMIN — Medication 10 ML: at 08:22

## 2022-12-24 RX ADMIN — HYDROXYCHLOROQUINE SULFATE 200 MG: 200 TABLET ORAL at 08:20

## 2022-12-24 RX ADMIN — HYDROCODONE BITARTRATE AND ACETAMINOPHEN 1 TABLET: 7.5; 325 TABLET ORAL at 08:19

## 2022-12-24 RX ADMIN — BUDESONIDE AND FORMOTEROL FUMARATE DIHYDRATE 2 PUFF: 160; 4.5 AEROSOL RESPIRATORY (INHALATION) at 08:44

## 2022-12-24 NOTE — PLAN OF CARE
Goal Outcome Evaluation:            Patient to discharge home, self care. Transport via private vehicle.

## 2022-12-24 NOTE — PLAN OF CARE
Goal Outcome Evaluation:  Plan of Care Reviewed With: patient        Progress: improving  Outcome Evaluation: VSS. Patient rested well through the shift, no complaints or concerns at this time. Patient is anxious to go home as soon as possible. Will continue to monitor.

## 2022-12-24 NOTE — THERAPY EVALUATION
Acute Care - Physical Therapy Initial Evaluation   [facility]     Patient Name: Milagros Ramirez  : 1960  MRN: [id number]  Today's Date: 2022      Visit Dx:     ICD-10-CM ICD-9-CM   1. Decreased activities of daily living (ADL)  Z78.9 V49.89   2. Difficulty walking  R26.2 719.7     Patient Active Problem List   Diagnosis   • CHF (congestive heart failure) (HCC)   • Extreme obesity with alveolar hypoventilation (HCC)   • Paroxysmal atrial fibrillation (HCC)   • Essential (primary) hypertension   • Osteoarthritis of knee   • Rheumatoid arthritis (HCC)   • Stage 3 chronic kidney disease (HCC)   • Type 2 diabetes mellitus without complication (HCC)   • AMOR on CPAP   • Gastrointestinal hemorrhage, unspecified gastrointestinal hemorrhage type   • Anemia   • Melena   • GI bleed   • Hypothyroidism     Past Medical History:   Diagnosis Date   • Atrial fibrillation (HCC)     Diagnosed 2019   • CKD (chronic kidney disease)    • Diabetes (HCC)     Diagnosed 2019   • Diastolic CHF (HCC)    • Hyperlipidemia    • Hypertension    • Hypothyroidism    • Morbid obesity (HCC)    • AMOR (obstructive sleep apnea)    • Rheumatoid arthritis (HCC)    • SLE (systemic lupus erythematosus) (HCC)      Past Surgical History:   Procedure Laterality Date   • APPENDECTOMY     • CARPAL TUNNEL RELEASE     • COLONOSCOPY N/A 10/13/2022    Procedure: COLONOSCOPY;  Surgeon: Sraa Jarrett MD;  Location: [facility] ENDOSCOPY;  Service: Gastroenterology;  Laterality: N/A;  DIVERTICULOSIS   • ENDOMETRIAL ABLATION     • ENDOSCOPY N/A 10/12/2022    Procedure: ESOPHAGOGASTRODUODENOSCOPY;  Surgeon: Sara Jarrett MD;  Location: [facility] ENDOSCOPY;  Service: Gastroenterology;  Laterality: N/A;  GASTRITIS   • ENTEROSCOPY SMALL BOWEL N/A 2022    Procedure: ENTEROSCOPY SMALL BOWEL;  Surgeon: Sara Jarrett MD;  Location: [facility] ENDOSCOPY;  Service: Gastroenterology;  Laterality: N/A;  normal   • HEEL SPUR EXCISION     • HERNIA  REPAIR     • THYROIDECTOMY, PARTIAL       PT Assessment (last 12 hours)     PT Evaluation and Treatment     Row Name 12/24/22 1400          Physical Therapy Time and Intention    Subjective Information no complaints  -DP     Document Type evaluation  -DP     Mode of Treatment individual therapy;physical therapy  -DP     Patient Effort good  -DP     Row Name 12/24/22 1400          General Information    Patient Profile Reviewed yes  -DP     Prior Level of Function independent:;gait;transfer;bed mobility;ADL's  -DP     Equipment Currently Used at Home cane, straight  -DP     Existing Precautions/Restrictions no known precautions/restrictions  -DP     Barriers to Rehab none identified  -DP     Row Name 12/24/22 1400          Living Environment    Current Living Arrangements home  -DP     Home Accessibility stairs to enter home  -DP     People in Home spouse  -DP     Row Name 12/24/22 1400          Home Main Entrance    Number of Stairs, Main Entrance two  -DP     Row Name 12/24/22 1400          Home Use of Assistive/Adaptive Equipment    Equipment Currently Used at Home cane, straight  -DP     Row Name 12/24/22 1400          Range of Motion (ROM)    Range of Motion bilateral lower extremities;ROM is WFL  -DP     Row Name 12/24/22 1400          Strength (Manual Muscle Testing)    Strength (Manual Muscle Testing) bilateral lower extremities;strength is WFL  -DP     Row Name 12/24/22 1400          Bed Mobility    Bed Mobility supine-sit-supine  -DP     Supine-Sit-Supine [place] (Bed Mobility) independent  -DP     Row Name 12/24/22 1400          Transfers    Transfers sit-stand transfer  -DP     Row Name 12/24/22 1400          Sit-Stand Transfer    Sit-Stand [place] (Transfers) independent  -DP     Row Name 12/24/22 1400          Gait/Stairs (Locomotion)    Gait/Stairs Locomotion gait/ambulation independence  -DP     [place] Level (Gait) independent  -DP     Assistive Device (Gait) other (see comments)  none   -DP     Distance in Feet (Gait) 20  -DP     Row Name 12/24/22 1400          Balance    Balance Assessment standing dynamic balance  -DP     Dynamic Standing Balance independent  -DP     Row Name 12/24/22 1400          Plan of Care Review    Plan of Care Reviewed With patient  -DP     Outcome Evaluation Patient presents with no strength deficits.  She is able to complete all transfers and ambulate independently in her room.  She does not need inpatient PT services and is okay to discharge home upon discharge from the hospital.  -DP     Row Name 12/24/22 1400          Therapy Assessment/Plan (PT)    Criteria for Skilled Interventions Met (PT) no problems identified which require skilled intervention  -DP     Therapy Frequency (PT) evaluation only  -DP     Row Name 12/24/22 1400          PT Evaluation Complexity    History, PT Evaluation Complexity no personal factors and/or comorbidities  -DP     Examination of Body Systems (PT Eval Complexity) total of 4 or more elements  -DP     Clinical Presentation (PT Evaluation Complexity) stable  -DP     Clinical Decision Making (PT Evaluation Complexity) low complexity  -DP     Overall Complexity (PT Evaluation Complexity) low complexity  -DP           User Key  (r) = Recorded By, (t) = Taken By, (c) = Cosigned By    Initials Name Provider Type    Umm Gonzalez, PT Physical Therapist                  PT Recommendation and Plan  Anticipated Discharge Disposition (PT): home  Therapy Frequency (PT): evaluation only  Plan of Care Reviewed With: patient  Outcome Evaluation: Patient presents with no strength deficits.  She is able to complete all transfers and ambulate independently in her room.  She does not need inpatient PT services and is okay to discharge home upon discharge from the hospital.   Outcome Measures     Row Name 12/24/22 1400             How much help from another person do you currently need...    Turning from your back to your side while in flat bed without  using bedrails? 4  -DP      Moving from lying on back to sitting on the side of a flat bed without bedrails? 4  -DP      Moving to and from a bed to a chair (including a wheelchair)? 4  -DP      Standing up from a chair using your arms (e.g., wheelchair, bedside chair)? 4  -DP      Climbing 3-5 steps with a railing? 4  -DP      To walk in hospital room? 4  -DP      AM-PAC 6 Clicks Score (PT) 24  -DP         Functional Assessment    Outcome Measure Options AM-PAC 6 Clicks Basic Mobility (PT)  -DP            User Key  (r) = Recorded By, (t) = Taken By, (c) = Cosigned By    Initials Name Provider Type    Umm Gonzalez PT Physical Therapist                 Time Calculation:    PT Charges     Row Name 12/24/22 1410             Time Calculation    PT Received On 12/24/22  -DP         Untimed Charges    PT Eval/Re-eval Minutes 40  -DP         Total Minutes    Untimed Charges Total Minutes 40  -DP       Total Minutes 40  -DP            User Key  (r) = Recorded By, (t) = Taken By, (c) = Cosigned By    Initials Name Provider Type    Umm Gonzalez, PT Physical Therapist              Therapy Charges for Today     Code Description Service Date Service Provider Modifiers Qty    [id number] HC PT EVAL LOW COMPLEXITY 3 12/24/2022 Umm Sen, PT GP 1          PT G-Codes  Outcome Measure Options: AM-PAC 6 Clicks Basic Mobility (PT)  AM-PAC 6 Clicks Score (PT): 24  AM-PAC 6 Clicks Score (OT): 24    Umm Sen PT  12/24/2022

## 2022-12-24 NOTE — DISCHARGE SUMMARY
[facility]         HOSPITALIST  DISCHARGE SUMMARY    Patient Name: Milagros Ramirez  : 1960  MRN: [id number]    Date of Admission: 2022  Date of Discharge: 2022  Primary Care Physician: Nelly Romero MD    Consults     No orders found from 2022 to 2022.          Active and Resolved Hospital Problems:  Active Hospital Problems    Diagnosis POA   • **Anemia [D64.9] Yes      Resolved Hospital Problems   No resolved problems to display.       Hospital Course     Hospital Course:  Milagros Ramirez is a 62 y.o. female  who was sent here per Dr. Jacobo's request.  Her lab work showed that she had a hemoglobin of 5.  Patient is a history of anemia and has required multiple blood transfusions in the past.  She has had an extensive anemia work-up that included EGD, C-scope, capsule enteroscopy.  We were asked admit the patient observe her overnight and transfuse her 2 units of blood.  If she remains stable she can be discharged in the morning.  Her only complaint is mild dizziness.     Patient's past medical history significant for diastolic CHF with borderline reduced EF 46% per 10/12/2022 echo.  Moderate MV regurgitation and tricuspid valve regurgitation, history of iron deficiency anemia requiring IV iron, A. fib with RVR previously on Xarelto, hypothyroidism with history of partial thyroidectomy, obstructive sleep apnea, SLE and rheumatoid arthritis on hydroxychloroquine alone, hypertension.    Patient received 4 units of blood with a bump in her hemoglobin from 5 left 8.4.  Next check was 7.7 and the repeat check on the same day was 8.4.  Due to the fact that her hemoglobin was stable over 24 hours, the patient be discharged home.  She will get a repeat CBC next week to continue to follow her hemoglobin.  She will follow-up with GI.    DISCHARGE Follow Up Recommendations for labs and diagnostics:   -- Repeat CBC next week  -- Follow-up with  gastroenterology      Day of Discharge     Vital Signs:  Temp:  [97.4 °F (36.3 °C)-98.5 °F (36.9 °C)] 97.6 °F (36.4 °C)  Heart Rate:  [] 94  Resp:  [16-18] 16  BP: (117-136)/(58-72) 120/66  Physical Exam:               Constitutional: Awake, alert, no acute distress              Eyes: Pupils equal, sclerae anicteric, no conjunctival injection              HENT: NCAT, mucous membranes moist              Neck: Supple, no thyromegaly, no lymphadenopathy, trachea midline              Respiratory: Clear to auscultation bilaterally, nonlabored respirations               Cardiovascular: RRR, no murmurs, rubs, or gallops, palpable pedal pulses bilaterally              Gastrointestinal: Positive bowel sounds, soft, nontender, nondistended              Musculoskeletal: No bilateral ankle edema, no clubbing or cyanosis to extremities              Psychiatric: Appropriate affect, cooperative              Neurologic: Oriented x 3, strength symmetric in all extremities, Cranial Nerves grossly intact to confrontation, speech clear              Skin: No rashes       Discharge Details        Discharge Medications      Continue These Medications      Instructions Start Date   albuterol sulfate  (90 Base) MCG/ACT inhaler  Commonly known as: PROVENTIL HFA;VENTOLIN HFA;PROAIR HFA   2 puffs, Inhalation, Every 4 Hours PRN      budesonide-formoterol 160-4.5 MCG/ACT inhaler  Commonly known as: SYMBICORT   2 puffs, Inhalation, 2 Times Daily - RT      cyclobenzaprine 10 MG tablet  Commonly known as: FLEXERIL   10 mg, Oral, As Needed      GREEN TEA PO   500 mg, Oral, Daily      hydroxychloroquine 200 MG tablet  Commonly known as: PLAQUENIL   200 mg, Oral, Every Other Day      metoprolol tartrate 50 MG tablet  Commonly known as: LOPRESSOR   50 mg, Oral, 2 Times Daily      montelukast 10 MG tablet  Commonly known as: SINGULAIR   10 mg, Oral, Nightly      nitroglycerin 0.4 MG SL tablet  Commonly known as: NITROSTAT   0.4 mg, Every 5  Minutes PRN      rosuvastatin 10 MG tablet  Commonly known as: CRESTOR   10 mg, Oral, Daily      Thyroid 60 MG tablet  Commonly known as: ARMOUR   60 mg, Oral, Daily      torsemide 20 MG tablet  Commonly known as: DEMADEX   TAKE 1 TABLET BY MOUTH EVERY DAY      Turmeric 500 MG capsule   500 mg, Oral, Daily      vitamin E 1000 UNIT capsule   1,000 Units, Oral, Daily             Allergies   Allergen Reactions   • Other Other (See Comments)     RHEUMATOID ARTHRITIS INFUSION REACTION   • Other Irritability     INFUSION FOR RHEUMATOID ARTHRITIS   • Rituximab Unknown - High Severity       Discharge Disposition:  Home or Self Care    Diet:  Hospital:  Diet Order   Procedures   • Diet: Regular/House Diet; Texture: Regular Texture (IDDSI 7); Fluid Consistency: Thin (IDDSI 0)       Discharge Activity:       CODE STATUS:  Code Status and Medical Interventions:   Ordered at: 12/22/22 1915     Level Of Support Discussed With:    Patient     Code Status (Patient has no pulse and is not breathing):    CPR (Attempt to Resuscitate)     Medical Interventions (Patient has pulse or is breathing):    Full Support         Future Appointments   Date Time Provider Department Center   12/29/2022 10:00 AM Сергей Kohli PT [facility]   2/13/2023  2:00 PM [facility]   [facility]14[facility]2023 10:00 AM [facility]           Pertinent  and/or Most Recent Results     PROCEDURES:       LAB RESULTS:      Lab 12/24/22  1356 12/24/22  0443 12/24/22  0442 12/23/22  1931 12/23/22  0754 12/22/22  1954 12/22/22  0950   WBC 6.60  --  7.50  --  4.72 5.21 5.60   HEMOGLOBIN 8.4*  --  7.7* 8.4* 6.1* 5.1* 5.0*   HEMATOCRIT 26.7*  --  24.6* 27.3* 20.2* 17.7* 17.5*   PLATELETS 351  --  328  --  311 360 274   NEUTROS ABS  --   --  5.84  --  3.21 3.59 3.72   IMMATURE GRANS (ABS)  --   --  0.02  --  0.02 0.01 0.01   LYMPHS ABS  --   --  1.00  --  0.95 1.08 1.26   MONOS ABS  --   --  0.50  --  0.41 0.42 0.45    EOS ABS  --   --  0.12  --  0.10 0.09 0.14   MCV 83.7  --  83.1  --  83.8 83.1 80.6   PROTIME  --  15.9*  --   --  16.6* 15.2*  --          Lab 12/24/22 0442 12/23/22  0754 12/22/22 1954   SODIUM 137 139 137   POTASSIUM 3.7 3.7 3.9   CHLORIDE 101 105 100   CO2 26.0 27.4 27.0   ANION GAP 10.0 6.6 10.0   BUN 15 15 19   CREATININE 0.83 0.74 0.86   EGFR 80.3 92.2 77.0   GLUCOSE 112* 116* 84   CALCIUM 8.1* 8.3* 8.5*   MAGNESIUM 1.7 2.1 2.2   PHOSPHORUS 3.6 3.7 3.6             Lab 12/24/22 0443 12/23/22 0754 12/22/22 1954   PROTIME 15.9* 16.6* 15.2*   INR 1.25* 1.32* 1.18*             Lab 12/24/22 0442 12/22/22 1954   IRON 26*  --    IRON SATURATION 8*  --    TIBC 340  --    TRANSFERRIN 228  --    ABO TYPING  --  O   RH TYPING  --  Positive   ANTIBODY SCREEN  --  Negative         Brief Urine Lab Results     None        Microbiology Results (last 10 days)     ** No results found for the last 240 hours. **                       Results for orders placed during the hospital encounter of 10/11/22    Adult Transthoracic Echo Complete W/ Cont if Necessary Per Protocol    Interpretation Summary  •  Calculated left ventricular EF = 46% Estimated left ventricular EF was in agreement with the calculated left ventricular EF.  •  Left ventricular wall thickness is consistent with concentric hypertrophy.  •  Left ventricular diastolic function was not assessed.  •  Mild aortic valve stenosis is present.  •  Moderate mitral valve regurgitation is present.  •  Moderate tricuspid valve regurgitation is present.  •  Estimated right ventricular systolic pressure from tricuspid regurgitation is markedly elevated (>55 mmHg).      Labs Pending at Discharge:        Time spent on Discharge including face to face service:  >30  minutes    Electronically signed by Jeremy Perez MD, 12/24/22, 3:01 PM EST.

## 2022-12-24 NOTE — PLAN OF CARE
Goal Outcome Evaluation:  Plan of Care Reviewed With: patient           Outcome Evaluation: Patient presents with no strength deficits.  She is able to complete all transfers and ambulate independently in her room.  She does not need inpatient PT services and is okay to discharge home upon discharge from the hospital.

## 2022-12-25 LAB
BH BB BLOOD EXPIRATION DATE: NORMAL
BH BB BLOOD EXPIRATION DATE: NORMAL
BH BB BLOOD TYPE BARCODE: 5100
BH BB BLOOD TYPE BARCODE: 5100
BH BB DISPENSE STATUS: NORMAL
BH BB DISPENSE STATUS: NORMAL
BH BB PRODUCT CODE: NORMAL
BH BB PRODUCT CODE: NORMAL
BH BB UNIT NUMBER: NORMAL
BH BB UNIT NUMBER: NORMAL
CROSSMATCH INTERPRETATION: NORMAL
CROSSMATCH INTERPRETATION: NORMAL
UNIT  ABO: NORMAL
UNIT  ABO: NORMAL
UNIT  RH: NORMAL
UNIT  RH: NORMAL

## 2022-12-25 NOTE — OUTREACH NOTE
Prep Survey    Flowsheet Row Responses   [facility] facility patient discharged from? [facility]   Is LACE score < 7 ? No   Eligibility [facility]   Hospital [facility]   Date of Admission 12/22/22   Date of Discharge 12/24/22   Discharge Disposition Home or Self Care   Discharge diagnosis anemia, Hx A-fib RVR, RA   Does the patient have one of the following disease processes/diagnoses(primary or secondary)? Other   Does the patient have Home health ordered? No   Is there a DME ordered? No   Prep survey completed? Yes          YOSVANY RODRIGUEZ - Registered Nurse

## 2022-12-26 ENCOUNTER — TRANSITIONAL CARE MANAGEMENT TELEPHONE ENCOUNTER (OUTPATIENT)
Dept: CALL CENTER | Facility: HOSPITAL | Age: 62
End: 2022-12-26

## 2022-12-26 NOTE — OUTREACH NOTE
Call Center TCM Note    Flowsheet Row Responses   [facility] facility patient discharged from? [facility]   Does the patient have one of the following disease processes/diagnoses(primary or secondary)? Other   TCM attempt successful? No  []   Unsuccessful attempts Attempt 1          Miranda Garcia RN    12/26/2022, 10:04 EST

## 2022-12-26 NOTE — OUTREACH NOTE
Call Center TCM Note    Flowsheet Row Responses   [facility] patient discharged from? [facility]   Does the patient have one of the following disease processes/diagnoses(primary or secondary)? Other   TCM attempt successful? No   Unsuccessful attempts Attempt 2          Miranda Garcia RN    12/26/2022, 10:40 EST

## 2022-12-27 ENCOUNTER — TRANSITIONAL CARE MANAGEMENT TELEPHONE ENCOUNTER (OUTPATIENT)
Dept: CALL CENTER | Facility: HOSPITAL | Age: 62
End: 2022-12-27

## 2022-12-27 DIAGNOSIS — D64.9 ANEMIA, UNSPECIFIED TYPE: Primary | ICD-10-CM

## 2022-12-27 NOTE — OUTREACH NOTE
Call Center TCM Note    Flowsheet Row Responses   [facility] patient discharged from? [facility]   Does the patient have one of the following disease processes/diagnoses(primary or secondary)? Other   TCM attempt successful? No   Unsuccessful attempts Attempt 3          Stevie Wade RN    12/27/2022, 11:46 EST

## 2022-12-28 LAB — QT INTERVAL: 354 MS

## 2022-12-29 ENCOUNTER — LAB (OUTPATIENT)
Dept: LAB | Facility: HOSPITAL | Age: 62
End: 2022-12-29
Payer: MEDICARE

## 2022-12-29 ENCOUNTER — TREATMENT (OUTPATIENT)
Dept: PHYSICAL THERAPY | Facility: CLINIC | Age: 62
End: 2022-12-29

## 2022-12-29 DIAGNOSIS — G89.29 CHRONIC RIGHT SHOULDER PAIN: Primary | ICD-10-CM

## 2022-12-29 DIAGNOSIS — R29.898 SHOULDER WEAKNESS: ICD-10-CM

## 2022-12-29 DIAGNOSIS — R26.2 DIFFICULTY WALKING: ICD-10-CM

## 2022-12-29 DIAGNOSIS — M25.511 CHRONIC RIGHT SHOULDER PAIN: Primary | ICD-10-CM

## 2022-12-29 DIAGNOSIS — D64.9 ANEMIA, UNSPECIFIED TYPE: ICD-10-CM

## 2022-12-29 LAB
BASOPHILS # BLD AUTO: 0.03 10*3/MM3 (ref 0–0.2)
BASOPHILS NFR BLD AUTO: 0.5 % (ref 0–1.5)
DEPRECATED RDW RBC AUTO: 50.3 FL (ref 37–54)
EOSINOPHIL # BLD AUTO: 0.14 10*3/MM3 (ref 0–0.4)
EOSINOPHIL NFR BLD AUTO: 2.3 % (ref 0.3–6.2)
ERYTHROCYTE [DISTWIDTH] IN BLOOD BY AUTOMATED COUNT: 16.4 % (ref 12.3–15.4)
HCT VFR BLD AUTO: 28.2 % (ref 34–46.6)
HGB BLD-MCNC: 8.4 G/DL (ref 12–15.9)
IMM GRANULOCYTES # BLD AUTO: 0.02 10*3/MM3 (ref 0–0.05)
IMM GRANULOCYTES NFR BLD AUTO: 0.3 % (ref 0–0.5)
LYMPHOCYTES # BLD AUTO: 1.16 10*3/MM3 (ref 0.7–3.1)
LYMPHOCYTES NFR BLD AUTO: 18.6 % (ref 19.6–45.3)
MCH RBC QN AUTO: 25 PG (ref 26.6–33)
MCHC RBC AUTO-ENTMCNC: 29.8 G/DL (ref 31.5–35.7)
MCV RBC AUTO: 83.9 FL (ref 79–97)
MONOCYTES # BLD AUTO: 0.49 10*3/MM3 (ref 0.1–0.9)
MONOCYTES NFR BLD AUTO: 7.9 % (ref 5–12)
NEUTROPHILS NFR BLD AUTO: 4.38 10*3/MM3 (ref 1.7–7)
NEUTROPHILS NFR BLD AUTO: 70.4 % (ref 42.7–76)
NRBC BLD AUTO-RTO: 0 /100 WBC (ref 0–0.2)
PLATELET # BLD AUTO: 352 10*3/MM3 (ref 140–450)
PMV BLD AUTO: 11.4 FL (ref 6–12)
RBC # BLD AUTO: 3.36 10*6/MM3 (ref 3.77–5.28)
WBC NRBC COR # BLD: 6.22 10*3/MM3 (ref 3.4–10.8)

## 2022-12-29 PROCEDURE — 97110 THERAPEUTIC EXERCISES: CPT

## 2022-12-29 PROCEDURE — 36415 COLL VENOUS BLD VENIPUNCTURE: CPT

## 2022-12-29 PROCEDURE — 85025 COMPLETE CBC W/AUTO DIFF WBC: CPT

## 2022-12-29 PROCEDURE — 97161 PT EVAL LOW COMPLEX 20 MIN: CPT

## 2022-12-29 NOTE — PROGRESS NOTES
Physical Therapy Initial Evaluation and Plan of Care      [facility]: [address] [place] [address]      Patient: Milagros Ramirez   : 1960  Diagnosis/ICD-10 Code:  Chronic right shoulder pain [M25.511, G89.29]  Referring practitioner: Jake James DO  Date of Initial Visit: 2022  Today's Date: 2022  Patient seen for 1 sessions           Subjective Questionnaire: QuickDASH: plan to provide next visit       Subjective   Pt reports to physical therapy w/ complaints of B shoulder pain. Pt today has more pain in their R shoulder in comparison to their L today. Pt attributes this to having to use their B UEs to assist in sitting and standing. Pt states their arm can feel worse with reaching overhead, and reaching behind their back. Pt reports popping and clicking that is painful. Pt reports they can use heat or topicals to make their shoulders feel better. Pt reports their pain in their R UE is a 4/10, and their L is a 1/10 until they hit the spot that has to 'pop'.       Pt occupation: household chores, lifting, putting groceries     R UE:   Current Pain: 4/10  Best Pain: 1/10; ongoing ache, always has some level of pain.   Worst Pain: 10/10  Quality of pain: pops, jabbing, stabbing     Pt is on pain management currently for knees.     Aggravating Factors: reaching overhead, reaching behind back, using UEs to stand  Easing Factors: heat or topicals that provide heat.     Past Medical Hx: CHF, Afib, low hemoglobin (currently waiting to see a specialist), RA, B knee pain, lupus, stage III kidney failure.     Hemoglobin: last checked on : 8.4       Objective          Static Posture     Comments  Pt has a tendency to sit w/ rounded shoulders and a forward head. Pt overall has a slumped posture.     Neurological Testing     Additional Neurological Details  Pt has slightly decreased light touch sensation on the R UE consistent w/ C5 dermatome. Otherwise, pt has intact light touch  sensation that is equal bilaterally. This is consistent w/ C4, C6-T2.   R C5 a little diminished.       Active Range of Motion   Left Shoulder   Flexion: 162 degrees   Abduction: 155 degrees   External rotation 0°: 55 degrees     Right Shoulder   Flexion: 150 degrees with pain  Abduction: 153 degrees with pain  External rotation 0°: 50 degrees with pain    Passive Range of Motion   Left Shoulder   Flexion: WFL  Abduction: WFL  External rotation 45°: 70 degrees   Internal rotation 45°: 75 degrees     Right Shoulder   Flexion: 165 degrees   Abduction: 170 degrees   External rotation 45°: 70 degrees   Internal rotation 45°: 75 degrees     Additional Passive Range of Motion Details  Pt has decreased pain w/ PROM    Strength/Myotome Testing     Left Shoulder     Planes of Motion   Flexion: 4-   Abduction: 4+   External rotation at 0°: 4+   Internal rotation at 0°: 4+     Right Shoulder     Planes of Motion   Flexion: 4-   Abduction: 4+   External rotation at 0°: 4-   Internal rotation at 0°: 4+     Left Elbow   Flexion: 4+  Extension: 4+    Right Elbow   Flexion: 4  Extension: 4+    Additional Strength Details  Pt has increased pain in their R shoulder w/ ER, shoulder flex, and elbow flex.  Most painful, and familiar, was shoulder ER.     Tests     Left Shoulder   Positive Neer's.     Right Shoulder   Positive Neer's.     Ambulation     Comments   Pt uses a cane to ambulate. Pt reports they switch hands depending on what knee is hurting more w/ ambulation.   Pt has a very decreased stride length and avoids knee flexion and ext throughout gait. Pt has a significantly decreased mason and gait speed.      General Comments     Wrist/Hand Comments  Pt experiences some locking of their 4th digit on their L UE. Pt has a hx of a carpal tunnel surgery performed on that side. Pt has cyst present on the dorsal aspect of the L hand/wrist.      See Exercise, Manual, and Modality Logs for complete treatment.       Assessment & Plan      Assessment  Impairments: abnormal coordination, abnormal gait, abnormal muscle firing, abnormal or restricted ROM, activity intolerance, impaired balance, impaired physical strength, lacks appropriate home exercise program and pain with function  Functional Limitations: carrying objects, lifting, sleeping, walking, pulling, pushing, uncomfortable because of pain, moving in bed, sitting, standing, stooping, reaching behind back, reaching overhead and unable to perform repetitive tasks  Assessment details: Pt reports to physical therapy w/ a primary complaint of R shoulder pain. Pt also has decreased overall mobility tolerance and pain associated w/ B LEs. Pt presents w/ decreased tolerance to activity, increased pain, decreased AROM, and decreased strength. Pt struggles with performing daily tasks that consist of reaching overhead, repetitive motion, reaching behind their back, and ambulating. Educated pt on use of ice for inflammation. Educated on their HEP performance, sets, and repetitions. Plan to perform TUG testing during next visit. Pt will benefit from skilled physical therapy to address their current impairments and limitations in order to improve upon their functional mobility, performance of ADLs, and to decrease pain overall while promoting safe independence.    Prognosis: poor    Goals  Plan Goals: SHOULDER  PROBLEMS:     1. The patient has limited strength of the R shoulder.   LTG 1: 12 weeks:  The patient will demonstrate 4+ /5 strength for R shoulder flexion, abduction, external rotation, and internal rotation in order to demonstrate improved shoulder stability.    STATUS:  New   STG 1a: 6 weeks:  The patient will demonstrate 4 /5 strength for R shoulder flexion, abduction, external rotation, and internal rotation.    STATUS:  New   TREATMENT: Manual therapy, therapeutic exercise, home exercise instruction, and modalities as needed to include: electrical stimulation, moist heat, and ice.     2.  The patient complains of pain to the R shoulder.   LTG 2: 12 weeks:  The patient will report a pain rating of 3 /10 or better in order to improve sleep quality and tolerance to performance of activities of daily living.    STATUS:  New   STG 2a: 6 weeks:  The patient will report a pain rating of 4 /10 or better.      STATUS:  New   TREATMENT: Manual therapy, therapeutic exercise, home exercise instruction, and modalities as needed to include: electrical stimulation, moist heat, and ice.     3. The patient has gait dysfunction.   LTG 3: 12 weeks:  The patient will ambulate without assistive device, independently, for community distances with minimal limp in order to improve mobility and allow patient to perform activities such as shopping and performing ADLs with greater ease.    STATUS:  New   STG3a:  6 weeks:  The patient will be independent with home exercises.     STATUS:  New   TREATMENT: Gait training, aquatic therapy, therapeutic exercise, and home exercise instruction.    4. Carrying, Moving, and Handling Objects Functional Limitation     LTG 4: 12 weeks:  The patient will demonstrate 9 % limitation by achieving a score of 15 on the QuickDASH.    STATUS:  New   STG 4a: 6 weeks:  The patient will demonstrate 20 % limitation by achieving a score of 20 on the QuickDASH.      STATUS:  New   TREATMENT:  Manual therapy, therapeutic exercise, home exercise instruction, and modalities as needed to include: moist heat and electrical stimulation.           PLAN:  Therapy options: will receive skilled therapy services  Planned modality interventions: Cryotherapy  Planned therapy interventions:balance/weight-bearing training, ADL retraining, soft tissue mobilization, strengthening, stretching, therapeutic activities, manual therapy, joint mobilization, home exercise program/patient education, gait training, functional ROM exercises, flexibility, body mechanics training, postural training and neuromuscular  re-education  Frequency: 2x per week  Duration in weeks: 12  Treatment plan discussed with: patient      Visit Diagnoses:    ICD-10-CM ICD-9-CM   1. Chronic right shoulder pain  M25.511 719.41    G89.29 338.29   2. Shoulder weakness  R29.898 719.61   3. Difficulty walking  R26.2 719.7       History # of Personal Factors and/or Comorbidities: HIGH (3+)  Examination of Body System(s): # of elements: LOW (1-2)  Clinical Presentation: STABLE   Clinical Decision Making: LOW       Timed:         Manual Therapy:    0     mins  39480;     Therapeutic Exercise:    25     mins  01371;     Neuromuscular Jose:    0    mins  87683;    Therapeutic Activity:     0     mins  28624;     Gait Trainin     mins  76053;     Ultrasound:     0     mins  36159;    Ionto                               0    mins   08024  Self Care                       0     mins   11360  Canalith Repos    0     mins 36942      Un-Timed:  Electrical Stimulation:    0     mins  72973 (MC );  Dry Needling     0     mins self-pay  Traction     0     mins 52145  Low Eval     30     Mins  36286  Mod Eval     0     Mins  10113  High Eval                       0     Mins  57094  Re-Eval                           0    mins  17208    Timed Treatment:   25   mins   Total Treatment:     55   mins    PT SIGNATURE: Сергей Kohli PT, DPT    Electronically signed 2022    [place] License: PT - [id number]    Supervised by Estuardo Hobbs PT DPT [place] License PT - [id number]    Initial Certification  Certification Period: 2022 thru 3/28/2023  I certify that the therapy services are furnished while this patient is under my care.  The services outlined above are required by this patient, and will be reviewed every 90 days.     PHYSICIAN: Jake James DO  NPI: [id number]      DATE:     Please sign and return via fax to [phone]. Thank you, [facility].

## 2022-12-30 ENCOUNTER — TELEPHONE (OUTPATIENT)
Dept: GASTROENTEROLOGY | Facility: CLINIC | Age: 62
End: 2022-12-30

## 2022-12-30 NOTE — TELEPHONE ENCOUNTER
----- Message from Sara Jarrett MD sent at 12/30/2022  6:43 AM EST -----  Please notify patient of stable Hgb.  Hgb 8.4.  thanks

## 2023-01-03 ENCOUNTER — TELEPHONE (OUTPATIENT)
Dept: PHYSICAL THERAPY | Facility: CLINIC | Age: 63
End: 2023-01-03

## 2023-01-04 RX ORDER — METOPROLOL TARTRATE 50 MG/1
50 TABLET, FILM COATED ORAL 2 TIMES DAILY
Qty: 180 TABLET | Refills: 1 | Status: SHIPPED | OUTPATIENT
Start: 2023-01-04

## 2023-01-05 ENCOUNTER — LAB (OUTPATIENT)
Dept: LAB | Facility: HOSPITAL | Age: 63
End: 2023-01-05
Payer: MEDICARE

## 2023-01-05 ENCOUNTER — TELEPHONE (OUTPATIENT)
Dept: PHYSICAL THERAPY | Facility: CLINIC | Age: 63
End: 2023-01-05

## 2023-01-05 DIAGNOSIS — D64.9 ANEMIA, UNSPECIFIED TYPE: ICD-10-CM

## 2023-01-05 PROCEDURE — 36415 COLL VENOUS BLD VENIPUNCTURE: CPT

## 2023-01-05 PROCEDURE — 85025 COMPLETE CBC W/AUTO DIFF WBC: CPT

## 2023-01-06 ENCOUNTER — TELEPHONE (OUTPATIENT)
Dept: GASTROENTEROLOGY | Facility: CLINIC | Age: 63
End: 2023-01-06
Payer: MEDICARE

## 2023-01-06 LAB
BASOPHILS # BLD AUTO: 0.02 10*3/MM3 (ref 0–0.2)
BASOPHILS NFR BLD AUTO: 0.8 % (ref 0–1.5)
DEPRECATED RDW RBC AUTO: 48.7 FL (ref 37–54)
EOSINOPHIL # BLD AUTO: 0.04 10*3/MM3 (ref 0–0.4)
EOSINOPHIL NFR BLD AUTO: 1.7 % (ref 0.3–6.2)
ERYTHROCYTE [DISTWIDTH] IN BLOOD BY AUTOMATED COUNT: 16.9 % (ref 12.3–15.4)
HCT VFR BLD AUTO: 46.1 % (ref 34–46.6)
HGB BLD-MCNC: 13.5 G/DL (ref 12–15.9)
IMM GRANULOCYTES # BLD AUTO: 0 10*3/MM3 (ref 0–0.05)
IMM GRANULOCYTES NFR BLD AUTO: 0 % (ref 0–0.5)
LYMPHOCYTES # BLD AUTO: 0.55 10*3/MM3 (ref 0.7–3.1)
LYMPHOCYTES NFR BLD AUTO: 22.9 % (ref 19.6–45.3)
MCH RBC QN AUTO: 23.7 PG (ref 26.6–33)
MCHC RBC AUTO-ENTMCNC: 29.3 G/DL (ref 31.5–35.7)
MCV RBC AUTO: 81 FL (ref 79–97)
MONOCYTES # BLD AUTO: 0.2 10*3/MM3 (ref 0.1–0.9)
MONOCYTES NFR BLD AUTO: 8.3 % (ref 5–12)
NEUTROPHILS NFR BLD AUTO: 1.59 10*3/MM3 (ref 1.7–7)
NEUTROPHILS NFR BLD AUTO: 66.3 % (ref 42.7–76)
NRBC BLD AUTO-RTO: 0 /100 WBC (ref 0–0.2)
PLATELET # BLD AUTO: 295 10*3/MM3 (ref 140–450)
PMV BLD AUTO: 10.4 FL (ref 6–12)
RBC # BLD AUTO: 5.69 10*6/MM3 (ref 3.77–5.28)
WBC NRBC COR # BLD: 2.4 10*3/MM3 (ref 3.4–10.8)

## 2023-01-06 NOTE — TELEPHONE ENCOUNTER
----- Message from Sara Jarrett MD sent at 1/6/2023  8:41 AM EST -----  Hgb normal.  Please notify patient.

## 2023-01-10 ENCOUNTER — TREATMENT (OUTPATIENT)
Dept: PHYSICAL THERAPY | Facility: CLINIC | Age: 63
End: 2023-01-10
Payer: MEDICARE

## 2023-01-10 DIAGNOSIS — R29.898 SHOULDER WEAKNESS: ICD-10-CM

## 2023-01-10 DIAGNOSIS — G89.29 CHRONIC RIGHT SHOULDER PAIN: Primary | ICD-10-CM

## 2023-01-10 DIAGNOSIS — R26.2 DIFFICULTY WALKING: ICD-10-CM

## 2023-01-10 DIAGNOSIS — M25.511 CHRONIC RIGHT SHOULDER PAIN: Primary | ICD-10-CM

## 2023-01-10 PROCEDURE — 97110 THERAPEUTIC EXERCISES: CPT | Performed by: PHYSICAL THERAPIST

## 2023-01-10 NOTE — PROGRESS NOTES
Physical Therapy Daily Treatment Note  [facility] [address] [place] [address]    Patient: Milagros Ramirez   : 1960  Referring practitioner: Jake James DO  Date of Initial Visit: Type: THERAPY  Noted: 2022  Today's Date: 1/10/2023  Patient seen for 2 sessions           Subjective  Milagros Ramirez reports: she got a hold of something that caused her to swell up and she was unable to attend therapy. \"knees are really acting up today, they are bone on bone. R shoulder is worse than L.\"  Milagros informed PTA that seh does go to pain management, is using hydrocodone for pain c/o.       Objective   Milagros observed to require time to STS and then ambulate between lobby and then in treatment gym.     See Exercise, Manual, and Modality Logs for complete treatment.       Assessment/Plan  Extended time to educate on proper posture to aid GH motion B shoulders. This is Milagros's first return visit after IE. Education to continue with HEP. Continue per outlined POC to attend to deficits outlined in evaluation.    Visit Diagnoses:    ICD-10-CM ICD-9-CM   1. Chronic right shoulder pain  M25.511 719.41    G89.29 338.29   2. Shoulder weakness  R29.898 719.61   3. Difficulty walking  R26.2 719.7       Progress per Plan of Care and Progress strengthening /stabilization /functional activity           Timed:  Manual Therapy:         mins  66297;  Therapeutic Exercise:    23     mins  12203;     Neuromuscular Jose:        mins  10897;    Therapeutic Activity:          mins  14648;     Gait Training:           mins  64052;     Ultrasound:          mins  77696;    Electrical Stimulation:         mins  12758 ( );  Aquatics  __   mins   94048    Untimed:  Electrical Stimulation:         mins  87240 ( );  Mechanical Traction:         mins  29918;     Timed Treatment:   23   mins   Total Treatment:     23   mins    Electronically Signed:  Pita Perez PTA  Physical Therapist Assistant    [employer]  license [id number]

## 2023-01-12 ENCOUNTER — TELEPHONE (OUTPATIENT)
Dept: PHYSICAL THERAPY | Facility: CLINIC | Age: 63
End: 2023-01-12

## 2023-01-12 NOTE — TELEPHONE ENCOUNTER
Caller: Milagros Ramirez    Relationship: Self       What was the call regarding: WAS UP ALL NIGHT WITH , HE HAS A STOMACH BUG

## 2023-01-13 ENCOUNTER — LAB (OUTPATIENT)
Dept: LAB | Facility: HOSPITAL | Age: 63
End: 2023-01-13
Payer: MEDICARE

## 2023-01-13 DIAGNOSIS — D64.9 ANEMIA, UNSPECIFIED TYPE: ICD-10-CM

## 2023-01-13 LAB
BASOPHILS # BLD AUTO: 0.03 10*3/MM3 (ref 0–0.2)
BASOPHILS NFR BLD AUTO: 0.5 % (ref 0–1.5)
DEPRECATED RDW RBC AUTO: 46.6 FL (ref 37–54)
EOSINOPHIL # BLD AUTO: 0.07 10*3/MM3 (ref 0–0.4)
EOSINOPHIL NFR BLD AUTO: 1.2 % (ref 0.3–6.2)
ERYTHROCYTE [DISTWIDTH] IN BLOOD BY AUTOMATED COUNT: 17 % (ref 12.3–15.4)
HCT VFR BLD AUTO: 27 % (ref 34–46.6)
HGB BLD-MCNC: 8.2 G/DL (ref 12–15.9)
IMM GRANULOCYTES # BLD AUTO: 0.02 10*3/MM3 (ref 0–0.05)
IMM GRANULOCYTES NFR BLD AUTO: 0.4 % (ref 0–0.5)
LYMPHOCYTES # BLD AUTO: 1.11 10*3/MM3 (ref 0.7–3.1)
LYMPHOCYTES NFR BLD AUTO: 19.5 % (ref 19.6–45.3)
MCH RBC QN AUTO: 23 PG (ref 26.6–33)
MCHC RBC AUTO-ENTMCNC: 30.4 G/DL (ref 31.5–35.7)
MCV RBC AUTO: 75.6 FL (ref 79–97)
MONOCYTES # BLD AUTO: 0.45 10*3/MM3 (ref 0.1–0.9)
MONOCYTES NFR BLD AUTO: 7.9 % (ref 5–12)
NEUTROPHILS NFR BLD AUTO: 4.02 10*3/MM3 (ref 1.7–7)
NEUTROPHILS NFR BLD AUTO: 70.5 % (ref 42.7–76)
NRBC BLD AUTO-RTO: 0 /100 WBC (ref 0–0.2)
PLATELET # BLD AUTO: 363 10*3/MM3 (ref 140–450)
PMV BLD AUTO: 10.8 FL (ref 6–12)
RBC # BLD AUTO: 3.57 10*6/MM3 (ref 3.77–5.28)
WBC NRBC COR # BLD: 5.7 10*3/MM3 (ref 3.4–10.8)

## 2023-01-13 PROCEDURE — 85025 COMPLETE CBC W/AUTO DIFF WBC: CPT

## 2023-01-13 PROCEDURE — 36415 COLL VENOUS BLD VENIPUNCTURE: CPT

## 2023-01-17 ENCOUNTER — TELEPHONE (OUTPATIENT)
Dept: GASTROENTEROLOGY | Facility: CLINIC | Age: 63
End: 2023-01-17
Payer: MEDICARE

## 2023-01-17 ENCOUNTER — TREATMENT (OUTPATIENT)
Dept: PHYSICAL THERAPY | Facility: CLINIC | Age: 63
End: 2023-01-17
Payer: MEDICARE

## 2023-01-17 DIAGNOSIS — M25.511 CHRONIC RIGHT SHOULDER PAIN: Primary | ICD-10-CM

## 2023-01-17 DIAGNOSIS — G89.29 CHRONIC RIGHT SHOULDER PAIN: Primary | ICD-10-CM

## 2023-01-17 DIAGNOSIS — R26.2 DIFFICULTY WALKING: ICD-10-CM

## 2023-01-17 DIAGNOSIS — R29.898 SHOULDER WEAKNESS: ICD-10-CM

## 2023-01-17 PROCEDURE — 97110 THERAPEUTIC EXERCISES: CPT | Performed by: PHYSICAL THERAPIST

## 2023-01-17 PROCEDURE — 97530 THERAPEUTIC ACTIVITIES: CPT | Performed by: PHYSICAL THERAPIST

## 2023-01-17 NOTE — TELEPHONE ENCOUNTER
Pt called to follow up on test results. Pt aware results are waiting to be reviewed by provider but we will call back as soon as we know something.

## 2023-01-17 NOTE — PROGRESS NOTES
"Physical Therapy Daily Treatment Note  [facility] [facility] [address] [place] [address]    Patient: Milagros Ramirez   : 1960  Referring practitioner: Jake James DO  Date of Initial Visit: Type: THERAPY  Noted: 2022  Today's Date: 2023  Patient seen for 3 sessions           Subjective  Milagros Ramirez reports: her shoulder were bad over the weekend. She indicated she is still having muscle pain, showing the anterior aspect of R shoulder. \"If I raise my arm in bed it pops.\" Indicating both biceps/anterior deltoid R.       Objective   See Exercise, Manual, and Modality Logs for complete treatment.       Assessment/Plan  Milagros noted that seating her scapula and attending to posture allowed for reduced shoulder pain, improved AROM. Milagros spoke about how she played numerous sports in high school and how her ability now is a far cry from what it used to be. Continued need for therapist directed rehab program to address deficits outlined in evaluation.     Visit Diagnoses:    ICD-10-CM ICD-9-CM   1. Chronic right shoulder pain  M25.511 719.41    G89.29 338.29   2. Shoulder weakness  R29.898 719.61   3. Difficulty walking  R26.2 719.7       Progress per Plan of Care and Progress strengthening /stabilization /functional activity           Timed:  Manual Therapy:         mins  00239;  Therapeutic Exercise:    17     mins  42650;     Neuromuscular Jose:        mins  79425;    Therapeutic Activity:     10     mins  36897;     Gait Training:           mins  91453;     Ultrasound:          mins  86783;    Electrical Stimulation:         mins  06395 ( );  Aquatics  __   mins   87740    Untimed:  Electrical Stimulation:         mins  86547 ( );  Mechanical Traction:         mins  17486;     Timed Treatment:   27   mins   Total Treatment:     27   mins    Electronically Signed:  Pita Perez PTA  Physical Therapist Assistant    [employer] license [id number]            "

## 2023-01-18 ENCOUNTER — TELEPHONE (OUTPATIENT)
Dept: GASTROENTEROLOGY | Facility: CLINIC | Age: 63
End: 2023-01-18
Payer: MEDICARE

## 2023-01-18 NOTE — TELEPHONE ENCOUNTER
I received a call from [facility] in regards to a referral that Dr. Jarrett had sent to them for this pt. [facility] has contacted the patient and the patient refused to schedule the procedure. Sent to [place] to advise.

## 2023-01-18 NOTE — TELEPHONE ENCOUNTER
----- Message from Sara Jarrett MD sent at 1/18/2023  8:47 AM EST -----  Hgb 8.2 which is closer to her previous baseline.  Her last Hgb of 13.5 was a significant outlier and did not fit with her other labs.  Would recommend to repeat CBC today if possible.  Continue f/u with Dr. Hall.

## 2023-01-19 ENCOUNTER — LAB (OUTPATIENT)
Dept: LAB | Facility: HOSPITAL | Age: 63
End: 2023-01-19
Payer: MEDICARE

## 2023-01-19 ENCOUNTER — TELEPHONE (OUTPATIENT)
Dept: PHYSICAL THERAPY | Facility: CLINIC | Age: 63
End: 2023-01-19

## 2023-01-19 DIAGNOSIS — D64.9 ANEMIA, UNSPECIFIED TYPE: ICD-10-CM

## 2023-01-19 LAB
BASOPHILS # BLD AUTO: 0.04 10*3/MM3 (ref 0–0.2)
BASOPHILS NFR BLD AUTO: 0.6 % (ref 0–1.5)
DEPRECATED RDW RBC AUTO: 47.7 FL (ref 37–54)
EOSINOPHIL # BLD AUTO: 0.11 10*3/MM3 (ref 0–0.4)
EOSINOPHIL NFR BLD AUTO: 1.8 % (ref 0.3–6.2)
ERYTHROCYTE [DISTWIDTH] IN BLOOD BY AUTOMATED COUNT: 17.1 % (ref 12.3–15.4)
HCT VFR BLD AUTO: 28.8 % (ref 34–46.6)
HGB BLD-MCNC: 8.6 G/DL (ref 12–15.9)
IMM GRANULOCYTES # BLD AUTO: 0.02 10*3/MM3 (ref 0–0.05)
IMM GRANULOCYTES NFR BLD AUTO: 0.3 % (ref 0–0.5)
LYMPHOCYTES # BLD AUTO: 1.3 10*3/MM3 (ref 0.7–3.1)
LYMPHOCYTES NFR BLD AUTO: 21 % (ref 19.6–45.3)
MCH RBC QN AUTO: 23 PG (ref 26.6–33)
MCHC RBC AUTO-ENTMCNC: 29.9 G/DL (ref 31.5–35.7)
MCV RBC AUTO: 77 FL (ref 79–97)
MONOCYTES # BLD AUTO: 0.51 10*3/MM3 (ref 0.1–0.9)
MONOCYTES NFR BLD AUTO: 8.3 % (ref 5–12)
NEUTROPHILS NFR BLD AUTO: 4.2 10*3/MM3 (ref 1.7–7)
NEUTROPHILS NFR BLD AUTO: 68 % (ref 42.7–76)
NRBC BLD AUTO-RTO: 0 /100 WBC (ref 0–0.2)
PLATELET # BLD AUTO: 397 10*3/MM3 (ref 140–450)
PMV BLD AUTO: 10.8 FL (ref 6–12)
RBC # BLD AUTO: 3.74 10*6/MM3 (ref 3.77–5.28)
WBC NRBC COR # BLD: 6.18 10*3/MM3 (ref 3.4–10.8)

## 2023-01-19 PROCEDURE — 36415 COLL VENOUS BLD VENIPUNCTURE: CPT

## 2023-01-19 PROCEDURE — 85025 COMPLETE CBC W/AUTO DIFF WBC: CPT

## 2023-01-20 ENCOUNTER — TELEPHONE (OUTPATIENT)
Dept: GASTROENTEROLOGY | Facility: CLINIC | Age: 63
End: 2023-01-20
Payer: MEDICARE

## 2023-01-20 NOTE — TELEPHONE ENCOUNTER
----- Message from Sara Jarrett MD sent at 1/20/2023  6:39 AM EST -----  Hgb stable.  Recommend repeat CBC in 2 weeks.

## 2023-01-24 ENCOUNTER — TELEPHONE (OUTPATIENT)
Dept: PHYSICAL THERAPY | Facility: CLINIC | Age: 63
End: 2023-01-24

## 2023-01-30 ENCOUNTER — OFFICE VISIT (OUTPATIENT)
Dept: CARDIOLOGY | Facility: CLINIC | Age: 63
End: 2023-01-30
Payer: MEDICARE

## 2023-01-30 VITALS
WEIGHT: 257 LBS | HEART RATE: 91 BPM | OXYGEN SATURATION: 100 % | DIASTOLIC BLOOD PRESSURE: 70 MMHG | BODY MASS INDEX: 36.79 KG/M2 | SYSTOLIC BLOOD PRESSURE: 130 MMHG | HEIGHT: 70 IN

## 2023-01-30 DIAGNOSIS — I38 VALVULAR HEART DISEASE: ICD-10-CM

## 2023-01-30 DIAGNOSIS — I36.1 NONRHEUMATIC TRICUSPID VALVE REGURGITATION: ICD-10-CM

## 2023-01-30 DIAGNOSIS — D64.9 ANEMIA, UNSPECIFIED TYPE: ICD-10-CM

## 2023-01-30 DIAGNOSIS — I34.0 NONRHEUMATIC MITRAL VALVE REGURGITATION: ICD-10-CM

## 2023-01-30 DIAGNOSIS — I35.0 NONRHEUMATIC AORTIC VALVE STENOSIS: ICD-10-CM

## 2023-01-30 DIAGNOSIS — I27.20 PULMONARY HYPERTENSION: ICD-10-CM

## 2023-01-30 DIAGNOSIS — I48.21 PERMANENT ATRIAL FIBRILLATION: Primary | ICD-10-CM

## 2023-01-30 DIAGNOSIS — I50.43 ACUTE ON CHRONIC COMBINED SYSTOLIC AND DIASTOLIC CHF (CONGESTIVE HEART FAILURE): ICD-10-CM

## 2023-01-30 PROBLEM — I48.0 PAROXYSMAL ATRIAL FIBRILLATION: Status: RESOLVED | Noted: 2019-07-26 | Resolved: 2023-01-30

## 2023-01-30 PROBLEM — I50.9 CHF (CONGESTIVE HEART FAILURE): Status: RESOLVED | Noted: 2019-06-28 | Resolved: 2023-01-30

## 2023-01-30 PROBLEM — I10 ESSENTIAL (PRIMARY) HYPERTENSION: Status: RESOLVED | Noted: 2020-02-10 | Resolved: 2023-01-30

## 2023-01-30 PROCEDURE — 99214 OFFICE O/P EST MOD 30 MIN: CPT | Performed by: NURSE PRACTITIONER

## 2023-01-30 PROCEDURE — 93000 ELECTROCARDIOGRAM COMPLETE: CPT | Performed by: NURSE PRACTITIONER

## 2023-01-30 RX ORDER — FERROUS SULFATE 325(65) MG
TABLET ORAL
COMMUNITY

## 2023-01-30 NOTE — PROGRESS NOTES
Date of Office Visit: 2023  Encounter Provider: MARILYNN Pascal  Place of Service: [facility] CARDIOLOGY  Patient Name: Milagros Ramirez  :1960  Primary Cardiologist: Dr. Quang Quick    Chief Complaint   Patient presents with   • Follow-up   • Atrial Fibrillation   :     HPI: Milagros Ramirez is a 62 y.o. female who presents today for follow-up with recent medication changes.  I have reviewed her medical records.    She has been diagnosed with chronic hypoxia/hypercapnic respiratory failure, hypoventilation syndrome, obstructive sleep apnea on CPAP, lupus, rheumatoid arthritis, and chronic kidney disease.    She is a previous patient of Dr. Duncan Hebert who is seeing her for atrial fibrillation and chronic diastolic heart failure.      In 2022, echocardiogram showed EF 46%, concentric hypertrophy, left atrial cavity moderately to severely dilated, mild aortic valve calcification, mild aortic stenosis, moderate mitral valve regurgitation, moderate tricuspid valve regurgitation, and severe pulmonary hypertension.    In 2022, she saw Dr. Quick in the office.  He reviewed a previous chest CT that demonstrated coronary artery calcification.  He recommended a Myoview stress test to be completed that was normal.  He recommended that her anticoagulation be placed on hold and that we may need to consider left atrial appendage closure in the future.  Holter monitor showed atrial fibrillation with average heart rate of 92 bpm, minimum 56 and maximum 169.  Dizziness correlated with episodes of atrial fibrillation.    On 2022, her metoprolol was increased to 50 mg twice per day for persistent atrial fibrillation with RVR.    In 2022, she was hospitalized at [facility] for hemoglobin of 5.0.  She underwent transfusion of 4 units of PRBCs.  Her hemoglobin improved to 8.4.    She presents today for follow-up visit.  She says her heart rates are running  around 100 bpm. Blood pressure normal today and heart rate 91.  Her recent hemoglobin was 8.6.   Occasionally she experiences stabbing chest pain, aching chest pain, and a heaviness.  These are nonexertional.  She denies shortness of breath.  Occasionally she feels like her heartbeat is racing, has intermittent lower extremity edema, and remains fatigued.  She has had 2 episodes of brief dizziness.  She denies syncope.  She feels a popping sensation in her right shoulder.  She says she has some fluid on her left elbow.      Past Medical History:   Diagnosis Date   • Atrial fibrillation (HCC)     Diagnosed 5/2019   • CKD (chronic kidney disease)    • Diabetes (HCC)     Diagnosed 5/2019   • Diastolic CHF (HCC)    • Hyperlipidemia    • Hypertension    • Hypothyroidism    • Morbid obesity (HCC)    • AMOR (obstructive sleep apnea)    • Rheumatoid arthritis (HCC)    • SLE (systemic lupus erythematosus) (HCC)        Past Surgical History:   Procedure Laterality Date   • APPENDECTOMY     • CARPAL TUNNEL RELEASE     • COLONOSCOPY N/A 10/13/2022    Procedure: COLONOSCOPY;  Surgeon: Sara Jarrett MD;  Location: [facility] ENDOSCOPY;  Service: Gastroenterology;  Laterality: N/A;  DIVERTICULOSIS   • ENDOMETRIAL ABLATION     • ENDOSCOPY N/A 10/12/2022    Procedure: ESOPHAGOGASTRODUODENOSCOPY;  Surgeon: Sara Jarrett MD;  Location: [facility] ENDOSCOPY;  Service: Gastroenterology;  Laterality: N/A;  GASTRITIS   • ENTEROSCOPY SMALL BOWEL N/A 11/4/2022    Procedure: ENTEROSCOPY SMALL BOWEL;  Surgeon: Sara Jarrett MD;  Location: [facility] ENDOSCOPY;  Service: Gastroenterology;  Laterality: N/A;  normal   • HEEL SPUR EXCISION     • HERNIA REPAIR     • THYROIDECTOMY, PARTIAL         Social History     Socioeconomic History   • Marital status:    Tobacco Use   • Smoking status: Former   • Smokeless tobacco: Never   • Tobacco comments:     Quit around age 36   Vaping Use   • Vaping Use: Never used   Substance  and Sexual Activity   • Alcohol use: Yes     Comment: rare   • Drug use: No   • Sexual activity: Defer       Family History   Problem Relation Age of Onset   • Stroke Mother    • Coronary artery disease Brother         3 brothers with stents at young ages    • Diabetes Paternal Grandfather    • Heart disease Father    • Atrial fibrillation Father    • Colon cancer Father    • Coronary artery disease Brother        The following portion of the patient's history were reviewed and updated as appropriate: past medical history, past surgical history, past social history, past family history, allergies, current medications, and problem list.    Review of Systems   Constitutional: Positive for malaise/fatigue.   Cardiovascular: Positive for chest pain, leg swelling and palpitations.   Respiratory: Negative.    Hematologic/Lymphatic: Negative.    Neurological: Positive for dizziness.       Allergies   Allergen Reactions   • Other Other (See Comments)     RHEUMATOID ARTHRITIS INFUSION REACTION   • Other Irritability     INFUSION FOR RHEUMATOID ARTHRITIS   • Rituximab Unknown - High Severity         Current Outpatient Medications:   •  albuterol sulfate  (90 Base) MCG/ACT inhaler, Inhale 2 puffs Every 4 (Four) Hours As Needed for Wheezing., Disp: , Rfl:   •  budesonide-formoterol (SYMBICORT) 160-4.5 MCG/ACT inhaler, Inhale 2 puffs 2 (Two) Times a Day., Disp: , Rfl:   •  cyclobenzaprine (FLEXERIL) 10 MG tablet, Take 1 tablet by mouth As Needed for Muscle Spasms., Disp: 90 tablet, Rfl: 1  •  ferrous sulfate 325 (65 FE) MG tablet, ferrous sulfate 325 mg (65 mg iron) tablet  TAKE 1 TABLET BY MOUTH DAILY WITH BREAKFAST FOR 30 DAYS., Disp: , Rfl:   •  Green Tea, Sabrina sinensis, (GREEN TEA PO), Take 500 mg by mouth Daily., Disp: , Rfl:   •  hydroxychloroquine (PLAQUENIL) 200 MG tablet, Take 1 tablet by mouth Every Other Day., Disp: , Rfl:   •  metoprolol tartrate (LOPRESSOR) 50 MG tablet, Take 1 tablet by mouth 2 (Two)  "Times a Day., Disp: 180 tablet, Rfl: 1  •  montelukast (SINGULAIR) 10 MG tablet, Take 1 tablet by mouth Every Night., Disp: 90 tablet, Rfl: 1  •  nitroglycerin (NITROSTAT) 0.4 MG SL tablet, 0.4 mg Every 5 (Five) Minutes As Needed., Disp: , Rfl:   •  rosuvastatin (CRESTOR) 10 MG tablet, Take 1 tablet by mouth Daily., Disp: 90 tablet, Rfl: 1  •  Thyroid 60 MG PO tablet, Take 1 tablet by mouth Daily., Disp: 90 tablet, Rfl: 1  •  torsemide (DEMADEX) 20 MG tablet, TAKE 1 TABLET BY MOUTH EVERY DAY (Patient taking differently: Take 20 mg by mouth Daily.), Disp: 90 tablet, Rfl: 2  •  Turmeric 500 MG capsule, Take 500 mg by mouth Daily., Disp: , Rfl:   •  vitamin E 1000 UNIT capsule, Take 1 capsule by mouth Daily., Disp: , Rfl:          Objective:     Vitals:    01/30/23 1516   BP: 130/70   BP Location: Left arm   Patient Position: Sitting   Cuff Size: Adult   Pulse: 91   SpO2: 100%   Weight: 117 kg (257 lb)   Height: 177.8 cm (70\")     Body mass index is 36.88 kg/m².    PHYSICAL EXAM:    Vitals Reviewed.   General Appearance: No acute distress, well developed and well nourished. Obese.    HENT: Wearing mask.  Wearing glasses.  Respiratory: No signs of respiratory distress. Respiration rhythm and depth normal.   Clear to auscultation. No rales, crackles, rhonchi, or wheezing auscultated.   Cardiovascular:  Jugular Venous Pressure: Normal  Heart Rate and Rhythm: Irregularly, irregular.  Heart Sounds: Normal S1 and S2. No S3 or S4 noted.  Murmurs: No murmurs noted. No rubs, thrills, or gallops.   Lower Extremities: Trace lower extremity edema noted.  Gastrointestinal:  Abdomen soft, non-distended, non-tender.    Musculoskeletal: Normal movement of extremities.  Skin and Nails: Pale.  Psychiatric: Patient alert and oriented to person, place, and time. Speech and behavior appropriate. Normal mood and affect.       ECG 12 Lead    Date/Time: 1/30/2023 3:18 PM  Performed by: Tammy Enriquez, APRN  Authorized by: Tammy Enriquez, " MARILYNN   Comparison: compared with previous ECG from 12/22/2022  Similar to previous ECG  Rhythm: atrial fibrillation  Rate: normal  BPM: 91  Conduction: conduction normal  ST Segments: ST segments normal  T Waves: T waves normal  QRS axis: normal    Clinical impression: abnormal EKG              Assessment:       Diagnosis Plan   1. Permanent atrial fibrillation (HCC)        2. Acute on chronic combined systolic and diastolic CHF (congestive heart failure) ([facility])        3. Valvular heart disease        4. Nonrheumatic aortic valve stenosis        5. Nonrheumatic mitral valve regurgitation        6. Nonrheumatic tricuspid valve regurgitation        7. Pulmonary hypertension (HCC)        8. Anemia, unspecified type               Plan:       1.  Atrial Fibrillation: Rate controlled today with a heart rate of 91 bpm.  Sometimes her heart rate is running around 100-109 bpm at home.  I said we could increase her metoprolol to 75 mg twice per day, but she opted to leave her medications the same.  Her risk of bleeding is higher than her risk of stroke at this point and I do not recommend anticoagulation.  Certainly we can assess this as her anemia improves.    2.  Acute on chronic systolic/diastolic heart failure: EF 46% per echocardiogram 10/2022.  Euvolemic today.    3-6.  Valvular Heart Disease: Mild aortic stenosis, moderate mitral valve regurgitation, and moderate tricuspid valve regurgitation per echocardiogram 10/2022.    7.  Severe Pulmonary Hypertension.  Denies shortness of breath.    8.  Anemia: Recently hospitalized in December 2022 for hemoglobin of 5.0.  Not felt to be a good candidate for anticoagulation with atrial fibrillation.  She underwent transfusion x4.  Follows with hematology.    9.  She will follow-up with Dr. Quick in 6 months.    As always, it has been a pleasure to participate in your patient's care. Thank you.         Sincerely,         MARILYNN Groves  [facility]  Cardiology      · Dictated utilizing Dragon Dictation  · COVID-19 Precautions - Patient was compliant in wearing a mask. When I saw the patient, I used appropriate personal protective equipment (PPE) including mask and eye shield (standard procedure).  Additionally, I used gown and gloves if indicated.  Hand hygiene was completed before and after seeing the patient.

## 2023-01-31 ENCOUNTER — TELEPHONE (OUTPATIENT)
Dept: PHYSICAL THERAPY | Facility: CLINIC | Age: 63
End: 2023-01-31

## 2023-02-02 ENCOUNTER — TELEPHONE (OUTPATIENT)
Dept: GASTROENTEROLOGY | Facility: CLINIC | Age: 63
End: 2023-02-02
Payer: MEDICARE

## 2023-02-02 ENCOUNTER — LAB (OUTPATIENT)
Dept: LAB | Facility: HOSPITAL | Age: 63
End: 2023-02-02
Payer: MEDICARE

## 2023-02-02 DIAGNOSIS — D64.9 ANEMIA, UNSPECIFIED TYPE: ICD-10-CM

## 2023-02-02 DIAGNOSIS — D64.9 ANEMIA, UNSPECIFIED TYPE: Primary | ICD-10-CM

## 2023-02-02 LAB
BASOPHILS # BLD AUTO: 0.02 10*3/MM3 (ref 0–0.2)
BASOPHILS NFR BLD AUTO: 0.4 % (ref 0–1.5)
DEPRECATED RDW RBC AUTO: 44.7 FL (ref 37–54)
EOSINOPHIL # BLD AUTO: 0.13 10*3/MM3 (ref 0–0.4)
EOSINOPHIL NFR BLD AUTO: 2.4 % (ref 0.3–6.2)
ERYTHROCYTE [DISTWIDTH] IN BLOOD BY AUTOMATED COUNT: 16.8 % (ref 12.3–15.4)
HCT VFR BLD AUTO: 27.8 % (ref 34–46.6)
HGB BLD-MCNC: 8.3 G/DL (ref 12–15.9)
IMM GRANULOCYTES # BLD AUTO: 0.01 10*3/MM3 (ref 0–0.05)
IMM GRANULOCYTES NFR BLD AUTO: 0.2 % (ref 0–0.5)
LYMPHOCYTES # BLD AUTO: 0.98 10*3/MM3 (ref 0.7–3.1)
LYMPHOCYTES NFR BLD AUTO: 18 % (ref 19.6–45.3)
MCH RBC QN AUTO: 22.5 PG (ref 26.6–33)
MCHC RBC AUTO-ENTMCNC: 29.9 G/DL (ref 31.5–35.7)
MCV RBC AUTO: 75.3 FL (ref 79–97)
MONOCYTES # BLD AUTO: 0.41 10*3/MM3 (ref 0.1–0.9)
MONOCYTES NFR BLD AUTO: 7.6 % (ref 5–12)
NEUTROPHILS NFR BLD AUTO: 3.88 10*3/MM3 (ref 1.7–7)
NEUTROPHILS NFR BLD AUTO: 71.4 % (ref 42.7–76)
NRBC BLD AUTO-RTO: 0 /100 WBC (ref 0–0.2)
PLATELET # BLD AUTO: 418 10*3/MM3 (ref 140–450)
PMV BLD AUTO: 10.1 FL (ref 6–12)
RBC # BLD AUTO: 3.69 10*6/MM3 (ref 3.77–5.28)
WBC NRBC COR # BLD: 5.43 10*3/MM3 (ref 3.4–10.8)

## 2023-02-02 PROCEDURE — 85025 COMPLETE CBC W/AUTO DIFF WBC: CPT

## 2023-02-02 PROCEDURE — 36415 COLL VENOUS BLD VENIPUNCTURE: CPT

## 2023-02-02 NOTE — TELEPHONE ENCOUNTER
Patient contacted the office stating she is currently at the [facility] out patient lab trying to get a CBC ran as per  recommendations on 1/20/2023 she wanted her to get them drawn again in 2 weeks. There is not a current active order. Can you please place an order for this?

## 2023-02-03 ENCOUNTER — TELEPHONE (OUTPATIENT)
Dept: GASTROENTEROLOGY | Facility: CLINIC | Age: 63
End: 2023-02-03
Payer: MEDICARE

## 2023-02-03 NOTE — TELEPHONE ENCOUNTER
----- Message from Sara Jarrett MD sent at 2/3/2023  1:19 PM EST -----  Hgb stable.  Continue to f/u with PCP.

## 2023-02-03 NOTE — TELEPHONE ENCOUNTER
Pt notified of results and recommendations. Pt states she saw Dr Hall and he mentioned a pillcam if she has any bright red blood again but didn't recommend any abdominal surgeries due to past surgical history. Pt just wanted to update you.

## 2023-02-13 ENCOUNTER — TREATMENT (OUTPATIENT)
Dept: PHYSICAL THERAPY | Facility: CLINIC | Age: 63
End: 2023-02-13
Payer: MEDICARE

## 2023-02-13 ENCOUNTER — OFFICE VISIT (OUTPATIENT)
Dept: INTERNAL MEDICINE | Facility: CLINIC | Age: 63
End: 2023-02-13
Payer: MEDICARE

## 2023-02-13 VITALS
OXYGEN SATURATION: 98 % | WEIGHT: 259.5 LBS | DIASTOLIC BLOOD PRESSURE: 74 MMHG | HEIGHT: 70 IN | TEMPERATURE: 98.1 F | BODY MASS INDEX: 37.15 KG/M2 | HEART RATE: 105 BPM | SYSTOLIC BLOOD PRESSURE: 132 MMHG

## 2023-02-13 DIAGNOSIS — R29.898 SHOULDER WEAKNESS: ICD-10-CM

## 2023-02-13 DIAGNOSIS — E03.9 HYPOTHYROIDISM, UNSPECIFIED TYPE: ICD-10-CM

## 2023-02-13 DIAGNOSIS — M25.511 CHRONIC RIGHT SHOULDER PAIN: Primary | ICD-10-CM

## 2023-02-13 DIAGNOSIS — R68.2 DRY MOUTH: ICD-10-CM

## 2023-02-13 DIAGNOSIS — G89.29 CHRONIC RIGHT SHOULDER PAIN: Primary | ICD-10-CM

## 2023-02-13 DIAGNOSIS — D50.0 IRON DEFICIENCY ANEMIA DUE TO CHRONIC BLOOD LOSS: Primary | ICD-10-CM

## 2023-02-13 DIAGNOSIS — R26.2 DIFFICULTY WALKING: ICD-10-CM

## 2023-02-13 PROCEDURE — 82746 ASSAY OF FOLIC ACID SERUM: CPT | Performed by: INTERNAL MEDICINE

## 2023-02-13 PROCEDURE — 84443 ASSAY THYROID STIM HORMONE: CPT | Performed by: INTERNAL MEDICINE

## 2023-02-13 PROCEDURE — 97110 THERAPEUTIC EXERCISES: CPT

## 2023-02-13 PROCEDURE — 99215 OFFICE O/P EST HI 40 MIN: CPT | Performed by: INTERNAL MEDICINE

## 2023-02-13 PROCEDURE — 1159F MED LIST DOCD IN RCRD: CPT | Performed by: INTERNAL MEDICINE

## 2023-02-13 PROCEDURE — 85025 COMPLETE CBC W/AUTO DIFF WBC: CPT | Performed by: INTERNAL MEDICINE

## 2023-02-13 PROCEDURE — 36415 COLL VENOUS BLD VENIPUNCTURE: CPT | Performed by: INTERNAL MEDICINE

## 2023-02-13 PROCEDURE — 82607 VITAMIN B-12: CPT | Performed by: INTERNAL MEDICINE

## 2023-02-13 NOTE — PROGRESS NOTES
Progress Note / Discharge Summary   [facility] [facility] [address] [place] [place] [address]      Patient: Milagros Ramirez   : 1960  Diagnosis/ICD-10 Code:  Chronic right shoulder pain [M25.511, G89.29]  Referring practitioner: Jake James DO  Date of Initial Visit: Type: THERAPY  Noted: 2022  Today's Date: 2023  Patient seen for 4 sessions      Subjective:   Subjective Questionnaire: QuickDASH: 41  Clinical Progress: unchanged  Home Program Compliance: Yes  Treatment has included: soft tissue mobilization, strengthening, stretching, therapeutic activities, manual therapy, joint mobilization, home exercise program/patient education, functional ROM exercises, flexibility, body mechanics training, postural training and neuromuscular re-education    Subjective   Pt reports they have not seen much progress in their R shoulder pain. Pt reports they struggle to perform the exs due to increased pain. Pt reports they have been having swelling and pain occurring in other joints throughout their body. Pt has been unable to follow up w/ Rheumatologist due to continuing to have a bleeding issue. Pt has an appointment today for blood work for their hemoglobin. Pt reports no progress in therapy at this time.   Pt reports their current pain is an 8/10 and has been on average for the past week or so.       Objective   Active Range of Motion     Right Shoulder   Flexion: 125 degrees with pain  Abduction: 80 degrees with pain  External rotation 0°: 56 degrees with pain      Strength/Myotome Testing     Right Shoulder      Planes of Motion   Flexion: 4- pain  Abduction: 4+ pain  External rotation at 0°: 4 pain  Internal rotation at 0°: 4 pain        Right Elbow   Flexion: 4  Extension: 4  Pain in elbow w/ testing     See Exercise, Manual, and Modality Logs for complete treatment.     Assessment/Plan   Impairments: abnormal coordination, abnormal gait, abnormal muscle firing, abnormal or restricted ROM, activity  intolerance, impaired balance, impaired physical strength, lacks appropriate home exercise program and pain with function  Functional Limitations: carrying objects, lifting, sleeping, walking, pulling, pushing, uncomfortable because of pain, moving in bed, sitting, standing, stooping, reaching behind back, reaching overhead and unable to perform repetitive tasks  Pt reported to therapy w/ complaints of R shoulder pain. Discussed with pt about following up w/ Rheumatologist as well as PCM due to increased pain and limited progress in therapy. Discussed with pt about continuing to use their HEP to their tolerance at home. Pt has been present for 2 treatment sessions since starting therapy on 12/29/2023. Pt has been limited in their attendance due to increased pain and other medical issues. Pt's goals will be addressed at this time. Pt will be discharged from physical therapy at this time.     Goals  Plan Goals: SHOULDER  PROBLEMS:     1. The patient has limited strength of the R shoulder.              LTG 1: 12 weeks:  The patient will demonstrate 4+ /5 strength for R shoulder flexion, abduction, external rotation, and internal rotation in order to demonstrate improved shoulder stability.                          STATUS:  Not met              STG 1a: 6 weeks:  The patient will demonstrate 4 /5 strength for R shoulder flexion, abduction, external rotation, and internal rotation.                          STATUS:  Not met              TREATMENT: Manual therapy, therapeutic exercise, home exercise instruction, and modalities as needed to include: electrical stimulation, moist heat, and ice.                2. The patient complains of pain to the R shoulder.              LTG 2: 12 weeks:  The patient will report a pain rating of 3 /10 or better in order to improve sleep quality and tolerance to performance of activities of daily living.                          STATUS:  Not met              STG 2a: 6 weeks:  The patient  will report a pain rating of 4 /10 or better.                            STATUS:  Not met              TREATMENT: Manual therapy, therapeutic exercise, home exercise instruction, and modalities as needed to include: electrical stimulation, moist heat, and ice.                3. The patient has gait dysfunction.              LTG 3: 12 weeks:  The patient will ambulate without assistive device, independently, for community distances with minimal limp in order to improve mobility and allow patient to perform activities such as shopping and performing ADLs with greater ease.                          STATUS:  Not met              STG3a:  6 weeks:  The patient will be independent with home exercises.                           STATUS:  met              TREATMENT: Gait training, aquatic therapy, therapeutic exercise, and home exercise instruction.    4. Carrying, Moving, and Handling Objects Functional Limitation                               LTG 4: 12 weeks:  The patient will demonstrate 9 % limitation by achieving a score of 15 on the QuickDASH.                          STATUS:  Not met              STG 4a: 6 weeks:  The patient will demonstrate 20 % limitation by achieving a score of 20 on the QuickDASH.                            STATUS:  Not met              TREATMENT:  Manual therapy, therapeutic exercise, home exercise instruction, and modalities as needed to include: moist heat and electrical stimulation.         Progress toward previous goals: Partially Met      Recommendations: Discharge; recommend follow up w/ PCM and/or Rheumatologist due to persistent pain.     PT Signature: Сергей Kohli PT, DPT    Electronically signed 2023    [place] License: PT - [id number]         Timed:  Manual Therapy:    0     mins  71542;  Therapeutic Exercise:    15     mins  03603;     Neuromuscular Jose:    0    mins  90933;    Therapeutic Activity:     0     mins  41742;     Gait Trainin     mins  33754;     Aquatics                          0      mins  29742    Un-timed:  Mechanical Traction      0     mins  79337  Dry Needling     0     mins self-pay  Electrical Stimulation:    0     mins  60240 ( );    Timed Treatment:   15   mins   Total Treatment:     25   mins

## 2023-02-14 LAB
BASOPHILS # BLD AUTO: 0.03 10*3/MM3 (ref 0–0.2)
BASOPHILS NFR BLD AUTO: 0.5 % (ref 0–1.5)
DEPRECATED RDW RBC AUTO: 46.1 FL (ref 37–54)
EOSINOPHIL # BLD AUTO: 0.05 10*3/MM3 (ref 0–0.4)
EOSINOPHIL NFR BLD AUTO: 0.8 % (ref 0.3–6.2)
ERYTHROCYTE [DISTWIDTH] IN BLOOD BY AUTOMATED COUNT: 16.7 % (ref 12.3–15.4)
FOLATE SERPL-MCNC: 10.7 NG/ML (ref 4.78–24.2)
HCT VFR BLD AUTO: 31.8 % (ref 34–46.6)
HGB BLD-MCNC: 9.1 G/DL (ref 12–15.9)
LYMPHOCYTES # BLD AUTO: 1.16 10*3/MM3 (ref 0.7–3.1)
LYMPHOCYTES NFR BLD AUTO: 19 % (ref 19.6–45.3)
MCH RBC QN AUTO: 21.8 PG (ref 26.6–33)
MCHC RBC AUTO-ENTMCNC: 28.6 G/DL (ref 31.5–35.7)
MCV RBC AUTO: 76.1 FL (ref 79–97)
MONOCYTES # BLD AUTO: 0.51 10*3/MM3 (ref 0.1–0.9)
MONOCYTES NFR BLD AUTO: 8.4 % (ref 5–12)
NEUTROPHILS NFR BLD AUTO: 4.34 10*3/MM3 (ref 1.7–7)
NEUTROPHILS NFR BLD AUTO: 71.1 % (ref 42.7–76)
PLATELET # BLD AUTO: 479 10*3/MM3 (ref 140–450)
PMV BLD AUTO: 10.2 FL (ref 6–12)
RBC # BLD AUTO: 4.18 10*6/MM3 (ref 3.77–5.28)
TSH SERPL DL<=0.05 MIU/L-ACNC: 0.7 UIU/ML (ref 0.27–4.2)
VIT B12 BLD-MCNC: 820 PG/ML (ref 211–946)
WBC NRBC COR # BLD: 6.1 10*3/MM3 (ref 3.4–10.8)

## 2023-02-16 ENCOUNTER — TELEPHONE (OUTPATIENT)
Dept: INTERNAL MEDICINE | Facility: CLINIC | Age: 63
End: 2023-02-16
Payer: MEDICARE

## 2023-02-16 NOTE — TELEPHONE ENCOUNTER
Caller: Milagros Ramirez    Relationship: Self    Best call back number: [phone]    What test was performed: LABS    When was the test performed: 02/13/23    Where was the test performed: IN OFFICE    Additional notes: THE PATIENT WOULD LIKE A CALL BACK TO DISCUSS TEST RESULTS

## 2023-02-17 NOTE — TELEPHONE ENCOUNTER
Pt called due to test results not being able to be seen in her my chart. I was able to provide her those results and she has no further questions at this time.

## 2023-03-20 ENCOUNTER — APPOINTMENT (OUTPATIENT)
Dept: CT IMAGING | Facility: HOSPITAL | Age: 63
End: 2023-03-20
Payer: MEDICARE

## 2023-03-20 ENCOUNTER — HOSPITAL ENCOUNTER (EMERGENCY)
Facility: HOSPITAL | Age: 63
Discharge: HOME | End: 2023-03-20
Attending: EMERGENCY MEDICINE
Payer: MEDICARE

## 2023-03-20 ENCOUNTER — APPOINTMENT (OUTPATIENT)
Dept: GENERAL RADIOLOGY | Facility: HOSPITAL | Age: 63
End: 2023-03-20
Payer: MEDICARE

## 2023-03-20 VITALS
DIASTOLIC BLOOD PRESSURE: 71 MMHG | WEIGHT: 261.6 LBS | TEMPERATURE: 97.8 F | BODY MASS INDEX: 37.45 KG/M2 | OXYGEN SATURATION: 94 % | HEART RATE: 112 BPM | HEIGHT: 70 IN | RESPIRATION RATE: 16 BRPM | SYSTOLIC BLOOD PRESSURE: 146 MMHG

## 2023-03-20 DIAGNOSIS — T14.8XXA MUSCLE STRAIN: ICD-10-CM
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
## 2023-03-20 LAB
ALBUMIN SERPL-MCNC: 3.4 G/DL (ref 3.5–5.2)
ALBUMIN/GLOB SERPL: 0.7 G/DL
ALP SERPL-CCNC: 144 U/L (ref 39–117)
ALT SERPL W P-5'-P-CCNC: 11 U/L (ref 1–33)
ANION GAP SERPL CALCULATED.3IONS-SCNC: 11.3 MMOL/L (ref 5–15)
AST SERPL-CCNC: 25 U/L (ref 1–32)
BASOPHILS # BLD AUTO: 0.03 10*3/MM3 (ref 0–0.2)
BASOPHILS NFR BLD AUTO: 0.4 % (ref 0–1.5)
BILIRUB SERPL-MCNC: 0.3 MG/DL (ref 0–1.2)
BUN SERPL-MCNC: 15 MG/DL (ref 8–23)
BUN/CREAT SERPL: 17.2 (ref 7–25)
CALCIUM SPEC-SCNC: 9.1 MG/DL (ref 8.6–10.5)
CHLORIDE SERPL-SCNC: 99 MMOL/L (ref 98–107)
CO2 SERPL-SCNC: 24.7 MMOL/L (ref 22–29)
CREAT SERPL-MCNC: 0.87 MG/DL (ref 0.57–1)
DEPRECATED RDW RBC AUTO: 56.4 FL (ref 37–54)
EGFRCR SERPLBLD CKD-EPI 2021: 75.9 ML/MIN/1.73
EOSINOPHIL # BLD AUTO: 0.12 10*3/MM3 (ref 0–0.4)
EOSINOPHIL NFR BLD AUTO: 1.5 % (ref 0.3–6.2)
ERYTHROCYTE [DISTWIDTH] IN BLOOD BY AUTOMATED COUNT: 20.8 % (ref 12.3–15.4)
GLOBULIN UR ELPH-MCNC: 4.9 GM/DL
GLUCOSE SERPL-MCNC: 124 MG/DL (ref 65–99)
HCT VFR BLD AUTO: 34.5 % (ref 34–46.6)
HGB BLD-MCNC: 10 G/DL (ref 12–15.9)
HOLD SPECIMEN: NORMAL
HOLD SPECIMEN: NORMAL
IMM GRANULOCYTES # BLD AUTO: 0.02 10*3/MM3 (ref 0–0.05)
IMM GRANULOCYTES NFR BLD AUTO: 0.2 % (ref 0–0.5)
LYMPHOCYTES # BLD AUTO: 1.61 10*3/MM3 (ref 0.7–3.1)
LYMPHOCYTES NFR BLD AUTO: 19.5 % (ref 19.6–45.3)
MCH RBC QN AUTO: 21.9 PG (ref 26.6–33)
MCHC RBC AUTO-ENTMCNC: 29 G/DL (ref 31.5–35.7)
MCV RBC AUTO: 75.5 FL (ref 79–97)
MONOCYTES # BLD AUTO: 0.47 10*3/MM3 (ref 0.1–0.9)
MONOCYTES NFR BLD AUTO: 5.7 % (ref 5–12)
NEUTROPHILS NFR BLD AUTO: 5.99 10*3/MM3 (ref 1.7–7)
NEUTROPHILS NFR BLD AUTO: 72.7 % (ref 42.7–76)
NRBC BLD AUTO-RTO: 0 /100 WBC (ref 0–0.2)
PLATELET # BLD AUTO: 306 10*3/MM3 (ref 140–450)
PMV BLD AUTO: 9.8 FL (ref 6–12)
POTASSIUM SERPL-SCNC: 3.9 MMOL/L (ref 3.5–5.2)
PROT SERPL-MCNC: 8.3 G/DL (ref 6–8.5)
RBC # BLD AUTO: 4.57 10*6/MM3 (ref 3.77–5.28)
SODIUM SERPL-SCNC: 135 MMOL/L (ref 136–145)
WBC NRBC COR # BLD: 8.24 10*3/MM3 (ref 3.4–10.8)
WHOLE BLOOD HOLD COAG: NORMAL
WHOLE BLOOD HOLD SPECIMEN: NORMAL

## 2023-03-20 PROCEDURE — 36415 COLL VENOUS BLD VENIPUNCTURE: CPT

## 2023-03-20 PROCEDURE — 96375 TX/PRO/DX INJ NEW DRUG ADDON: CPT

## 2023-03-20 PROCEDURE — 85025 COMPLETE CBC W/AUTO DIFF WBC: CPT

## 2023-03-20 PROCEDURE — 99284 EMERGENCY DEPT VISIT MOD MDM: CPT

## 2023-03-20 PROCEDURE — 25010000002 KETOROLAC TROMETHAMINE PER 15 MG: Performed by: EMERGENCY MEDICINE

## 2023-03-20 PROCEDURE — 70450 CT HEAD/BRAIN W/O DYE: CPT

## 2023-03-20 PROCEDURE — 96374 THER/PROPH/DIAG INJ IV PUSH: CPT

## 2023-03-20 PROCEDURE — 74177 CT ABD & PELVIS W/CONTRAST: CPT

## 2023-03-20 PROCEDURE — 80053 COMPREHEN METABOLIC PANEL: CPT

## 2023-03-20 PROCEDURE — 71101 X-RAY EXAM UNILAT RIBS/CHEST: CPT

## 2023-03-20 PROCEDURE — 25010000002 HYDROMORPHONE 1 MG/ML SOLUTION: Performed by: EMERGENCY MEDICINE

## 2023-03-20 PROCEDURE — 71260 CT THORAX DX C+: CPT

## 2023-03-20 PROCEDURE — 25510000001 IOPAMIDOL PER 1 ML: Performed by: EMERGENCY MEDICINE

## 2023-03-20 RX ORDER — SODIUM CHLORIDE 0.9 % (FLUSH) 0.9 %
10 SYRINGE (ML) INJECTION AS NEEDED
Status: DISCONTINUED | OUTPATIENT
Start: 2023-03-20 | End: 2023-03-20 | Stop reason: HOSPADM

## 2023-03-20 RX ORDER — OXYCODONE HYDROCHLORIDE AND ACETAMINOPHEN 5; 325 MG/1; MG/1
1 TABLET ORAL EVERY 6 HOURS PRN
Qty: 12 TABLET | Refills: 0 | Status: SHIPPED | OUTPATIENT
Start: 2023-03-20

## 2023-03-20 RX ORDER — KETOROLAC TROMETHAMINE 30 MG/ML
30 INJECTION, SOLUTION INTRAMUSCULAR; INTRAVENOUS ONCE
Status: COMPLETED | OUTPATIENT
Start: 2023-03-20 | End: 2023-03-20

## 2023-03-20 RX ORDER — CYCLOBENZAPRINE HCL 10 MG
10 TABLET ORAL 3 TIMES DAILY
Qty: 20 TABLET | Refills: 0 | Status: SHIPPED | OUTPATIENT
Start: 2023-03-20

## 2023-03-20 RX ADMIN — HYDROMORPHONE HYDROCHLORIDE 1 MG: 1 INJECTION, SOLUTION INTRAMUSCULAR; INTRAVENOUS; SUBCUTANEOUS at 18:53

## 2023-03-20 RX ADMIN — IOPAMIDOL 100 ML: 755 INJECTION, SOLUTION INTRAVENOUS at 19:38

## 2023-03-20 RX ADMIN — KETOROLAC TROMETHAMINE 30 MG: 30 INJECTION, SOLUTION INTRAMUSCULAR; INTRAVENOUS at 18:53
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
## 2023-04-21 RX ORDER — THYROID 60 MG
TABLET ORAL
Qty: 30 TABLET | Refills: 5 | Status: SHIPPED | OUTPATIENT
Start: 2023-04-21

## 2023-05-15 ENCOUNTER — OFFICE VISIT (OUTPATIENT)
Dept: INTERNAL MEDICINE | Facility: CLINIC | Age: 63
End: 2023-05-15
Payer: MEDICARE

## 2023-05-15 VITALS
HEART RATE: 73 BPM | BODY MASS INDEX: 36.97 KG/M2 | SYSTOLIC BLOOD PRESSURE: 122 MMHG | DIASTOLIC BLOOD PRESSURE: 68 MMHG | OXYGEN SATURATION: 96 % | RESPIRATION RATE: 18 BRPM | HEIGHT: 70 IN | TEMPERATURE: 98.3 F | WEIGHT: 258.25 LBS

## 2023-05-15 DIAGNOSIS — K14.0 GLOSSITIS: ICD-10-CM

## 2023-05-15 DIAGNOSIS — D50.0 IRON DEFICIENCY ANEMIA DUE TO CHRONIC BLOOD LOSS: Primary | ICD-10-CM

## 2023-05-15 DIAGNOSIS — R68.2 DRY MOUTH: ICD-10-CM

## 2023-05-15 LAB
BASOPHILS # BLD AUTO: 0.02 10*3/MM3 (ref 0–0.2)
BASOPHILS NFR BLD AUTO: 0.5 % (ref 0–1.5)
DEPRECATED RDW RBC AUTO: 42.3 FL (ref 37–54)
EOSINOPHIL # BLD AUTO: 0.09 10*3/MM3 (ref 0–0.4)
EOSINOPHIL NFR BLD AUTO: 2.1 % (ref 0.3–6.2)
ERYTHROCYTE [DISTWIDTH] IN BLOOD BY AUTOMATED COUNT: 15.9 % (ref 12.3–15.4)
FOLATE SERPL-MCNC: 9.15 NG/ML (ref 4.78–24.2)
HCT VFR BLD AUTO: 32.2 % (ref 34–46.6)
HGB BLD-MCNC: 9.7 G/DL (ref 12–15.9)
IRON 24H UR-MRATE: 18 MCG/DL (ref 37–145)
IRON SATN MFR SERPL: 5 % (ref 20–50)
LYMPHOCYTES # BLD AUTO: 1.03 10*3/MM3 (ref 0.7–3.1)
LYMPHOCYTES NFR BLD AUTO: 23.6 % (ref 19.6–45.3)
MCH RBC QN AUTO: 22.5 PG (ref 26.6–33)
MCHC RBC AUTO-ENTMCNC: 30.1 G/DL (ref 31.5–35.7)
MCV RBC AUTO: 74.7 FL (ref 79–97)
MONOCYTES # BLD AUTO: 0.32 10*3/MM3 (ref 0.1–0.9)
MONOCYTES NFR BLD AUTO: 7.3 % (ref 5–12)
NEUTROPHILS NFR BLD AUTO: 2.89 10*3/MM3 (ref 1.7–7)
NEUTROPHILS NFR BLD AUTO: 66.3 % (ref 42.7–76)
PLATELET # BLD AUTO: 199 10*3/MM3 (ref 140–450)
PMV BLD AUTO: 10.6 FL (ref 6–12)
RBC # BLD AUTO: 4.31 10*6/MM3 (ref 3.77–5.28)
TIBC SERPL-MCNC: 367 MCG/DL (ref 298–536)
TRANSFERRIN SERPL-MCNC: 246 MG/DL (ref 200–360)
VIT B12 BLD-MCNC: 758 PG/ML (ref 211–946)
WBC NRBC COR # BLD: 4.36 10*3/MM3 (ref 3.4–10.8)

## 2023-05-15 PROCEDURE — 85025 COMPLETE CBC W/AUTO DIFF WBC: CPT | Performed by: INTERNAL MEDICINE

## 2023-05-15 PROCEDURE — 82607 VITAMIN B-12: CPT | Performed by: INTERNAL MEDICINE

## 2023-05-15 PROCEDURE — 36415 COLL VENOUS BLD VENIPUNCTURE: CPT | Performed by: INTERNAL MEDICINE

## 2023-05-15 PROCEDURE — 99214 OFFICE O/P EST MOD 30 MIN: CPT | Performed by: INTERNAL MEDICINE

## 2023-05-15 PROCEDURE — 83540 ASSAY OF IRON: CPT | Performed by: INTERNAL MEDICINE

## 2023-05-15 PROCEDURE — 84466 ASSAY OF TRANSFERRIN: CPT | Performed by: INTERNAL MEDICINE

## 2023-05-15 PROCEDURE — 82746 ASSAY OF FOLIC ACID SERUM: CPT | Performed by: INTERNAL MEDICINE

## 2023-05-15 PROCEDURE — 1160F RVW MEDS BY RX/DR IN RCRD: CPT | Performed by: INTERNAL MEDICINE

## 2023-05-15 PROCEDURE — 1159F MED LIST DOCD IN RCRD: CPT | Performed by: INTERNAL MEDICINE

## 2023-05-15 RX ORDER — HYDROCODONE BITARTRATE AND ACETAMINOPHEN 7.5; 325 MG/1; MG/1
TABLET ORAL
COMMUNITY
Start: 2023-05-02

## 2023-05-23 ENCOUNTER — OFFICE VISIT (OUTPATIENT)
Dept: OTOLARYNGOLOGY | Facility: CLINIC | Age: 63
End: 2023-05-23
Payer: MEDICARE

## 2023-05-23 VITALS
HEART RATE: 96 BPM | TEMPERATURE: 96.4 F | DIASTOLIC BLOOD PRESSURE: 76 MMHG | HEIGHT: 70 IN | BODY MASS INDEX: 36.19 KG/M2 | SYSTOLIC BLOOD PRESSURE: 120 MMHG | WEIGHT: 252.8 LBS

## 2023-05-23 DIAGNOSIS — H04.123 DRY EYES: ICD-10-CM

## 2023-05-23 DIAGNOSIS — K11.7 XEROSTOMIA: Primary | ICD-10-CM

## 2023-05-23 NOTE — PROGRESS NOTES
Patient Name: Milagros Ramirez   Visit Date: 05/23/2023   Patient ID: [id number]  Provider: MARILYNN Velásquez    Sex: female  [place] [facility]   YOB: 1960  Location Address: [address] [place] [address], [address],?[place]?[address]    Primary Care Provider Nelly Romero MD  Location Phone: [phone]    Referring Provider: Nelly Abbasi*        Chief Complaint  Dry Mouth (NEW PATIENT)    Subjective   Milagros Ramirez is a 62 y.o. female who presents to [facility] today as a consult from Nelly Abbasi* for evaluation of xerostomia.  Patient states that this started about 3 to 4 months ago she began to notice extreme dry mouth.  She states that the tip of her tongue often gets sore and she is constantly drinking water.  She states she also has dry eyes that are often itchy, dry nasal mucosa and dry vaginal mucosa.  She has recently had issues with iron deficiency anemia.  She reports she takes Singulair daily for her allergies and sinus congestion but no other antihistamines.  She is on narcotic pain medication and states she has to take this daily due to chronic pain.  She has a history of hypothyroidism and she reports a history of rheumatoid arthritis and lupus.  She denies any sores in the mouth or issues with acid reflux.  She did have recent labs testing vitamin B12 and folate which were normal.  TSH levels have also been normal and she is on [place] Thyroid 60 mg daily.  She has not seen by endocrinology.      Current Outpatient Medications on File Prior to Visit   Medication Sig   • albuterol sulfate  (90 Base) MCG/ACT inhaler Inhale 2 puffs Every 4 (Four) Hours As Needed for Wheezing.   • [place] Thyroid 60 MG tablet TAKE 1 TABLET BY MOUTH EVERY DAY   • budesonide-formoterol (SYMBICORT) 160-4.5 MCG/ACT inhaler Inhale 2 puffs 2 (Two) Times a Day.   • cyclobenzaprine (FLEXERIL) 10 MG tablet Take 1 tablet by  "mouth 3 (Three) Times a Day.   • ferrous sulfate 325 (65 FE) MG tablet ferrous sulfate 325 mg (65 mg iron) tablet   TAKE 1 TABLET BY MOUTH DAILY WITH BREAKFAST FOR 30 DAYS.   • Green Tea, Sabrina sinensis, (GREEN TEA PO) Take 500 mg by mouth Daily.   • HYDROcodone-acetaminophen (NORCO) 7.5-325 MG per tablet TAKE 1 TABLET BY MOUTH EVERY 12 HOURS AS NEEDED FOR 30 DAYS   • hydroxychloroquine (PLAQUENIL) 200 MG tablet Take 1 tablet by mouth Every Other Day.   • metoprolol tartrate (LOPRESSOR) 50 MG tablet Take 1 tablet by mouth 2 (Two) Times a Day.   • montelukast (SINGULAIR) 10 MG tablet Take 1 tablet by mouth Every Night.   • nitroglycerin (NITROSTAT) 0.4 MG SL tablet 1 tablet Every 5 (Five) Minutes As Needed.   • rosuvastatin (CRESTOR) 10 MG tablet Take 1 tablet by mouth Daily.   • torsemide (DEMADEX) 20 MG tablet TAKE 1 TABLET BY MOUTH EVERY DAY (Patient taking differently: Take 1 tablet by mouth Daily.)   • Turmeric 500 MG capsule Take 1 capsule by mouth Daily.   • vitamin E 1000 UNIT capsule Take 1 capsule by mouth Daily.   • oxyCODONE-acetaminophen (PERCOCET) 5-325 MG per tablet Take 1 tablet by mouth Every 6 (Six) Hours As Needed for Severe Pain. (Patient not taking: Reported on 5/23/2023)     No current facility-administered medications on file prior to visit.        Social History     Tobacco Use   • Smoking status: Former     Types: Cigarettes   • Smokeless tobacco: Never   • Tobacco comments:     Quit around age 36   Vaping Use   • Vaping Use: Never used   Substance Use Topics   • Alcohol use: Yes     Comment: rare   • Drug use: No       Objective     Vital Signs:   /76 (BP Location: Left arm, Patient Position: Sitting, Cuff Size: Adult)   Pulse 96   Temp 96.4 °F (35.8 °C) (Temporal)   Ht 177.8 cm (70\")   Wt 115 kg (252 lb 12.8 oz)   BMI 36.27 kg/m²       Physical Exam  Constitutional:       General: She is not in acute distress.     Appearance: Normal appearance. She is not ill-appearing. "   HENT:      Head: Normocephalic and atraumatic.      Jaw: There is normal jaw occlusion. No tenderness or pain on movement.      Salivary Glands: Right salivary gland is not diffusely enlarged or tender. Left salivary gland is not diffusely enlarged or tender.      Right Ear: Tympanic membrane, ear canal and external ear normal.      Left Ear: Tympanic membrane, ear canal and external ear normal.      Nose: Nose normal. No septal deviation.      Right Sinus: No maxillary sinus tenderness or frontal sinus tenderness.      Left Sinus: No maxillary sinus tenderness or frontal sinus tenderness.      Mouth/Throat:      Lips: Pink. No lesions.      Mouth: Mucous membranes are dry. No oral lesions.      Dentition: Normal dentition.      Tongue: No lesions.      Palate: No mass and lesions.      Pharynx: Oropharynx is clear. Uvula midline.      Tonsils: No tonsillar exudate.      Comments: Dry oral cavity, no ulcerations or lesions  Eyes:      Extraocular Movements: Extraocular movements intact.      Conjunctiva/sclera: Conjunctivae normal.      Pupils: Pupils are equal, round, and reactive to light.   Neck:      Thyroid: No thyroid mass, thyromegaly or thyroid tenderness.      Trachea: Trachea normal.   Pulmonary:      Effort: Pulmonary effort is normal. No respiratory distress.   Musculoskeletal:         General: Normal range of motion.      Cervical back: Normal range of motion and neck supple. No tenderness.   Lymphadenopathy:      Cervical: No cervical adenopathy.   Skin:     General: Skin is warm and dry.   Neurological:      General: No focal deficit present.      Mental Status: She is alert and oriented to person, place, and time.      Cranial Nerves: No cranial nerve deficit.      Motor: No weakness.      Gait: Gait normal.   Psychiatric:         Mood and Affect: Mood normal.         Behavior: Behavior normal.         Thought Content: Thought content normal.         Judgment: Judgment normal.               Result  Review :              Assessment and Plan    Diagnoses and all orders for this visit:    1. Xerostomia (Primary)  -     Sjogren's Antibody, Anti-SS-A / -SS-B; Future    2. Dry eyes  -     Sjogren's Antibody, Anti-SS-A / -SS-B; Future    On examination today patient does have dry oral mucous membranes with no obvious ulcerations or lesions.  She has had recent labs testing B12 and folate which were normal.  We have discussed ordering labs for Sjogren's antibodies.  We have discussed that her medications could be playing a role in her chronic dry mouth and have encouraged her to continue to drink water and continue to try Biotene.  We will get the lab work and I will do a phone visit follow-up afterwards to discuss next steps.       Follow Up   Return phone visit follow up.  Patient was given instructions and counseling regarding her condition or for health maintenance advice. Please see specific information pulled into the AVS if appropriate.      MARILYNN Velásquez

## 2023-05-25 ENCOUNTER — LAB (OUTPATIENT)
Dept: LAB | Facility: HOSPITAL | Age: 63
End: 2023-05-25
Payer: MEDICARE

## 2023-05-25 DIAGNOSIS — K11.7 XEROSTOMIA: ICD-10-CM

## 2023-05-25 DIAGNOSIS — H04.123 DRY EYES: ICD-10-CM

## 2023-05-25 PROCEDURE — 36415 COLL VENOUS BLD VENIPUNCTURE: CPT

## 2023-05-25 PROCEDURE — 86235 NUCLEAR ANTIGEN ANTIBODY: CPT

## 2023-05-25 RX ORDER — FERROUS SULFATE 325(65) MG
325 TABLET ORAL 2 TIMES DAILY WITH MEALS
Qty: 60 TABLET | Refills: 2 | Status: SHIPPED | OUTPATIENT
Start: 2023-05-25

## 2023-05-26 LAB
ENA SS-A AB SER-ACNC: 0.2 AI (ref 0–0.9)
ENA SS-B AB SER-ACNC: <0.2 AI (ref 0–0.9)

## 2023-06-07 NOTE — PROGRESS NOTES
"Chief Complaint  Follow-up (Follow up for anemia, wants blood work done to check levels, also both shoulders have been bothering her again, especially the right one, and right elbow has a cyst, also dry mouth. Medication questions as well. ) and Anemia    Subjective       Milagros Ramirez presents to [facility]    HPI   Presenting for follow-up of anemia.  Needing follow-up blood work.    Complaining of shoulder pain bilaterally, right greater than left.    Complaining of right elbow cyst.    Objective     Vitals:    05/15/23 1507   BP: 122/68   BP Location: Left arm   Patient Position: Sitting   Cuff Size: Adult   Pulse: 73   Resp: 18   Temp: 98.3 °F (36.8 °C)   TempSrc: Temporal   SpO2: 96%   Weight: 117 kg (258 lb 4 oz)   Height: 177.8 cm (70\")      Wt Readings from Last 3 Encounters:   05/23/23 115 kg (252 lb 12.8 oz)   05/15/23 117 kg (258 lb 4 oz)   03/20/23 119 kg (261 lb 9.6 oz)      BP Readings from Last 3 Encounters:   05/23/23 120/76   05/15/23 122/68   03/20/23 146/71        Body mass index is 37.06 kg/m².           Physical Exam  Vitals reviewed.   Constitutional:       Appearance: Normal appearance. She is well-developed.   HENT:      Head: Normocephalic and atraumatic.      Mouth/Throat:      Pharynx: No oropharyngeal exudate.   Eyes:      Conjunctiva/sclera: Conjunctivae normal.      Pupils: Pupils are equal, round, and reactive to light.   Cardiovascular:      Rate and Rhythm: Normal rate and regular rhythm.      Heart sounds: No murmur heard.    No friction rub. No gallop.   Pulmonary:      Effort: Pulmonary effort is normal.      Breath sounds: Normal breath sounds. No wheezing or rhonchi.   Skin:     General: Skin is warm and dry.   Neurological:      Mental Status: She is alert and oriented to person, place, and time.   Psychiatric:         Mood and Affect: Affect normal.        Result Review :   The following data was reviewed by: Nelly Mccormick " MD Nick on 05/15/2023:  CMP          12/23/2022    07:54 12/24/2022    04:42 3/20/2023    17:57   CMP   Glucose 116  112  124    BUN 15  15  15    Creatinine 0.74  0.83  0.87    EGFR 92.2  80.3  75.9    Sodium 139  137  135    Potassium 3.7  3.7  3.9    Chloride 105  101  99    Calcium 8.3  8.1  9.1    Total Protein   8.3    Albumin   3.4    Globulin   4.9    Total Bilirubin   0.3    Alkaline Phosphatase   144    AST (SGOT)   25    ALT (SGPT)   11    Albumin/Globulin Ratio   0.7    BUN/Creatinine Ratio 20.3  18.1  17.2    Anion Gap 6.6  10.0  11.3      CBC          2/13/2023    15:35 3/20/2023    17:57 5/15/2023    16:14   CBC   WBC 6.10  8.24  4.36    RBC 4.18  4.57  4.31    Hemoglobin 9.1  10.0  9.7    Hematocrit 31.8  34.5  32.2    MCV 76.1  75.5  74.7    MCH 21.8  21.9  22.5    MCHC 28.6  29.0  30.1    RDW 16.7  20.8  15.9    Platelets 479  306  199      Lipid Panel          11/14/2022    13:47   Lipid Panel   Total Cholesterol 137    Triglycerides 60    HDL Cholesterol 73    VLDL Cholesterol 13    LDL Cholesterol  51    LDL/HDL Ratio 0.71      TSH          11/14/2022    13:47 2/13/2023    15:35   TSH   TSH 0.800  0.696          Procedures    Assessment and Plan   Diagnoses and all orders for this visit:    1. Iron deficiency anemia due to chronic blood loss (Primary)  -     CBC & Differential  -     Vitamin B12 & Folate  -     Iron Profile  -     Cancel: Manual Differential    2. Dry mouth  -     Ambulatory Referral to ENT (Otolaryngology)    3. Glossitis  -     Ambulatory Referral to ENT (Otolaryngology)    Other orders  -     ferrous sulfate 325 (65 FE) MG tablet; Take 1 tablet by mouth 2 (Two) Times a Day With Meals.  Dispense: 60 tablet; Refill: 2          Follow Up   Return in about 3 months (around 8/15/2023) for Next scheduled follow up.  Patient was given instructions and counseling regarding her condition or for health maintenance advice. Please see specific information pulled into the AVS if  appropriate.

## 2023-06-14 RX ORDER — MONTELUKAST SODIUM 10 MG/1
TABLET ORAL
Qty: 90 TABLET | Refills: 1 | Status: SHIPPED | OUTPATIENT
Start: 2023-06-14

## 2023-06-15 ENCOUNTER — CLINICAL SUPPORT (OUTPATIENT)
Dept: OTOLARYNGOLOGY | Facility: CLINIC | Age: 63
End: 2023-06-15
Payer: MEDICARE

## 2023-06-15 DIAGNOSIS — K11.7 XEROSTOMIA: Primary | ICD-10-CM

## 2023-06-15 DIAGNOSIS — H04.123 DRY EYES: ICD-10-CM

## 2023-06-15 NOTE — PROGRESS NOTES
Patient Name: Milagros Ramirez   Visit Date: 06/15/2023   Patient ID: [id number]  Provider: MARILYNN Velásquez    Sex: female  Location: [facility]   YOB: 1960  Location Address: [address] [place] [address], [address],?[place]?[address]    Primary Care Provider Nelly Romero MD  Location Phone: [phone]    Referring Provider: No ref. provider found        Chief Complaint  No chief complaint on file.    Subjective          Milagros Ramirez is a 62 y.o. female who presents to [facility] for a follow-up visit of chronic xerostomia.  When I last saw her we did order SSA and SSB antibodies so today we are doing a phone visit follow-up to discuss this.  The Sjogren's antibodies were drawn on 5/25/2023 and both SSA and SSB were both negative.  History of Present Illness    Current Outpatient Medications on File Prior to Visit   Medication Sig    albuterol sulfate  (90 Base) MCG/ACT inhaler Inhale 2 puffs Every 4 (Four) Hours As Needed for Wheezing.    [place] Thyroid 60 MG tablet TAKE 1 TABLET BY MOUTH EVERY DAY    budesonide-formoterol (SYMBICORT) 160-4.5 MCG/ACT inhaler Inhale 2 puffs 2 (Two) Times a Day.    cyclobenzaprine (FLEXERIL) 10 MG tablet Take 1 tablet by mouth 3 (Three) Times a Day.    ferrous sulfate 325 (65 FE) MG tablet Take 1 tablet by mouth 2 (Two) Times a Day With Meals.    Green Tea, Sabrina sinensis, (GREEN TEA PO) Take 500 mg by mouth Daily.    HYDROcodone-acetaminophen (NORCO) 7.5-325 MG per tablet TAKE 1 TABLET BY MOUTH EVERY 12 HOURS AS NEEDED FOR 30 DAYS    hydroxychloroquine (PLAQUENIL) 200 MG tablet Take 1 tablet by mouth Every Other Day.    metoprolol tartrate (LOPRESSOR) 50 MG tablet Take 1 tablet by mouth 2 (Two) Times a Day.    montelukast (SINGULAIR) 10 MG tablet TAKE 1 TABLET BY MOUTH EVERY DAY AT NIGHT    nitroglycerin (NITROSTAT) 0.4 MG SL tablet 1 tablet Every 5 (Five) Minutes As Needed.    rosuvastatin  (CRESTOR) 10 MG tablet Take 1 tablet by mouth Daily.    torsemide (DEMADEX) 20 MG tablet TAKE 1 TABLET BY MOUTH EVERY DAY (Patient taking differently: Take 1 tablet by mouth Daily.)    Turmeric 500 MG capsule Take 1 capsule by mouth Daily.    vitamin E 1000 UNIT capsule Take 1 capsule by mouth Daily.    oxyCODONE-acetaminophen (PERCOCET) 5-325 MG per tablet Take 1 tablet by mouth Every 6 (Six) Hours As Needed for Severe Pain. (Patient not taking: Reported on 5/23/2023)     No current facility-administered medications on file prior to visit.        Social History     Tobacco Use    Smoking status: Former     Types: Cigarettes    Smokeless tobacco: Never    Tobacco comments:     Quit around age 36   Vaping Use    Vaping Use: Never used   Substance Use Topics    Alcohol use: Yes     Comment: rare    Drug use: No        Objective     Vital Signs:   There were no vitals taken for this visit.      Physical Exam           Result Review :               Assessment and Plan    Diagnoses and all orders for this visit:    1. Xerostomia (Primary)    2. Dry eyes    I discussed the negative testing with the patient.  She continues to have issues with chronic dry mouth.  I have encouraged her to drink more water each day, control her acid reflux and take her vitamins as ordered.  She is on several medications that could create dry mouth and I have discussed this with her.  She is being seen by rheumatologist Dr. Kent and he may want to reorder Sjogren's antibodies in the future to confirm another negative test.  This was a phone visit lasting around 5 minutes.       Follow Up   No follow-ups on file.  Patient was given instructions and counseling regarding her condition or for health maintenance advice. Please see specific information pulled into the AVS if appropriate.     MARILYNN Velásquez

## 2023-06-15 NOTE — PROGRESS NOTES
You have chosen to receive care through a telephone visit. Do you consent to use a telephone visit for your medical care today?  YES

## 2023-08-29 ENCOUNTER — OFFICE VISIT (OUTPATIENT)
Dept: CARDIOLOGY | Facility: CLINIC | Age: 63
End: 2023-08-29
Payer: MEDICARE

## 2023-08-29 VITALS
DIASTOLIC BLOOD PRESSURE: 81 MMHG | BODY MASS INDEX: 35.93 KG/M2 | HEART RATE: 78 BPM | HEIGHT: 70 IN | SYSTOLIC BLOOD PRESSURE: 126 MMHG | WEIGHT: 251 LBS | OXYGEN SATURATION: 100 %

## 2023-08-29 DIAGNOSIS — I38 VALVULAR HEART DISEASE: ICD-10-CM

## 2023-08-29 DIAGNOSIS — I48.21 PERMANENT ATRIAL FIBRILLATION: Primary | ICD-10-CM

## 2023-08-29 DIAGNOSIS — I50.43 ACUTE ON CHRONIC COMBINED SYSTOLIC AND DIASTOLIC CHF (CONGESTIVE HEART FAILURE): ICD-10-CM

## 2023-08-29 PROCEDURE — 99214 OFFICE O/P EST MOD 30 MIN: CPT | Performed by: INTERNAL MEDICINE

## 2023-08-29 PROCEDURE — 93000 ELECTROCARDIOGRAM COMPLETE: CPT | Performed by: INTERNAL MEDICINE
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
## 2023-09-22 RX ORDER — TORSEMIDE 20 MG/1
TABLET ORAL
Qty: 90 TABLET | Refills: 2 | Status: SHIPPED | OUTPATIENT
Start: 2023-09-22

## 2023-09-25 ENCOUNTER — TELEPHONE (OUTPATIENT)
Dept: INTERNAL MEDICINE | Facility: CLINIC | Age: 63
End: 2023-09-25

## 2023-09-25 DIAGNOSIS — M17.0 OSTEOARTHRITIS OF BOTH KNEES, UNSPECIFIED OSTEOARTHRITIS TYPE: Primary | ICD-10-CM

## 2023-09-25 NOTE — TELEPHONE ENCOUNTER
Caller: Milagros Ramirez    Relationship: Self    Best call back number: [phone]     What orders are you requesting (i.e. lab or imaging): HEMOGLOBIN TEST    In what timeframe would the patient need to come in: ASAP    Where will you receive your lab/imaging services: IN OFFICE

## 2023-09-25 NOTE — TELEPHONE ENCOUNTER
Caller: Milagros Ramirez    Relationship: Self    Best call back number: [phone]     What is the medical concern/diagnosis: KNEE PAIN    What specialty or service is being requested: ORTHOPEDIC    What is the provider, practice or medical service name: [facility]    What is the office location: [place]    What is the office phone number: [phone]

## 2023-09-27 ENCOUNTER — CLINICAL SUPPORT (OUTPATIENT)
Dept: INTERNAL MEDICINE | Facility: CLINIC | Age: 63
End: 2023-09-27
Payer: MEDICARE

## 2023-09-27 ENCOUNTER — TELEPHONE (OUTPATIENT)
Dept: INTERNAL MEDICINE | Facility: CLINIC | Age: 63
End: 2023-09-27

## 2023-09-27 DIAGNOSIS — D50.0 IRON DEFICIENCY ANEMIA DUE TO CHRONIC BLOOD LOSS: ICD-10-CM

## 2023-09-27 DIAGNOSIS — E11.9 TYPE 2 DIABETES MELLITUS WITHOUT COMPLICATION, UNSPECIFIED WHETHER LONG TERM INSULIN USE: ICD-10-CM

## 2023-09-27 DIAGNOSIS — E11.9 TYPE 2 DIABETES MELLITUS WITHOUT COMPLICATION, UNSPECIFIED WHETHER LONG TERM INSULIN USE: Primary | ICD-10-CM

## 2023-09-27 LAB
ALBUMIN SERPL-MCNC: 4.2 G/DL (ref 3.5–5.2)
ALBUMIN UR-MCNC: 8.4 MG/DL
ALBUMIN/GLOB SERPL: 1 G/DL
ALP SERPL-CCNC: 170 U/L (ref 39–117)
ALT SERPL W P-5'-P-CCNC: 13 U/L (ref 1–33)
ANION GAP SERPL CALCULATED.3IONS-SCNC: 11 MMOL/L (ref 5–15)
AST SERPL-CCNC: 17 U/L (ref 1–32)
BILIRUB SERPL-MCNC: 0.3 MG/DL (ref 0–1.2)
BUN SERPL-MCNC: 15 MG/DL (ref 8–23)
BUN/CREAT SERPL: 15.5 (ref 7–25)
CALCIUM SPEC-SCNC: 9.6 MG/DL (ref 8.6–10.5)
CHLORIDE SERPL-SCNC: 99 MMOL/L (ref 98–107)
CHOLEST SERPL-MCNC: 153 MG/DL (ref 0–200)
CO2 SERPL-SCNC: 30 MMOL/L (ref 22–29)
CREAT SERPL-MCNC: 0.97 MG/DL (ref 0.57–1)
EGFRCR SERPLBLD CKD-EPI 2021: 66.2 ML/MIN/1.73
GLOBULIN UR ELPH-MCNC: 4.3 GM/DL
GLUCOSE SERPL-MCNC: 119 MG/DL (ref 65–99)
HBA1C MFR BLD: 5.7 % (ref 4.8–5.6)
HDLC SERPL-MCNC: 69 MG/DL (ref 40–60)
IRON 24H UR-MRATE: 48 MCG/DL (ref 37–145)
IRON SATN MFR SERPL: 13 % (ref 20–50)
LDLC SERPL CALC-MCNC: 71 MG/DL (ref 0–100)
LDLC/HDLC SERPL: 1.03 {RATIO}
POTASSIUM SERPL-SCNC: 4.1 MMOL/L (ref 3.5–5.2)
PROT SERPL-MCNC: 8.5 G/DL (ref 6–8.5)
SODIUM SERPL-SCNC: 140 MMOL/L (ref 136–145)
TIBC SERPL-MCNC: 375 MCG/DL (ref 298–536)
TRANSFERRIN SERPL-MCNC: 252 MG/DL (ref 200–360)
TRIGL SERPL-MCNC: 63 MG/DL (ref 0–150)
TSH SERPL DL<=0.05 MIU/L-ACNC: 1.12 UIU/ML (ref 0.27–4.2)
VLDLC SERPL-MCNC: 13 MG/DL (ref 5–40)

## 2023-09-27 PROCEDURE — 83036 HEMOGLOBIN GLYCOSYLATED A1C: CPT | Performed by: INTERNAL MEDICINE

## 2023-09-27 PROCEDURE — 36415 COLL VENOUS BLD VENIPUNCTURE: CPT | Performed by: INTERNAL MEDICINE

## 2023-09-27 PROCEDURE — 83540 ASSAY OF IRON: CPT | Performed by: INTERNAL MEDICINE

## 2023-09-27 PROCEDURE — 84443 ASSAY THYROID STIM HORMONE: CPT | Performed by: INTERNAL MEDICINE

## 2023-09-27 PROCEDURE — 80061 LIPID PANEL: CPT | Performed by: INTERNAL MEDICINE

## 2023-09-27 PROCEDURE — 84466 ASSAY OF TRANSFERRIN: CPT | Performed by: INTERNAL MEDICINE

## 2023-09-27 PROCEDURE — 80053 COMPREHEN METABOLIC PANEL: CPT | Performed by: INTERNAL MEDICINE

## 2023-09-27 PROCEDURE — 82043 UR ALBUMIN QUANTITATIVE: CPT | Performed by: INTERNAL MEDICINE

## 2023-09-27 PROCEDURE — 85025 COMPLETE CBC W/AUTO DIFF WBC: CPT | Performed by: INTERNAL MEDICINE

## 2023-09-27 NOTE — PROGRESS NOTES
Venipuncture Blood Specimen Collection  Venipuncture performed in [facility] by Erin St RN with good hemostasis. Patient tolerated the procedure well without complications. Patient left urine for micro albumin as well.  09/27/23   Erin St RN

## 2023-09-27 NOTE — TELEPHONE ENCOUNTER
Patient came into office for labs this afternoon and to schedule her 3 month f/u around the middle of October.  She is asking about a referral to Ortho in reference to her knees.  It appears she saw orthopedic back in 2021 but she wants another referral sent to their office.  Provider notified.

## 2023-09-28 LAB
BASOPHILS # BLD AUTO: 0.02 10*3/MM3 (ref 0–0.2)
BASOPHILS NFR BLD AUTO: 0.4 % (ref 0–1.5)
DEPRECATED RDW RBC AUTO: 46.3 FL (ref 37–54)
EOSINOPHIL # BLD AUTO: 0.12 10*3/MM3 (ref 0–0.4)
EOSINOPHIL NFR BLD AUTO: 2.3 % (ref 0.3–6.2)
ERYTHROCYTE [DISTWIDTH] IN BLOOD BY AUTOMATED COUNT: 15.6 % (ref 12.3–15.4)
HCT VFR BLD AUTO: 36.9 % (ref 34–46.6)
HGB BLD-MCNC: 11.6 G/DL (ref 12–15.9)
LYMPHOCYTES # BLD AUTO: 1.38 10*3/MM3 (ref 0.7–3.1)
LYMPHOCYTES NFR BLD AUTO: 26.8 % (ref 19.6–45.3)
MCH RBC QN AUTO: 25.7 PG (ref 26.6–33)
MCHC RBC AUTO-ENTMCNC: 31.4 G/DL (ref 31.5–35.7)
MCV RBC AUTO: 81.8 FL (ref 79–97)
MONOCYTES # BLD AUTO: 0.33 10*3/MM3 (ref 0.1–0.9)
MONOCYTES NFR BLD AUTO: 6.4 % (ref 5–12)
NEUTROPHILS NFR BLD AUTO: 3.29 10*3/MM3 (ref 1.7–7)
NEUTROPHILS NFR BLD AUTO: 63.9 % (ref 42.7–76)
PLATELET # BLD AUTO: 165 10*3/MM3 (ref 140–450)
PMV BLD AUTO: 10.5 FL (ref 6–12)
RBC # BLD AUTO: 4.51 10*6/MM3 (ref 3.77–5.28)
WBC NRBC COR # BLD: 5.15 10*3/MM3 (ref 3.4–10.8)

## 2023-10-02 RX ORDER — METOPROLOL TARTRATE 50 MG/1
TABLET, FILM COATED ORAL
Qty: 180 TABLET | Refills: 1 | Status: SHIPPED | OUTPATIENT
Start: 2023-10-02

## 2023-10-03 ENCOUNTER — OFFICE VISIT (OUTPATIENT)
Dept: ORTHOPEDIC SURGERY | Facility: CLINIC | Age: 63
End: 2023-10-03
Payer: MEDICARE

## 2023-10-03 VITALS
HEIGHT: 70 IN | OXYGEN SATURATION: 94 % | WEIGHT: 259.6 LBS | SYSTOLIC BLOOD PRESSURE: 173 MMHG | DIASTOLIC BLOOD PRESSURE: 78 MMHG | HEART RATE: 72 BPM | BODY MASS INDEX: 37.16 KG/M2

## 2023-10-03 DIAGNOSIS — M25.561 PAIN IN BOTH KNEES, UNSPECIFIED CHRONICITY: Primary | ICD-10-CM

## 2023-10-03 DIAGNOSIS — M17.11 OSTEOARTHRITIS OF RIGHT KNEE, UNSPECIFIED OSTEOARTHRITIS TYPE: ICD-10-CM

## 2023-10-03 DIAGNOSIS — M25.562 PAIN IN BOTH KNEES, UNSPECIFIED CHRONICITY: Primary | ICD-10-CM

## 2023-10-03 DIAGNOSIS — M17.12 OSTEOARTHRITIS OF LEFT KNEE, UNSPECIFIED OSTEOARTHRITIS TYPE: ICD-10-CM

## 2023-10-03 NOTE — PROGRESS NOTES
"Chief Complaint  Initial Evaluation of the Right Knee and Initial Evaluation of the Left Knee     Subjective      Milagros Ramirez presents to [facility] for initial evaluation of bilateral knees. She ambulates with a cane for support and stability. She has had pain in bilateral knees for years.  She has treated her knees conservatively with injections, anti inflammatories and exercises.  Her right knee is worse then the left.  She notes the left one is catching up fast. Her cardiologist wants to do a heart cath prior to the surgery.  She is not taking blood thinner's anymore. She has A fib. She has lost over 180 pounds in the last 2 years.     Allergies   Allergen Reactions    Other Other (See Comments)     RHEUMATOID ARTHRITIS INFUSION REACTION    Other Irritability     INFUSION FOR RHEUMATOID ARTHRITIS    Rituximab Unknown - High Severity and Unknown (See Comments)     Other reaction(s): review reaction with chills, rigors, muscle spasms and difficulty breathing          Social History     Socioeconomic History    Marital status:    Tobacco Use    Smoking status: Former     Types: Cigarettes    Smokeless tobacco: Never    Tobacco comments:     Quit around age 36   Vaping Use    Vaping Use: Never used   Substance and Sexual Activity    Alcohol use: Yes     Comment: rare    Drug use: No    Sexual activity: Defer        I reviewed the patient's chief complaint, history of present illness, review of systems, past medical history, surgical history, family history, social history, medications, and allergy list.     Review of Systems     Constitutional: Denies fevers, chills, weight loss  Cardiovascular: Denies chest pain, shortness of breath  Skin: Denies rashes, acute skin changes  Neurologic: Denies headache, loss of consciousness        Vital Signs:   /78   Pulse 72   Ht 177.8 cm (70\")   Wt 118 kg (259 lb 9.6 oz)   SpO2 94%   BMI 37.25 kg/m²          Physical " Exam  General: Alert. No acute distress    Ortho Exam        BILATERAL KNEES Flexion 95. Extension -3 Stable to varus/valgus stress. Stable to anterior/posterior drawer. Neurovascularly intact. Negative Balaji. Negative Lachman. Positive EHL, FHL, HS and TA. Sensation intact to light touch all 5 nerves of the foot. Ambulates with Antalgic gait. Patella is well tracking. Calf supple, non-tender. Positive tenderness to the medial joint line. Positive tenderness to the lateral joint line. Positive Crepitus. Good strength to hamstrings, quadriceps, dorsiflexors, and plantar flexors.  Knee Extensor Mechanism intact        Procedures      Imaging Results (Most Recent)       Procedure Component Value Units Date/Time    XR Knee 3 View Right [[id number]] Resulted: 10/03/23 1354     Updated: 10/03/23 1357    XR Knee 3 View Left [[id number]] Resulted: 10/03/23 1354     Updated: 10/03/23 1357             Result Review :     X-Ray Report:  Right knee X-Ray  Indication: Evaluation of the right knee  AP/Lateral and [place] view(s)  Findings: Advanced degenerative arthritis. Bone on bone arthritis.   Prior studies available for comparison: no     X-Ray Report:  Left knee X-Ray  Indication: Evaluation of the left knee  AP/Lateral and [place] view(s)  Findings: Advanced degenerative arthritis. Bone on bone arthritis.   Prior studies available for comparison: yes             Assessment and Plan     Diagnoses and all orders for this visit:    1. Pain in both knees, unspecified chronicity (Primary)  -     XR Knee 3 View Right  -     XR Knee 3 View Left    2. Osteoarthritis of left knee, unspecified osteoarthritis type    3. Osteoarthritis of right knee, unspecified osteoarthritis type        Discussed the treatment plan with the patient. I reviewed the X-rays that were obtained today with the patient.     Discussed the treatment options with the patient, operative vs non-operative. She is a candidate for replacement of one and/or both  knees when she is ready.     The patient expressed understanding and wished to proceed with a right total knee arthroplasty when her cardiologist clears her for surgery.       Discussed surgery., Risks/benefits discussed with patient including, but not limited to: infection, bleeding, neurovascular damage, re-rupture, aesthetic deformity, need for further surgery, and death., Discussed with patient the implant type being used during surgery and patient understands., Surgery pamphlet given., Call or return if worsening symptoms., and DME order for a 3 in 1 given today due to patient will be confined to one room/level of the home that does not offer a toilet during postop recovery.     Follow Up     2 weeks postoperatively     Patient was given instructions and counseling regarding her condition or for health maintenance advice. Please see specific information pulled into the AVS if appropriate.     Scribed for Lucian Eduardo MD by Hyun Varma MA.  10/03/23   13:57 EDT    I have personally performed the services described in this document as scribed by the above individual and it is both accurate and complete. Lucian Eduardo MD 10/04/23

## 2023-10-04 DIAGNOSIS — I48.21 PERMANENT ATRIAL FIBRILLATION: Primary | ICD-10-CM

## 2023-10-04 DIAGNOSIS — I35.0 NONRHEUMATIC AORTIC VALVE STENOSIS: ICD-10-CM

## 2023-10-04 DIAGNOSIS — I50.43 ACUTE ON CHRONIC COMBINED SYSTOLIC AND DIASTOLIC CHF (CONGESTIVE HEART FAILURE): ICD-10-CM

## 2023-10-04 DIAGNOSIS — R07.89 OTHER CHEST PAIN: ICD-10-CM

## 2023-10-04 DIAGNOSIS — R06.02 SOB (SHORTNESS OF BREATH): ICD-10-CM

## 2023-10-04 RX ORDER — ASPIRIN 81 MG/1
81 TABLET ORAL DAILY
COMMUNITY

## 2023-10-11 ENCOUNTER — HOSPITAL ENCOUNTER (OUTPATIENT)
Facility: HOSPITAL | Age: 63
Setting detail: HOSPITAL OUTPATIENT SURGERY
Discharge: HOME | End: 2023-10-11
Attending: INTERNAL MEDICINE | Admitting: INTERNAL MEDICINE
Payer: MEDICARE

## 2023-10-11 VITALS
OXYGEN SATURATION: 97 % | TEMPERATURE: 96.6 F | HEIGHT: 70 IN | SYSTOLIC BLOOD PRESSURE: 126 MMHG | DIASTOLIC BLOOD PRESSURE: 78 MMHG | WEIGHT: 255 LBS | RESPIRATION RATE: 16 BRPM | BODY MASS INDEX: 36.51 KG/M2 | HEART RATE: 90 BPM

## 2023-10-11 DIAGNOSIS — R07.89 OTHER CHEST PAIN: ICD-10-CM

## 2023-10-11 DIAGNOSIS — R06.02 SOB (SHORTNESS OF BREATH): ICD-10-CM

## 2023-10-11 DIAGNOSIS — I50.43 ACUTE ON CHRONIC COMBINED SYSTOLIC AND DIASTOLIC CHF (CONGESTIVE HEART FAILURE): ICD-10-CM

## 2023-10-11 DIAGNOSIS — I35.0 NONRHEUMATIC AORTIC VALVE STENOSIS: ICD-10-CM

## 2023-10-11 DIAGNOSIS — I48.21 PERMANENT ATRIAL FIBRILLATION: ICD-10-CM

## 2023-10-11 LAB
GLUCOSE BLDC GLUCOMTR-MCNC: 109 MG/DL (ref 70–130)
GLUCOSE BLDC GLUCOMTR-MCNC: 88 MG/DL (ref 70–130)

## 2023-10-11 PROCEDURE — 25010000002 FENTANYL CITRATE (PF) 50 MCG/ML SOLUTION: Performed by: INTERNAL MEDICINE

## 2023-10-11 PROCEDURE — 93458 L HRT ARTERY/VENTRICLE ANGIO: CPT | Performed by: INTERNAL MEDICINE

## 2023-10-11 PROCEDURE — C1894 INTRO/SHEATH, NON-LASER: HCPCS | Performed by: INTERNAL MEDICINE

## 2023-10-11 PROCEDURE — C1769 GUIDE WIRE: HCPCS | Performed by: INTERNAL MEDICINE

## 2023-10-11 PROCEDURE — 25010000002 MIDAZOLAM PER 1 MG: Performed by: INTERNAL MEDICINE

## 2023-10-11 PROCEDURE — 25810000003 SODIUM CHLORIDE 0.9 % SOLUTION: Performed by: INTERNAL MEDICINE

## 2023-10-11 PROCEDURE — 25010000002 HEPARIN (PORCINE) PER 1000 UNITS: Performed by: INTERNAL MEDICINE

## 2023-10-11 PROCEDURE — 82948 REAGENT STRIP/BLOOD GLUCOSE: CPT

## 2023-10-11 PROCEDURE — 25510000001 IOPAMIDOL PER 1 ML: Performed by: INTERNAL MEDICINE

## 2023-10-11 RX ORDER — LIDOCAINE HYDROCHLORIDE 20 MG/ML
INJECTION, SOLUTION INFILTRATION; PERINEURAL
Status: DISCONTINUED | OUTPATIENT
Start: 2023-10-11 | End: 2023-10-11 | Stop reason: HOSPADM

## 2023-10-11 RX ORDER — SODIUM CHLORIDE 0.9 % (FLUSH) 0.9 %
10 SYRINGE (ML) INJECTION AS NEEDED
Status: DISCONTINUED | OUTPATIENT
Start: 2023-10-11 | End: 2023-10-11 | Stop reason: HOSPADM

## 2023-10-11 RX ORDER — VERAPAMIL HYDROCHLORIDE 2.5 MG/ML
INJECTION, SOLUTION INTRAVENOUS
Status: DISCONTINUED | OUTPATIENT
Start: 2023-10-11 | End: 2023-10-11 | Stop reason: HOSPADM

## 2023-10-11 RX ORDER — SODIUM CHLORIDE 9 MG/ML
75 INJECTION, SOLUTION INTRAVENOUS CONTINUOUS
Status: DISCONTINUED | OUTPATIENT
Start: 2023-10-11 | End: 2023-10-11 | Stop reason: HOSPADM

## 2023-10-11 RX ORDER — FENTANYL CITRATE 50 UG/ML
INJECTION, SOLUTION INTRAMUSCULAR; INTRAVENOUS
Status: DISCONTINUED | OUTPATIENT
Start: 2023-10-11 | End: 2023-10-11 | Stop reason: HOSPADM

## 2023-10-11 RX ORDER — ACETAMINOPHEN 325 MG/1
650 TABLET ORAL EVERY 4 HOURS PRN
Status: DISCONTINUED | OUTPATIENT
Start: 2023-10-11 | End: 2023-10-11 | Stop reason: HOSPADM

## 2023-10-11 RX ORDER — NITROGLYCERIN 0.4 MG/1
0.4 TABLET SUBLINGUAL
Status: DISCONTINUED | OUTPATIENT
Start: 2023-10-11 | End: 2023-10-11 | Stop reason: HOSPADM

## 2023-10-11 RX ORDER — HEPARIN SODIUM 1000 [USP'U]/ML
INJECTION, SOLUTION INTRAVENOUS; SUBCUTANEOUS
Status: DISCONTINUED | OUTPATIENT
Start: 2023-10-11 | End: 2023-10-11 | Stop reason: HOSPADM

## 2023-10-11 RX ORDER — MIDAZOLAM HYDROCHLORIDE 1 MG/ML
INJECTION INTRAMUSCULAR; INTRAVENOUS
Status: DISCONTINUED | OUTPATIENT
Start: 2023-10-11 | End: 2023-10-11 | Stop reason: HOSPADM

## 2023-10-11 RX ADMIN — SODIUM CHLORIDE 75 ML/HR: 9 INJECTION, SOLUTION INTRAVENOUS at 07:58

## 2023-10-11 NOTE — Clinical Note
Hemostasis started on the right radial artery. Manual pressure applied to vessel. Manual pressure was held by  rtr. Manual pressure was held for 5 min. Hemostasis achieved successfully. Closure device additional comment: tensoplast

## 2023-10-11 NOTE — DISCHARGE INSTRUCTIONS
"[facility]  [address] [place] [address]    Coronary Angiogram (Radial/Ulnar Approach) After Care    Refer to this sheet in the next few weeks. These instructions provide you with information on caring for yourself after your procedure. Your caregiver may also give you more specific instructions. Your treatment has been planned according to current medical practices, but problems sometimes occur. Call your caregiver if you have any problems or questions after your procedure.    Home Care Instructions:  You may shower the day after the procedure. Remove the bandage (dressing) and gently wash the site with plain soap and water. Gently pat the site dry. You may apply a band aid daily for 2 days if desired.    Do not apply powder or lotion to the site.  Do not submerge the affected site in water for 3 to 5 days or until the site is completely healed.   Do not lift, push or pull anything over 5 pounds for 5 days after your procedure or as directed by your physician.  As a reference, a gallon of milk weighs 8 pounds.   Inspect the site at least twice daily. You may notice some bruising at the site and it may be tender for 1 to 2 weeks.     Increase your fluid intake for the next 2 days.    Keep arm elevated for 24 hours. For the remainder of the day, keep your arm in "Pledge of Allegiance" position when up and about.     You may drive 24 hours after the procedure unless otherwise instructed by your caregiver.  Do not operate machinery or power tools for 24 hours.  A responsible adult should be with you for the first 24 hours after you arrive home. Do not make any important legal decisions or sign legal papers for 24 hours.  Do not drink alcohol for 24 hours.    Metformin or any medications containing Metformin should not be taken for 48 hours after your procedure.      Call Your Doctor if:   You have unusual pain at the radial/ulnar (wrist) site.  You have redness, warmth, swelling, or pain at the " radial/ulnar (wrist) site.  You have drainage (other than a small amount of blood on the dressing).  `You have chills or a fever > 101.  Your arm becomes pale or dark, cool, tingly, or numb.  You develop chest pain, shortness of breath, feel faint or pass out.    You have heavy bleeding from the site, hold pressure on the site for 20 minutes.  If the bleeding stops, apply a fresh bandage and call your cardiologist.  However, if you        continue to have bleeding, call 911 and continue to apply pressure to the site.   You have any symptoms of a stroke.  Remember BE FAST  B-balance. Sudden trouble walking or loss of balance.  E-eyes.  Sudden changes in how you see or a sudden onset of a very bad headache.   F-face. Sudden weakness or loss of feeling of the face or facial droop on one side.   A-arms Sudden weakness or numbness in one arm.  One arm drifts down if they are both held out in front of you. This happens suddenly and usually on one side of the body.   S-speech.  Sudden trouble speaking, slurred speech or trouble understanding what are saying.   T-time  Time to call emergency services.  Write down the symptoms and the time they started.

## 2023-10-11 NOTE — Clinical Note
Chart Update for OV with Dr. Mendoza scheduled for 11/14/19.   CHAZ Casey RN, BSN.  11/12/19 4:06 PM      Pre-Procedural Saturation was taken from the Right Hand. Sat result is 93%
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
## 2023-10-17 ENCOUNTER — PREP FOR SURGERY (OUTPATIENT)
Dept: OTHER | Facility: HOSPITAL | Age: 63
End: 2023-10-17
Payer: MEDICARE

## 2023-10-17 ENCOUNTER — TELEPHONE (OUTPATIENT)
Dept: ORTHOPEDIC SURGERY | Facility: CLINIC | Age: 63
End: 2023-10-17
Payer: MEDICARE

## 2023-10-17 DIAGNOSIS — M17.11 OSTEOARTHRITIS OF RIGHT KNEE, UNSPECIFIED OSTEOARTHRITIS TYPE: Primary | ICD-10-CM

## 2023-10-17 PROBLEM — M17.9 OA (OSTEOARTHRITIS) OF KNEE: Status: ACTIVE | Noted: 2023-10-17

## 2023-10-17 RX ORDER — TRANEXAMIC ACID 10 MG/ML
1000 INJECTION, SOLUTION INTRAVENOUS ONCE
OUTPATIENT
Start: 2023-10-17 | End: 2023-10-17

## 2023-10-17 RX ORDER — CEFAZOLIN SODIUM IN 0.9 % NACL 3 G/100 ML
3 INTRAVENOUS SOLUTION, PIGGYBACK (ML) INTRAVENOUS ONCE
OUTPATIENT
Start: 2023-10-17 | End: 2023-10-17

## 2023-10-17 RX ORDER — CEFAZOLIN SODIUM 2 G/100ML
2 INJECTION, SOLUTION INTRAVENOUS ONCE
OUTPATIENT
Start: 2023-10-17 | End: 2023-10-17

## 2023-10-17 NOTE — TELEPHONE ENCOUNTER
PATIENT STATES SHE COMPLETED THE CARDIAC CATH THAT WAS ORDERED FOR HER AND WAS GIVEN VERBAL CLEARANCE FOR SURGERY BY HER CARDIOLOGIST DR. CORI SULLIVAN. SHE WOULD LIKE TO KNOW WHAT THE NEXT STEPS ARE TO GET SURGERY SCHEDULED AS WELL AS SPEAK TO SOMEONE ABOUT COST OF SURGERY.   ADVISED PATIENT THAT WE WOULD NEED TO HAVE WRITTEN CARDIAC CLEARANCE FORM IN HAND AS WELL AS HAVE DR. GUTIERREZ PUT IN PREP FOR SURGERY PRIOR TO BEING ABLE TO SCHEDULE HER. LET HER KNOW THAT WE HAVE FAXED THE REQUEST FOR CLEARANCE TO DR. SULLIVAN OFFICE SHE VOICED UNDERSTANDING.   ALSO SENT PATIENT PATIENT [employer] MESSAGE WITH DISCHARGE INFORMATION, HOME EXERCISES, AND A BUSINESS OFFICE CARD ATTACHED SO SHE COULD INQUIRE ABOUT COST.

## 2023-10-26 ENCOUNTER — OFFICE VISIT (OUTPATIENT)
Dept: INTERNAL MEDICINE | Facility: CLINIC | Age: 63
End: 2023-10-26
Payer: MEDICARE

## 2023-10-26 VITALS
BODY MASS INDEX: 36.36 KG/M2 | WEIGHT: 254 LBS | SYSTOLIC BLOOD PRESSURE: 120 MMHG | TEMPERATURE: 97.8 F | HEIGHT: 70 IN | RESPIRATION RATE: 20 BRPM | OXYGEN SATURATION: 98 % | HEART RATE: 95 BPM | DIASTOLIC BLOOD PRESSURE: 74 MMHG

## 2023-10-26 DIAGNOSIS — Z00.00 ENCOUNTER FOR SUBSEQUENT ANNUAL WELLNESS VISIT (AWV) IN MEDICARE PATIENT: Primary | ICD-10-CM

## 2023-10-26 DIAGNOSIS — E66.01 CLASS 2 SEVERE OBESITY DUE TO EXCESS CALORIES WITH SERIOUS COMORBIDITY AND BODY MASS INDEX (BMI) OF 36.0 TO 36.9 IN ADULT: ICD-10-CM

## 2023-10-26 DIAGNOSIS — D50.0 IRON DEFICIENCY ANEMIA DUE TO CHRONIC BLOOD LOSS: ICD-10-CM

## 2023-10-26 RX ORDER — BUPROPION HYDROCHLORIDE 150 MG/1
150 TABLET, EXTENDED RELEASE ORAL 2 TIMES DAILY
Qty: 60 TABLET | Refills: 2 | Status: SHIPPED | OUTPATIENT
Start: 2023-10-26

## 2023-10-26 NOTE — ASSESSMENT & PLAN NOTE
Patient's (Body mass index is 36.45 kg/m².) indicates that they are morbidly/severely obese (BMI > 40 or > 35 with obesity - related health condition) with health conditions that include hypertension, dyslipidemias, and osteoarthritis . Weight is improving with lifestyle modifications. BMI  is above average; BMI management plan is completed. We discussed portion control, increasing exercise, and pharmacologic options including Wellbutrin .

## 2023-10-26 NOTE — PROGRESS NOTES
The ABCs of the Annual Wellness Visit  Subsequent Medicare Wellness Visit    Subjective    Milagros Ramirez is a 63 y.o. female who presents for a Subsequent Medicare Wellness Visit.    The following portions of the patient's history were reviewed and   updated as appropriate: allergies, current medications, past family history, past medical history, past social history, past surgical history, and problem list.    Compared to one year ago, the patient feels her physical   health is the same.    Compared to one year ago, the patient feels her mental   health is the same.    Recent Hospitalizations:  This patient has had a [facility] admission record on file within the last 365 days.    Current Medical Providers:  Patient Care Team:  Nelly Romero MD as PCP - General (Pediatrics)  Destin Aleman MD as Consulting Physician (Pulmonary Disease)  Gabriel Salcedo MD as Consulting Physician (Rheumatology)  Quang Quick III, MD as Cardiologist (Cardiology)  Sara Jarrett MD as Consulting Physician (Gastroenterology)  Curtis Ogden MD as Consulting Physician (Nephrology)  Rene Jacobo MD as Consulting Physician (Gastroenterology)    Outpatient Medications Prior to Visit   Medication Sig Dispense Refill    [place] Thyroid 60 MG tablet TAKE 1 TABLET BY MOUTH EVERY DAY 30 tablet 5    cyclobenzaprine (FLEXERIL) 10 MG tablet Take 1 tablet by mouth 3 (Three) Times a Day. (Patient taking differently: Take 1 tablet by mouth every night at bedtime.) 20 tablet 0    ferrous sulfate 325 (65 FE) MG tablet Take 1 tablet by mouth 2 (Two) Times a Day With Meals. (Patient taking differently: Take 1 tablet by mouth Daily With Breakfast. 1 time a day) 60 tablet 2    Green Tea, Sabrina sinensis, (GREEN TEA PO) Take 500 mg by mouth Daily.      HYDROcodone-acetaminophen (NORCO) 7.5-325 MG per tablet TAKE 1 TABLET BY MOUTH EVERY 12 HOURS AS NEEDED FOR 30 DAYS      hydroxychloroquine (PLAQUENIL)  200 MG tablet Take 1 tablet by mouth Every Other Day.      metoprolol tartrate (LOPRESSOR) 50 MG tablet TAKE 1 TABLET BY MOUTH TWICE A  tablet 1    montelukast (SINGULAIR) 10 MG tablet TAKE 1 TABLET BY MOUTH EVERY DAY AT NIGHT (Patient taking differently: Take 1 tablet by mouth Every Night.) 90 tablet 1    rosuvastatin (CRESTOR) 10 MG tablet TAKE 1 TABLET BY MOUTH EVERY DAY 90 tablet 1    torsemide (DEMADEX) 20 MG tablet TAKE 1 TABLET BY MOUTH EVERY DAY (Patient taking differently: Take 1 tablet by mouth Daily.) 90 tablet 2    Turmeric 500 MG capsule Take 1 capsule by mouth Daily.      vitamin E 1000 UNIT capsule Take 1 capsule by mouth Daily.      albuterol sulfate  (90 Base) MCG/ACT inhaler Inhale 2 puffs Every 4 (Four) Hours As Needed for Wheezing. (Patient not taking: Reported on 10/26/2023)      budesonide-formoterol (SYMBICORT) 160-4.5 MCG/ACT inhaler Inhale 2 puffs 2 (Two) Times a Day. (Patient not taking: Reported on 10/26/2023)      nitroglycerin (NITROSTAT) 0.4 MG SL tablet 1 tablet Every 5 (Five) Minutes As Needed. (Patient not taking: Reported on 10/26/2023)       No facility-administered medications prior to visit.       Opioid medication/s are on active medication list.  and I have evaluated her active treatment plan and pain score trends (see table).  There were no vitals filed for this visit.  I have reviewed the chart for potential of high risk medication and harmful drug interactions in the elderly.          Aspirin is not on active medication list.  Aspirin use is not indicated based on review of current medical condition/s. Risk of harm outweighs potential benefits.  .    Patient Active Problem List   Diagnosis    Extreme obesity with alveolar hypoventilation    Osteoarthritis of knee    Rheumatoid arthritis    Stage 3 chronic kidney disease    Type 2 diabetes mellitus without complication    AMOR on CPAP    Gastrointestinal hemorrhage, unspecified gastrointestinal hemorrhage type     "Anemia    Melena    GI bleed    Hypothyroidism    Permanent atrial fibrillation    Acute on chronic combined systolic and diastolic CHF (congestive heart failure)    Nonrheumatic aortic valve stenosis    SOB (shortness of breath)    Other chest pain    OA (osteoarthritis) of knee     Advance Care Planning   Advance Care Planning     Advance Directive is not on file.  ACP discussion was held with the patient during this visit. Patient does not have an advance directive, information provided.     Objective    Vitals:    10/26/23 1118   BP: 120/74   BP Location: Left arm   Patient Position: Sitting   Cuff Size: Adult   Pulse: 95   Resp: 20   Temp: 97.8 °F (36.6 °C)   TempSrc: Temporal   SpO2: 98%   Weight: 115 kg (254 lb)   Height: 177.8 cm (70\")     Estimated body mass index is 36.45 kg/m² as calculated from the following:    Height as of this encounter: 177.8 cm (70\").    Weight as of this encounter: 115 kg (254 lb).           Does the patient have evidence of cognitive impairment? No    Lab Results   Component Value Date    TRIG 63 09/27/2023    HDL 69 (H) 09/27/2023    LDL 71 09/27/2023    VLDL 13 09/27/2023    HGBA1C 5.70 (H) 09/27/2023        HEALTH RISK ASSESSMENT    Smoking Status:  Social History     Tobacco Use   Smoking Status Former    Types: Cigarettes   Smokeless Tobacco Never   Tobacco Comments    Quit around age 36     Alcohol Consumption:  Social History     Substance and Sexual Activity   Alcohol Use Yes    Comment: rare     Fall Risk Screen:    DAIADI Fall Risk Assessment was completed, and patient is at LOW risk for falls.Assessment completed on:10/26/2023    Depression Screening:      10/26/2023    11:36 AM   PHQ-2/PHQ-9 Depression Screening   Little Interest or Pleasure in Doing Things 0-->not at all   Feeling Down, Depressed or Hopeless 0-->not at all   PHQ-9: Brief Depression Severity Measure Score 0       Health Habits and Functional and Cognitive Screening:      10/26/2023    11:31 AM "   Functional & Cognitive Status   Do you have difficulty preparing food and eating? No   Do you have difficulty bathing yourself, getting dressed or grooming yourself? No   Do you have difficulty using the toilet? No   Do you have difficulty moving around from place to place? Yes   Do you have trouble with steps or getting out of a bed or a chair? Yes   Current Diet Well Balanced Diet   Dental Exam Up to date   Eye Exam Up to date   Exercise (times per week) 7 times per week        Exercise Comment working arms out   Do you need help using the phone?  No   Are you deaf or do you have serious difficulty hearing?  No   Do you need help to go to places out of walking distance? No   Do you need help shopping? Yes   Do you need help preparing meals?  No   Do you need help with housework?  No   Do you need help with laundry? No   Do you need help taking your medications? No   Do you need help managing money? No   Do you ever drive or ride in a car without wearing a seat belt? No   Have you felt unusual stress, anger or loneliness in the last month? No   Who do you live with? Spouse   If you need help, do you have trouble finding someone available to you? No   Do you have difficulty concentrating, remembering or making decisions? No       Age-appropriate Screening Schedule:  Refer to the list below for future screening recommendations based on patient's age, sex and/or medical conditions. Orders for these recommended tests are listed in the plan section. The patient has been provided with a written plan.    Health Maintenance   Topic Date Due    Pneumococcal Vaccine 0-64 (1 - PCV) Never done    TDAP/TD VACCINES (1 - Tdap) Never done    ZOSTER VACCINE (1 of 2) Never done    ANNUAL WELLNESS VISIT  Never done    DIABETIC FOOT EXAM  Never done    DIABETIC EYE EXAM  Never done    INFLUENZA VACCINE  Never done    MAMMOGRAM  08/05/2023    COVID-19 Vaccine (4 - 2023-24 season) 09/01/2023    COLORECTAL CANCER SCREENING  10/13/2023  "   BMI FOLLOWUP  11/11/2023    HEMOGLOBIN A1C  03/27/2024    PAP SMEAR  06/28/2024    LIPID PANEL  09/27/2024    URINE MICROALBUMIN  09/27/2024    HEPATITIS C SCREENING  Completed                  CMS Preventative Services Quick Reference  Risk Factors Identified During Encounter  Immunizations Discussed/Encouraged: Tdap, Influenza, and COVID19  The above risks/problems have been discussed with the patient.  Pertinent information has been shared with the patient in the After Visit Summary.  An After Visit Summary and PPPS were made available to the patient.    Follow Up:   Next Medicare Wellness visit to be scheduled in 1 year.       Additional E&M Note during same encounter follows:  Patient has multiple medical problems which are significant and separately identifiable that require additional work above and beyond the Medicare Wellness Visit.      Chief Complaint  Follow-up (Follow up on iron deficiency and diabetes and labs, also lower back is hurting, also had questions about vaccines, wants flu vaccine ) and Medicare Wellness-subsequent (Annual exam )    Subjective        HPI  Milagros Ramirez is also being seen today for discussion of options to aid with weight loss. She was previously on Ozempic for diabetes, but sugars became well controlled with weight loss and medication was discontinued. Her A1C has been normal off medication.     She is planning to have knee replacement in December.          Objective   Vital Signs:  /74 (BP Location: Left arm, Patient Position: Sitting, Cuff Size: Adult)   Pulse 95   Temp 97.8 °F (36.6 °C) (Temporal)   Resp 20   Ht 177.8 cm (70\")   Wt 115 kg (254 lb)   SpO2 98%   BMI 36.45 kg/m²     Physical Exam  Vitals reviewed.   Constitutional:       Appearance: Normal appearance. She is well-developed.   HENT:      Head: Normocephalic and atraumatic.      Mouth/Throat:      Pharynx: No oropharyngeal exudate.   Eyes:      Conjunctiva/sclera: Conjunctivae normal.      " Pupils: Pupils are equal, round, and reactive to light.   Neck:      Thyroid: No thyromegaly or thyroid tenderness.   Cardiovascular:      Rate and Rhythm: Normal rate and regular rhythm.      Heart sounds: No murmur heard.     No friction rub. No gallop.   Pulmonary:      Effort: Pulmonary effort is normal.      Breath sounds: Normal breath sounds. No wheezing or rhonchi.   Lymphadenopathy:      Cervical: No cervical adenopathy.   Skin:     General: Skin is warm and dry.   Neurological:      Mental Status: She is alert and oriented to person, place, and time.   Psychiatric:         Mood and Affect: Affect normal.                       Assessment and Plan   Diagnoses and all orders for this visit:    1. Encounter for subsequent annual wellness visit (AWV) in Medicare patient (Primary)    2. Iron deficiency anemia due to chronic blood loss  Assessment & Plan:  H/H much improved on recent labs      3. Class 2 severe obesity due to excess calories with serious comorbidity and body mass index (BMI) of 36.0 to 36.9 in adult  Assessment & Plan:  Patient's (Body mass index is 36.45 kg/m².) indicates that they are morbidly/severely obese (BMI > 40 or > 35 with obesity - related health condition) with health conditions that include hypertension, dyslipidemias, and osteoarthritis . Weight is improving with lifestyle modifications. BMI  is above average; BMI management plan is completed. We discussed portion control, increasing exercise, and pharmacologic options including Wellbutrin .       Other orders  -     Fluzone (or Fluarix & Flulaval for VFC) >6mos  -     buPROPion SR (Wellbutrin SR) 150 MG 12 hr tablet; Take 1 tablet by mouth 2 (Two) Times a Day.  Dispense: 60 tablet; Refill: 2             Follow Up   Return in about 3 months (around 1/26/2024) for Next scheduled follow up.  Patient was given instructions and counseling regarding her condition or for health maintenance advice. Please see specific information pulled  into the AVS if appropriate.     Patient has been erroneously marked as diabetic. Based on the available clinical information, she does not have diabetes and should therefore be excluded from diabetic health maintenance and quality measures for the remainder of the reporting period.

## 2023-10-31 RX ORDER — THYROID 60 MG
TABLET ORAL
Qty: 30 TABLET | Refills: 5 | Status: SHIPPED | OUTPATIENT
Start: 2023-10-31

## 2023-11-14 DIAGNOSIS — M17.11 OSTEOARTHRITIS OF RIGHT KNEE, UNSPECIFIED OSTEOARTHRITIS TYPE: Primary | ICD-10-CM

## 2023-11-21 ENCOUNTER — PRE-ADMISSION TESTING (OUTPATIENT)
Dept: PREADMISSION TESTING | Facility: HOSPITAL | Age: 63
End: 2023-11-21
Payer: MEDICARE

## 2023-11-21 ENCOUNTER — TELEPHONE (OUTPATIENT)
Dept: ORTHOPEDIC SURGERY | Facility: CLINIC | Age: 63
End: 2023-11-21
Payer: MEDICARE

## 2023-11-21 VITALS
BODY MASS INDEX: 39.79 KG/M2 | DIASTOLIC BLOOD PRESSURE: 64 MMHG | RESPIRATION RATE: 18 BRPM | WEIGHT: 253.53 LBS | SYSTOLIC BLOOD PRESSURE: 124 MMHG | HEART RATE: 75 BPM | TEMPERATURE: 98.2 F | OXYGEN SATURATION: 95 % | HEIGHT: 67 IN

## 2023-11-21 DIAGNOSIS — Z01.818 ENCOUNTER FOR PREADMISSION TESTING: Primary | ICD-10-CM

## 2023-11-21 DIAGNOSIS — M17.11 OSTEOARTHRITIS OF RIGHT KNEE, UNSPECIFIED OSTEOARTHRITIS TYPE: ICD-10-CM

## 2023-11-21 LAB
ALBUMIN SERPL-MCNC: 3.6 G/DL (ref 3.5–5.2)
ALBUMIN/GLOB SERPL: 0.8 G/DL
ALP SERPL-CCNC: 143 U/L (ref 39–117)
ALT SERPL W P-5'-P-CCNC: 10 U/L (ref 1–33)
ANION GAP SERPL CALCULATED.3IONS-SCNC: 11.5 MMOL/L (ref 5–15)
AST SERPL-CCNC: 14 U/L (ref 1–32)
BASOPHILS # BLD AUTO: 0.03 10*3/MM3 (ref 0–0.2)
BASOPHILS NFR BLD AUTO: 0.5 % (ref 0–1.5)
BILIRUB SERPL-MCNC: 0.3 MG/DL (ref 0–1.2)
BUN SERPL-MCNC: 25 MG/DL (ref 8–23)
BUN/CREAT SERPL: 23.8 (ref 7–25)
CALCIUM SPEC-SCNC: 8.9 MG/DL (ref 8.6–10.5)
CHLORIDE SERPL-SCNC: 100 MMOL/L (ref 98–107)
CO2 SERPL-SCNC: 25.5 MMOL/L (ref 22–29)
CREAT SERPL-MCNC: 1.05 MG/DL (ref 0.57–1)
DEPRECATED RDW RBC AUTO: 47.8 FL (ref 37–54)
EGFRCR SERPLBLD CKD-EPI 2021: 60.2 ML/MIN/1.73
EOSINOPHIL # BLD AUTO: 0.13 10*3/MM3 (ref 0–0.4)
EOSINOPHIL NFR BLD AUTO: 2 % (ref 0.3–6.2)
ERYTHROCYTE [DISTWIDTH] IN BLOOD BY AUTOMATED COUNT: 15.4 % (ref 12.3–15.4)
GLOBULIN UR ELPH-MCNC: 4.4 GM/DL
GLUCOSE SERPL-MCNC: 102 MG/DL (ref 65–99)
HBA1C MFR BLD: 6 % (ref 4.8–5.6)
HCT VFR BLD AUTO: 35.1 % (ref 34–46.6)
HGB BLD-MCNC: 11 G/DL (ref 12–15.9)
IMM GRANULOCYTES # BLD AUTO: 0.01 10*3/MM3 (ref 0–0.05)
IMM GRANULOCYTES NFR BLD AUTO: 0.2 % (ref 0–0.5)
INR PPP: 1.11 (ref 0.86–1.15)
LYMPHOCYTES # BLD AUTO: 1.33 10*3/MM3 (ref 0.7–3.1)
LYMPHOCYTES NFR BLD AUTO: 20 % (ref 19.6–45.3)
MCH RBC QN AUTO: 26.9 PG (ref 26.6–33)
MCHC RBC AUTO-ENTMCNC: 31.3 G/DL (ref 31.5–35.7)
MCV RBC AUTO: 85.8 FL (ref 79–97)
MONOCYTES # BLD AUTO: 0.45 10*3/MM3 (ref 0.1–0.9)
MONOCYTES NFR BLD AUTO: 6.8 % (ref 5–12)
NEUTROPHILS NFR BLD AUTO: 4.69 10*3/MM3 (ref 1.7–7)
NEUTROPHILS NFR BLD AUTO: 70.5 % (ref 42.7–76)
NRBC BLD AUTO-RTO: 0 /100 WBC (ref 0–0.2)
PLATELET # BLD AUTO: 323 10*3/MM3 (ref 140–450)
PMV BLD AUTO: 9.8 FL (ref 6–12)
POTASSIUM SERPL-SCNC: 3.9 MMOL/L (ref 3.5–5.2)
PROT SERPL-MCNC: 8 G/DL (ref 6–8.5)
PROTHROMBIN TIME: 14.5 SECONDS (ref 11.8–14.9)
QT INTERVAL: 391 MS
QTC INTERVAL: 407 MS
RBC # BLD AUTO: 4.09 10*6/MM3 (ref 3.77–5.28)
SODIUM SERPL-SCNC: 137 MMOL/L (ref 136–145)
WBC NRBC COR # BLD AUTO: 6.64 10*3/MM3 (ref 3.4–10.8)

## 2023-11-21 PROCEDURE — 83036 HEMOGLOBIN GLYCOSYLATED A1C: CPT

## 2023-11-21 PROCEDURE — 85025 COMPLETE CBC W/AUTO DIFF WBC: CPT

## 2023-11-21 PROCEDURE — 93005 ELECTROCARDIOGRAM TRACING: CPT

## 2023-11-21 PROCEDURE — 36415 COLL VENOUS BLD VENIPUNCTURE: CPT

## 2023-11-21 PROCEDURE — 80053 COMPREHEN METABOLIC PANEL: CPT

## 2023-11-21 PROCEDURE — 85610 PROTHROMBIN TIME: CPT

## 2023-11-21 NOTE — TELEPHONE ENCOUNTER
DELETE AFTER REVIEWING: Telephone encounter to be sent to the clinical pool     Hub staff attempted to follow warm transfer process and was unsuccessful     Caller: Milagros Ramirez    Relationship to patient: Self    Best call back number: [phone] (home)       Patient is needing: PATIENT NEEDS PAPERWORK RESENT TO HER IN REGARDS TO SX SHE IS SHEDULE FOR DECEMBER 4TH    PATIENT ALSO RECEIVED A CALL FROM SOMEONE IN REGARDS TO [facility]. SHE WAS UNABLE TO COMMUNICATE WITH THEM DUE TO BAD RECEPTION. THEY WERE SUPPOSED TO CALL HER BACK BUT NEVER DID.     DR GUTIERREZ

## 2023-11-21 NOTE — DISCHARGE INSTRUCTIONS
IMPORTANT INSTRUCTIONS - PRE-ADMISSION TESTING  DO NOT EAT OR CHEW anything after midnight the night before your procedure.    You may have CLEAR liquids up to 3 hours prior to ARRIVAL time.   Take the following medications the morning of your procedure with JUST A SIP OF WATER: METOPROLOL, NORCO IF NEEDED, CRESTOR, INHALERS, PLAQUENIL, ARMOUR THYROID, BUPROPION    DO NOT BRING your medications to the hospital with you, UNLESS something has changed since your PRE-Admission Testing appointment.  Hold all vitamins, supplements, and NSAIDS (Non- steroidal anti-inflammatory meds) for one week prior to surgery (you MAY take Tylenol or Acetaminophen).  If you are diabetic, check your blood sugar the morning of your procedure. If it is less than 70 or if you are feeling symptomatic, call the following number for further instructions: [phone] SAME DAY SURGERY_.  Use your inhalers/nebulizers as usual, the morning of your procedure. BRING YOUR INHALERS with you.   Bring your CPAP or BIPAP to hospital, ONLY IF YOU WILL BE SPENDING THE NIGHT.   Make sure you have a ride home and have someone who will stay with you the day of your procedure after you go home.  If you have any questions, please call your Pre-Admission Testing NurseJJ_ at [phone].   Per anesthesia request, do not smoke for 24 hours before your procedure or as instructed by your surgeon.    Clear Liquid Diet        Find out when you need to start a clear liquid diet.   Think of “clear liquids” as anything you could read a newspaper through. This includes things like water, broth, sports drinks, or tea WITHOUT any kind of milk or cream.           Once you are told to start a clear liquid diet, only drink these things until 3 hours before arrival to the hospital or when the hospital says to stop. Total volume limitation: 8 oz.       Clear liquids you CAN drink:   Water   Clear broth: beef, chicken, vegetable, or bone broth with nothing in it    Gatorade   Lemonade or Sarbjit-aid   Soda   Tea, coffee (NO cream or honey)   Jell-O (without fruit)   Popsicles (without fruit or cream)   Italian ices   Juice without pulp: apple, white, grape   You may use salt, pepper, and sugar    Do NOT drink:   Milk or cream   Soy milk, almond milk, coconut milk, or other non-dairy drinks and   creamers   Milkshakes or smoothies   Tomato juice   Orange juice   Grapefruit juice   Cream soups or any other than broth         Clear Liquid Diet:  Do NOT eat any solid food.  Do NOT eat or suck on mints or candy.  Do NOT chew gum.  Do NOT drink thick liquids like milk or juice with pulp in it.  Do NOT add milk, cream, or anything like soy milk or almond milk to coffee or tea.

## 2023-11-22 ENCOUNTER — ANESTHESIA EVENT (OUTPATIENT)
Dept: PERIOP | Facility: HOSPITAL | Age: 63
End: 2023-11-22
Payer: MEDICARE

## 2023-11-27 RX ORDER — BUPROPION HYDROCHLORIDE 150 MG/1
150 TABLET, EXTENDED RELEASE ORAL 2 TIMES DAILY
Qty: 60 TABLET | Refills: 2 | Status: ON HOLD | OUTPATIENT
Start: 2023-11-27

## 2023-11-27 NOTE — TELEPHONE ENCOUNTER
Caller: JoseorMilagros    Relationship: Self    Best call back number: [phone]     Requested Prescriptions:   Requested Prescriptions     Pending Prescriptions Disp Refills    buPROPion SR (Wellbutrin SR) 150 MG 12 hr tablet 60 tablet 2     Sig: Take 1 tablet by mouth 2 (Two) Times a Day.        Pharmacy where request should be sent: [facility]Y #[id number] - [place], [place] - [address] [facility] [phone] [facility] [phone] FX     LLast office visit with prescribing clinician: 10/26/2023   Last telemedicine visit with prescribing clinician: Visit date not found   Next office visit with prescribing clinician: 2/2/2024     Additional details provided by patient: PATIENT IS OUT OF MEDICATION     Does the patient have less than a 3 day supply:  [x] Yes  [] No    Colette Mojica Rep   11/27/23 10:23 EST

## 2023-11-29 DIAGNOSIS — Z96.651 AFTERCARE FOLLOWING RIGHT KNEE JOINT REPLACEMENT SURGERY: Primary | ICD-10-CM

## 2023-11-29 DIAGNOSIS — Z47.1 AFTERCARE FOLLOWING RIGHT KNEE JOINT REPLACEMENT SURGERY: Primary | ICD-10-CM

## 2023-12-02 NOTE — H&P
Chief Complaint  No chief complaint on file.     Wali Ramirez presents to [facility] for initial evaluation of bilateral knees. She ambulates with a cane for support and stability. She has had pain in bilateral knees for years.  She has treated her knees conservatively with injections, anti inflammatories and exercises.  Her right knee is worse then the left.  She notes the left one is catching up fast. Her cardiologist wants to do a heart cath prior to the surgery.  She is not taking blood thinner's anymore. She has A fib. She has lost over 180 pounds in the last 2 years.     Allergies   Allergen Reactions    Other Irritability     INFUSION FOR RHEUMATOID ARTHRITIS    Rituximab Unknown (See Comments) and Unknown - High Severity     Other reaction(s): review reaction with chills, rigors, muscle spasms and difficulty breathing          Social History     Socioeconomic History    Marital status:    Tobacco Use    Smoking status: Former     Types: Cigarettes    Smokeless tobacco: Never    Tobacco comments:     Quit around age 36   Vaping Use    Vaping Use: Never used   Substance and Sexual Activity    Alcohol use: Yes     Comment: rare    Drug use: No    Sexual activity: Defer        I reviewed the patient's chief complaint, history of present illness, review of systems, past medical history, surgical history, family history, social history, medications, and allergy list.     Review of Systems     Constitutional: Denies fevers, chills, weight loss  Cardiovascular: Denies chest pain, shortness of breath  Skin: Denies rashes, acute skin changes  Neurologic: Denies headache, loss of consciousness        Vital Signs:   There were no vitals taken for this visit.         Physical Exam  General: Alert. No acute distress    Ortho Exam        BILATERAL KNEES Flexion 95. Extension -3 Stable to varus/valgus stress. Stable to anterior/posterior drawer. Neurovascularly intact. Negative  Balaji. Negative Lachman. Positive EHL, FHL, HS and TA. Sensation intact to light touch all 5 nerves of the foot. Ambulates with Antalgic gait. Patella is well tracking. Calf supple, non-tender. Positive tenderness to the medial joint line. Positive tenderness to the lateral joint line. Positive Crepitus. Good strength to hamstrings, quadriceps, dorsiflexors, and plantar flexors.  Knee Extensor Mechanism intact        Procedures      Imaging Results (Most Recent)       None             Result Review :     X-Ray Report:  Right knee X-Ray  Indication: Evaluation of the right knee  AP/Lateral and Rosharon view(s)  Findings: Advanced degenerative arthritis. Bone on bone arthritis.   Prior studies available for comparison: no     X-Ray Report:  Left knee X-Ray  Indication: Evaluation of the left knee  AP/Lateral and Rosharon view(s)  Findings: Advanced degenerative arthritis. Bone on bone arthritis.   Prior studies available for comparison: yes             Assessment and Plan     There are no diagnoses linked to this encounter.      Discussed the treatment plan with the patient. I reviewed the X-rays that were obtained today with the patient.     Discussed the treatment options with the patient, operative vs non-operative. She is a candidate for replacement of one and/or both knees when she is ready.     The patient expressed understanding and wished to proceed with a right total knee arthroplasty when her cardiologist clears her for surgery.       Discussed surgery., Risks/benefits discussed with patient including, but not limited to: infection, bleeding, neurovascular damage, re-rupture, aesthetic deformity, need for further surgery, and death., Discussed with patient the implant type being used during surgery and patient understands., Surgery pamphlet given., Call or return if worsening symptoms., and DME order for a 3 in 1 given today due to patient will be confined to one room/level of the home that does not offer a  toilet during postop recovery.     Follow Up     2 weeks postoperatively     I have personally performed the services described in this document as scribed by the above individual and it is both accurate and complete. Lucian Eduardo MD 12/02/23

## 2023-12-03 LAB
QT INTERVAL: 391 MS
QTC INTERVAL: 407 MS

## 2023-12-04 ENCOUNTER — APPOINTMENT (OUTPATIENT)
Dept: GENERAL RADIOLOGY | Facility: HOSPITAL | Age: 63
End: 2023-12-04
Payer: MEDICARE

## 2023-12-04 ENCOUNTER — ANESTHESIA EVENT CONVERTED (OUTPATIENT)
Dept: ANESTHESIOLOGY | Facility: HOSPITAL | Age: 63
End: 2023-12-04
Payer: MEDICARE

## 2023-12-04 ENCOUNTER — ANESTHESIA (OUTPATIENT)
Dept: PERIOP | Facility: HOSPITAL | Age: 63
End: 2023-12-04
Payer: MEDICARE

## 2023-12-04 ENCOUNTER — HOSPITAL ENCOUNTER (OUTPATIENT)
Facility: HOSPITAL | Age: 63
Discharge: HOME | End: 2023-12-05
Attending: ORTHOPAEDIC SURGERY | Admitting: ORTHOPAEDIC SURGERY
Payer: MEDICARE

## 2023-12-04 DIAGNOSIS — R26.2 DIFFICULTY IN WALKING: Primary | ICD-10-CM

## 2023-12-04 DIAGNOSIS — M17.11 OSTEOARTHRITIS OF RIGHT KNEE, UNSPECIFIED OSTEOARTHRITIS TYPE: ICD-10-CM

## 2023-12-04 DIAGNOSIS — Z74.09 IMPAIRED MOBILITY AND ADLS: ICD-10-CM

## 2023-12-04 DIAGNOSIS — Z78.9 IMPAIRED MOBILITY AND ADLS: ICD-10-CM

## 2023-12-04 LAB
GLUCOSE BLDC GLUCOMTR-MCNC: 111 MG/DL (ref 70–99)
GLUCOSE BLDC GLUCOMTR-MCNC: 133 MG/DL (ref 70–99)

## 2023-12-04 PROCEDURE — 25810000003 LACTATED RINGERS PER 1000 ML: Performed by: ANESTHESIOLOGY

## 2023-12-04 PROCEDURE — 25010000002 MORPHINE PER 10 MG: Performed by: ORTHOPAEDIC SURGERY

## 2023-12-04 PROCEDURE — C1776 JOINT DEVICE (IMPLANTABLE): HCPCS | Performed by: ORTHOPAEDIC SURGERY

## 2023-12-04 PROCEDURE — C1713 ANCHOR/SCREW BN/BN,TIS/BN: HCPCS | Performed by: ORTHOPAEDIC SURGERY

## 2023-12-04 PROCEDURE — 82948 REAGENT STRIP/BLOOD GLUCOSE: CPT

## 2023-12-04 PROCEDURE — 25010000002 EPINEPHRINE 1 MG/ML SOLUTION: Performed by: ORTHOPAEDIC SURGERY

## 2023-12-04 PROCEDURE — 25810000003 LACTATED RINGERS PER 1000 ML: Performed by: ORTHOPAEDIC SURGERY

## 2023-12-04 PROCEDURE — 25010000002 CEFAZOLIN IN DEXTROSE 2000 MG/ 100 ML SOLUTION: Performed by: ORTHOPAEDIC SURGERY

## 2023-12-04 PROCEDURE — 25010000002 HYDROMORPHONE 1 MG/ML SOLUTION: Performed by: NURSE ANESTHETIST, CERTIFIED REGISTERED

## 2023-12-04 PROCEDURE — 99204 OFFICE O/P NEW MOD 45 MIN: CPT | Performed by: INTERNAL MEDICINE

## 2023-12-04 PROCEDURE — 25010000002 KETOROLAC TROMETHAMINE PER 15 MG: Performed by: ORTHOPAEDIC SURGERY

## 2023-12-04 PROCEDURE — 25010000002 ROPIVACAINE PER 1 MG: Performed by: ORTHOPAEDIC SURGERY

## 2023-12-04 PROCEDURE — 25010000002 CEFAZOLIN IN DEXTROSE 2-4 GM/100ML-% SOLUTION: Performed by: ORTHOPAEDIC SURGERY

## 2023-12-04 PROCEDURE — 25010000002 DEXAMETHASONE PER 1 MG: Performed by: NURSE ANESTHETIST, CERTIFIED REGISTERED

## 2023-12-04 PROCEDURE — 25010000002 MIDAZOLAM PER 1MG: Performed by: ANESTHESIOLOGY

## 2023-12-04 PROCEDURE — 25010000002 ONDANSETRON PER 1 MG: Performed by: NURSE ANESTHETIST, CERTIFIED REGISTERED

## 2023-12-04 PROCEDURE — 97161 PT EVAL LOW COMPLEX 20 MIN: CPT

## 2023-12-04 PROCEDURE — 94761 N-INVAS EAR/PLS OXIMETRY MLT: CPT

## 2023-12-04 PROCEDURE — 25010000002 FENTANYL CITRATE (PF) 50 MCG/ML SOLUTION: Performed by: NURSE ANESTHETIST, CERTIFIED REGISTERED

## 2023-12-04 PROCEDURE — 25010000002 SUGAMMADEX 200 MG/2ML SOLUTION: Performed by: NURSE ANESTHETIST, CERTIFIED REGISTERED

## 2023-12-04 PROCEDURE — 25010000002 ONDANSETRON PER 1 MG: Performed by: ORTHOPAEDIC SURGERY

## 2023-12-04 PROCEDURE — 73560 X-RAY EXAM OF KNEE 1 OR 2: CPT

## 2023-12-04 PROCEDURE — 94799 UNLISTED PULMONARY SVC/PX: CPT

## 2023-12-04 PROCEDURE — 25010000002 PROPOFOL 10 MG/ML EMULSION: Performed by: NURSE ANESTHETIST, CERTIFIED REGISTERED

## 2023-12-04 DEVICE — BEAR TIB/KN VANGUARD AS 12X75MM NS: Type: IMPLANTABLE DEVICE | Site: KNEE | Status: FUNCTIONAL

## 2023-12-04 DEVICE — TRY TIB CRUC 75MM: Type: IMPLANTABLE DEVICE | Site: KNEE | Status: FUNCTIONAL

## 2023-12-04 DEVICE — CMT BONE PALACOS R HI/VISC 1X40: Type: IMPLANTABLE DEVICE | Site: KNEE | Status: FUNCTIONAL

## 2023-12-04 DEVICE — PAT 3PEG STD 8X31MM: Type: IMPLANTABLE DEVICE | Site: KNEE | Status: FUNCTIONAL

## 2023-12-04 DEVICE — CAP TOTL KN CMT PRIMARY: Type: IMPLANTABLE DEVICE | Site: KNEE | Status: FUNCTIONAL

## 2023-12-04 DEVICE — COMP FEM/KN VANGUARD INTLK CR 67.5MM NS RT: Type: IMPLANTABLE DEVICE | Site: KNEE | Status: FUNCTIONAL

## 2023-12-04 RX ORDER — PROMETHAZINE HYDROCHLORIDE 25 MG/1
25 SUPPOSITORY RECTAL ONCE AS NEEDED
Status: DISCONTINUED | OUTPATIENT
Start: 2023-12-04 | End: 2023-12-04

## 2023-12-04 RX ORDER — SODIUM CHLORIDE, SODIUM LACTATE, POTASSIUM CHLORIDE, CALCIUM CHLORIDE 600; 310; 30; 20 MG/100ML; MG/100ML; MG/100ML; MG/100ML
100 INJECTION, SOLUTION INTRAVENOUS CONTINUOUS
Status: DISCONTINUED | OUTPATIENT
Start: 2023-12-04 | End: 2023-12-05 | Stop reason: HOSPADM

## 2023-12-04 RX ORDER — MEPERIDINE HYDROCHLORIDE 25 MG/ML
12.5 INJECTION INTRAMUSCULAR; INTRAVENOUS; SUBCUTANEOUS
Status: DISCONTINUED | OUTPATIENT
Start: 2023-12-04 | End: 2023-12-04

## 2023-12-04 RX ORDER — ONDANSETRON 4 MG/1
4 TABLET, FILM COATED ORAL EVERY 6 HOURS PRN
Status: DISCONTINUED | OUTPATIENT
Start: 2023-12-04 | End: 2023-12-05 | Stop reason: HOSPADM

## 2023-12-04 RX ORDER — OXYCODONE HYDROCHLORIDE 5 MG/1
5 TABLET ORAL
Status: DISCONTINUED | OUTPATIENT
Start: 2023-12-04 | End: 2023-12-04

## 2023-12-04 RX ORDER — ROSUVASTATIN CALCIUM 5 MG/1
10 TABLET, COATED ORAL DAILY
Status: DISCONTINUED | OUTPATIENT
Start: 2023-12-04 | End: 2023-12-05 | Stop reason: HOSPADM

## 2023-12-04 RX ORDER — SODIUM CHLORIDE 0.9 % (FLUSH) 0.9 %
10 SYRINGE (ML) INJECTION AS NEEDED
Status: DISCONTINUED | OUTPATIENT
Start: 2023-12-04 | End: 2023-12-04 | Stop reason: HOSPADM

## 2023-12-04 RX ORDER — PROPOFOL 10 MG/ML
VIAL (ML) INTRAVENOUS AS NEEDED
Status: DISCONTINUED | OUTPATIENT
Start: 2023-12-04 | End: 2023-12-04 | Stop reason: SURG

## 2023-12-04 RX ORDER — CEFAZOLIN SODIUM 2 G/100ML
2000 INJECTION, SOLUTION INTRAVENOUS EVERY 8 HOURS
Qty: 200 ML | Refills: 0 | Status: COMPLETED | OUTPATIENT
Start: 2023-12-04 | End: 2023-12-05

## 2023-12-04 RX ORDER — PROMETHAZINE HYDROCHLORIDE 12.5 MG/1
25 TABLET ORAL ONCE AS NEEDED
Status: DISCONTINUED | OUTPATIENT
Start: 2023-12-04 | End: 2023-12-04

## 2023-12-04 RX ORDER — SODIUM CHLORIDE 9 MG/ML
40 INJECTION, SOLUTION INTRAVENOUS AS NEEDED
Status: DISCONTINUED | OUTPATIENT
Start: 2023-12-04 | End: 2023-12-04 | Stop reason: HOSPADM

## 2023-12-04 RX ORDER — FENTANYL CITRATE 50 UG/ML
INJECTION, SOLUTION INTRAMUSCULAR; INTRAVENOUS AS NEEDED
Status: DISCONTINUED | OUTPATIENT
Start: 2023-12-04 | End: 2023-12-04 | Stop reason: SURG

## 2023-12-04 RX ORDER — ACETAMINOPHEN 500 MG
1000 TABLET ORAL EVERY 8 HOURS
Status: DISCONTINUED | OUTPATIENT
Start: 2023-12-04 | End: 2023-12-05 | Stop reason: HOSPADM

## 2023-12-04 RX ORDER — LIDOCAINE HYDROCHLORIDE 20 MG/ML
INJECTION, SOLUTION EPIDURAL; INFILTRATION; INTRACAUDAL; PERINEURAL AS NEEDED
Status: DISCONTINUED | OUTPATIENT
Start: 2023-12-04 | End: 2023-12-04 | Stop reason: SURG

## 2023-12-04 RX ORDER — AMOXICILLIN 250 MG
2 CAPSULE ORAL 2 TIMES DAILY
Status: DISCONTINUED | OUTPATIENT
Start: 2023-12-04 | End: 2023-12-05 | Stop reason: HOSPADM

## 2023-12-04 RX ORDER — BUPROPION HYDROCHLORIDE 150 MG/1
1 TABLET, EXTENDED RELEASE ORAL 2 TIMES DAILY
Status: ON HOLD | COMMUNITY
End: 2023-12-04

## 2023-12-04 RX ORDER — BUPIVACAINE HYDROCHLORIDE AND EPINEPHRINE 5; 5 MG/ML; UG/ML
INJECTION, SOLUTION EPIDURAL; INTRACAUDAL; PERINEURAL
Status: COMPLETED | OUTPATIENT
Start: 2023-12-04 | End: 2023-12-04

## 2023-12-04 RX ORDER — TRANEXAMIC ACID 10 MG/ML
1000 INJECTION, SOLUTION INTRAVENOUS ONCE
Status: COMPLETED | OUTPATIENT
Start: 2023-12-04 | End: 2023-12-04

## 2023-12-04 RX ORDER — BUDESONIDE AND FORMOTEROL FUMARATE DIHYDRATE 160; 4.5 UG/1; UG/1
2 AEROSOL RESPIRATORY (INHALATION)
Status: DISCONTINUED | OUTPATIENT
Start: 2023-12-04 | End: 2023-12-05 | Stop reason: HOSPADM

## 2023-12-04 RX ORDER — DEXMEDETOMIDINE HYDROCHLORIDE 100 UG/ML
INJECTION, SOLUTION INTRAVENOUS AS NEEDED
Status: DISCONTINUED | OUTPATIENT
Start: 2023-12-04 | End: 2023-12-04 | Stop reason: SURG

## 2023-12-04 RX ORDER — MAGNESIUM HYDROXIDE 1200 MG/15ML
LIQUID ORAL AS NEEDED
Status: DISCONTINUED | OUTPATIENT
Start: 2023-12-04 | End: 2023-12-04 | Stop reason: HOSPADM

## 2023-12-04 RX ORDER — MIDAZOLAM HYDROCHLORIDE 2 MG/2ML
4 INJECTION, SOLUTION INTRAMUSCULAR; INTRAVENOUS ONCE
Status: COMPLETED | OUTPATIENT
Start: 2023-12-04 | End: 2023-12-04

## 2023-12-04 RX ORDER — OXYCODONE AND ACETAMINOPHEN 7.5; 325 MG/1; MG/1
1 TABLET ORAL EVERY 4 HOURS PRN
Status: DISCONTINUED | OUTPATIENT
Start: 2023-12-04 | End: 2023-12-05 | Stop reason: HOSPADM

## 2023-12-04 RX ORDER — METOPROLOL TARTRATE 50 MG/1
50 TABLET, FILM COATED ORAL 2 TIMES DAILY
Status: DISCONTINUED | OUTPATIENT
Start: 2023-12-04 | End: 2023-12-05 | Stop reason: HOSPADM

## 2023-12-04 RX ORDER — KETOROLAC TROMETHAMINE 30 MG/ML
15 INJECTION, SOLUTION INTRAMUSCULAR; INTRAVENOUS EVERY 6 HOURS SCHEDULED
Status: COMPLETED | OUTPATIENT
Start: 2023-12-04 | End: 2023-12-05

## 2023-12-04 RX ORDER — ONDANSETRON 2 MG/ML
4 INJECTION INTRAMUSCULAR; INTRAVENOUS ONCE AS NEEDED
Status: DISCONTINUED | OUTPATIENT
Start: 2023-12-04 | End: 2023-12-04

## 2023-12-04 RX ORDER — ACETAMINOPHEN 500 MG
1000 TABLET ORAL ONCE
Status: COMPLETED | OUTPATIENT
Start: 2023-12-04 | End: 2023-12-04

## 2023-12-04 RX ORDER — ALBUTEROL SULFATE 2.5 MG/3ML
2.5 SOLUTION RESPIRATORY (INHALATION) EVERY 6 HOURS PRN
Status: DISCONTINUED | OUTPATIENT
Start: 2023-12-04 | End: 2023-12-05 | Stop reason: HOSPADM

## 2023-12-04 RX ORDER — CELECOXIB 100 MG/1
200 CAPSULE ORAL ONCE
Status: COMPLETED | OUTPATIENT
Start: 2023-12-04 | End: 2023-12-04

## 2023-12-04 RX ORDER — NALOXONE HCL 0.4 MG/ML
0.4 VIAL (ML) INJECTION
Status: DISCONTINUED | OUTPATIENT
Start: 2023-12-04 | End: 2023-12-05 | Stop reason: HOSPADM

## 2023-12-04 RX ORDER — FAMOTIDINE 20 MG/1
40 TABLET, FILM COATED ORAL DAILY
Status: DISCONTINUED | OUTPATIENT
Start: 2023-12-04 | End: 2023-12-05 | Stop reason: HOSPADM

## 2023-12-04 RX ORDER — FERROUS SULFATE 325(65) MG
325 TABLET ORAL
Status: DISCONTINUED | OUTPATIENT
Start: 2023-12-04 | End: 2023-12-05 | Stop reason: HOSPADM

## 2023-12-04 RX ORDER — BUPROPION HYDROCHLORIDE 150 MG/1
150 TABLET, EXTENDED RELEASE ORAL 2 TIMES DAILY
Status: DISCONTINUED | OUTPATIENT
Start: 2023-12-04 | End: 2023-12-05 | Stop reason: HOSPADM

## 2023-12-04 RX ORDER — ROCURONIUM BROMIDE 10 MG/ML
INJECTION, SOLUTION INTRAVENOUS AS NEEDED
Status: DISCONTINUED | OUTPATIENT
Start: 2023-12-04 | End: 2023-12-04 | Stop reason: SURG

## 2023-12-04 RX ORDER — CEFAZOLIN SODIUM 2 G/100ML
2 INJECTION, SOLUTION INTRAVENOUS ONCE
Status: COMPLETED | OUTPATIENT
Start: 2023-12-04 | End: 2023-12-04

## 2023-12-04 RX ORDER — ENOXAPARIN SODIUM 100 MG/ML
40 INJECTION SUBCUTANEOUS DAILY
Status: DISCONTINUED | OUTPATIENT
Start: 2023-12-05 | End: 2023-12-05 | Stop reason: HOSPADM

## 2023-12-04 RX ORDER — DEXAMETHASONE SODIUM PHOSPHATE 4 MG/ML
INJECTION, SOLUTION INTRA-ARTICULAR; INTRALESIONAL; INTRAMUSCULAR; INTRAVENOUS; SOFT TISSUE AS NEEDED
Status: DISCONTINUED | OUTPATIENT
Start: 2023-12-04 | End: 2023-12-04 | Stop reason: SURG

## 2023-12-04 RX ORDER — ONDANSETRON 2 MG/ML
INJECTION INTRAMUSCULAR; INTRAVENOUS AS NEEDED
Status: DISCONTINUED | OUTPATIENT
Start: 2023-12-04 | End: 2023-12-04 | Stop reason: SURG

## 2023-12-04 RX ORDER — CEFAZOLIN SODIUM IN 0.9 % NACL 3 G/100 ML
3 INTRAVENOUS SOLUTION, PIGGYBACK (ML) INTRAVENOUS ONCE
Status: DISCONTINUED | OUTPATIENT
Start: 2023-12-04 | End: 2023-12-04 | Stop reason: DRUGHIGH

## 2023-12-04 RX ORDER — OXYCODONE AND ACETAMINOPHEN 7.5; 325 MG/1; MG/1
2 TABLET ORAL EVERY 4 HOURS PRN
Status: DISCONTINUED | OUTPATIENT
Start: 2023-12-04 | End: 2023-12-05 | Stop reason: HOSPADM

## 2023-12-04 RX ORDER — PHENYLEPHRINE HCL IN 0.9% NACL 1 MG/10 ML
SYRINGE (ML) INTRAVENOUS AS NEEDED
Status: DISCONTINUED | OUTPATIENT
Start: 2023-12-04 | End: 2023-12-04 | Stop reason: SURG

## 2023-12-04 RX ORDER — ONDANSETRON 2 MG/ML
4 INJECTION INTRAMUSCULAR; INTRAVENOUS EVERY 6 HOURS PRN
Status: DISCONTINUED | OUTPATIENT
Start: 2023-12-04 | End: 2023-12-05 | Stop reason: HOSPADM

## 2023-12-04 RX ORDER — SODIUM CHLORIDE, SODIUM LACTATE, POTASSIUM CHLORIDE, CALCIUM CHLORIDE 600; 310; 30; 20 MG/100ML; MG/100ML; MG/100ML; MG/100ML
9 INJECTION, SOLUTION INTRAVENOUS CONTINUOUS PRN
Status: DISCONTINUED | OUTPATIENT
Start: 2023-12-04 | End: 2023-12-05 | Stop reason: HOSPADM

## 2023-12-04 RX ADMIN — HYDROMORPHONE HYDROCHLORIDE 0.5 MG: 1 INJECTION, SOLUTION INTRAMUSCULAR; INTRAVENOUS; SUBCUTANEOUS at 10:19

## 2023-12-04 RX ADMIN — FAMOTIDINE 40 MG: 20 TABLET ORAL at 12:30

## 2023-12-04 RX ADMIN — BUPIVACAINE HYDROCHLORIDE AND EPINEPHRINE BITARTRATE 30 ML: 5; .005 INJECTION, SOLUTION EPIDURAL; INTRACAUDAL; PERINEURAL at 08:06

## 2023-12-04 RX ADMIN — Medication 100 MCG: at 08:50

## 2023-12-04 RX ADMIN — HYDROMORPHONE HYDROCHLORIDE 0.25 MG: 1 INJECTION, SOLUTION INTRAMUSCULAR; INTRAVENOUS; SUBCUTANEOUS at 10:29

## 2023-12-04 RX ADMIN — BUPROPION HYDROCHLORIDE 150 MG: 150 TABLET, EXTENDED RELEASE ORAL at 20:24

## 2023-12-04 RX ADMIN — LIDOCAINE HYDROCHLORIDE 100 MG: 20 INJECTION, SOLUTION EPIDURAL; INFILTRATION; INTRACAUDAL; PERINEURAL at 08:36

## 2023-12-04 RX ADMIN — TRANEXAMIC ACID 1000 MG: 10 INJECTION, SOLUTION INTRAVENOUS at 07:17

## 2023-12-04 RX ADMIN — ONDANSETRON 4 MG: 2 INJECTION INTRAMUSCULAR; INTRAVENOUS at 09:39

## 2023-12-04 RX ADMIN — METOPROLOL TARTRATE 50 MG: 50 TABLET, FILM COATED ORAL at 12:30

## 2023-12-04 RX ADMIN — Medication 100 MCG: at 08:39

## 2023-12-04 RX ADMIN — SODIUM CHLORIDE, POTASSIUM CHLORIDE, SODIUM LACTATE AND CALCIUM CHLORIDE: 600; 310; 30; 20 INJECTION, SOLUTION INTRAVENOUS at 09:51

## 2023-12-04 RX ADMIN — ACETAMINOPHEN 1000 MG: 500 TABLET ORAL at 20:24

## 2023-12-04 RX ADMIN — DEXMEDETOMIDINE 10 MCG: 100 INJECTION, SOLUTION INTRAVENOUS at 09:41

## 2023-12-04 RX ADMIN — CEFAZOLIN SODIUM 2 G: 2 INJECTION, SOLUTION INTRAVENOUS at 08:31

## 2023-12-04 RX ADMIN — DEXMEDETOMIDINE 15 MCG: 100 INJECTION, SOLUTION INTRAVENOUS at 09:20

## 2023-12-04 RX ADMIN — PROPOFOL 30 MG: 10 INJECTION, EMULSION INTRAVENOUS at 09:59

## 2023-12-04 RX ADMIN — OXYCODONE HYDROCHLORIDE 5 MG: 5 TABLET ORAL at 10:30

## 2023-12-04 RX ADMIN — DEXAMETHASONE SODIUM PHOSPHATE 4 MG: 4 INJECTION, SOLUTION INTRAMUSCULAR; INTRAVENOUS at 08:48

## 2023-12-04 RX ADMIN — HYDROMORPHONE HYDROCHLORIDE 0.5 MG: 1 INJECTION, SOLUTION INTRAMUSCULAR; INTRAVENOUS; SUBCUTANEOUS at 10:13

## 2023-12-04 RX ADMIN — MIDAZOLAM HYDROCHLORIDE 4 MG: 1 INJECTION, SOLUTION INTRAMUSCULAR; INTRAVENOUS at 07:17

## 2023-12-04 RX ADMIN — SUGAMMADEX 200 MG: 100 INJECTION, SOLUTION INTRAVENOUS at 10:00

## 2023-12-04 RX ADMIN — FENTANYL CITRATE 50 MCG: 50 INJECTION, SOLUTION INTRAMUSCULAR; INTRAVENOUS at 08:36

## 2023-12-04 RX ADMIN — METOPROLOL TARTRATE 50 MG: 50 TABLET, FILM COATED ORAL at 20:24

## 2023-12-04 RX ADMIN — KETOROLAC TROMETHAMINE 15 MG: 30 INJECTION, SOLUTION INTRAMUSCULAR; INTRAVENOUS at 17:52

## 2023-12-04 RX ADMIN — PROPOFOL 50 MG: 10 INJECTION, EMULSION INTRAVENOUS at 10:01

## 2023-12-04 RX ADMIN — Medication 100 MCG: at 08:44

## 2023-12-04 RX ADMIN — ACETAMINOPHEN 1000 MG: 500 TABLET ORAL at 12:30

## 2023-12-04 RX ADMIN — ONDANSETRON 4 MG: 2 INJECTION INTRAMUSCULAR; INTRAVENOUS at 14:28

## 2023-12-04 RX ADMIN — BUPROPION HYDROCHLORIDE 150 MG: 150 TABLET, EXTENDED RELEASE ORAL at 12:33

## 2023-12-04 RX ADMIN — DOCUSATE SODIUM 50MG AND SENNOSIDES 8.6MG 2 TABLET: 8.6; 5 TABLET, FILM COATED ORAL at 20:25

## 2023-12-04 RX ADMIN — SODIUM CHLORIDE, POTASSIUM CHLORIDE, SODIUM LACTATE AND CALCIUM CHLORIDE 100 ML/HR: 600; 310; 30; 20 INJECTION, SOLUTION INTRAVENOUS at 17:01

## 2023-12-04 RX ADMIN — ROCURONIUM BROMIDE 50 MG: 50 INJECTION INTRAVENOUS at 08:36

## 2023-12-04 RX ADMIN — CELECOXIB 200 MG: 100 CAPSULE ORAL at 07:16

## 2023-12-04 RX ADMIN — ACETAMINOPHEN 1000 MG: 500 TABLET ORAL at 07:16

## 2023-12-04 RX ADMIN — ONDANSETRON 4 MG: 2 INJECTION INTRAMUSCULAR; INTRAVENOUS at 20:27

## 2023-12-04 RX ADMIN — FERROUS SULFATE TAB 325 MG (65 MG ELEMENTAL FE) 325 MG: 325 (65 FE) TAB at 12:30

## 2023-12-04 RX ADMIN — CEFAZOLIN SODIUM 2000 MG: 2 INJECTION, SOLUTION INTRAVENOUS at 17:00

## 2023-12-04 RX ADMIN — PROPOFOL 150 MG: 10 INJECTION, EMULSION INTRAVENOUS at 08:36

## 2023-12-04 RX ADMIN — SODIUM CHLORIDE, POTASSIUM CHLORIDE, SODIUM LACTATE AND CALCIUM CHLORIDE 9 ML/HR: 600; 310; 30; 20 INJECTION, SOLUTION INTRAVENOUS at 07:16

## 2023-12-04 RX ADMIN — SODIUM CHLORIDE, POTASSIUM CHLORIDE, SODIUM LACTATE AND CALCIUM CHLORIDE 100 ML/HR: 600; 310; 30; 20 INJECTION, SOLUTION INTRAVENOUS at 11:15

## 2023-12-04 RX ADMIN — KETOROLAC TROMETHAMINE 15 MG: 30 INJECTION, SOLUTION INTRAMUSCULAR; INTRAVENOUS at 12:30

## 2023-12-04 RX ADMIN — DEXMEDETOMIDINE 10 MCG: 100 INJECTION, SOLUTION INTRAVENOUS at 09:37

## 2023-12-04 RX ADMIN — TRANEXAMIC ACID 1000 MG: 10 INJECTION, SOLUTION INTRAVENOUS at 09:37

## 2023-12-04 RX ADMIN — ROCURONIUM BROMIDE 20 MG: 50 INJECTION INTRAVENOUS at 09:13

## 2023-12-04 NOTE — H&P
[facility]  [facility]   Patient Name: Milagros Ramirez  : 1960  MRN: [id number]  Primary Care Physician:  Nelly Romero MD  Date of admission: 2023    Subjective   Subjective     Chief Complaint: Knee pain, medical management    HPI:  Patient is a 63-year-old with past medical history significant for osteoarthritis, rheumatoid arthritis, SLE, CHF, COPD, hypertension, hyperlipidemia, A-fib who presents for scheduled right total knee arthroplasty. Patient states that prior to the procedure the pain had gradually been worsening over the past several years. The patient reports pain and functional impairment including activities of daily living, they have moderate to severe resting pain, chronic inflammation, and swelling. The patient states that this pain is no longer relieved with conservative. The pain is dull and achy in nature, nonradiating, worse with activity. Because of the above the patient is admitted for postsurgical pain control, symptom management and rehab evaluation.  We are consulted for management of the chronical medical issues.      Personal History     Past Medical History:  Past Medical History:   Diagnosis Date    Arrhythmia     Atrial fibrillation     Chronic back pain     CKD (chronic kidney disease)     Diabetes     Diastolic CHF     History of anemia 10/2022    Hyperlipidemia     Hypertension     Hypothyroidism     Morbid obesity     AMOR (obstructive sleep apnea)     Pain management     Rheumatoid arthritis     SLE (systemic lupus erythematosus)        Past Surgical History:  Past Surgical History:   Procedure Laterality Date    APPENDECTOMY      CARDIAC CATHETERIZATION N/A 10/11/2023    Procedure: Left Heart Cath;  Surgeon: Tiffany Paredes MD;  Location: [facility] INVASIVE LOCATION;  Service: Cardiovascular;  Laterality: N/A;    CARDIAC CATHETERIZATION N/A 10/11/2023    Procedure: Coronary angiography;  Surgeon: Lena  MD Tiffany;  Location: [facility] CATH INVASIVE LOCATION;  Service: Cardiovascular;  Laterality: N/A;    CARDIAC CATHETERIZATION N/A 10/11/2023    Procedure: Left ventriculography;  Surgeon: Tiffany Paredes MD;  Location: [facility] CATH INVASIVE LOCATION;  Service: Cardiovascular;  Laterality: N/A;    CARPAL TUNNEL RELEASE      COLONOSCOPY N/A 10/13/2022    Procedure: COLONOSCOPY;  Surgeon: Sara Jarrett MD;  Location: [facility] ENDOSCOPY;  Service: Gastroenterology;  Laterality: N/A;  DIVERTICULOSIS    ENDOMETRIAL ABLATION      ENDOSCOPY N/A 10/12/2022    Procedure: ESOPHAGOGASTRODUODENOSCOPY;  Surgeon: Sara Jarrett MD;  Location: [facility] ENDOSCOPY;  Service: Gastroenterology;  Laterality: N/A;  GASTRITIS    ENTEROSCOPY SMALL BOWEL N/A 11/04/2022    Procedure: ENTEROSCOPY SMALL BOWEL;  Surgeon: Sara Jarrett MD;  Location: [facility] ENDOSCOPY;  Service: Gastroenterology;  Laterality: N/A;  normal    HEEL SPUR EXCISION      HERNIA REPAIR      THYROIDECTOMY, PARTIAL         Family History:   family history includes Atrial fibrillation in her father; Colon cancer in her father; Coronary artery disease in her brother and brother; Diabetes in her paternal grandfather; Heart disease in her father; Stroke in her mother.    Social History:    reports that she has quit smoking. Her smoking use included cigarettes. She has never used smokeless tobacco. She reports current alcohol use. She reports that she does not use drugs.    Home Medications:  Green Tea (Sabrina sinensis), HYDROcodone-acetaminophen, Thyroid, Turmeric, albuterol sulfate HFA, buPROPion SR, budesonide-formoterol, cyclobenzaprine, ferrous sulfate, hydroxychloroquine, metoprolol tartrate, montelukast, nitroglycerin, rosuvastatin, torsemide, and vitamin E    Allergies:  Allergies   Allergen Reactions    Other Irritability     INFUSION FOR RHEUMATOID ARTHRITIS    Rituximab Unknown (See Comments) and Unknown - High Severity     Other reaction(s):  review reaction with chills, rigors, muscle spasms and difficulty breathing         Review of Systems:  All systems reviewed and negative other than stated in HPI    Objective   Objective     Vitals:   Temp:  [97.5 °F (36.4 °C)-98.2 °F (36.8 °C)] 98.2 °F (36.8 °C)  Heart Rate:  [68-87] 73  Resp:  [10-22] 19  BP: (109-146)/(50-93) 126/65  Flow (L/min):  [2-4] 4    Physical Exam   GEN: No acute distress  HEENT: Moist mucous membranes  LUNGS: Equal chest rise bilaterally  CARDIAC: Regular rate and rhythm  NEURO: Moving all 4 extremities spontaneously  SKIN: Postsurgical change of the knee noted, dressing clean dry and intact    Result Review:  I have personally reviewed the results from the time of this admission to 12/4/2023 11:32 EST and agree with these findings:  [x]  Laboratory  CMP          3/20/2023    17:57 9/27/2023    14:23 11/21/2023    11:36   CMP   Glucose 124  119  102    BUN 15  15  25    Creatinine 0.87  0.97  1.05    EGFR 75.9  66.2  60.2    Sodium 135  140  137    Potassium 3.9  4.1  3.9    Chloride 99  99  100    Calcium 9.1  9.6  8.9    Total Protein 8.3  8.5  8.0    Albumin 3.4  4.2  3.6    Globulin 4.9  4.3  4.4    Total Bilirubin 0.3  0.3  0.3    Alkaline Phosphatase 144  170  143    AST (SGOT) 25  17  14    ALT (SGPT) 11  13  10    Albumin/Globulin Ratio 0.7  1.0  0.8    BUN/Creatinine Ratio 17.2  15.5  23.8    Anion Gap 11.3  11.0  11.5      CBC          5/15/2023    16:14 9/27/2023    14:23 11/21/2023    11:36   CBC   WBC 4.36  5.15  6.64    RBC 4.31  4.51  4.09    Hemoglobin 9.7  11.6  11.0    Hematocrit 32.2  36.9  35.1    MCV 74.7  81.8  85.8    MCH 22.5  25.7  26.9    MCHC 30.1  31.4  31.3    RDW 15.9  15.6  15.4    Platelets 199  165  323      []  Microbiology  []  Radiology  []  EKG/Telemetry   []  Cardiology/Vascular   []  Pathology  []  Old records  []  Other:      Assessment & Plan   Assessment / Plan     Assessment:   Right total knee arthroplasty  Atrial fibrillation  CKD stage  III  Diabetes mellitus  Diastolic CHF not in acute exacerbation  Hyperlipidemia  Hypertension  Hypothyroidism  Morbid obesity  AMOR  Rheumatoid arthritis  SLE    Plan:  Patient is admitted to the hospital for further workup and management of above  Patient status post right total knee arthroplasty without any immediate postoperative complications  Postoperative pain control with IV and p.o. narcotics  Patient will be started on anticoagulation postop day 1, monitor hemoglobin for stability  Resume home beta-blocker  Resume home statin  Resume home Wellbutrin  Resume home Symbicort  Start as needed albuterol  Zofran if needed for nausea  Hold home torsemide for now, patient is on IV fluids, have discussed with nursing staff to monitor SpO2 closely as she will be high risk of becoming volume overloaded  PT/OT/social work  Clinical course will dictate further management    DVT prophylaxis: SCDs, will start chemical prophylaxis postop day 1    Reviewed patients labs and imaging, and discussed with patient and nurse at bedside, will discuss with orthopedic surgery    CODE STATUS:         Admission Status:  I believe this patient meets outpatient status.      Electronically signed by Abiodun Betts MD, 12/04/23, 11:32 AM EST.

## 2023-12-04 NOTE — ANESTHESIA PREPROCEDURE EVALUATION
Anesthesia Evaluation     Patient summary reviewed and Nursing notes reviewed   no history of anesthetic complications:   NPO Solid Status: > 8 hours  NPO Liquid Status: > 2 hours           Airway   Mallampati: II  TM distance: >3 FB  Neck ROM: full  No difficulty expected  Dental          Pulmonary - normal exam    breath sounds clear to auscultation  (+) ,shortness of breath, sleep apnea on CPAP  Cardiovascular - normal exam  Exercise tolerance: good (4-7 METS)    ECG reviewed  PT is on anticoagulation therapy  Patient on routine beta blocker and Beta blocker not taken-may be given intraoperatively  Rhythm: regular  Rate: normal    (+) hypertension, valvular problems/murmurs MR and AS, dysrhythmias Paroxysmal Atrial Fib, Atrial Fib, CHF , hyperlipidemia    ROS comment: Atrial fibrillation  RSR' in V1 or V2, right VCD or RVH  Borderline repolarization abnormality    EF 46%    Neuro/Psych- negative ROS  GI/Hepatic/Renal/Endo    (+) morbid obesity, GI bleeding lower active bleeding, renal disease- CRI, diabetes mellitus type 2, thyroid problem hypothyroidism    Musculoskeletal     Abdominal    Substance History - negative use     OB/GYN negative ob/gyn ROS         Other   arthritis, blood dyscrasia (acute-- received blood transfusion) anemia,     ROS/Med Hx Other: EKG 11/21/23 Afib    Interpretation Summary ECHO 10/12/22  ·  Calculated left ventricular EF = 46% Estimated left ventricular EF was in agreement with the calculated left ventricular EF.  ·  Left ventricular wall thickness is consistent with concentric hypertrophy.  ·  Left ventricular diastolic function was not assessed.  ·  Mild aortic valve stenosis is present.  ·  Moderate mitral valve regurgitation is present.  ·  Moderate tricuspid valve regurgitation is present.  ·  Estimated right ventricular systolic pressure from tricuspid regurgitation is markedly elevated (>55 mmHg).                  Anesthesia Plan    ASA 3     general with block, ERAS  Protocol and Kailyn     (Patient understands anesthesia not responsible for dental damage.)  intravenous induction     Anesthetic plan, risks, benefits, and alternatives have been provided, discussed and informed consent has been obtained with: patient.  Pre-procedure education provided  Use of blood products discussed with patient .        CODE STATUS:

## 2023-12-04 NOTE — PLAN OF CARE
Goal Outcome Evaluation:           Progress: no change  Outcome Evaluation: PT is AAOx4, VSS, S/P (R) TKR with Dr. Eduardo today. x1 assist with walker, ambulating without difficulty. Voiding spontaneously without difficulty. Medicated x1 for nausea with PRN Zofran. Pain managed with scheduled Toradol. Dressing is clean/dry/intact. Tolerating IVF and IVPB Ancef. Potential DC home tomorrow with OP therapy. Continue to manage with current POC.

## 2023-12-04 NOTE — THERAPY EVALUATION
Acute Care - Physical Therapy Initial Evaluation   [facility]     Patient Name: Milagros Ramirez  : 1960  MRN: [id number]  Today's Date: 2023     Admit date: 2023     Referring Physician: Abiodun Betts MD     Surgery Date:2023   Procedure(s) (LRB):  RIGHT TOTAL KNEE ARTHROPLASTY. (Right)          Visit Dx:     ICD-10-CM ICD-9-CM   1. Difficulty in walking  R26.2 719.7   2. Osteoarthritis of right knee, unspecified osteoarthritis type  M17.11 715.96     Patient Active Problem List   Diagnosis    Extreme obesity with alveolar hypoventilation    Osteoarthritis of knee    Rheumatoid arthritis    Stage 3 chronic kidney disease    Type 2 diabetes mellitus without complication    AMOR on CPAP    Gastrointestinal hemorrhage, unspecified gastrointestinal hemorrhage type    Anemia    Melena    GI bleed    Hypothyroidism    Permanent atrial fibrillation    Acute on chronic combined systolic and diastolic CHF (congestive heart failure)    Nonrheumatic aortic valve stenosis    SOB (shortness of breath)    Other chest pain    OA (osteoarthritis) of knee    Class 2 severe obesity due to excess calories with serious comorbidity in adult     Past Medical History:   Diagnosis Date    Arrhythmia     Atrial fibrillation     Diagnosed 2019/FOLLOWS W/DR. SULLIVAN    Chronic back pain     CKD (chronic kidney disease)     STAGE 3-FOLLOWS TWICE YEARLY WITH SADIQ    Diabetes     Diagnosed 2019    Diastolic CHF     History of anemia 10/2022    R/T GI BLEED, RECEIVED SEVERAL UNITS OF BLOOD    Hyperlipidemia     Hypertension     Hypothyroidism     Morbid obesity     AMOR (obstructive sleep apnea)     Pain management     [facility]    Rheumatoid arthritis     SLE (systemic lupus erythematosus)      Past Surgical History:   Procedure Laterality Date    APPENDECTOMY      CARDIAC CATHETERIZATION N/A 10/11/2023    Procedure: Left Heart Cath;  Surgeon: Tiffany Paredes MD;  Location: [facility] CATH INVASIVE  LOCATION;  Service: Cardiovascular;  Laterality: N/A;    CARDIAC CATHETERIZATION N/A 10/11/2023    Procedure: Coronary angiography;  Surgeon: Tiffany Paredes MD;  Location: [facility] CATH INVASIVE LOCATION;  Service: Cardiovascular;  Laterality: N/A;    CARDIAC CATHETERIZATION N/A 10/11/2023    Procedure: Left ventriculography;  Surgeon: Tiffany Paredes MD;  Location:  [facility] CATH INVASIVE LOCATION;  Service: Cardiovascular;  Laterality: N/A;    CARPAL TUNNEL RELEASE      COLONOSCOPY N/A 10/13/2022    Procedure: COLONOSCOPY;  Surgeon: aSra Jarrett MD;  Location: [facility] ENDOSCOPY;  Service: Gastroenterology;  Laterality: N/A;  DIVERTICULOSIS    ENDOMETRIAL ABLATION      ENDOSCOPY N/A 10/12/2022    Procedure: ESOPHAGOGASTRODUODENOSCOPY;  Surgeon: Sara Jarrett MD;  Location: [facility] ENDOSCOPY;  Service: Gastroenterology;  Laterality: N/A;  GASTRITIS    ENTEROSCOPY SMALL BOWEL N/A 11/04/2022    Procedure: ENTEROSCOPY SMALL BOWEL;  Surgeon: Sara Jarrett MD;  Location: [facility] ENDOSCOPY;  Service: Gastroenterology;  Laterality: N/A;  normal    HEEL SPUR EXCISION      HERNIA REPAIR      THYROIDECTOMY, PARTIAL       PT Assessment (last 12 hours)       PT Evaluation and Treatment       Row Name 12/04/23 1230          Physical Therapy Time and Intention    Subjective Information complains of;pain  -SURENDRA     Document Type evaluation  -SURENDRA     Mode of Treatment individual therapy;physical therapy  -SURENDRA     Patient Effort good  -SURENDRA     Symptoms Noted During/After Treatment none  -SURENDRA       Row Name 12/04/23 1230          General Information    Patient Observations alert;cooperative;agree to therapy  -SURENDRA     Prior Level of Function independent:;all household mobility;community mobility  -SURENDRA     Equipment Currently Used at Home cane, straight  -SURENDRA     Existing Precautions/Restrictions fall;weight bearing  -SURENDRA     Barriers to Rehab none identified  -SURENDRA       Row Name 12/04/23 1230          Living Environment     Current Living Arrangements home  -SURENDRA     People in Home spouse  -SURENDRA       Row Name 12/04/23 1230          Cognition    Orientation Status (Cognition) oriented x 3  -SURENDRA       Row Name 12/04/23 1230          Range of Motion Comprehensive    General Range of Motion lower extremity range of motion deficits identified  Right knee 15-80° knee.  -SURENDRA       Row Name 12/04/23 1230          Strength Comprehensive (MMT)    General Manual Muscle Testing (MMT) Assessment lower extremity strength deficits identified  RLE 3/5  -SURENDRA       Row Name 12/04/23 1230          Mobility    Extremity Weight-bearing Status right lower extremity  -SURENDRA     Right Lower Extremity (Weight-bearing Status) weight-bearing as tolerated (WBAT)  -SURENDRA       Row Name 12/04/23 1230          Bed Mobility    Bed Mobility bed mobility (all) activities;supine-sit  -SURENDRA     All Activities, Hawkins (Bed Mobility) minimum assist (75% patient effort)  -SURENDRA     Supine-Sit Hawkins (Bed Mobility) minimum assist (75% patient effort)  -SURENDRA       Row Name 12/04/23 1230          Transfers    Transfers bed-chair transfer;sit-stand transfer  -SURENDRA       Row Name 12/04/23 1230          Bed-Chair Transfer    Bed-Chair Hawkins (Transfers) moderate assist (50% patient effort)  -SURENDRA     Assistive Device (Bed-Chair Transfers) walker, front-wheeled  -SURENDRA       Row Name 12/04/23 1230          Sit-Stand Transfer    Sit-Stand Hawkins (Transfers) moderate assist (50% patient effort)  -SURENDRA     Assistive Device (Sit-Stand Transfers) walker, front-wheeled  -SURENDRA       Row Name 12/04/23 1230          Gait/Stairs (Locomotion)    Gait/Stairs Locomotion gait/ambulation assistive device  -SURENDRA     Hawkins Level (Gait) minimum assist (75% patient effort)  -SURENDRA     Assistive Device (Gait) walker, front-wheeled  -SURENDRA     Patient was able to Ambulate yes  -SURENDRA     Distance in Feet (Gait) 20  -SURENDRA     Pattern (Gait) step-to  cues for proper gait pattern  -SURENDRA       Row Name 12/04/23 1230           Safety Issues, Functional Mobility    Impairments Affecting Function (Mobility) balance;pain;range of motion (ROM);strength  -SURENDRA       Row Name 12/04/23 1230          Balance    Balance Assessment standing dynamic balance  -SURENDRA     Dynamic Standing Balance minimal assist  -SURENDRA     Position/Device Used, Standing Balance walker, front-wheeled  -SURENDRA       Row Name             Wound 12/04/23 0856 Right anterior knee Incision    Wound - Properties Group Placement Date: 12/04/23  -BW Placement Time: 0856  -BW Present on Original Admission: N  -BW Side: Right  -BW Orientation: anterior  -BW Location: knee  -BW Primary Wound Type: Incision  -BW    Retired Wound - Properties Group Placement Date: 12/04/23  -BW Placement Time: 0856  -BW Present on Original Admission: N  -BW Side: Right  -BW Orientation: anterior  -BW Location: knee  -BW Primary Wound Type: Incision  -BW    Retired Wound - Properties Group Date first assessed: 12/04/23  -BW Time first assessed: 0856  -BW Present on Original Admission: N  -BW Side: Right  -BW Location: knee  -BW Primary Wound Type: Incision  -BW      Row Name 12/04/23 1230          Plan of Care Review    Plan of Care Reviewed With patient  -SURENDRA     Outcome Evaluation Pt presents with decreased ROM, strength, transfers and ambulation.  Skilled PT services will be required to address these mobility deficits.  -SURENDRA       Row Name 12/04/23 1230          Therapy Assessment/Plan (PT)    Patient/Family Therapy Goals Statement (PT) Pt wants to walk without pain  -SURENDRA     Rehab Potential (PT) good, to achieve stated therapy goals  -SURENDRA     Criteria for Skilled Interventions Met (PT) skilled treatment is necessary  -SURENDRA     Therapy Frequency (PT) 2 times/day  -SURENDRA     Predicted Duration of Therapy Intervention (PT) 10 days  -SURENDRA     Problem List (PT) problems related to;balance;mobility;range of motion (ROM);strength;pain  -SURENDRA     Activity Limitations Related to Problem List (PT) unable to ambulate  safely;unable to transfer safely  -SURENDRA       Row Name 12/04/23 1230          PT Evaluation Complexity    History, PT Evaluation Complexity no personal factors and/or comorbidities  -SURENDRA     Examination of Body Systems (PT Eval Complexity) total of 4 or more elements  -SURENDRA     Clinical Presentation (PT Evaluation Complexity) stable  -SURENDRA     Clinical Decision Making (PT Evaluation Complexity) low complexity  -SURENDRA     Overall Complexity (PT Evaluation Complexity) low complexity  -SURENDRA       Row Name 12/04/23 1230          Therapy Plan Review/Discharge Plan (PT)    Therapy Plan Review (PT) evaluation/treatment results reviewed;participants voiced agreement with care plan;participants included;patient  -SURENDRA       Row Name 12/04/23 1230          Physical Therapy Goals    Transfer Goal Selection (PT) transfer, PT goal 1  -SURENDRA     Gait Training Goal Selection (PT) gait training, PT goal 1  -SURENDRA     ROM Goal Selection (PT) ROM, PT goal 1  -SURENDRA     Strength Goal Selection (PT) strength, PT goal 1  -SURENDRA       Row Name 12/04/23 1230          Transfer Goal 1 (PT)    Activity/Assistive Device (Transfer Goal 1, PT) transfers, all  -SURENDRA     Hickman Level/Cues Needed (Transfer Goal 1, PT) independent  -SURENDRA     Time Frame (Transfer Goal 1, PT) long term goal (LTG);10 days  -SURENDRA       Row Name 12/04/23 1230          Gait Training Goal 1 (PT)    Activity/Assistive Device (Gait Training Goal 1, PT) gait (walking locomotion);walker, rolling  -SURENDRA     Hickman Level (Gait Training Goal 1, PT) independent  -SURENDRA     Distance (Gait Training Goal 1, PT) 300  -SURENDRA     Time Frame (Gait Training Goal 1, PT) long term goal (LTG);10 days  -SURENDRA       Row Name 12/04/23 1230          ROM Goal 1 (PT)    ROM Goal 1 (PT) Pt will demonstrate knee ROM from 0-110° on the affected side.  -SURENDRA     Time Frame (ROM Goal 1, PT) long-term goal (LTG);10 days  -SURENDRA       Row Name 12/04/23 1230          Strength Goal 1 (PT)    Strength Goal 1 (PT) Pt will demonstrate knee  extension strength of 5/5 on the affected side.  -SURENDRA     Time Frame (Strength Goal 1, PT) long term goal (LTG);10 days  -SURENDRA               User Key  (r) = Recorded By, (t) = Taken By, (c) = Cosigned By      Initials Name Provider Type    Janes Sibley RN Registered Nurse    Wilber Zendejas PT Physical Therapist                    Physical Therapy Education       Title: PT OT SLP Therapies (Done)       Topic: Physical Therapy (Done)       Point: Mobility training (Done)       Learning Progress Summary             Patient Acceptance, E,TB, VU by SURENDRA at 12/4/2023 1253                         Point: Precautions (Done)       Learning Progress Summary             Patient Acceptance, E,TB, VU by SURENDRA at 12/4/2023 1253                                         User Key       Initials Effective Dates Name Provider Type Discipline    SURENDRA 06/03/21 -  Wilber Kovacs PT Physical Therapist PT                  PT Recommendation and Plan  Anticipated Discharge Disposition (PT): home with outpatient therapy services  Planned Therapy Interventions (PT): balance training, bed mobility training, gait training, home exercise program, stair training, ROM (range of motion), strengthening, stretching, transfer training  Therapy Frequency (PT): 2 times/day  Plan of Care Reviewed With: patient  Outcome Evaluation: Pt presents with decreased ROM, strength, transfers and ambulation.  Skilled PT services will be required to address these mobility deficits.   Outcome Measures       Row Name 12/04/23 1200             How much help from another person do you currently need...    Turning from your back to your side while in flat bed without using bedrails? 4  -SURENDRA      Moving from lying on back to sitting on the side of a flat bed without bedrails? 3  -SURENDRA      Moving to and from a bed to a chair (including a wheelchair)? 2  -SURENDRA      Standing up from a chair using your arms (e.g., wheelchair, bedside chair)? 2  -SURENDRA      Climbing 3-5 steps  with a railing? 2  -SURENDRA      To walk in hospital room? 3  -SURENDRA      AM-PAC 6 Clicks Score (PT) 16  -SURENDRA      Highest Level of Mobility Goal 5 --> Static standing  -SURENDRA         Functional Assessment    Outcome Measure Options AM-PAC 6 Clicks Basic Mobility (PT)  -SURENDRA                User Key  (r) = Recorded By, (t) = Taken By, (c) = Cosigned By      Initials Name Provider Type    Wilber Zendejas, PT Physical Therapist                     Time Calculation:    PT Charges       Row Name 12/04/23 1250             Time Calculation    PT Received On 12/04/23  -SURENDRA      PT Goal Re-Cert Due Date 12/13/23  -SURENDRA         Untimed Charges    PT Eval/Re-eval Minutes 20  -SURENDRA         Total Minutes    Untimed Charges Total Minutes 20  -SURENDRA       Total Minutes 20  -SURENDRA                User Key  (r) = Recorded By, (t) = Taken By, (c) = Cosigned By      Initials Name Provider Type    Wilber Zendejas, PT Physical Therapist                      PT G-Codes  Outcome Measure Options: AM-PAC 6 Clicks Basic Mobility (PT)  AM-PAC 6 Clicks Score (PT): 16    Wilber Kovacs, PT  12/4/2023

## 2023-12-04 NOTE — ANESTHESIA PROCEDURE NOTES
Peripheral Block      Patient reassessed immediately prior to procedure    Patient location during procedure: pre-op  Reason for block: at surgeon's request and post-op pain management  Preanesthetic Checklist  Completed: patient identified, IV checked, site marked, risks and benefits discussed, surgical consent, monitors and equipment checked, pre-op evaluation and timeout performed  Prep:  Pt Position: supine  Sterile barriers:alcohol skin prep, partial drape, cap, washed/disinfected hands, mask and gloves  Prep: ChloraPrep  Patient monitoring: blood pressure monitoring, continuous pulse oximetry and EKG  Procedure    Sedation: yes  Performed under: local infiltration  Guidance:ultrasound guided and nerve stimulator    ULTRASOUND INTERPRETATION.  Using ultrasound guidance a 20 G gauge needle was placed in close proximity to the nerve, at which point, under ultrasound guidance anesthetic was injected in the area of the nerve and spread of the anesthesia was seen on ultrasound in close proximity thereto.  There were no abnormalities seen on ultrasound; a digital image was taken; and the patient tolerated the procedure with no complications. Images:still images obtained, printed/placed on chart    Laterality:right  Block Type:adductor canal block  Injection Technique:single-shot  Needle Type:echogenic  Needle Gauge:20 G (4in)  Resistance on Injection: none    Medications Used: bupivacaine-EPINEPHrine PF (MARCAINE w/EPI) 0.5% -1:290440 injection - Injection   30 mL - 12/4/2023 8:06:00 AM      Post Assessment  Injection Assessment: negative aspiration for heme, no paresthesia on injection and incremental injection  Patient Tolerance:comfortable throughout block  Complications:no  Additional Notes  The block or continuous infusion is requested by the referring physician for management of postoperative pain, or pain related to a procedure. Ultrasound guidance (deemed medically necessary). Painless injection, pt was awake  and conversant during the procedure without complications. Needle and surrounding structures visualized throughout procedure. No adverse reactions or complications seen during this period. Post-procedure image showed no signs of complication, and anatomy was consistent with an uncomplicated nerve blockade.

## 2023-12-04 NOTE — ANESTHESIA POSTPROCEDURE EVALUATION
Patient: Milagros Ramirez    Procedure Summary       Date: 12/04/23 Room / Location: [facility] OR 06 / [facility] MAIN OR    Anesthesia Start: 0831 Anesthesia Stop: 1010    Procedure: RIGHT TOTAL KNEE ARTHROPLASTY. (Right: Knee) Diagnosis:       Osteoarthritis of right knee, unspecified osteoarthritis type      (Osteoarthritis of right knee, unspecified osteoarthritis type [M17.11])    Surgeons: Lucian Eduardo MD Provider: Michael Shipman MD    Anesthesia Type: general with block, ERAS ProtocolKailyn ASA Status: 3            Anesthesia Type: general with block, ERAS Protocol, Kailyn    Vitals  Vitals Value Taken Time   /77 12/04/23 1035   Temp 36.7 °C (98 °F) 12/04/23 1010   Pulse 85 12/04/23 1036   Resp 10 12/04/23 1020   SpO2 100 % 12/04/23 1036   Vitals shown include unfiled device data.        Post Anesthesia Care and Evaluation    Patient location during evaluation: bedside  Patient participation: complete - patient participated  Level of consciousness: awake  Pain management: adequate    Airway patency: patent  PONV Status: none  Cardiovascular status: acceptable  Respiratory status: acceptable  Hydration status: acceptable    Comments: An Anesthesiologist personally participated in the most demanding procedures (including induction and emergence if applicable) in the anesthesia plan, monitored the course of anesthesia administration at frequent intervals and remained physically present and available for immediate diagnosis and treatment of emergencies.

## 2023-12-04 NOTE — OP NOTE
TOTAL KNEE ARTHROPLASTY  Procedure Report    Patient Name:  Milagros Ramirez  YOB: 1960    Date of Surgery:  12/4/2023     Indications:  Severe OA, failed conservative treatment    Pre-op Diagnosis:   Osteoarthritis of right knee, unspecified osteoarthritis type [M17.11]       Post-Op Diagnosis Codes:     * Osteoarthritis of right knee, unspecified osteoarthritis type [M17.11]    Procedure/CPT® Codes:      Procedure(s):  RIGHT TOTAL KNEE ARTHROPLASTY.    Surgical Approach: Knee Medial Parapatellar        Staff:  Surgeon(s):  Lucian Eduardo MD    Assistant: Jocelyn Angeles RN; Mis Marti    Anesthesia: General with Block    Estimated Blood Loss: 100ml    Implants:    Implant Name Type Inv. Item Serial No.  Lot No. LRB No. Used Action   CMT BONE PALACOS R HI/VISC 1X40 - [id number] Implant CMT BONE PALACOS R HI/VISC 1X40  [facility] [id number] Right 2 Implanted   TRY TIB CRUC 75MM - [id number] Implant TRY TIB CRUC 75MM  [employer] [id number] Right 1 Implanted   COMP FEM/KN VANGUARD INTLK CR 67.5MM NS RT - [id number] Implant COMP FEM/KN VANGUARD INTLK CR 67.5MM NS RT  [employer] [id number] Right 1 Implanted   PAT 3PEG STD 8X31MM - [id number] Implant PAT 3PEG STD 8X31MM  [employer] [id number] Right 1 Implanted   BEAR TIB/KN VANGUARD AS [id number] NS - [id number] Implant BEAR TIB/KN VANGUARD AS [id number] NS  [employer] [id number] Right 1 Implanted       Specimen:          None        Findings: advance OA    Complications: None    Description of Procedure: The patient was brought to the operating room and underwent general anesthesia, had a preoperative antibiotic, and was then prepped and draped in sterile fashion. An Esmarch was used to exsanguinate the limb. The tourniquet was then inflated. A standard medial parapatellar arthrotomy was performed. An intramedullary guide was placed in the femur. A distal femoral cut was made and measured. The 4-in-1 block was placed. Excellent cuts were  achieved. Bone was removed, followed by removal of the ACL and remaining menisci. The intramedullary guide was placed in the tibia. The tibia was then cut and measured. This was then broached. The patella was then osteotomized, removing approximately 8-10 mm of bone. It measured. There was excellent range of motion, tracking and stability of the trials. The trials were removed. Bony cut surfaces were thoroughly irrigated. The knee was then injected. The cement was vacuum mixed, placed on the undersurface of the components and then packed into place. Excess cement was removed. Once this had hardened, trial polys were tried and the appropriate sized anterior insert was then chosen. This was then locked, thoroughly irrigated. Bovie electrocautery was used for hemostasis. The tourniquet was deflated. This was again thoroughly irrigated and closed using #1 Vicryl, 2-0 Vicryl and staples. A sterile dressing was applied. The patient was then extubated and taken to the recovery room in stable condition. There were no complications.    Assistant: Jocelyn Angeles RN; Mis Marti  was responsible for performing the following activities: Retraction, Suction, Irrigation, Closing, and Placing Dressing and their skilled assistance was necessary for the success of this case.    Lucian Eduardo MD     Date: 12/4/2023  Time: 10:05 EST

## 2023-12-05 VITALS
WEIGHT: 257.5 LBS | TEMPERATURE: 97.4 F | RESPIRATION RATE: 18 BRPM | SYSTOLIC BLOOD PRESSURE: 116 MMHG | HEIGHT: 67 IN | HEART RATE: 68 BPM | DIASTOLIC BLOOD PRESSURE: 54 MMHG | BODY MASS INDEX: 40.42 KG/M2 | OXYGEN SATURATION: 94 %

## 2023-12-05 LAB
ANION GAP SERPL CALCULATED.3IONS-SCNC: 11.1 MMOL/L (ref 5–15)
BUN SERPL-MCNC: 28 MG/DL (ref 8–23)
BUN/CREAT SERPL: 23.9 (ref 7–25)
CALCIUM SPEC-SCNC: 8.9 MG/DL (ref 8.6–10.5)
CHLORIDE SERPL-SCNC: 96 MMOL/L (ref 98–107)
CO2 SERPL-SCNC: 25.9 MMOL/L (ref 22–29)
CREAT SERPL-MCNC: 1.17 MG/DL (ref 0.57–1)
EGFRCR SERPLBLD CKD-EPI 2021: 52.9 ML/MIN/1.73
GLUCOSE SERPL-MCNC: 112 MG/DL (ref 65–99)
HCT VFR BLD AUTO: 31.5 % (ref 34–46.6)
HGB BLD-MCNC: 9.6 G/DL (ref 12–15.9)
POTASSIUM SERPL-SCNC: 4.6 MMOL/L (ref 3.5–5.2)
SODIUM SERPL-SCNC: 133 MMOL/L (ref 136–145)

## 2023-12-05 PROCEDURE — 97535 SELF CARE MNGMENT TRAINING: CPT

## 2023-12-05 PROCEDURE — 80048 BASIC METABOLIC PNL TOTAL CA: CPT | Performed by: ORTHOPAEDIC SURGERY

## 2023-12-05 PROCEDURE — 25010000002 CEFAZOLIN IN DEXTROSE 2-4 GM/100ML-% SOLUTION: Performed by: ORTHOPAEDIC SURGERY

## 2023-12-05 PROCEDURE — 97116 GAIT TRAINING THERAPY: CPT

## 2023-12-05 PROCEDURE — 97165 OT EVAL LOW COMPLEX 30 MIN: CPT

## 2023-12-05 PROCEDURE — 94799 UNLISTED PULMONARY SVC/PX: CPT

## 2023-12-05 PROCEDURE — 25010000002 ENOXAPARIN PER 10 MG: Performed by: ORTHOPAEDIC SURGERY

## 2023-12-05 PROCEDURE — 99213 OFFICE O/P EST LOW 20 MIN: CPT | Performed by: INTERNAL MEDICINE

## 2023-12-05 PROCEDURE — 97150 GROUP THERAPEUTIC PROCEDURES: CPT

## 2023-12-05 PROCEDURE — 25010000002 KETOROLAC TROMETHAMINE PER 15 MG: Performed by: ORTHOPAEDIC SURGERY

## 2023-12-05 PROCEDURE — 85018 HEMOGLOBIN: CPT | Performed by: ORTHOPAEDIC SURGERY

## 2023-12-05 PROCEDURE — 85014 HEMATOCRIT: CPT | Performed by: ORTHOPAEDIC SURGERY

## 2023-12-05 PROCEDURE — 94664 DEMO&/EVAL PT USE INHALER: CPT

## 2023-12-05 RX ORDER — OXYCODONE AND ACETAMINOPHEN 7.5; 325 MG/1; MG/1
1-2 TABLET ORAL EVERY 4 HOURS PRN
Qty: 40 TABLET | Refills: 0 | Status: SHIPPED | OUTPATIENT
Start: 2023-12-05

## 2023-12-05 RX ADMIN — METOPROLOL TARTRATE 50 MG: 50 TABLET, FILM COATED ORAL at 09:24

## 2023-12-05 RX ADMIN — ENOXAPARIN SODIUM 40 MG: 100 INJECTION SUBCUTANEOUS at 09:24

## 2023-12-05 RX ADMIN — FERROUS SULFATE TAB 325 MG (65 MG ELEMENTAL FE) 325 MG: 325 (65 FE) TAB at 09:23

## 2023-12-05 RX ADMIN — KETOROLAC TROMETHAMINE 15 MG: 30 INJECTION, SOLUTION INTRAMUSCULAR; INTRAVENOUS at 05:45

## 2023-12-05 RX ADMIN — BUDESONIDE AND FORMOTEROL FUMARATE DIHYDRATE 2 PUFF: 160; 4.5 AEROSOL RESPIRATORY (INHALATION) at 06:56

## 2023-12-05 RX ADMIN — ACETAMINOPHEN 1000 MG: 500 TABLET ORAL at 02:44

## 2023-12-05 RX ADMIN — KETOROLAC TROMETHAMINE 15 MG: 30 INJECTION, SOLUTION INTRAMUSCULAR; INTRAVENOUS at 00:06

## 2023-12-05 RX ADMIN — BUPROPION HYDROCHLORIDE 150 MG: 150 TABLET, EXTENDED RELEASE ORAL at 09:23

## 2023-12-05 RX ADMIN — FAMOTIDINE 40 MG: 20 TABLET ORAL at 09:23

## 2023-12-05 RX ADMIN — DOCUSATE SODIUM 50MG AND SENNOSIDES 8.6MG 2 TABLET: 8.6; 5 TABLET, FILM COATED ORAL at 09:24

## 2023-12-05 RX ADMIN — CEFAZOLIN SODIUM 2000 MG: 2 INJECTION, SOLUTION INTRAVENOUS at 00:06

## 2023-12-05 NOTE — THERAPY EVALUATION
Patient Name: Milagros Ramirez  : 1960    MRN: [id number]                              Today's Date: 2023       Admit Date: 2023    Visit Dx:     ICD-10-CM ICD-9-CM   1. Difficulty in walking  R26.2 719.7   2. Osteoarthritis of right knee, unspecified osteoarthritis type  M17.11 715.96   3. Impaired mobility and ADLs  Z74.09 V49.89    Z78.9      Patient Active Problem List   Diagnosis    Extreme obesity with alveolar hypoventilation    Osteoarthritis of knee    Rheumatoid arthritis    Stage 3 chronic kidney disease    Type 2 diabetes mellitus without complication    AMOR on CPAP    Gastrointestinal hemorrhage, unspecified gastrointestinal hemorrhage type    Anemia    Melena    GI bleed    Hypothyroidism    Permanent atrial fibrillation    Acute on chronic combined systolic and diastolic CHF (congestive heart failure)    Nonrheumatic aortic valve stenosis    SOB (shortness of breath)    Other chest pain    OA (osteoarthritis) of knee    Class 2 severe obesity due to excess calories with serious comorbidity in adult     Past Medical History:   Diagnosis Date    Arrhythmia     Atrial fibrillation     Diagnosed 2019/FOLLOWS W/DR. SULLIVAN    Chronic back pain     CKD (chronic kidney disease)     STAGE 3-FOLLOWS TWICE YEARLY WITH SADIQ    Diabetes     Diagnosed 2019    Diastolic CHF     History of anemia 10/2022    R/T GI BLEED, RECEIVED SEVERAL UNITS OF BLOOD    Hyperlipidemia     Hypertension     Hypothyroidism     Morbid obesity     AMOR (obstructive sleep apnea)     Pain management     [facility]    Rheumatoid arthritis     SLE (systemic lupus erythematosus)      Past Surgical History:   Procedure Laterality Date    APPENDECTOMY      CARDIAC CATHETERIZATION N/A 10/11/2023    Procedure: Left Heart Cath;  Surgeon: Tiffany Paredes MD;  Location: [facility] CATH INVASIVE LOCATION;  Service: Cardiovascular;  Laterality: N/A;    CARDIAC CATHETERIZATION N/A 10/11/2023    Procedure: Coronary  angiography;  Surgeon: Tiffany Paredes MD;  Location: [facility] CATH INVASIVE LOCATION;  Service: Cardiovascular;  Laterality: N/A;    CARDIAC CATHETERIZATION N/A 10/11/2023    Procedure: Left ventriculography;  Surgeon: Tiffany Paredes MD;  [facility];  Service: Cardiovascular;  Laterality: N/A;    CARPAL TUNNEL RELEASE      COLONOSCOPY N/A 10/13/2022    Procedure: COLONOSCOPY;  Surgeon: Sara Jarrett MD;  Location: [facility] ENDOSCOPY;  Service: Gastroenterology;  Laterality: N/A;  DIVERTICULOSIS    ENDOMETRIAL ABLATION      ENDOSCOPY N/A 10/12/2022    Procedure: ESOPHAGOGASTRODUODENOSCOPY;  Surgeon: Sara Jarrett MD;  Location: [facility] ENDOSCOPY;  Service: Gastroenterology;  Laterality: N/A;  GASTRITIS    ENTEROSCOPY SMALL BOWEL N/A 11/04/2022    Procedure: ENTEROSCOPY SMALL BOWEL;  Surgeon: Sara Jarrett MD;  Location: [facility] ENDOSCOPY;  Service: Gastroenterology;  Laterality: N/A;  normal    HEEL SPUR EXCISION      HERNIA REPAIR      THYROIDECTOMY, PARTIAL      TOTAL KNEE ARTHROPLASTY Right 12/4/2023    Procedure: RIGHT TOTAL KNEE ARTHROPLASTY.;  Surgeon: Lucian Eduardo MD;  Location: [facility] MAIN OR;  Service: Orthopedics;  Laterality: Right;      General Information       Row Name 12/05/23 0946 12/05/23 0929       OT Time and Intention    Document Type therapy note (daily note)  -AC evaluation  -AC    Mode of Treatment individual therapy;occupational therapy  -AC individual therapy;occupational therapy  -      Row Name 12/05/23 0946 12/05/23 0929       General Information    Patient Profile Reviewed yes  -AC yes  -AC    Prior Level of Function -- --  patient reports (I) with adls with use of cane for functional mobility, has a tub shower without seat for bathing and recently received a 3in1 commode.  -AC    Existing Precautions/Restrictions fall;weight bearing  -AC fall;weight bearing  -AC    Barriers to Rehab -- none identified  -      Row Name 12/05/23  0929          Occupational Profile    Reason for Services/Referral (Occupational Profile) Pt. is a 63year old female admitted for the above diagnosis status post right total knee replacement on 12/4/2023. Pt. referred to OT services to assess independence with ADLs and adl transfers/fx'l mobility. No previous OT services for current condition.  -AC       Row Name 12/05/23 0929          Living Environment    People in Home spouse  -AC       Row Name 12/05/23 0946 12/05/23 0929       Cognition    Orientation Status (Cognition) oriented x 3  -AC oriented x 3  -AC      Row Name 12/05/23 0946 12/05/23 0929       Safety Issues, Functional Mobility    Impairments Affecting Function (Mobility) balance;endurance/activity tolerance;pain  -AC balance;endurance/activity tolerance;pain  -AC              User Key  (r) = Recorded By, (t) = Taken By, (c) = Cosigned By      Initials Name Provider Type    AC Yesenia Arciniega, JAYANT Occupational Therapist                     Mobility/ADL's       Row Name 12/05/23 0947 12/05/23 0934       Bed Mobility    Comment, (Bed Mobility) pt. in recliner upon OT arrival in the room.  -AC patient in recliner upon OT entering the room.  -AC      Row Name 12/05/23 0947 12/05/23 0934       Transfers    Transfers sit-stand transfer  -AC sit-stand transfer  -AC      Row Name 12/05/23 0947 12/05/23 0934       Sit-Stand Transfer    Sit-Stand [place] (Transfers) contact guard;1 person assist;verbal cues  -AC contact guard;1 person assist  -AC    Assistive Device (Sit-Stand Transfers) walker, front-wheeled  -AC walker, front-wheeled  -AC    Comment, (Sit-Stand Transfer) patient was CGA with rolling walker for sit to/from standx 3 with rocking back and forth prior to standing and cues for safety and technique.  -AC --      Row Name 12/05/23 0947 12/05/23 0934       Functional Mobility    Functional Mobility- Ind. Level contact guard assist;1 person;verbal cues required  -AC contact guard assist;1 person  -AC     Functional Mobility- Device walker, front-wheeled  -AC walker, front-wheeled  -AC    Functional Mobility- Comment patient was CGA with rolling walker for recliner to /from sink for grooming with cues for safety and technique.  -AC --      Row Name 12/05/23 0947 12/05/23 0934       Activities of Daily Living    BADL Assessment/Intervention upper body dressing;lower body dressing;grooming  -AC --  patient is setup for upper body bathing/dressing, setup for grooming,setup for self-feeding, CGA for lower body bathing/dressing, CGA for toileting.  -AC      Row Name 12/05/23 0947 12/05/23 0934       Mobility    Extremity Weight-bearing Status right lower extremity  -AC right lower extremity  -AC    Right Lower Extremity (Weight-bearing Status) weight-bearing as tolerated (WBAT)  -AC --      Row Name 12/05/23 0947          Upper Body Dressing Assessment/Training    [place] Level (Upper Body Dressing) upper body dressing skills;doff;don;bra/undergarment;pull-over garment;set up  -AC     Position (Upper Body Dressing) unsupported sitting  -AC       Row Name 12/05/23 0947          Lower Body Dressing Assessment/Training    [place] Level (Lower Body Dressing) lower body dressing skills;doff;don;pants/bottoms;shoes/slippers;socks;contact guard assist;verbal cues  -AC     Position (Lower Body Dressing) supported standing;unsupported sitting  -AC       Row Name 12/05/23 0947          Grooming Assessment/Training    [place] Level (Grooming) grooming skills;oral care regimen;verbal cues;contact guard assist  -AC     Position (Grooming) supported standing;sink side  -AC               User Key  (r) = Recorded By, (t) = Taken By, (c) = Cosigned By      Initials Name Provider Type    AC Yesenia Arciniega OT Occupational Therapist                   Obj/Interventions       Row Name 12/05/23 0949 12/05/23 0938       Sensory Assessment (Somatosensory)    Sensory Assessment (Somatosensory) UE sensation intact  - UE  sensation intact  -AC      Row Name 12/05/23 0949 12/05/23 0938       Vision Assessment/Intervention    Visual Impairment/Limitations WFL;corrective lenses full-time  -AC corrective lenses full-time  -AC      Row Name 12/05/23 0949 12/05/23 0938       Range of Motion Comprehensive    General Range of Motion bilateral upper extremity ROM WNL  -AC bilateral upper extremity ROM WNL  -AC      Row Name 12/05/23 0949 12/05/23 0938       Strength Comprehensive (MMT)    General Manual Muscle Testing (MMT) Assessment no strength deficits identified  -AC no strength deficits identified  -AC      Row Name 12/05/23 0949 12/05/23 0938       Motor Skills    Motor Skills coordination;functional endurance  -AC coordination;functional endurance  -AC    Coordination WFL  -AC WFL  -AC    Functional Endurance f/f+  -AC fair/fair plus  -AC      Row Name 12/05/23 0949 12/05/23 0938       Balance    Balance Assessment standing dynamic balance  -AC standing dynamic balance  -AC    Dynamic Standing Balance contact guard;verbal cues;1-person assist  -AC contact guard;1-person assist  -AC    Position/Device Used, Standing Balance supported;walker, front-wheeled  -AC supported;walker, front-wheeled  -AC    Balance Interventions standing;sit to stand;supported;dynamic;minimal challenge;occupation based/functional task  -AC --              User Key  (r) = Recorded By, (t) = Taken By, (c) = Cosigned By      Initials Name Provider Type    AC Yesenia Arciniega OT Occupational Therapist                   Goals/Plan       Row Name 12/05/23 0940          Transfer Goal 1 (OT)    Activity/Assistive Device (Transfer Goal 1, OT) transfers, all  -AC     [place] Level/Cues Needed (Transfer Goal 1, OT) modified independence  -AC     Time Frame (Transfer Goal 1, OT) long term goal (LTG);10 days  -AC       Row Name 12/05/23 0940          Bathing Goal 1 (OT)    Activity/Device (Bathing Goal 1, OT) bathing skills, all  -AC     [place] Level/Cues Needed  (Bathing Goal 1, OT) modified independence  -AC     Time Frame (Bathing Goal 1, OT) long term goal (LTG);10 days  -AC       Row Name 12/05/23 0940          Dressing Goal 1 (OT)    Activity/Device (Dressing Goal 1, OT) dressing skills, all  -AC     [place]/Cues Needed (Dressing Goal 1, OT) modified independence  -AC     Time Frame (Dressing Goal 1, OT) long term goal (LTG);10 days  -AC       Row Name 12/05/23 0940          Toileting Goal 1 (OT)    Activity/Device (Toileting Goal 1, OT) toileting skills, all  -AC     [place] Level/Cues Needed (Toileting Goal 1, OT) modified independence  -AC     Time Frame (Toileting Goal 1, OT) long term goal (LTG);10 days  -AC       Row Name 12/05/23 0940          Problem Specific Goal 1 (OT)    Problem Specific Goal 1 (OT) patient will improve endurane to good for adls.  -AC     Time Frame (Problem Specific Goal 1, OT) long term goal (LTG);10 days  -AC       Row Name 12/05/23 0940          Therapy Assessment/Plan (OT)    Planned Therapy Interventions (OT) activity tolerance training;functional balance retraining;occupation/activity based interventions;ROM/therapeutic exercise;transfer/mobility retraining;patient/caregiver education/training;BADL retraining  -               User Key  (r) = Recorded By, (t) = Taken By, (c) = Cosigned By      Initials Name Provider Type    AC Yesenia Arciniega OT Occupational Therapist                   Clinical Impression       Row Name 12/05/23 0950 12/05/23 0939       Pain Assessment    Pretreatment Pain Rating 0/10 - no pain  -AC 0/10 - no pain  -AC    Posttreatment Pain Rating 0/10 - no pain  -AC 0/10 - no pain  -AC      Row Name 12/05/23 0950 12/05/23 0939       Plan of Care Review    Plan of Care Reviewed With patient  -AC patient  -AC    Progress improving  -AC no change  -AC    Outcome Evaluation patient instructed in lower body adaptive dressing technique, weightbearing status, joint protection and safety with adl transfers. patient  to continue with therapy services in order to maximize independence with adls.  - Patient presents with balance and endurance limitations that impede his/her ability to perform ADLS. The skills of a therapist are necessary to maximize independence with ADLs.  -      Row Name 12/05/23 0939          Therapy Assessment/Plan (OT)    Patient/Family Therapy Goal Statement (OT) Patient wants to walk without pain.  -     Rehab Potential (OT) good, to achieve stated therapy goals  -     Criteria for Skilled Therapeutic Interventions Met (OT) yes;meets criteria;skilled treatment is necessary  -     Therapy Frequency (OT) 5 times/wk  -       Row Name 12/05/23 0939          Therapy Plan Review/Discharge Plan (OT)    Equipment Needs Upon Discharge (OT) walker, rolling;commode chair;tub bench  -     Anticipated Discharge Disposition (OT) home with assist;home with outpatient therapy services  -       Row Name 12/05/23 0939          Positioning and Restraints    Pre-Treatment Position sitting in chair/recliner  -     Post Treatment Position chair  -     In Chair sitting;legs elevated;call light within reach;encouraged to call for assist;exit alarm on  -               User Key  (r) = Recorded By, (t) = Taken By, (c) = Cosigned By      Initials Name Provider Type     Yesenia Arciniega, OT Occupational Therapist                   Outcome Measures       Row Name 12/05/23 0941          How much help from another is currently needed...    Putting on and taking off regular lower body clothing? 3  -AC     Bathing (including washing, rinsing, and drying) 3  -AC     Toileting (which includes using toilet bed pan or urinal) 3  -AC     Putting on and taking off regular upper body clothing 4  -AC     Taking care of personal grooming (such as brushing teeth) 4  -AC     Eating meals 4  -AC     AM-PAC 6 Clicks Score (OT) 21  -       Row Name 12/05/23 0941          Functional Assessment    Outcome Measure Options AM-PAC 6  Clicks Daily Activity (OT);Optimal Instrument  -AC       Row Name 12/05/23 0941          Optimal Instrument    Optimal Instrument Optimal - 3  -AC     Bending/Stooping 2  -AC     Standing 2  -AC     Reaching 1  -AC     From the list, choose the 3 activities you would most like to be able to do without any difficulty Bending/stooping;Standing;Reaching  -AC     Total Score Optimal - 3 5  -AC               User Key  (r) = Recorded By, (t) = Taken By, (c) = Cosigned By      Initials Name Provider Type    AC Yesenia Arciniega OT Occupational Therapist                      OT Recommendation and Plan  Planned Therapy Interventions (OT): activity tolerance training, functional balance retraining, occupation/activity based interventions, ROM/therapeutic exercise, transfer/mobility retraining, patient/caregiver education/training, BADL retraining  Therapy Frequency (OT): 5 times/wk  Plan of Care Review  Plan of Care Reviewed With: patient  Progress: improving  Outcome Evaluation: patient instructed in lower body adaptive dressing technique, weightbearing status, joint protection and safety with adl transfers. patient to continue with therapy services in order to maximize independence with adls.     Time Calculation:   Evaluation Complexity (OT)  Review Occupational Profile/Medical/Therapy History Complexity: expanded/moderate complexity  Assessment, Occupational Performance/Identification of Deficit Complexity: 1-3 performance deficits  Clinical Decision Making Complexity (OT): problem focused assessment/low complexity  Overall Complexity of Evaluation (OT): low complexity     Time Calculation- OT       Row Name 12/05/23 0946             Time Calculation- OT    OT Received On 12/05/23  -AC      OT Goal Re-Cert Due Date 12/14/23  -AC         Timed Charges    53970 - OT Self Care/Mgmt Minutes 15  -AC         Untimed Charges    OT Eval/Re-eval Minutes 32  -AC         Total Minutes    Timed Charges Total Minutes 15  -AC      Untimed  Charges Total Minutes 32  -AC       Total Minutes 47  -AC                User Key  (r) = Recorded By, (t) = Taken By, (c) = Cosigned By      Initials Name Provider Type    AC Yesenia Arciniega OT Occupational Therapist                  Therapy Charges for Today       Code Description Service Date Service Provider Modifiers Qty    [id number]  OT SELF CARE/MGMT/TRAIN EA 15 MIN 12/5/2023 Yesenia Arciniega OT GO 1    [id number]  OT EVAL LOW COMPLEXITY 3 12/5/2023 Yesenia Arciniega OT GO 1                 Yesenia Arciniega OT  12/5/2023

## 2023-12-05 NOTE — THERAPY TREATMENT NOTE
Acute Care - Physical Therapy Treatment Note   [place]     Patient Name: Milagros Ramirez  : 1960  MRN: [id number]  Today's Date: 2023      Visit Dx:     ICD-10-CM ICD-9-CM   1. Difficulty in walking  R26.2 719.7   2. Osteoarthritis of right knee, unspecified osteoarthritis type  M17.11 715.96   3. Impaired mobility and ADLs  Z74.09 V49.89    Z78.9      Patient Active Problem List   Diagnosis    Extreme obesity with alveolar hypoventilation    Osteoarthritis of knee    Rheumatoid arthritis    Stage 3 chronic kidney disease    Type 2 diabetes mellitus without complication    AMOR on CPAP    Gastrointestinal hemorrhage, unspecified gastrointestinal hemorrhage type    Anemia    Melena    GI bleed    Hypothyroidism    Permanent atrial fibrillation    Acute on chronic combined systolic and diastolic CHF (congestive heart failure)    Nonrheumatic aortic valve stenosis    SOB (shortness of breath)    Other chest pain    OA (osteoarthritis) of knee    Class 2 severe obesity due to excess calories with serious comorbidity in adult     Past Medical History:   Diagnosis Date    Arrhythmia     Atrial fibrillation     Diagnosed 2019/FOLLOWS W/DR. SULLIVAN    Chronic back pain     CKD (chronic kidney disease)     STAGE 3-FOLLOWS TWICE YEARLY WITH SADIQ    Diabetes     Diagnosed 2019    Diastolic CHF     History of anemia 10/2022    R/T GI BLEED, RECEIVED SEVERAL UNITS OF BLOOD    Hyperlipidemia     Hypertension     Hypothyroidism     Morbid obesity     AMOR (obstructive sleep apnea)     Pain management     [facility]    Rheumatoid arthritis     SLE (systemic lupus erythematosus)      Past Surgical History:   Procedure Laterality Date    APPENDECTOMY      CARDIAC CATHETERIZATION N/A 10/11/2023    Procedure: Left Heart Cath;  Surgeon: Tiffany Paredes MD;  Location: [facility] CATH INVASIVE LOCATION;  Service: Cardiovascular;  Laterality: N/A;    CARDIAC CATHETERIZATION N/A 10/11/2023    Procedure: Coronary  angiography;  Surgeon: Tiffany Paredes MD;  Location: [facility] CATH INVASIVE LOCATION;  Service: Cardiovascular;  Laterality: N/A;    CARDIAC CATHETERIZATION N/A 10/11/2023    Procedure: Left ventriculography;  Surgeon: Tiffany Paredes MD;  Location: [facility] CATH INVASIVE LOCATION;  Service: Cardiovascular;  Laterality: N/A;    CARPAL TUNNEL RELEASE      COLONOSCOPY N/A 10/13/2022    Procedure: COLONOSCOPY;  Surgeon: Sara Jarrett MD;  Location: [facility] ENDOSCOPY;  Service: Gastroenterology;  Laterality: N/A;  DIVERTICULOSIS    ENDOMETRIAL ABLATION      ENDOSCOPY N/A 10/12/2022    Procedure: ESOPHAGOGASTRODUODENOSCOPY;  Surgeon: Sara Jarrett MD;  Location: [facility] ENDOSCOPY;  Service: Gastroenterology;  Laterality: N/A;  GASTRITIS    ENTEROSCOPY SMALL BOWEL N/A 11/04/2022    Procedure: ENTEROSCOPY SMALL BOWEL;  Surgeon: Sara Jarrett MD;  Location: [facility] ENDOSCOPY;  Service: Gastroenterology;  Laterality: N/A;  normal    HEEL SPUR EXCISION      HERNIA REPAIR      THYROIDECTOMY, PARTIAL      TOTAL KNEE ARTHROPLASTY Right 12/4/2023    Procedure: RIGHT TOTAL KNEE ARTHROPLASTY.;  Surgeon: Lucian Eduardo MD;  Location: [facility] MAIN OR;  Service: Orthopedics;  Laterality: Right;     PT Assessment (last 12 hours)       PT Evaluation and Treatment       Row Name 12/05/23 1227          Physical Therapy Time and Intention    Subjective Information complains of;pain  -RH     Document Type therapy note (daily note)  -     Mode of Treatment individual therapy;group therapy;physical therapy  -     Patient Effort good  -RH     Comment Gait training performed individually; therapeutic exercises performed in a group setting with * participants  -       Row Name 12/05/23 1227          General Information    Patient Observations alert;cooperative;agree to therapy  -       Row Name 12/05/23 1227          Pain    Additional Documentation Pain Scale: FACES Pre/Post-Treatment (Group)  -       Row Name  12/05/23 1227          Pain Scale: FACES Pre/Post-Treatment    Pain: FACES Scale, Pretreatment 2-->hurts little bit  -RH     Posttreatment Pain Rating 2-->hurts little bit  -RH     Pain Location - Side/Orientation Right  -     Pain Location - knee  -       Row Name 12/05/23 1227          Range of Motion (ROM)    Range of Motion --  Pt R knee AAROM at 88 flex and 8 degrees ext.  -       Row Name 12/05/23 1227          Strength (Manual Muscle Testing)    Strength (Manual Muscle Testing) --  Pt R knee ext strength at 2/5.  -       Row Name 12/05/23 1227          Mobility    Extremity Weight-bearing Status right lower extremity  -     Right Lower Extremity (Weight-bearing Status) weight-bearing as tolerated (WBAT)  -       Row Name 12/05/23 1227          Transfers    Transfers sit-stand transfer;stand-sit transfer  -       Row Name 12/05/23 1227          Sit-Stand Transfer    Sit-Stand [place] (Transfers) contact guard  -     Assistive Device (Sit-Stand Transfers) walker, front-wheeled  -     Comment, (Sit-Stand Transfer) Pt transfers sit to stand with difficulty with pt rocking minimally to create momentum for standing.  -       Row Name 12/05/23 1227          Stand-Sit Transfer    Stand-Sit [place] (Transfers) contact guard  -     Assistive Device (Stand-Sit Transfers) walker, front-wheeled  -       Row Name 12/05/23 1227          Gait/Stairs (Locomotion)    Gait/Stairs Locomotion gait/ambulation assistive device;gait/ambulation independence;distance ambulated;gait pattern;gait deviations  -     [place] Level (Gait) contact guard  -     Assistive Device (Gait) walker, front-wheeled  -     Patient was able to Ambulate yes  -RH     Distance in Feet (Gait) 170  -RH     Pattern (Gait) --  Pt able to achieve and maintain reciprocal gait.  -RH     Deviations/Abnormal Patterns (Gait) base of support, wide;gait speed decreased;stride length decreased  -RH     Right Sided Gait  Deviations heel strike decreased  -     Gait Assessment/Intervention Pt amb wtih RW and CGA with reciprocal gait with decreased gait speed, stride length, and RLE heel strike.  -     Negotiation (Stairs) stairs independence;stairs assistive device;handrail location;number of steps;ascending technique;descending technique  -     [place] Level (Stairs) contact guard  -     Handrail Location (Stairs) both sides  -     Number of Steps (Stairs) 5  -RH     Ascending Technique (Stairs) step-to-step  -RH     Descending Technique (Stairs) step-to-step  -       Row Name 12/05/23 1227          Safety Issues, Functional Mobility    Impairments Affecting Function (Mobility) balance;pain;range of motion (ROM);strength  -       Row Name 12/05/23 1227          Balance    Balance Assessment standing dynamic balance  -     Dynamic Standing Balance contact guard  -     Position/Device Used, Standing Balance walker, front-wheeled  -       Row Name 12/05/23 1227          Motor Skills    Therapeutic Exercise knee;hip;ankle  -       Row Name 12/05/23 1227          Hip (Therapeutic Exercise)    Hip (Therapeutic Exercise) isometric exercises  -     Hip Isometrics (Therapeutic Exercise) right;gluteal sets;10 repetitions;2 sets  -       Row Name 12/05/23 1227          Knee (Therapeutic Exercise)    Knee (Therapeutic Exercise) isometric exercises;strengthening exercise  -     Knee Isometrics (Therapeutic Exercise) right;quad sets;10 repetitions;2 sets  -     Knee Strengthening (Therapeutic Exercise) right;heel slides;SLR (straight leg raise);SAQ (short arc quad);LAQ (long arc quad);10 repetitions;2 sets  -       Row Name 12/05/23 1227          Ankle (Therapeutic Exercise)    Ankle (Therapeutic Exercise) AROM (active range of motion)  -     Ankle AROM (Therapeutic Exercise) right;dorsiflexion;plantarflexion;10 repetitions;2 sets  -       Row Name             Wound 12/04/23 0856 Right anterior knee  Incision    Wound - Properties Group Placement Date: 12/04/23  -BW Placement Time: 0856  -BW Present on Original Admission: N  -BW Side: Right  -BW Orientation: anterior  -BW Location: knee  -BW Primary Wound Type: Incision  -BW    Retired Wound - Properties Group Placement Date: 12/04/23  -BW Placement Time: 0856  -BW Present on Original Admission: N  -BW Side: Right  -BW Orientation: anterior  -BW Location: knee  -BW Primary Wound Type: Incision  -BW    Retired Wound - Properties Group Date first assessed: 12/04/23  -BW Time first assessed: 0856  -BW Present on Original Admission: N  -BW Side: Right  -BW Location: knee  -BW Primary Wound Type: Incision  -BW      Row Name 12/05/23 1227          Positioning and Restraints    In Chair encouraged to call for assist;call light within reach;reclined  -       Row Name 12/05/23 1227          Progress Summary (PT)    Progress Toward Functional Goals (PT) progress toward functional goals is good  -     Daily Progress Summary (PT) Pt is progressing well with their exercise program.  Will continue current plan of care.  -               User Key  (r) = Recorded By, (t) = Taken By, (c) = Cosigned By      Initials Name Provider Type    BW Janes Harris, RN Registered Nurse     Reid Romero PTA Physical Therapist Assistant                    Physical Therapy Education       Title: PT OT SLP Therapies (Resolved)       Topic: Physical Therapy (Resolved)       Point: Mobility training (Resolved)       Learning Progress Summary             Patient Acceptance, E, VU by SHAD at 12/5/2023 1223    Acceptance, E,TB,D, VU,DU by SUSAN at 12/5/2023 0942    Acceptance, E, VU,NR by SEAN at 12/4/2023 2300    Acceptance, E,TB, VU by SURENDRA at 12/4/2023 1253                         Point: Precautions (Resolved)       Learning Progress Summary             Patient Acceptance, E, VU by SHAD at 12/5/2023 1223    Acceptance, E,TB,D, VU,DU by SUSAN at 12/5/2023 0942    Acceptance, E, VU,NR by SEAN at  12/4/2023 2300    Acceptance, E,TB, VU by SURENDRA at 12/4/2023 1253                                         User Key       Initials Effective Dates Name Provider Type Discipline    RK 01/16/23 -  Kinza Rubin, RN Registered Nurse Nurse    JW 07/13/23 -  Adela Bill RN Registered Nurse Nurse    AC 06/16/21 -  Yesenia Arciniega OT Occupational Therapist OT    SURENDRA 06/03/21 -  Wilber Kovacs, PT Physical Therapist PT                  PT Recommendation and Plan     Progress Summary (PT)  Progress Toward Functional Goals (PT): progress toward functional goals is good  Daily Progress Summary (PT): Pt is progressing well with their exercise program.  Will continue current plan of care.   Outcome Measures       Row Name 12/05/23 1200 12/04/23 1200          How much help from another person do you currently need...    Turning from your back to your side while in flat bed without using bedrails? 3  -RH 4  -SURENDRA     Moving from lying on back to sitting on the side of a flat bed without bedrails? 3  -RH 3  -SURENDRA     Moving to and from a bed to a chair (including a wheelchair)? 3  -RH 2  -SURENDRA     Standing up from a chair using your arms (e.g., wheelchair, bedside chair)? 3  -RH 2  -SURENDRA     Climbing 3-5 steps with a railing? 3  -RH 2  -SURENDRA     To walk in hospital room? 4  -RH 3  -SURENDAR     AM-PAC 6 Clicks Score (PT) 19  -RH 16  -SURENDRA     Highest Level of Mobility Goal 6 --> Walk 10 steps or more  -RH 5 --> Static standing  -SURENDRA        Functional Assessment    Outcome Measure Options -- AM-PAC 6 Clicks Basic Mobility (PT)  -SURENDRA               User Key  (r) = Recorded By, (t) = Taken By, (c) = Cosigned By      Initials Name Provider Type    RH Reid Romero, ALEXANDRE Physical Therapist Assistant    Wilber Zendejas, PT Physical Therapist                     Time Calculation:    PT Charges       Row Name 12/05/23 1227             Time Calculation    PT Received On 12/05/23  -         Timed Charges    56269 - Gait Training Minutes  9  -RH       00275 - PT Therapeutic Activity Minutes 4  -RH         Untimed Charges    PT Group Therapy Minutes 30  -RH         Total Minutes    Timed Charges Total Minutes 13  -RH      Untimed Charges Total Minutes 30  -RH       Total Minutes 43  -RH                User Key  (r) = Recorded By, (t) = Taken By, (c) = Cosigned By      Initials Name Provider Type     Reid Romero PTA Physical Therapist Assistant                  Therapy Charges for Today       Code Description Service Date Service Provider Modifiers Qty    47191457733 HC GAIT TRAINING EA 15 MIN 12/5/2023 Reid Romero PTA GP 1    39287763872 HC PT THER PROC GROUP 12/5/2023 Reid Romero PTA GP 1            PT G-Codes  Outcome Measure Options: AM-PAC 6 Clicks Daily Activity (OT), Optimal Instrument  AM-PAC 6 Clicks Score (PT): 19  AM-PAC 6 Clicks Score (OT): 21    Reid Romero PTA  12/5/2023

## 2023-12-05 NOTE — PLAN OF CARE
Goal Outcome Evaluation:  Plan of Care Reviewed With: patient        Progress: improving  Outcome Evaluation: Pt alert and able to make needs known. Pain controlled with scheduled APAP and Toradol. Pt urinating without difficulty. Pt one person assist with rolling walker and gait belt for transfers, ambulated 70+ feet-tolerated well. Pt plan to d/c home today with spouse to transport, has all needed DME, will be utilizing meds to bed for discharge medications and has outpatient therapy scheduled.

## 2023-12-05 NOTE — PROGRESS NOTES
" Orthopedic Total Knee Progress Note    Assessment/Plan now requesting outpatient PT, follow-up 2 weeks, DVT prophylaxis    Status post-right total knee arthroplasty: Doing well postoperatively.    Pain Relief: some relief    Continues current post-op course    Activity: up with assistance    Weight Bearing: WBAT     LOS: 0 days     Subjective     Post-Operative Day: 1 post-right total knee arthroplasty  Systemic or Specific Complaints: No Complaints    Objective     Vital signs in last 24 hours:  Vitals:    12/04/23 1900 12/04/23 2300 12/05/23 0300 12/05/23 0654   BP: 115/64 112/53 112/48    BP Location: Right arm Right arm Right arm    Patient Position: Sitting Lying Lying    Pulse: 74 78 81 81   Resp: 18 18 18 18   Temp: 97.4 °F (36.3 °C) 97.3 °F (36.3 °C) 97.4 °F (36.3 °C)    TempSrc: Oral Oral Oral    SpO2: 95% 97% 96% 96%   Weight:       Height:            General: alert, appears stated age, and cooperative   Neurovascular: Tibial nerve: Intact, Superficial peroneal nerve: Intact, and Deep peroneal nerve: Intact  Capillary refill: Normal   Wound: Wound clean and dry no evidence of infection.   Range of Motion: Limited flexsion and Limited extension   DVT Exam: No evidence of DVT seen on physical exam.      Hemoglobin   Date Value Ref Range Status   12/05/2023 9.6 (L) 12.0 - 15.9 g/dL Final     Hematocrit   Date Value Ref Range Status   12/05/2023 31.5 (L) 34.0 - 46.6 % Final        Basic Metabolic Panel    Sodium Sodium   Date Value Ref Range Status   12/05/2023 133 (L) 136 - 145 mmol/L Final      Potassium Potassium   Date Value Ref Range Status   12/05/2023 4.6 3.5 - 5.2 mmol/L Final      Chloride Chloride   Date Value Ref Range Status   12/05/2023 96 (L) 98 - 107 mmol/L Final      Bicarbonate No results found for: \"PLASMABICARB\"   BUN BUN   Date Value Ref Range Status   12/05/2023 28 (H) 8 - 23 mg/dL Final      Creatinine Creatinine   Date Value Ref Range Status   12/05/2023 1.17 (H) 0.57 - 1.00 mg/dL Final " "     Calcium Calcium   Date Value Ref Range Status   12/05/2023 8.9 8.6 - 10.5 mg/dL Final      Glucose      No components found for: \"GLUCOSE.*\"      XR Knee 1 or 2 View Right   Final Result       1. Status post knee arthroplasty.   2. No immediate postoperative complication is identified.                    BRAD ALEJANDRO MD          Electronically Signed and Approved By: BRAD ALEJANDRO MD on 12/04/2023 at 11:21                                "

## 2023-12-05 NOTE — SIGNIFICANT NOTE
12/05/23 0922   Plan   Final Discharge Disposition Code 01 - home or self-care   Final Note Home with Outpatient Therapy at [facility] in [place]. Appt: 12/06/23 at 10:30AM.

## 2023-12-05 NOTE — NURSING NOTE
HUANG Smith notified pts B/P 112/48 with MAP of 65. Also advised pt has history of CHF. No new orders at this time.

## 2023-12-05 NOTE — PLAN OF CARE
Goal Outcome Evaluation:      Pt had no new changes throughout shift and continues to remain stable. Safety checks maintained throughout shift. Pt will follow up with outpatient therapy

## 2023-12-05 NOTE — DISCHARGE SUMMARY
[facility]         HOSPITALIST  DISCHARGE SUMMARY    Patient Name: Milagros Ramirez  : 1960  MRN: [id number]    Date of Admission: 2023  Date of Discharge:  2023  Primary Care Physician: Nelly Romero MD    Consults       Date and Time Order Name Status Description    2023 11:16 AM Inpatient Hospitalist Consult              Active and Resolved Hospital Problems:  Right total knee arthroplasty -continue postoperative care  Atrial fibrillation -continue home Lopressor  CKD stage III  Diabetes mellitus  Diastolic CHF not in acute exacerbation  Hyperlipidemia -resume home Crestor  Hypertension  Hypothyroidism   Morbid obesity -encourage weight loss  AMOR -resume home treatment  Rheumatoid arthritis -continue Plaquenil  SLE  COPD -resume home [facility]  Hospital Course     Hospital Course:  Patient is a 63-year-old with past medical history significant for osteoarthritis, rheumatoid arthritis, SLE, CHF, COPD, hypertension, hyperlipidemia, A-fib who presents for scheduled right total knee arthroplasty. Patient states that prior to the procedure the pain had gradually been worsening over the past several years. The patient reports pain and functional impairment including activities of daily living, they have moderate to severe resting pain, chronic inflammation, and swelling. The patient states that this pain is no longer relieved with conservative. The pain is dull and achy in nature, nonradiating, worse with activity. Because of the above the patient is admitted for postsurgical pain control, symptom management and rehab evaluation.  We are consulted for management of the chronical medical issues.  Patient tolerated treatment well, patient's pain was adequately controlled, patient was working with physical therapy.  Patient is discharged home today with outpatient PT in stable condition    Day of Discharge     Vital Signs:  Temp:  [97.2 °F (36.2 °C)-98.2 °F (36.8 °C)]  97.7 °F (36.5 °C)  Heart Rate:  [68-81] 72  Resp:  [16-20] 18  BP: (107-131)/(48-77) 109/49    Physical Exam:   GEN: No acute distress  HEENT: Moist mucous membranes  LUNGS: Equal chest rise bilaterally  CARDIAC: Regular rate and rhythm  NEURO: Moving all 4 extremities spontaneously  SKIN: No obvious breakdown    Discharge Details        Discharge Medications        New Medications        Instructions Start Date   apixaban 2.5 MG tablet tablet  Commonly known as: ELIQUIS   2.5 mg, Oral, Every 12 Hours Scheduled, Once Eliquis is completed, begin enteric-coated aspirin 325 mg p.o. daily for 4 weeks      oxyCODONE-acetaminophen 7.5-325 MG per tablet  Commonly known as: PERCOCET   1-2 tablets, Oral, Every 4 Hours PRN             Changes to Medications        Instructions Start Date   ferrous sulfate 325 (65 FE) MG tablet  What changed:   when to take this  additional instructions   325 mg, Oral, 2 Times Daily With Meals      montelukast 10 MG tablet  Commonly known as: SINGULAIR  What changed: when to take this   TAKE 1 TABLET BY MOUTH EVERY DAY AT NIGHT             Continue These Medications        Instructions Start Date   albuterol sulfate  (90 Base) MCG/ACT inhaler  Commonly known as: PROVENTIL HFA;VENTOLIN HFA;PROAIR HFA   2 puffs, Inhalation, Every 4 Hours PRN      Loris Thyroid 60 MG tablet  Generic drug: Thyroid   TAKE 1 TABLET BY MOUTH EVERY DAY      budesonide-formoterol 160-4.5 MCG/ACT inhaler  Commonly known as: SYMBICORT   2 puffs, Inhalation, 2 Times Daily PRN      buPROPion  MG 12 hr tablet  Commonly known as: Wellbutrin SR   150 mg, Oral, 2 Times Daily      cyclobenzaprine 10 MG tablet  Commonly known as: FLEXERIL   10 mg, Oral, 3 Times Daily      GREEN TEA PO   500 mg, Oral, Daily      hydroxychloroquine 200 MG tablet  Commonly known as: PLAQUENIL   200 mg, Oral, Every Other Day      metoprolol tartrate 50 MG tablet  Commonly known as: LOPRESSOR   TAKE 1 TABLET BY MOUTH TWICE A DAY       nitroglycerin 0.4 MG SL tablet  Commonly known as: NITROSTAT   0.4 mg, Every 5 Minutes PRN      rosuvastatin 10 MG tablet  Commonly known as: CRESTOR   TAKE 1 TABLET BY MOUTH EVERY DAY      torsemide 20 MG tablet  Commonly known as: DEMADEX   TAKE 1 TABLET BY MOUTH EVERY DAY      Turmeric 500 MG capsule   500 mg, Oral, Daily      vitamin E 1000 UNIT capsule   1,000 Units, Oral, Daily             Stop These Medications      HYDROcodone-acetaminophen 7.5-325 MG per tablet  Commonly known as: NORCO              Allergies   Allergen Reactions    Other Irritability     INFUSION FOR RHEUMATOID ARTHRITIS    Rituximab Unknown (See Comments) and Unknown - High Severity     Other reaction(s): review reaction with chills, rigors, muscle spasms and difficulty breathing         Discharge Disposition:  Home or Self Care    Diet:  Hospital:  Diet Order   Procedures    Diet: Regular/House Diet; Texture: Regular Texture (IDDSI 7); Fluid Consistency: Thin (IDDSI 0)       Discharge Activity:   Activity Instructions       Up WIth Assist              CODE STATUS:  There are no questions and answers to display.       Future Appointments   Date Time Provider Department Center   12/19/2023  9:45 AM Tammy Urena APRN [facility] ORS RING [facility]   2/2/2024 10:00 AM Nelly [facility]ero MD [facility]       Additional Instructions for the Follow-ups that You Need to Schedule       Ambulatory Referral to Physical Therapy Evaluate and treat, POST OP   As directed      2-3x/week for 8-12 weeks  + modalities    Order Comments: 2-3x/week for 8-12 weeks + modalities    Specialty needed: Evaluate and treat POST OP   Follow-up needed: Yes        Discharge Follow-up with Specified Provider: Dr Eduardo's office; 2 Weeks   As directed      To: Dr Eduardo's office   Follow Up: 2 Weeks                Pertinent  and/or Most Recent Results     IMAGING:  XR Knee 1 or 2 View Right    Result Date: 12/4/2023  PROCEDURE: XR KNEE 1 OR 2 VW RIGHT  COMPARISON: E  [facility] , CR, XR KNEE 3 VW RIGHT, 10/03/2023, 14:04.  INDICATIONS: Post-Op Knee Arthoplasty  FINDINGS:  Status post knee arthroplasty.  Hardware components appear to be in satisfactory positions.  Anterior skin staple line is present.  There is soft tissue prominence and soft tissue gas, as expected.  No acute osseous abnormality is seen.        1. Status post knee arthroplasty. 2. No immediate postoperative complication is identified.      BRAD ALEJANDRO MD       Electronically Signed and Approved By: BRAD ALEJANDRO MD on 12/04/2023 at 11:21             Peripheral Block    Result Date: 12/4/2023  Jose Pedro MD     12/4/2023  8:11 AM Peripheral Block Patient reassessed immediately prior to procedure Patient location during procedure: pre-op Reason for block: at surgeon's request and post-op pain management Preanesthetic Checklist Completed: patient identified, IV checked, site marked, risks and benefits discussed, surgical consent, monitors and equipment checked, pre-op evaluation and timeout performed Prep: Pt Position: supine Sterile barriers:alcohol skin prep, partial drape, cap, washed/disinfected hands, mask and gloves Prep: ChloraPrep Patient monitoring: blood pressure monitoring, continuous pulse oximetry and EKG Procedure Sedation: yes Performed under: local infiltration Guidance:ultrasound guided and nerve stimulator ULTRASOUND INTERPRETATION.  Using ultrasound guidance a 20 G gauge needle was placed in close proximity to the nerve, at which point, under ultrasound guidance anesthetic was injected in the area of the nerve and spread of the anesthesia was seen on ultrasound in close proximity thereto.  There were no abnormalities seen on ultrasound; a digital image was taken; and the patient tolerated the procedure with no complications. Images:still images obtained, printed/placed on chart Laterality:right Block Type:adductor canal block Injection Technique:single-shot Needle  Type:echogenic Needle Gauge:20 G (4in) Resistance on Injection: none Medications Used: bupivacaine-EPINEPHrine PF (MARCAINE w/EPI) 0.5% -[id number] injection - Injection  30 mL - 12/4/2023 8:06:00 AM Post Assessment Injection Assessment: negative aspiration for heme, no paresthesia on injection and incremental injection Patient Tolerance:comfortable throughout block Complications:no Additional Notes The block or continuous infusion is requested by the referring physician for management of postoperative pain, or pain related to a procedure. Ultrasound guidance (deemed medically necessary). Painless injection, pt was awake and conversant during the procedure without complications. Needle and surrounding structures visualized throughout procedure. No adverse reactions or complications seen during this period. Post-procedure image showed no signs of complication, and anatomy was consistent with an uncomplicated nerve blockade.       LAB RESULTS:      Lab 12/05/23  0406   HEMOGLOBIN 9.6*   HEMATOCRIT 31.5*         Lab 12/05/23  0406   SODIUM 133*   POTASSIUM 4.6   CHLORIDE 96*   CO2 25.9   ANION GAP 11.1   BUN 28*   CREATININE 1.17*   EGFR 52.9*   GLUCOSE 112*   CALCIUM 8.9                         Brief Urine Lab Results       None          Microbiology Results (last 10 days)       ** No results found for the last 240 hours. **              Results for orders placed during the hospital encounter of 10/11/22    Adult Transthoracic Echo Complete W/ Cont if Necessary Per Protocol    Interpretation Summary    Calculated left ventricular EF = 46% Estimated left ventricular EF was in agreement with the calculated left ventricular EF.    Left ventricular wall thickness is consistent with concentric hypertrophy.    Left ventricular diastolic function was not assessed.    Mild aortic valve stenosis is present.    Moderate mitral valve regurgitation is present.    Moderate tricuspid valve regurgitation is present.    Estimated right  ventricular systolic pressure from tricuspid regurgitation is markedly elevated (>55 mmHg).      Time spent on Discharge including face to face service: Greater than 30 minutes      Electronically signed by Abiodun Betts MD, 12/05/23, 10:33 AM EST.

## 2023-12-12 ENCOUNTER — TELEPHONE (OUTPATIENT)
Dept: ORTHOPEDIC SURGERY | Facility: CLINIC | Age: 63
End: 2023-12-12
Payer: MEDICARE

## 2023-12-12 DIAGNOSIS — Z96.651 AFTERCARE FOLLOWING RIGHT KNEE JOINT REPLACEMENT SURGERY: Primary | ICD-10-CM

## 2023-12-12 DIAGNOSIS — Z47.1 AFTERCARE FOLLOWING RIGHT KNEE JOINT REPLACEMENT SURGERY: Primary | ICD-10-CM

## 2023-12-12 RX ORDER — CEPHALEXIN 500 MG/1
500 CAPSULE ORAL EVERY 6 HOURS
Qty: 40 CAPSULE | Refills: 0 | Status: SHIPPED | OUTPATIENT
Start: 2023-12-12

## 2023-12-12 NOTE — TELEPHONE ENCOUNTER
PATIENT CALLED AND SENT IN PICTURES OF KNEE INCISION AND DRESSING.  DR GUTIERREZ REVIEWED AND KEFLEX WAS SENT INTO THE PHARMACY FOR PATIENT.  PATIENT NOTIFIED AND VOICES UNDERSTANDING TO CALL WITH NEW ISSUES OR CONCERNS.

## 2023-12-13 ENCOUNTER — TELEPHONE (OUTPATIENT)
Dept: ORTHOPEDIC SURGERY | Facility: CLINIC | Age: 63
End: 2023-12-13
Payer: MEDICARE

## 2023-12-13 DIAGNOSIS — R26.2 DIFFICULTY IN WALKING: ICD-10-CM

## 2023-12-13 NOTE — TELEPHONE ENCOUNTER
PATIENT REQUESTING PAIN MEDICATION REFILL. [facility] IN [facility]

## 2023-12-13 NOTE — TELEPHONE ENCOUNTER
Hub staff attempted to follow warm transfer process and was unsuccessful     Caller: Milagros Ramirez    Relationship to patient: Self    Best call back number: [phone]    Patient is needing: PATIENT IS REQUESTING A CALL BACK FROM [facility]- PATIENT IS NEEDING TO DISCUSS MEDICATION AND KNEE PAIN

## 2023-12-13 NOTE — TELEPHONE ENCOUNTER
SPOKE WITH PATIENT, SHE STATES SHE IS HAVING PAIN ON THE INSIDE OF HER KNEE AND IN HER SHIN. ENCOURAGED HER TO CONTINUE ICING AND ELEVATING, AND DO GENTLE MASSAGE ON THE AREA TO HELP REDUCE PAIN AND INFLAMMATION. SHE STATES SHE WILL TRY AND WILL CALL BACK IF THERE ARE ANY OTHER CONCERNS.

## 2023-12-14 RX ORDER — OXYCODONE AND ACETAMINOPHEN 7.5; 325 MG/1; MG/1
1 TABLET ORAL EVERY 4 HOURS PRN
Qty: 42 TABLET | Refills: 0 | Status: SHIPPED | OUTPATIENT
Start: 2023-12-14

## 2023-12-19 ENCOUNTER — OFFICE VISIT (OUTPATIENT)
Dept: ORTHOPEDIC SURGERY | Facility: CLINIC | Age: 63
End: 2023-12-19
Payer: MEDICARE

## 2023-12-19 ENCOUNTER — TELEPHONE (OUTPATIENT)
Dept: ORTHOPEDIC SURGERY | Facility: CLINIC | Age: 63
End: 2023-12-19

## 2023-12-19 VITALS
DIASTOLIC BLOOD PRESSURE: 75 MMHG | OXYGEN SATURATION: 90 % | WEIGHT: 257 LBS | BODY MASS INDEX: 40.34 KG/M2 | HEART RATE: 95 BPM | SYSTOLIC BLOOD PRESSURE: 118 MMHG | HEIGHT: 67 IN

## 2023-12-19 DIAGNOSIS — Z47.1 AFTERCARE FOLLOWING RIGHT KNEE JOINT REPLACEMENT SURGERY: Primary | ICD-10-CM

## 2023-12-19 DIAGNOSIS — Z96.651 AFTERCARE FOLLOWING RIGHT KNEE JOINT REPLACEMENT SURGERY: Primary | ICD-10-CM

## 2023-12-19 PROCEDURE — 99024 POSTOP FOLLOW-UP VISIT: CPT

## 2023-12-19 PROCEDURE — 1160F RVW MEDS BY RX/DR IN RCRD: CPT

## 2023-12-19 PROCEDURE — 1159F MED LIST DOCD IN RCRD: CPT

## 2023-12-19 NOTE — TELEPHONE ENCOUNTER
PER ELIZABETH PATIENT WOULD NEED TO APPLY CONTINUITY OF CARE THROUGH HER INSURANCE WHICH IS [facility] HMO OR CHANGE INSURANCES BEFORE WE JUSTINO PATIENT FOR HER NEXT APT. PATIENT IS AWARE AND WILL BE CALLING [facility] AND US BACK WHEN SHE GETS THIS ALL RESOLVED

## 2023-12-19 NOTE — PROGRESS NOTES
"Chief Complaint  Follow-up of the Right Knee and Suture / Staple Removal    Subjective      Milagros Ramirez presents to [facility] for 2-week follow-up of right total knee arthroplasty with Dr. Eduardo on 12/4/23.  Patient is ambulatory with use of a rollator walker.  She is attending physical therapy with [facility].  Patient reports that her on the operative leg hurts worse than her operative leg.  Reports that she has had a little bit of drainage and is on day 4 of Keflex.  Reports that physical therapy was able to get her knee motion from -10 extension to 96 degree flexion.    Objective   Allergies   Allergen Reactions    Other Irritability     INFUSION FOR RHEUMATOID ARTHRITIS    Rituximab Unknown (See Comments) and Unknown - High Severity     Other reaction(s): review reaction with chills, rigors, muscle spasms and difficulty breathing         Vital Signs:   /75   Pulse 95   Ht 170.2 cm (67\")   Wt 117 kg (257 lb)   SpO2 90%   BMI 40.25 kg/m²       Physical Exam    Constitutional: Awake, alert. Well nourished appearance.    Integumentary: Warm, dry, intact. No obvious rashes.    HENT: Atraumatic, normocephalic.   Respiratory: Non labored respirations .   Cardiovascular: Intact peripheral pulses.    Psychiatric: Normal mood and affect. A&O X3    Ortho Exam  Right knee: Incision is well-approximated and healing appropriately.  There is no redness, drainage or tenderness to touch.  Mild edema generalized over the knee.  Stable to varus and valgus stress.  Stable to anterior and posterior drawer test.  Neurovascular intact.  Sensation is intact.  Range of motion extension lacks 10 degrees, flexion to 90 degrees.    Imaging Results (Most Recent)       Procedure Component Value Units Date/Time    XR Knee 3 View Right [[id number]] Resulted: 12/19/23 1615     Updated: 12/19/23 1615    Narrative:      X-Ray Report:  Study: X-rays ordered, taken in the office, and reviewed today.   Site: " Right knee xray  Indication: Right TKA  View: AP/Lateral, Standing, and [place] view(s)  Findings: Intact right total knee arthroplasty. No evidence of hardware   malfunction or periprosthetic fracture.   Prior studies available for comparison: yes                       Assessment and Plan   Problem List Items Addressed This Visit    None  Visit Diagnoses       Aftercare following right knee joint replacement surgery    -  Primary    Relevant Orders    XR Knee 3 View Right (Completed)            Follow Up   Return in about 4 weeks (around 1/16/2024).    Patient is a non-smoker, did not discuss options for smoking cessation.     Social History     Socioeconomic History    Marital status:    Tobacco Use    Smoking status: Former     Types: Cigarettes    Smokeless tobacco: Never    Tobacco comments:     Quit around age 36   Vaping Use    Vaping Use: Never used   Substance and Sexual Activity    Alcohol use: Yes     Comment: rare    Drug use: No    Sexual activity: Defer       Patient Instructions   X-rays taken and reviewed, showing intact hardware.    Staples removed in office and Steri-Strips applied.  Patient educated on incision care.  Please keep incision clean and dry.  Do not soak or submerge in water until incision is fully healed.  Do not apply creams or lotions over the incision.  Please allow Steri-Strips to fall off on their own within 7 to 10 days.    Continue icing and elevation of the knee as needed to help with pain and swelling.  Ice knee up to 3 or 4 times daily for no longer than 15 to 20 minutes at a time.    Continue PT to progress ROM, strength, and weightbearing status.    Follow-up in 4 weeks. Repeat x-rays not needed at this visit.  Please call with questions or concerns.   Patient was given instructions and counseling regarding her condition or for health maintenance advice. Please see specific information pulled into the AVS if appropriate.

## 2023-12-26 ENCOUNTER — TELEPHONE (OUTPATIENT)
Dept: ORTHOPEDIC SURGERY | Facility: CLINIC | Age: 63
End: 2023-12-26
Payer: MEDICARE

## 2023-12-26 DIAGNOSIS — R26.2 DIFFICULTY IN WALKING: ICD-10-CM

## 2023-12-26 RX ORDER — OXYCODONE AND ACETAMINOPHEN 7.5; 325 MG/1; MG/1
1 TABLET ORAL EVERY 4 HOURS PRN
Qty: 42 TABLET | Refills: 0 | Status: SHIPPED | OUTPATIENT
Start: 2023-12-26

## 2023-12-26 NOTE — TELEPHONE ENCOUNTER
Caller: Milagros Ramirez    Relationship: Self    Best call back number: [phone]    Requested Prescriptions:   Requested Prescriptions     Pending Prescriptions Disp Refills    oxyCODONE-acetaminophen (PERCOCET) 7.5-325 MG per tablet 42 tablet 0     Sig: Take 1 tablet by mouth Every 4 (Four) Hours As Needed for Moderate Pain.        Pharmacy where request should be sent:      Last office visit with prescribing clinician: 10/3/2023   Last telemedicine visit with prescribing clinician: Visit date not found   Next office visit with prescribing clinician: Visit date not found         Does the patient have less than a 3 day supply:  [x] Yes  [] No    Would you like a call back once the refill request has been completed: [x] Yes [] No    If the office needs to give you a call back, can they leave a voicemail: [x] Yes [] No

## 2023-12-28 ENCOUNTER — TELEPHONE (OUTPATIENT)
Dept: INTERNAL MEDICINE | Facility: CLINIC | Age: 63
End: 2023-12-28
Payer: MEDICARE

## 2023-12-28 NOTE — TELEPHONE ENCOUNTER
Caller: Milagros Ramirez    Relationship: Self    Best call back number: [phone]     What medication are you requesting: SOMETHING FOR YEAST INFECTION THAT WAS CAUSED BY ANTIBIOTIC    If a prescription is needed, what is your preferred pharmacy and phone number: [id number] - [place], [place] - [address] [facility] AT [facility] [address] /[facility] & [place] - [phone] [facility] [phone] FX

## 2023-12-29 RX ORDER — MONTELUKAST SODIUM 10 MG/1
TABLET ORAL
Qty: 90 TABLET | Refills: 1 | Status: SHIPPED | OUTPATIENT
Start: 2023-12-29

## 2024-01-03 RX ORDER — FLUCONAZOLE 150 MG/1
150 TABLET ORAL ONCE
Qty: 1 TABLET | Refills: 0 | Status: SHIPPED | OUTPATIENT
Start: 2024-01-03 | End: 2024-01-03

## 2024-01-11 ENCOUNTER — TELEPHONE (OUTPATIENT)
Dept: ORTHOPEDIC SURGERY | Facility: CLINIC | Age: 64
End: 2024-01-11
Payer: MEDICARE

## 2024-01-11 NOTE — TELEPHONE ENCOUNTER
SPOKE W/PATIENT - SHE HAS SPOKEN WITH [facility] BUT COULD NOT REMEMBER WHAT IT WAS CALLED TO CONTINUE TO BE SEEN W/OUR OFFICE [CONTINUITY OF CARE] IN ORDER TO PROCEED WITH CARE - SHE EXPLAINED THAT NO ONE INFORMED HER ABOUT THE NON-PAR COVERAGE BEFORE HER SX AND THAT SHE WAS UNDER THE IMPRESSION AFTER THE FACT THAT POST-OP PERIODS ARE COVERED REGARDLESS. I EXPLAINED THAT TO CONTINUE W/CARE W/Christian SHE WOULD NEED TO CALL [facility] BACK AND DISCUSS CONTINUITY OF CARE AND WHAT THAT MEANS FOR HER GOING FORWARD. I INFORMED HER THAT ONCE A PLAN OF ACTION IS DETERMINED AND CONTINUITY OF CARE HAS BEEN APPLIED FOR, TO CALL OUR OFFICE BACK AND WE CAN READDRESS THE ISSUE(S) - PATIENT IS FRUSTRATED WITH THE MISMANAGEMENT OF  CARE BUT EXPRESSED UNDERSTANDING AND STATED SHE WILL CALL BACK [facility]

## 2024-01-11 NOTE — TELEPHONE ENCOUNTER
Caller: Milagros Ramirez    Relationship to patient: Self    Best call back number: [phone]    Chief complaint: RIGHT KNEE     Type of visit: PATIENT HAD SURGERY 12-4-23    Requested date:4 WEEKS FROM LAST VISIT ON 12-19-23     If rescheduling, when is the original appointment: N/A      Additional notes:PATIENT STATES SHE WAS TO SCHEDULE A 4 WEEK FOLLOW UP WHEN SHE WAS IN THE OFFICE ON 12-19-23. SHE WAS TOLD AT THAT TIME TO CALL HER INSURANCE COMPANY TO CHECK HER OUT OF NETWORK BENEFITS BEFORE SCHEDULING. SHE STATES SHE IS STILL IN HER 90 DAY POST OP LUISA PERIOD AND WAS UNDER THE IMPRESSION THIS WAS COVERED ALONG WITH THE COST OF THE SURGERY. PLEASE CALL HER BACK TO CONFIRM THIS. SHE ALSO STATES SHE HAS A LOT OF POPPING IN HER KNEE WHICH THE PHYSICAL THERAPIST SAID WAS NORMAL AND SHE WOULD LIKE TO DISCUSS THIS  AS WELL. OKAY TO LEAVE A VOICEMAIL.

## 2024-01-18 ENCOUNTER — OFFICE VISIT (OUTPATIENT)
Dept: ORTHOPEDIC SURGERY | Facility: CLINIC | Age: 64
End: 2024-01-18
Payer: MEDICARE

## 2024-01-18 VITALS
DIASTOLIC BLOOD PRESSURE: 79 MMHG | OXYGEN SATURATION: 94 % | SYSTOLIC BLOOD PRESSURE: 123 MMHG | BODY MASS INDEX: 40.34 KG/M2 | HEART RATE: 98 BPM | WEIGHT: 257 LBS | HEIGHT: 67 IN

## 2024-01-18 DIAGNOSIS — Z47.1 AFTERCARE FOLLOWING RIGHT KNEE JOINT REPLACEMENT SURGERY: ICD-10-CM

## 2024-01-18 DIAGNOSIS — M25.561 RIGHT KNEE PAIN, UNSPECIFIED CHRONICITY: Primary | ICD-10-CM

## 2024-01-18 DIAGNOSIS — Z96.651 AFTERCARE FOLLOWING RIGHT KNEE JOINT REPLACEMENT SURGERY: ICD-10-CM

## 2024-01-18 NOTE — PROGRESS NOTES
"Chief Complaint  Pain and Follow-up of the Right Knee     Subjective      Milagros Ramirez presents to [facility] for follow up of the right knee. She ambulates with a cane for support and stability.  She had a right knee replacement on 12/4/23.  She is in physical therapy.  She attends [facility].  She is pleased with her total knee arthroplasty     Allergies   Allergen Reactions    Other Irritability     INFUSION FOR RHEUMATOID ARTHRITIS    Rituximab Unknown (See Comments) and Unknown - High Severity     Other reaction(s): review reaction with chills, rigors, muscle spasms and difficulty breathing          Social History     Socioeconomic History    Marital status:    Tobacco Use    Smoking status: Former     Types: Cigarettes    Smokeless tobacco: Never    Tobacco comments:     Quit around age 36   Vaping Use    Vaping Use: Never used   Substance and Sexual Activity    Alcohol use: Yes     Comment: rare    Drug use: No    Sexual activity: Defer        I reviewed the patient's chief complaint, history of present illness, review of systems, past medical history, surgical history, family history, social history, medications, and allergy list.     Review of Systems     Constitutional: Denies fevers, chills, weight loss  Cardiovascular: Denies chest pain, shortness of breath  Skin: Denies rashes, acute skin changes  Neurologic: Denies headache, loss of consciousness        Vital Signs:   /79   Pulse 98   Ht 170.2 cm (67\")   Wt 117 kg (257 lb)   SpO2 94%   BMI 40.25 kg/m²          Physical Exam  General: Alert. No acute distress    Ortho Exam      Right knee: Incision is well-approximated and healing appropriately.  There is no redness, drainage or tenderness to touch. There is a little stitch mid incision.  No drainage.  Mild edema generalized over the knee.  Stable to varus and valgus stress.  Stable to anterior and posterior drawer test.  Neurovascular intact.  Sensation is " intact.  Range of motion extension lacks -3 degrees, flexion to 105 degrees.       Procedures      Imaging Results (Most Recent)       None             Result Review :             Assessment and Plan     Diagnoses and all orders for this visit:    1. Right knee pain, unspecified chronicity (Primary)    2. Aftercare following right knee joint replacement surgery        Discussed the treatment plan with the patient. I reviewed the X-rays that were obtained today with the patient.     Continue physical therapy.       Call or return if worsening symptoms.    Follow Up     3 months.       Patient was given instructions and counseling regarding her condition or for health maintenance advice. Please see specific information pulled into the AVS if appropriate.     Scribed for Lucian Eduardo MD by Hyun Varma MA.  01/18/24   13:52 EST    I have personally performed the services described in this document as scribed by the above individual and it is both accurate and complete. Lucian Eduardo MD 01/18/24

## 2024-01-22 RX ORDER — ROSUVASTATIN CALCIUM 10 MG/1
TABLET, COATED ORAL
Qty: 90 TABLET | Refills: 1 | Status: SHIPPED | OUTPATIENT
Start: 2024-01-22

## 2024-04-04 ENCOUNTER — TELEPHONE (OUTPATIENT)
Dept: CARDIOLOGY | Facility: CLINIC | Age: 64
End: 2024-04-04
Payer: MEDICARE

## 2024-04-04 NOTE — TELEPHONE ENCOUNTER
Pt has called and LVM asking for surgery clearance for a R Hemithyroidectomy and a medication review for Eliquis 2.5mg.    Pt was upset due to this being requested a few weeks ago. I am unsure what happened in the shuffle, as the cardiac clearance was scanned into the chart but I had not seen this request prior to today's phone call. Pt was made aware of the situation and verbalized understanding.       Procedure to be schedule after clearance is approved.         Fax/form left in your inbox

## 2024-05-16 RX ORDER — THYROID 60 MG
TABLET ORAL
Qty: 90 TABLET | Refills: 1 | Status: SHIPPED | OUTPATIENT
Start: 2024-05-16

## 2024-07-05 RX ORDER — MONTELUKAST SODIUM 10 MG/1
TABLET ORAL
Qty: 90 TABLET | Refills: 1 | Status: SHIPPED | OUTPATIENT
Start: 2024-07-05

## 2024-07-25 RX ORDER — ROSUVASTATIN CALCIUM 10 MG/1
TABLET, COATED ORAL
Qty: 90 TABLET | Refills: 1 | Status: SHIPPED | OUTPATIENT
Start: 2024-07-25

## (undated) DEVICE — BLCK/BITE BLOX WO/DENTL/RIM W/STRAP 54F

## (undated) DEVICE — APPL CHLORAPREP HI/LITE 26ML ORNG

## (undated) DEVICE — SOLIDIFIER LIQLOC PLS 1500CC BT

## (undated) DEVICE — GLV SURG SENSICARE SLT PF LF 6.5 STRL

## (undated) DEVICE — MAT FLR ABS W/BLU/LINER 56X72IN WHT

## (undated) DEVICE — PK CATH CARD 40

## (undated) DEVICE — CATH VENT MIV RADL PIG ST TIP 5F 110CM

## (undated) DEVICE — GAUZE,SPONGE,4"X4",16PLY,STRL,LF,10/TRAY: Brand: MEDLINE

## (undated) DEVICE — TOTAL KNEE-LF: Brand: MEDLINE INDUSTRIES, INC.

## (undated) DEVICE — SLV SCD KN/LEN ADJ EXPRSS BLENDED MD 1P/U

## (undated) DEVICE — GW EMR FIX EXCHG J STD .035 3MM 260CM

## (undated) DEVICE — Device: Brand: PULSAVAC®

## (undated) DEVICE — RADIFOCUS OPTITORQUE ANGIOGRAPHIC CATHETER: Brand: OPTITORQUE

## (undated) DEVICE — SUT VIC 2/0 CT1 36IN

## (undated) DEVICE — GLIDESHEATH SLENDER STAINLESS STEEL KIT: Brand: GLIDESHEATH SLENDER

## (undated) DEVICE — 3 BONE CEMENT MIXER: Brand: MIXEVAC

## (undated) DEVICE — KT MANIFLD CARDIAC

## (undated) DEVICE — INTENDED FOR TISSUE SEPARATION, AND OTHER PROCEDURES THAT REQUIRE A SHARP SURGICAL BLADE TO PUNCTURE OR CUT.: Brand: BARD-PARKER ® CARBON RIB-BACK BLADES

## (undated) DEVICE — PULLOVER TOGA, 2X LARGE: Brand: FLYTE, SURGICOOL

## (undated) DEVICE — STRYKER PERFORMANCE SERIES SAGITTAL BLADE: Brand: STRYKER PERFORMANCE SERIES

## (undated) DEVICE — SOL IRR NACL 0.9PCT 3000ML

## (undated) DEVICE — SOL IRRG H2O PL/BG 1000ML STRL

## (undated) DEVICE — SYR LUERLOK 30CC

## (undated) DEVICE — GLV SURG SENSICARE SLT PF LF 7 STRL

## (undated) DEVICE — BNDG ELAS MATRX V/CLS 6INX10YD LF

## (undated) DEVICE — ANTIBACTERIAL VIOLET BRAIDED (POLYGLACTIN 910), SYNTHETIC ABSORBABLE SURGICAL SUTURE: Brand: COATED VICRYL

## (undated) DEVICE — Device

## (undated) DEVICE — NO-SCRATCH ™ SMALL WHITNEY CURETTE ™ IS A SINGLE-USE, PLASTIC CURETTE FOR QUICKLY APPLYING, MANIPULATING AND REMOVING BONE CEMENT DURING HIP AND KNEE REPLACEMENT SURGERY. THE PLASTIC IS SOFTER THAN STEEL INSTRUMENTS, REDUCING THE RISK OF DAMAGING THE PROSTHESIS WITH METAL INSTRUMENTS.  THE CURETTE’S 6MM TIP REMOVES EXCESS CEMENT FROM REPLACEMENT HIPS AND KNEES. EASY-TO-MANEUVER, THE SMALL BLUE CURETTE LETS YOU REMOVE CEMENT FROM ALL EDGES OF THE PROSTHESIS.NO-SCRATCH WHITNEY SMALL CURETTE FEATURES:SAFER THAN STEEL- MADE OF PLASTIC - STURDY YET SOFTER THAN SURGICAL STEEL.HANDIER- EACH TOOL HAS A MOLDED-IN THUMB INDENTATION INSTANTLY ORIENTING THE TOOL.- EASIER TO MANEUVER IN HARD TO SEE PLACES.- COLOR-CODED FOR EASY IDENTIFICATION.FASTER- COMES INDIVIDUALLY PACKAGED IN STERILE, PEEL OPEN POUCH, READY TO GO.- APPLIES, MANIPULATES, OR REMOVES CEMENT WITH FINGERTIP PRECISION.ECONOMICAL- THE COST OF A SINGLE REVISION DWARFS THE COST OF A SINGLE-USE CURETTE. - DISPOSABLE – THERE’S NO NEED TO WASTE TIME REMOVING HARDENED CEMENT OR RE-STERILIZING TOOLS.- LESS EXPENSIVE TO BUY AND INVENTORY - ORDER ONLY THE TOOL YOU USE.- PACKAGED 25 INDIVIDUALLY WRAPPED TOOLS TO A CARTON FOR CONVENIENT SHELF STORAGE.: Brand: WHITNEY NO-SCRATCH CURETTE (SMALL)

## (undated) DEVICE — BASIC SINGLE BASIN-LF: Brand: MEDLINE INDUSTRIES, INC.

## (undated) DEVICE — GLV SURG SENSICARE SLT PF LF 8.5 STRL

## (undated) DEVICE — PLASMABLADE PS200-040 4.0: Brand: PLASMABLADE™

## (undated) DEVICE — NDL HYPO ECLPS SFTY 18G 1 1/2IN

## (undated) DEVICE — DISPOSABLE TOURNIQUET CUFF 30"X4", 1-LINE, WHITE, STERILE, 1EA/PK, 10PK/CS: Brand: ASP MEDICAL

## (undated) DEVICE — LINER SURG CANSTR SXN S/RIGD 1500CC

## (undated) DEVICE — CVR LEG BOOTLEG F/R NOSKID 33IN

## (undated) DEVICE — PROXIMATE RH ROTATING HEAD SKIN STAPLERS (35 WIDE) CONTAINS 35 STAINLESS STEEL STAPLES: Brand: PROXIMATE

## (undated) DEVICE — UNDERCAST PADDING: Brand: DEROYAL

## (undated) DEVICE — Device: Brand: DEFENDO AIR/WATER/SUCTION AND BIOPSY VALVE

## (undated) DEVICE — GLV SURG BIOGEL LTX PF 8 1/2

## (undated) DEVICE — CONN JET HYDRA H20 AUXILIARY DISP

## (undated) DEVICE — GLV SURG SENSICARE PI PF LF 7 GRN STRL

## (undated) DEVICE — TOWEL,OR,DSP,ST,BLUE,STD,4/PK,20PK/CS: Brand: MEDLINE